# Patient Record
Sex: MALE | Race: WHITE | NOT HISPANIC OR LATINO | Employment: OTHER | URBAN - NONMETROPOLITAN AREA
[De-identification: names, ages, dates, MRNs, and addresses within clinical notes are randomized per-mention and may not be internally consistent; named-entity substitution may affect disease eponyms.]

---

## 2017-01-09 ENCOUNTER — OFFICE VISIT (OUTPATIENT)
Dept: GASTROENTEROLOGY | Facility: CLINIC | Age: 62
End: 2017-01-09

## 2017-01-09 VITALS
HEART RATE: 80 BPM | BODY MASS INDEX: 31.07 KG/M2 | RESPIRATION RATE: 18 BRPM | SYSTOLIC BLOOD PRESSURE: 158 MMHG | HEIGHT: 72 IN | DIASTOLIC BLOOD PRESSURE: 120 MMHG | WEIGHT: 229.4 LBS

## 2017-01-09 DIAGNOSIS — Z80.0 FAMILY HX OF COLON CANCER: ICD-10-CM

## 2017-01-09 DIAGNOSIS — I10 HTN (HYPERTENSION), BENIGN: ICD-10-CM

## 2017-01-09 DIAGNOSIS — Z86.010 HX OF COLONIC POLYPS: Primary | ICD-10-CM

## 2017-01-09 DIAGNOSIS — E11.9 CONTROLLED TYPE 2 DIABETES MELLITUS WITHOUT COMPLICATION, WITHOUT LONG-TERM CURRENT USE OF INSULIN (HCC): ICD-10-CM

## 2017-01-09 PROCEDURE — S0260 H&P FOR SURGERY: HCPCS | Performed by: CLINICAL NURSE SPECIALIST

## 2017-01-09 RX ORDER — GLIPIZIDE 5 MG/1
TABLET, FILM COATED, EXTENDED RELEASE ORAL
COMMUNITY
End: 2017-02-07

## 2017-01-09 NOTE — PROGRESS NOTES
Chief Complaint   Patient presents with   • Colonoscopy     screening      Subjective   HPI  Joao Fonseca is a 61 y.o. male who presents as a referral for preventative maintenance. He has no complaints of nausea or vomiting. No change in bowels. No wt loss. No BRBPR. No melena. There is a positive family hx for colon cancer in his mother. No abdominal pain.    Past Medical History   Diagnosis Date   • Arrhythmia    • Cancer      malignant chest tumor   • Cardiomyopathy 12/27/2016   • CHF (congestive heart failure)    • Coronary artery disease    • Diabetes mellitus    • Disorder of liver 8/12/2013   • History of adenomatous polyp of colon 06/28/2010     TUBULAR ADENOMA AT 40CM   • Hyperlipidemia    • Hypertension    • Pacemaker    • Shingles      Past Surgical History   Procedure Laterality Date   • Coronary artery bypass graft     • Cardiac catheterization     • Tonsillectomy     • Hernia repair     • Cardiac electrophysiology procedure Left 10/6/2016     Procedure: ICD DC new;  Surgeon: Zander Andino MD;  Location:  [facility];  Service:    • Ankle surgery Right    • Hemorrhoidectomy  1980   • Colonoscopy w/ polypectomy  06/28/2010     POLYP AT 40CM TUBULAR ADENOMA, SUBOPTIMAL PREP   • Pacemaker implantation     • Colonoscopy  06/28/2010     biopsy at 41, 2 mm polyp supboptimal prep right clon      Outpatient Prescriptions Marked as Taking for the 1/9/17 encounter (Office Visit) with DIOR Caceres   Medication Sig Dispense Refill   • acyclovir (ZOVIRAX) 400 MG tablet Take 1 tablet by mouth Daily. Take no more than 5 doses a day. 90 tablet 2   • aspirin 81 MG EC tablet Take 1 tablet by mouth Daily. 90 tablet 2   • furosemide (LASIX) 40 MG tablet Take 1 tablet by mouth Daily As Needed (edema, SOA). 90 tablet 2   • lisinopril (PRINIVIL,ZESTRIL) 20 MG tablet Take 1 tablet by mouth Daily. 90 tablet 2   • metFORMIN (GLUCOPHAGE) 1000 MG tablet 1 po daily in the am and 1/2 po daily  in the pm 180 tablet 2   • metoprolol tartrate (LOPRESSOR) 25 MG tablet Take 25 mg by mouth 2 (Two) Times a Day.     • nitroglycerin (NITROSTAT) 0.4 MG SL tablet 1 under the tongue as needed for angina, may repeat q5mins for up three doses 100 tablet 11     Allergies   Allergen Reactions   • Cephalexin Anaphylaxis     Social History     Social History   • Marital status:      Spouse name: N/A   • Number of children: N/A   • Years of education: N/A     Occupational History   • Not on file.     Social History Main Topics   • Smoking status: Former Smoker     Types: Cigarettes   • Smokeless tobacco: Never Used      Comment: QUIT 20 YEARS AGO   • Alcohol use No   • Drug use: No   • Sexual activity: Defer     Other Topics Concern   • Not on file     Social History Narrative     Family History   Problem Relation Age of Onset   • Diabetes Mother    • Colon cancer Mother    • Heart disease Father    • Hypertension Father    • Diabetes Father    • Cancer Father    • Bipolar disorder Sister    • Hypertension Brother    • Colon polyps Brother    • No Known Problems Son    • No Known Problems Maternal Grandmother    • Diabetes Maternal Grandfather    • No Known Problems Paternal Grandmother    • Diabetes Paternal Grandfather      Health Maintenance   Topic Date Due   • HEPATITIS C SCREENING  11/07/2016   • DIABETIC EYE EXAM  11/07/2016   • COLONOSCOPY  11/07/2016   • DIABETIC FOOT EXAM  11/07/2017   • LIPID PANEL  11/07/2017   • HEMOGLOBIN A1C  11/07/2017   • URINE MICROALBUMIN  11/07/2017   • TDAP/TD VACCINES (2 - Td) 11/07/2026   • PNEUMOCOCCAL VACCINE (19-64 MEDIUM RISK)  Addressed   • INFLUENZA VACCINE  Addressed   • ZOSTER VACCINE  Completed       REVIEW OF SYSTEMS  General: well appearing, no fever chills or sweats, no unexplained wt loss  HEENT: no acute visual or hearing disturbances  Cardiovascular: No chest pain or palpitations  Pulmonary: No shortness of breath, coughing, wheezing or hemoptysis  : No  "burning, urgency, hematuria, or dysuria  Musculoskeletal: No joint pain or stiffness  Peripheral: no edema  Skin: No lesions or rashes  Neuro: No dizziness, headaches, stroke, syncope  Endocrine: No hot or cold intolerances  Hematological: No blood dyscrasias    Objective   Vitals:    01/09/17 0824   BP: (!) 158/120   BP Location: Left arm   Patient Position: Sitting   Cuff Size: Adult   Pulse: 80   Resp: 18   Weight: 229 lb 6.4 oz (104 kg)   Height: 72\" (182.9 cm)     Body mass index is 31.11 kg/(m^2).    PHYSICAL EXAM  General: age appropriate well nourished well appearing, no acute distress  Head: normocephalic and atraumatic  Global assessment-supple  Neck-No JVD noted, no lymphadenopathy  Pulmonary-clear to auscultation bilaterally, normal respiratory effort  Cardiovascular-normal rate and rhythm, normal heart sounds, S1 and S2 noted  Abdomen-soft, non tender, non distended, normal bowel sounds all 4 quadrants, no hepatosplenomegaly noted  Extremities-No clubbing cyanosis or edema  Neuro-Non focal, converses appropriately, awake, alert, oriented    Assessment/Plan     Joao was seen today for colonoscopy.    Diagnoses and all orders for this visit:    Hx of colonic polyps  -     polyethylene glycol (GoLYTELY) 236 G solution; Starting at noon on day prior to procedure, drink 8 ounces every 30 minutes until all gone or stools are clear. May add flavor packet.  -     Case Request; Standing  -     Implement Anesthesia Orders Day of Procedure; Standing  -     Obtain Informed Consent; Standing  -     Verify Informed Consent; Standing  -     Verify bowel prep was successful; Standing  -     Case Request    Family hx of colon cancer  Comments:  in his mother    Controlled type 2 diabetes mellitus without complication, without long-term current use of insulin    HTN (hypertension), benign  Comments:  continue BP medication the day of procedure        COLONOSCOPY WITH ANESTHESIA (N/A)  Body mass index is 31.11 " kg/(m^2).    Patient instructions on prep prior to procedure provided to the patient.    All risks, benefits, alternatives, and indications of colonoscopy procedure have been discussed with the patient. Risks to include perforation of the colon requiring possible surgery or colostomy, risk of bleeding from biopsies or removal of colon tissue, possibility of missing a colon polyp or cancer, or adverse drug reaction.  Benefits to include the diagnosis and management of disease of the colon and rectum. Alternatives to include barium enema, radiographic evaluation, lab testing or no intervention. Pt verbalizes understanding and agrees.

## 2017-01-09 NOTE — MR AVS SNAPSHOT
Joao Cuencaer   1/9/2017 2:00 PM   Office Visit    Dept Phone:  [phone]   Encounter #:  [id number]    Provider:  DIOR Caceres   Department:  [facility] GASTROENTEROLOGY                Your Full Care Plan              Today's Medication Changes          These changes are accurate as of: 1/9/17  9:03 AM.  If you have any questions, ask your nurse or doctor.               New Medication(s)Ordered:     polyethylene glycol 236 G solution   Commonly known as:  GoLYTELY   Starting at noon on day prior to procedure, drink 8 ounces every 30 minutes until all gone or stools are clear. May add flavor packet.   Started by:  DIOR Caceres            Where to Get Your Medications      These medications were sent to [facility]/pharmacy #[id number] - [place], [place] - [address] [place] [address] - [phone]  - [phone] [address] [address]     Phone:  [phone]     polyethylene glycol 236 G solution                  Your Updated Medication List          This list is accurate as of: 1/9/17  9:03 AM.  Always use your most recent med list.                acyclovir 400 MG tablet   Commonly known as:  ZOVIRAX   Take 1 tablet by mouth Daily. Take no more than 5 doses a day.       aspirin 81 MG EC tablet   Take 1 tablet by mouth Daily.       furosemide 40 MG tablet   Commonly known as:  LASIX   Take 1 tablet by mouth Daily As Needed (edema, SOA).       glipiZIDE 5 MG ER tablet   Commonly known as:  GLUCOTROL       lisinopril 20 MG tablet   Commonly known as:  PRINIVIL,ZESTRIL   Take 1 tablet by mouth Daily.       metFORMIN 1000 MG tablet   Commonly known as:  GLUCOPHAGE   1 po daily in the am and 1/2 po daily in the pm       metoprolol tartrate 25 MG tablet   Commonly known as:  LOPRESSOR       nitroglycerin 0.4 MG SL tablet   Commonly known as:  NITROSTAT   1 under the tongue as needed for angina, may repeat q5mins for up three doses       polyethylene  glycol 236 G solution   Commonly known as:  GoLYTELY   Starting at noon on day prior to procedure, drink 8 ounces every 30 minutes until all gone or stools are clear. May add flavor packet.               We Performed the Following     Case Request       You Were Diagnosed With        Codes Comments    Hx of colonic polyps    -  Primary ICD-10-CM: Z86.010  ICD-9-CM: V12.72     Family hx of colon cancer     ICD-10-CM: Z80.0  ICD-9-CM: V16.0 in his mother    Controlled type 2 diabetes mellitus without complication, without long-term current use of insulin     ICD-10-CM: E11.9  ICD-9-CM: 250.00     HTN (hypertension), benign     ICD-10-CM: I10  ICD-9-CM: 401.1 continue BP medication the day of procedure      Instructions     None    Patient Instructions History      Upcoming Appointments     Visit Type Date Time Department    SCOPE SCREENING 1/9/2017  2:00 PM [facility]    FOLLOW UP 2/7/2017  7:00 AM [facility]    FOLLOW UP 3/29/2017  8:00 AM [facility]      MyChart Signup     Our records indicate that you have declined [employer] signup. If you would like to sign up for [employer], please email [email or web address] or call [phone] to obtain an activation code.             Other Info from Your Visit           Your Appointments     Jan 09, 2017  2:00 PM CST   Scope Screen with DIOR Caceres   [facility] GASTROENTEROLOGY (--)    [address]  [facility] [address] [phone]            Feb 07, 2017  7:00 AM CST   Follow Up with Hira Alvarez MD   [facility] FAM[facility] MEDICINE (--)    [address] [place] [id number] [phone]           Arrive 15 minutes prior to appointment.            Mar 29, 2017  8:00 AM CDT   Follow Up with [facility] NP   [facility] (--)    [address]  [facility] [address] [phone]           Arrive 15 minutes prior to  "appointment.              Allergies     Cephalexin  Anaphylaxis      Reason for Visit     Colonoscopy screening       Vital Signs     Blood Pressure Pulse Respirations Height Weight Body Mass Index    158/120 (BP Location: Left arm, Patient Position: Sitting, Cuff Size: Adult) 80 18 72\" (182.9 cm) 229 lb 6.4 oz (104 kg) 31.11 kg/m2    Smoking Status                   Former Smoker           Problems and Diagnoses Noted     Controlled type 2 diabetes mellitus without complication, without long-term current use of insulin    Family hx of colon cancer    HTN (hypertension), benign    History of colonic polyps        "

## 2017-01-12 ENCOUNTER — HOSPITAL ENCOUNTER (OUTPATIENT)
Facility: HOSPITAL | Age: 62
Setting detail: HOSPITAL OUTPATIENT SURGERY
End: 2017-01-12
Attending: INTERNAL MEDICINE | Admitting: INTERNAL MEDICINE

## 2017-02-02 DIAGNOSIS — I10 ESSENTIAL HYPERTENSION: ICD-10-CM

## 2017-02-02 RX ORDER — LISINOPRIL 20 MG/1
20 TABLET ORAL DAILY
Qty: 90 TABLET | Refills: 3 | Status: SHIPPED | OUTPATIENT
Start: 2017-02-02 | End: 2018-01-05 | Stop reason: SDUPTHER

## 2017-02-07 ENCOUNTER — OFFICE VISIT (OUTPATIENT)
Dept: FAMILY MEDICINE CLINIC | Facility: CLINIC | Age: 62
End: 2017-02-07

## 2017-02-07 VITALS
OXYGEN SATURATION: 99 % | SYSTOLIC BLOOD PRESSURE: 128 MMHG | HEART RATE: 64 BPM | TEMPERATURE: 97.8 F | WEIGHT: 225.4 LBS | BODY MASS INDEX: 30.53 KG/M2 | HEIGHT: 72 IN | RESPIRATION RATE: 18 BRPM | DIASTOLIC BLOOD PRESSURE: 72 MMHG

## 2017-02-07 DIAGNOSIS — L30.9 ECZEMA, UNSPECIFIED TYPE: Primary | ICD-10-CM

## 2017-02-07 DIAGNOSIS — E11.9 TYPE 2 DIABETES MELLITUS WITH HEMOGLOBIN A1C GOAL OF LESS THAN 7.5% (HCC): ICD-10-CM

## 2017-02-07 DIAGNOSIS — I10 HTN (HYPERTENSION), BENIGN: ICD-10-CM

## 2017-02-07 DIAGNOSIS — B35.1 ONYCHOMYCOSIS: ICD-10-CM

## 2017-02-07 DIAGNOSIS — I50.22 SYSTOLIC HEART FAILURE, CHRONIC (HCC): ICD-10-CM

## 2017-02-07 DIAGNOSIS — Z95.810 AICD (AUTOMATIC CARDIOVERTER/DEFIBRILLATOR) PRESENT: ICD-10-CM

## 2017-02-07 LAB — HBA1C MFR BLD: 7.6 %

## 2017-02-07 PROCEDURE — 83036 HEMOGLOBIN GLYCOSYLATED A1C: CPT | Performed by: FAMILY MEDICINE

## 2017-02-07 PROCEDURE — 99214 OFFICE O/P EST MOD 30 MIN: CPT | Performed by: FAMILY MEDICINE

## 2017-02-07 RX ORDER — GLIPIZIDE 5 MG/1
5 TABLET, FILM COATED, EXTENDED RELEASE ORAL DAILY
Qty: 30 TABLET | Refills: 2 | Status: SHIPPED | OUTPATIENT
Start: 2017-02-07 | End: 2017-02-07

## 2017-02-07 RX ORDER — CLOBETASOL PROPIONATE 0.5 MG/G
CREAM TOPICAL 2 TIMES DAILY
Qty: 15 G | Refills: 0 | Status: SHIPPED | OUTPATIENT
Start: 2017-02-07 | End: 2017-08-24

## 2017-02-07 NOTE — PROGRESS NOTES
Chief Complaint   Patient presents with   • Follow-up     Patient said there are no problems today.        History:  Joao Fonseca is a 61 y.o. male presents who today for evaluation of the above problems.  PCP currently listed as Hira Alvarez MD.     Heart is in rhythm, showing good rate of 64. No chest pain, No new SOA actually improved from last OV.  Weight is down 4 lb from last OV and 4 pounds from prior OV.  Total of 8 lb drop from 12/29/16.  BP is stable at 128/72.  No refills needed of any medications.  Still using Baby ASA,  Lasix intermittently when needed.  Lopressor regularly.  Not needing nitro.  They gave him 100 pills and he was uncertain why he needed this many pills.  SS started and they stopped his disability.  He talked with an  and working on seeing why this process happened.  Last 1C 7.5 11/7/16.  Still taking metformin.  We are okay to keep his A1C <7.5.  He is on metformin and tolerating this well.  On Ace-I with lisinopril.  He is feeling better now than he has in a long time.  New problem Eczematous Rash:  Right anterior shin #2 lesions and along entirety of tibia.  Present on thighs. Worse with stress, worse in winter.  Improves with triamcinolone but takes a long time.  We discussed R/B/A to clobetasol and he wants to try the stronger agent. Aware to not occlude the chemical on the shins.  Use as needed avoid getting into the eye.  He is going to [place] soon.  Mild tenderness along anterior chest wall at location where he had his median sternotomy and along the left anterior chest.  He has had chronic shoulder pain/stiffness since the procedure.  He has some frozen shoulder bilaterally.  Due for colonoscopy 04/2017    Joao Fonseca  has a past medical history of Arrhythmia; Cancer; Cardiomyopathy (12/27/2016); CHF (congestive heart failure); Coronary artery disease; Diabetes mellitus; Disorder of liver (8/12/2013); History of adenomatous polyp of colon (06/28/2010);  Hyperlipidemia; Hypertension; Pacemaker; and Shingles.    Allergies   Allergen Reactions   • Cephalexin Anaphylaxis     Past Medical History   Diagnosis Date   • Arrhythmia    • Cancer      malignant chest tumor   • Cardiomyopathy 12/27/2016   • CHF (congestive heart failure)    • Coronary artery disease    • Diabetes mellitus    • Disorder of liver 8/12/2013   • History of adenomatous polyp of colon 06/28/2010     TUBULAR ADENOMA AT 40CM   • Hyperlipidemia    • Hypertension    • Pacemaker    • Shingles      Past Surgical History   Procedure Laterality Date   • Coronary artery bypass graft     • Cardiac catheterization     • Tonsillectomy     • Hernia repair     • Cardiac electrophysiology procedure Left 10/6/2016     Procedure: ICD DC new;  Surgeon: Zander Andino MD;  Location:  [facility];  Service:    • Ankle surgery Right    • Hemorrhoidectomy  1980   • Colonoscopy w/ polypectomy  06/28/2010     POLYP AT 40CM TUBULAR ADENOMA, SUBOPTIMAL PREP   • Pacemaker implantation     • Colonoscopy  06/28/2010     biopsy at 41, 2 mm polyp supboptimal prep right clon      Family History   Problem Relation Age of Onset   • Diabetes Mother    • Colon cancer Mother    • Heart disease Father    • Hypertension Father    • Diabetes Father    • Cancer Father    • Bipolar disorder Sister    • Hypertension Brother    • Colon polyps Brother    • No Known Problems Son    • No Known Problems Maternal Grandmother    • Diabetes Maternal Grandfather    • No Known Problems Paternal Grandmother    • Diabetes Paternal Grandfather        Current Outpatient Prescriptions on File Prior to Visit   Medication Sig Dispense Refill   • acyclovir (ZOVIRAX) 400 MG tablet Take 1 tablet by mouth Daily. Take no more than 5 doses a day. 90 tablet 2   • aspirin 81 MG EC tablet Take 1 tablet by mouth Daily. 90 tablet 2   • furosemide (LASIX) 40 MG tablet Take 1 tablet by mouth Daily As Needed (edema, SOA). 90 tablet 2   • lisinopril  "(PRINIVIL,ZESTRIL) 20 MG tablet Take 1 tablet by mouth Daily. 90 tablet 3   • metFORMIN (GLUCOPHAGE) 1000 MG tablet 1 po daily in the am and 1/2 po daily in the pm 180 tablet 2   • metoprolol tartrate (LOPRESSOR) 25 MG tablet Take 1 tablet by mouth 2 (Two) Times a Day. 180 tablet 3   • nitroglycerin (NITROSTAT) 0.4 MG SL tablet 1 under the tongue as needed for angina, may repeat q5mins for up three doses 100 tablet 11   • [DISCONTINUED] glipiZIDE (GLUCOTROL) 5 MG ER tablet Take 5 mg by mouth daily.     • [DISCONTINUED] polyethylene glycol (GoLYTELY) 236 G solution Starting at noon on day prior to procedure, drink 8 ounces every 30 minutes until all gone or stools are clear. May add flavor packet. 4000 mL 0     No current facility-administered medications on file prior to visit.         ROS:  Review of Systems   Constitutional: Negative for activity change, appetite change and chills.   HENT: Negative for congestion, dental problem and drooling.    Eyes: Negative for pain, discharge and itching.   Respiratory: Negative for apnea, choking and chest tightness.    Cardiovascular: Negative for chest pain, palpitations and leg swelling.   Gastrointestinal: Negative for abdominal distention, abdominal pain and anal bleeding.   Endocrine: Negative for cold intolerance, heat intolerance and polydipsia.   Genitourinary: Negative for difficulty urinating, dysuria and enuresis.   Musculoskeletal: Negative for arthralgias, back pain and gait problem.   Skin: Negative for color change, pallor and rash.   Neurological: Negative for dizziness and facial asymmetry.   Hematological: Negative for adenopathy. Does not bruise/bleed easily.   Psychiatric/Behavioral: Negative for agitation, behavioral problems and confusion.       OBJECTIVE:  Visit Vitals   • /72 (BP Location: Left arm, Patient Position: Sitting, Cuff Size: Large Adult)   • Pulse 64   • Temp 97.8 °F (36.6 °C) (Oral)   • Resp 18   • Ht 72\" (182.9 cm)   • Wt 225 lb " 6.4 oz (102 kg)   • SpO2 99%   • BMI 30.57 kg/m2      Physical Exam   Constitutional: He is oriented to person, place, and time. He appears well-developed and well-nourished.   HENT:   Head: Normocephalic and atraumatic.   Right Ear: External ear normal.   Left Ear: External ear normal.   Nose: Nose normal.   Mouth/Throat: Oropharynx is clear and moist.   Head- normocephalic and atraumatic.  Neck- without visible/palpable lumps or pulsations.  Palpation- No bony tenderness about head/neck along frontal, occipital, temporal, parietal, mastoid, jawline, zygoma, orbit or any other location.  NO temporal artery tenderness. No TMJ tenderness. Neck Supple.  Thyroid-No thyromegaly, no nodules     Lesion on the chin is resolved, he has no visible defect now. Nothing to I+D or biopsy.    Eyes: Conjunctivae and EOM are normal. Pupils are equal, round, and reactive to light.   Neck: Normal range of motion. Neck supple. No thyromegaly present.   Cardiovascular: Normal rate, regular rhythm and intact distal pulses.  PMI is displaced (mildly inferiorly and laterally).  Exam reveals distant heart sounds.    No murmur heard.   No systolic murmur is present   Distant heart sounds.    Pulmonary/Chest: Effort normal and breath sounds normal. No respiratory distress. He has no wheezes. He exhibits no tenderness.   Abdominal: Soft. Bowel sounds are normal. There is no tenderness. There is no rebound and no guarding.   Musculoskeletal: Normal range of motion. He exhibits no tenderness.   Lymphadenopathy:     He has no cervical adenopathy.   Neurological: He is alert and oriented to person, place, and time. No cranial nerve deficit. Coordination normal.   Skin: Skin is warm and dry. No rash noted.   Right anterior shin with #2 nummular type lesions 3.5 x 2.5 cm distally, proximally 3 x 2 cm mid shin.  All along tibia inflammatory, erythematous process.  Bilateral anterior thigh with xeroderma, excoriation and appearance of dry skin with  underlying eczematous process.  Spared around wasteline.  Similar lesions along bilateral lateral epicondyle L>R.    Psychiatric: He has a normal mood and affect. His behavior is normal. Judgment and thought content normal.   Nursing note and vitals reviewed.      Assessment/Plan:  Eczema, unspecified type  -     clobetasol (TEMOVATE) 0.05 % cream; Apply  topically 2 (Two) Times a Day.    Onychomycosis: R/B/A to meds d/w patient, SE reviewed, handout offered regarding medications listed.    New problem.  We will address this today. D/w patient jublia, ciclopirox and we will try jublia.  He is not interested in lamisil.   -     Efinaconazole (JUBLIA) 10 % solution; Apply 1 drop topically Daily. Every 7th day wipe off with alcohol.  For great toenail use 2 drops.    AICD (automatic cardioverter/defibrillator) present: tenderness anterior chest wall and along sternal scar and along the left pocket.  I discussed with patient to consider topical shingles cream sparingly over scar.      HTN (hypertension), benign: Chronic and stable problem. Using medications as prescribed.  Discussed need to take regularly as prescribed, to avoid missing doses as able.  Medications reviewed with patient today. We discussed cessation/continuation of medications for current problem.  Needed Rx refills will be provided.  No side effects from medications.  Risks/benefits to current therapy discussed      Systolic heart failure, chronic: Chronic and stable problem. Using medications as prescribed.  Discussed need to take regularly as prescribed, to avoid missing doses as able.  Medications reviewed with patient today. We discussed cessation/continuation of medications for current problem.  Needed Rx refills will be provided.  No side effects from medications.  Risks/benefits to current therapy discussed.       Type 2 diabetes mellitus with hemoglobin A1c goal of less than 7.5%: Diabetes Type 2:  poorly controlled. Insulin non dependent.  Nephropathy, Retinopathy, Neuropahty status discussed.  Reviewed goals of Diabetes today.  The Goal is to have an Hgb A1C <7.0 for most patients.  Good BP control is encouraged with Goal BP based on JNC 8 guidelines 2014 of SBP <140 and DBP <90.  At goal: yes.  Discussed role of Ace-I and ARB with DM.  DM imparts risk equivalence for CAD based on ATP III.  Current guidelines support moderate intensity statin with goal of 30-50% reduction in LDL unless 10 yr risk ASCVD >7.5 then high intensity should be used. Close monitoring of Lipid levels encouraged. Recommend once yearly eye evaluation by optometry or ophthalmology.  Good foot health discussed and foot exam completed today and listed in examination.  Recommended to monitor toe health, wear good shoes, cut nails straight across and tend calluses as listed.  Take medications as encouraged.  Monitor blood sugars as encouraged and bring log to future meetings. Weight needs to be monitored. Monitor portions and caloric intake.    · Pneumovax frequency discussed.    · Patients <65 years old should have PPSV 23 q 5 years x 2 up to age 65 then once after age 65.    · Prevnar at age 65 and then 1 year later PPSV 23.    · Recent data suggest there may be a link between metformin and B12.  D/W patient to consider evaluation for b12 deficiency.  · Recommend yearly microalbumin  · Yearly eye evaluation  · Discussed importance of Ace-I and ARB  · Monitor portions  · Work on regular exercise and diet to keep BMI in appropriate range.   · Lab review: labs are reviewed, up to date and normal except a1c today at 7.6.  We will have him add new agent.  Discussed glipizde initially.  With heart heart health, he may benefit more from jardiance. R/B/A to meds d/w patient, SE reviewed, handout offered regarding medications listed.      · See orders for this visit as documented in the electronic medical record. Diabetic issues reviewed with male: all medications, side effects and  compliance discussed carefully, foot care discussed and Podiatry visits discussed, annual eye examinations at Ophthalmology discussed, glycohemoglobin and other lab monitoring discussed and long term diabetic complications discussed.   -     POC Glycosylated Hemoglobin (Hb A1C)  -     Empagliflozin (JARDIANCE) 10 MG tablet; Take 10 mg by mouth Daily.            Risks/benefits of current and new medications discussed with the patient and or family today.  The patient/family are aware and accept that if there any side effects they should call or return to clinic as soon as possible.  Appropriate F/U discussed for topics addressed today. All questions were answered to the satisfactory state of patient/family.  Should symptoms fail to improve or worsen they agree to call or return to clinic or to go to the ER. Education handouts were offered on any new Rx if requested.  Discussed the importance of following up with any needed screening tests/labs/specialist appointments and any requested follow-up recommended by me today.  Importance of maintaining follow-up discussed and patient accepts that missed appointments can delay diagnosis and potentially lead to worsening of conditions.    An After Visit Summary was printed and given to the patient at discharge.  Return in about 1 month (around 3/7/2017).         Hira Alvarez M.D. 2/7/2017

## 2017-02-13 ENCOUNTER — TELEPHONE (OUTPATIENT)
Dept: FAMILY MEDICINE CLINIC | Facility: CLINIC | Age: 62
End: 2017-02-13

## 2017-02-13 NOTE — TELEPHONE ENCOUNTER
Patient said since he has started jardiance and he has been having dizzy spells. He blacked out last night and said he is not going to continue the medication. He said he will change his diet to control his sugars.

## 2017-02-15 ENCOUNTER — TELEPHONE (OUTPATIENT)
Dept: CARDIOLOGY | Facility: CLINIC | Age: 62
End: 2017-02-15

## 2017-02-15 NOTE — TELEPHONE ENCOUNTER
Patients pcp started him on Jardiance? and he said he became dehydrated and blood pressure dropped making his defibrillator gave him a shock. He wanted you to know this and wants to know if it will be safe to travel to [place] for 2 weeks. Please advise.

## 2017-02-16 NOTE — TELEPHONE ENCOUNTER
So long as he is feeling back to normal at this time, no obvious reason why he could not travel to [place].      Thanks

## 2017-04-13 ENCOUNTER — TELEPHONE (OUTPATIENT)
Dept: GASTROENTEROLOGY | Facility: CLINIC | Age: 62
End: 2017-04-13

## 2017-04-13 NOTE — TELEPHONE ENCOUNTER
I called patient because he was showing up on [facility] for a colon tomorrow but not on our paper schedule I called wife and she said she called and cancelled that due to patients insurance will not cover procedure at this time.

## 2017-04-28 ENCOUNTER — TELEPHONE (OUTPATIENT)
Dept: GASTROENTEROLOGY | Facility: CLINIC | Age: 62
End: 2017-04-28

## 2017-06-13 ENCOUNTER — TELEPHONE (OUTPATIENT)
Dept: CARDIOLOGY | Facility: CLINIC | Age: 62
End: 2017-06-13

## 2017-06-13 ENCOUNTER — OFFICE VISIT (OUTPATIENT)
Dept: FAMILY MEDICINE CLINIC | Facility: CLINIC | Age: 62
End: 2017-06-13

## 2017-06-13 VITALS
HEIGHT: 72 IN | HEART RATE: 90 BPM | OXYGEN SATURATION: 98 % | DIASTOLIC BLOOD PRESSURE: 80 MMHG | RESPIRATION RATE: 18 BRPM | WEIGHT: 232 LBS | BODY MASS INDEX: 31.42 KG/M2 | TEMPERATURE: 97.9 F | SYSTOLIC BLOOD PRESSURE: 128 MMHG

## 2017-06-13 DIAGNOSIS — I83.90 VARICOSITIES OF LEG: ICD-10-CM

## 2017-06-13 DIAGNOSIS — M25.521 RIGHT ELBOW PAIN: Primary | ICD-10-CM
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
## 2017-06-13 PROCEDURE — 99214 OFFICE O/P EST MOD 30 MIN: CPT | Performed by: FAMILY MEDICINE

## 2017-06-13 NOTE — TELEPHONE ENCOUNTER
Patient has appointment to see a chiropractor for a possible pinched nerve in his hip and also seeing pcp for gout. He would like to know if he can take inflammatories and ibuprofen? Please advise.

## 2017-06-13 NOTE — TELEPHONE ENCOUNTER
In general, I would avoid use of NSAIDs, including ibuprofen with ha history of CAD and CHF, if at all possible.

## 2017-06-13 NOTE — PROGRESS NOTES
Chief Complaint   Patient presents with   • Elbow Pain     started last night right elbow    • Tick Removal     patient reports that he removed a tick from his back a few days ago        History:  Joao Fonseca is a 62 y.o. male presents who today for evaluation of the above problems.  PCP currently listed as Hira Alvarez MD.   Right elbow pain 24 hours. Minimally edematous, mildly hot to touch.  NO obvious/remembered trauma, no pain, no injury.  His wife thinks he did hit this on the edge of the pool when he fell, but he thinks he would remember.  Pain with resting elbow on anything. No pain with flexion/extension of his elbow.  Swelling is better today but was worse in past 3-4 days.  Pain is minimal unless resting elbow on surface/touching area.  He has history of gout previously. 4 days ago sitting at pool.  He has not built deck yet.  He stood on bottom of step that was cracked and the step broke.  When it broke he bruised bottoms of feet and hit elbow on the pool.  He had tick on his back overnight <24 hours that he removed.  He has a 6cm x 4cm erythematous region that is not an EM rash along the left upper back.  Prominent varicose veins.  Watns to see specialist.  Leg heaviness fullness and edema.     Joao Fonseca  has a past medical history of Arrhythmia; Cancer; Cardiomyopathy (12/27/2016); CHF (congestive heart failure); Coronary artery disease; Diabetes mellitus; Disorder of liver (8/12/2013); History of adenomatous polyp of colon (06/28/2010); Hyperlipidemia; Hypertension; Pacemaker; and Shingles.    Allergies   Allergen Reactions   • Cephalexin Anaphylaxis     Past Medical History:   Diagnosis Date   • Arrhythmia    • Cancer     malignant chest tumor   • Cardiomyopathy 12/27/2016   • CHF (congestive heart failure)    • Coronary artery disease    • Diabetes mellitus    • Disorder of liver 8/12/2013   • History of adenomatous polyp of colon 06/28/2010    TUBULAR ADENOMA AT 40CM   •  Hyperlipidemia    • Hypertension    • Pacemaker    • Shingles      Past Surgical History:   Procedure Laterality Date   • ANKLE SURGERY Right    • CARDIAC CATHETERIZATION     • CARDIAC ELECTROPHYSIOLOGY PROCEDURE Left 10/6/2016    Procedure: ICD DC new;  Surgeon: Zander Andino MD;  Location:  [facility];  Service:    • COLONOSCOPY  06/28/2010    biopsy at 41, 2 mm polyp supboptimal prep right clon    • COLONOSCOPY W/ POLYPECTOMY  06/28/2010    POLYP AT 40CM TUBULAR ADENOMA, SUBOPTIMAL PREP   • CORONARY ARTERY BYPASS GRAFT     • HEMORRHOIDECTOMY  1980   • HERNIA REPAIR     • PACEMAKER IMPLANTATION     • TONSILLECTOMY       Family History   Problem Relation Age of Onset   • Diabetes Mother    • Colon cancer Mother    • Heart disease Father    • Hypertension Father    • Diabetes Father    • Cancer Father    • Bipolar disorder Sister    • Hypertension Brother    • Colon polyps Brother    • No Known Problems Son    • No Known Problems Maternal Grandmother    • Diabetes Maternal Grandfather    • No Known Problems Paternal Grandmother    • Diabetes Paternal Grandfather        Current Outpatient Prescriptions on File Prior to Visit   Medication Sig Dispense Refill   • acyclovir (ZOVIRAX) 400 MG tablet Take 1 tablet by mouth Daily. Take no more than 5 doses a day. 90 tablet 2   • aspirin 81 MG EC tablet Take 1 tablet by mouth Daily. 90 tablet 2   • clobetasol (TEMOVATE) 0.05 % cream Apply  topically 2 (Two) Times a Day. 15 g 0   • Efinaconazole (JUBLIA) 10 % solution Apply 1 drop topically Daily. Every 7th day wipe off with alcohol.  For great toenail use 2 drops. 8 mL 2   • furosemide (LASIX) 40 MG tablet Take 1 tablet by mouth Daily As Needed (edema, SOA). 90 tablet 2   • lisinopril (PRINIVIL,ZESTRIL) 20 MG tablet Take 1 tablet by mouth Daily. 90 tablet 3   • metFORMIN (GLUCOPHAGE) 1000 MG tablet 1 po daily in the am and 1/2 po daily in the pm 180 tablet 2   • metoprolol tartrate (LOPRESSOR) 25 MG  "tablet Take 1 tablet by mouth 2 (Two) Times a Day. 180 tablet 3   • nitroglycerin (NITROSTAT) 0.4 MG SL tablet 1 under the tongue as needed for angina, may repeat q5mins for up three doses 100 tablet 11     No current facility-administered medications on file prior to visit.        Family history, surgical history, past medical history, Allergies and meds reviewed with patient today and updated in [facility] EMR.     ROS:  Review of Systems   Constitutional: Negative.    HENT: Negative.    Eyes: Negative.    Respiratory: Negative.    Cardiovascular: Negative.    Gastrointestinal: Negative.    Endocrine: Negative.    Genitourinary: Negative.    Musculoskeletal: Positive for joint swelling.   Skin: Negative.    Allergic/Immunologic: Negative.    Neurological: Negative.    Hematological: Negative.    Psychiatric/Behavioral: Negative.        OBJECTIVE:  Vitals:    06/13/17 1441   BP: 128/80   BP Location: Left arm   Patient Position: Sitting   Cuff Size: Adult   Pulse: 90   Resp: 18   Temp: 97.9 °F (36.6 °C)   TempSrc: Oral   SpO2: 98%   Weight: 232 lb (105 kg)   Height: 72\" (182.9 cm)     Physical Exam   Constitutional: He is oriented to person, place, and time. He appears well-developed and well-nourished.   HENT:   Head: Normocephalic and atraumatic.   Right Ear: External ear normal.   Left Ear: External ear normal.   Nose: Nose normal.   Mouth/Throat: Oropharynx is clear and moist.   Head- normocephalic and atraumatic.  Neck- without visible/palpable lumps or pulsations.  Palpation- No bony tenderness about head/neck along frontal, occipital, temporal, parietal, mastoid, jawline, zygoma, orbit or any other location.  NO temporal artery tenderness. No TMJ tenderness. Neck Supple.  Thyroid-No thyromegaly, no nodules      Eyes: Conjunctivae and EOM are normal. Pupils are equal, round, and reactive to light.   Neck: Normal range of motion. Neck supple. No thyromegaly present.   Cardiovascular: Normal rate, regular rhythm, " normal heart sounds and intact distal pulses.  PMI is displaced (mildly inferiorly and laterally).    No murmur heard.   No systolic murmur is present   Distant heart sounds.    Pulmonary/Chest: Effort normal and breath sounds normal. No respiratory distress. He has no wheezes. He exhibits no tenderness.   Abdominal: Soft. Bowel sounds are normal. There is no tenderness. There is no rebound and no guarding.   Musculoskeletal: Normal range of motion.        Right shoulder: He exhibits normal range of motion, no tenderness, no bony tenderness, no pain and no spasm.        Left shoulder: He exhibits normal range of motion, no tenderness, no bony tenderness, no pain and no spasm.        Right elbow: He exhibits normal range of motion, no swelling and no effusion. Tenderness found. Olecranon process tenderness noted. No radial head, no medial epicondyle and no lateral epicondyle tenderness noted.        Left elbow: He exhibits normal range of motion, no swelling, no effusion, no deformity and no laceration. No tenderness found. No medial epicondyle, no lateral epicondyle and no olecranon process tenderness noted.        Right wrist: He exhibits normal range of motion, no tenderness and no bony tenderness.   Lymphadenopathy:     He has no cervical adenopathy.   Neurological: He is alert and oriented to person, place, and time. No cranial nerve deficit. Coordination normal.   Skin: Skin is warm and dry. No rash noted.   Prominent varicosities bilaterally.    6cm x 4cm erythematous region without embedded tick.  Not EM rash.  Localized bite reaction likely. Present along left upper back just at base of scapula.    Psychiatric: He has a normal mood and affect. His behavior is normal. Judgment and thought content normal.   Nursing note and vitals reviewed.      Assessment/Plan:  Right elbow pain: Etiology unknown.  No obvious bursitis, no epicondylitis either.  He thinks he may have hit it on the pool deck.  I will treat as  if this is a contusion for now.  NO imaging today.  F/U in 1 week if not improving or sooner if worsening so we can consider imaging the joint.  No rubor/calor/dolor.    -     NON FORMULARY; ibuprofren 5%, baclofen 2%, gabapentin 6%, lidocaine 2.5%, sarapin 5%.    Varicosities of leg: Prominent varicosities, history of stripping veins. Not wearing hosing. L>R symptoms. Discussed referral to vein center.  Initially he was okay with this.  Called back after OV today and noted he wanted to go to Vascular surgery instead. Chronic problem worsening.   -     Cancel: Ambulatory Referral to Vascular Surgery  -     Ambulatory Referral to Vascular Surgery    Tick bite, initial encounter: he had a tick removed 2 days ago present on body <48 hours.  Localized pruritis else no rash, no HA, no other joint pain.   -     NON FORMULARY; ibuprofren 5%, baclofen 2%, gabapentin 6%, lidocaine 2.5%, sarapin 5%.      Risks/benefits of current and new medications discussed with the patient and or family today.  The patient/family are aware and accept that if there any side effects they should call or return to clinic as soon as possible.  Appropriate F/U discussed for topics addressed today. All questions were answered to the satisfactory state of patient/family.  Should symptoms fail to improve or worsen they agree to call or return to clinic or to go to the ER. Education handouts were offered on any new Rx if requested.  Discussed the importance of following up with any needed screening tests/labs/specialist appointments and any requested follow-up recommended by me today.  Importance of maintaining follow-up discussed and patient accepts that missed appointments can delay diagnosis and potentially lead to worsening of conditions.    An After Visit Summary was printed and given to the patient at discharge.  Follow-up: Return if symptoms worsen or fail to improve.         Hira Alvarez M.D. 6/13/2017

## 2017-06-14 NOTE — TELEPHONE ENCOUNTER
Told patient what Dr Henry said. He said his pharmacist is mixing a cream for him that will be put on topically for pain.

## 2017-08-24 ENCOUNTER — OFFICE VISIT (OUTPATIENT)
Dept: CARDIOLOGY | Facility: CLINIC | Age: 62
End: 2017-08-24

## 2017-08-24 VITALS
BODY MASS INDEX: 31.29 KG/M2 | DIASTOLIC BLOOD PRESSURE: 82 MMHG | HEIGHT: 72 IN | WEIGHT: 231 LBS | SYSTOLIC BLOOD PRESSURE: 123 MMHG | HEART RATE: 65 BPM

## 2017-08-24 DIAGNOSIS — I50.22 SYSTOLIC HEART FAILURE, CHRONIC (HCC): Primary | ICD-10-CM

## 2017-08-24 DIAGNOSIS — I10 HTN (HYPERTENSION), BENIGN: ICD-10-CM

## 2017-08-24 DIAGNOSIS — I25.10 ATHEROSCLEROSIS OF NATIVE CORONARY ARTERY OF NATIVE HEART WITHOUT ANGINA PECTORIS: ICD-10-CM

## 2017-08-24 PROCEDURE — 93000 ELECTROCARDIOGRAM COMPLETE: CPT | Performed by: INTERNAL MEDICINE

## 2017-08-24 PROCEDURE — 99214 OFFICE O/P EST MOD 30 MIN: CPT | Performed by: INTERNAL MEDICINE

## 2017-08-24 NOTE — PROGRESS NOTES
Subjective:     Encounter Date: 08/24/2017      Patient ID: Joao Fonseca is a 62 y.o. male with coronary artery disease, status post coronary artery bypass grafting consisting of left internal mammary to left anterior descending artery, saphenous vein graft to the first obtuse marginal, saphenous vein graft to the diagonal, chronic systolic heart failure (ICD implant place per Dr. Andino), hypertension who presents today for routine follow-up.    Chief Complaint: Routine follow-up    Coronary Artery Disease   Presents for follow-up visit. Symptoms include chest pain (Rare, sharp, left-sided, occurring at random times, lasting seconds) and shortness of breath (Only with exertion, stable). Pertinent negatives include no chest pressure, dizziness, leg swelling, muscle weakness or palpitations. His past medical history is significant for CHF. The symptoms have been stable. Compliance with diet is good. Compliance with exercise is good. Compliance with medications is good.   Congestive Heart Failure   Presents for follow-up visit. Associated symptoms include chest pain (Rare, sharp, left-sided, occurring at random times, lasting seconds), fatigue (occasional) and shortness of breath (Only with exertion, stable). Pertinent negatives include no abdominal pain, chest pressure, claudication, edema, muscle weakness, near-syncope, nocturia, orthopnea, palpitations, paroxysmal nocturnal dyspnea or unexpected weight change. The symptoms have been stable. The pain is mild. The pain is present in the lateral region. The quality of the pain is described as sharp. The pain does not radiate. Chest pain occurs at rest. His past medical history is significant for CAD. Compliance with total regimen is %. Compliance with diet is %. Compliance with exercise is %. Compliance with medications is %.     The patient presents today for routine follow-up.  We follow him closely for coronary artery disease and  chronic systolic congestive heart failure.  In terms of the patient's coronary artery disease, the patient says that he has been clinically stable with no significant chest discomfort.  He does describe intermittent sharp left-sided chest comfort occurs at random times, last seconds, nonradiating, no associated signs or symptoms, going away on its own without any need for medication.  This is been present for quite some time and he notes only 3 episodes of any significance.  There is been no increase in frequency, severity, timing.  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  He seems to be able to do a considerable amount of exertion and simply knows when to rest and when he rests he feels better quickly.  He denies orthopnea, PND, edema, lightheadedness, dizziness, syncope.  He says that he was dehydrated earlier this year and did receive one shock from his ICD.  Dr. Andino follows his ICD closely.  His only other complaint today is bilateral shoulder pain.  He says that initially, his right shoulder was hurting and when he lifts his arm out laterally, he is unable to lift any further than his shoulder line.  This was also having with his left shoulder but he recently moved in a certain way or he felt that shoulder pop and now his left shoulder does not hurt anymore.  He says that he has never been known to have any rotator cuff issues.  He notes compliance with all medications with the exception of metformin as prescribed by his primary care doctor.  Apparently, the patient's hemoglobin A1c was elevated and he was started on metformin at which point he lost a considerable amount weight pretty quickly and says that he became dehydrated and that is when his ICD fired.  He then stop this medication and has not restarted.   The rest of his cardiac medications he has been compliant with.  He does say that he stopped all medications for one week because he was taking that he had some side effects but he has been back on them and has tolerated them well.      Current Outpatient Prescriptions:   •  acyclovir (ZOVIRAX) 400 MG tablet, Take 1 tablet by mouth Daily. Take no more than 5 doses a day., Disp: 90 tablet, Rfl: 2  •  aspirin 81 MG EC tablet, Take 1 tablet by mouth Daily., Disp: 90 tablet, Rfl: 2  •  furosemide (LASIX) 40 MG tablet, Take 1 tablet by mouth Daily As Needed (edema, SOA)., Disp: 90 tablet, Rfl: 2  •  lisinopril (PRINIVIL,ZESTRIL) 20 MG tablet, Take 1 tablet by mouth Daily., Disp: 90 tablet, Rfl: 3  •  metFORMIN (GLUCOPHAGE) 1000 MG tablet, 1 po daily in the am and 1/2 po daily in the pm, Disp: 180 tablet, Rfl: 2  •  metoprolol tartrate (LOPRESSOR) 25 MG tablet, Take 1 tablet by mouth 2 (Two) Times a Day., Disp: 180 tablet, Rfl: 3  •  nitroglycerin (NITROSTAT) 0.4 MG SL tablet, 1 under the tongue as needed for angina, may repeat q5mins for up three doses, Disp: 100 tablet, Rfl: 11    Allergies   Allergen Reactions   • Cephalexin Anaphylaxis     Social History   Substance Use Topics   • Smoking status: Former Smoker     Types: Cigarettes   • Smokeless tobacco: Never Used      Comment: QUIT 20 YEARS AGO   • Alcohol use No     Review of Systems   Constitution: Positive for fatigue (occasional). Negative for chills, fever, weakness, night sweats, unexpected weight change and weight loss.   HENT: Negative for congestion, headaches and sore throat.    Cardiovascular: Positive for chest pain (Rare, sharp, left-sided, occurring at random times, lasting seconds) and dyspnea on exertion (Stable, but does limit activity somewhat). Negative for claudication, irregular heartbeat, leg swelling, near-syncope, orthopnea, palpitations, paroxysmal nocturnal dyspnea and syncope.   Respiratory: Positive for shortness of breath (Only with  exertion, stable). Negative for cough, hemoptysis and wheezing.    Endocrine: Negative for cold intolerance, heat intolerance, polydipsia and polyuria.   Hematologic/Lymphatic: Negative for bleeding problem. Does not bruise/bleed easily.   Musculoskeletal: Positive for joint pain (Patient notes pain in bilateral shoulders.  Recently with left shoulder popped and feels better and right shoulder with limited motion above the shoulder). Negative for muscle weakness.   Gastrointestinal: Negative for abdominal pain, hematemesis, hematochezia, melena, nausea and vomiting.   Genitourinary: Negative for bladder incontinence, dysuria, hematuria and nocturia.   Neurological: Negative for dizziness, loss of balance, numbness, paresthesias and seizures.       ECG 12 Lead  Date/Time: 8/24/2017 8:59 AM  Performed by: JAISON GONZALEZ  Authorized by: JAISON GONZALEZ   Comparison: compared with previous ECG from 12/29/2016  Rhythm: sinus rhythm  Ectopy: atrial premature contractions  Rate: normal  BPM: 65  Conduction: non-specific intraventricular conduction delay  QRS axis: left  Other findings: LAE  Clinical impression: abnormal ECG             Objective:     Physical Exam   Constitutional: He is oriented to person, place, and time. He appears well-developed and well-nourished. No distress.   HENT:   Head: Normocephalic and atraumatic.   Mouth/Throat: Oropharynx is clear and moist.   Eyes: EOM are normal. Pupils are equal, round, and reactive to light.   Neck: Normal range of motion. Neck supple. No JVD present. No thyromegaly present.   Cardiovascular: Normal rate, regular rhythm, S1 normal, S2 normal, normal heart sounds and intact distal pulses.  Exam reveals no gallop and no friction rub.    No murmur heard.  Pulmonary/Chest: Effort normal and breath sounds normal.   Abdominal: Soft. Bowel sounds are normal. He exhibits no distension. There is no tenderness.   Musculoskeletal: Normal range of motion. He exhibits  "no edema.   Neurological: He is alert and oriented to person, place, and time. No cranial nerve deficit.   Skin: Skin is warm and dry. No rash noted. No cyanosis or erythema. Nails show no clubbing.   Psychiatric: He has a normal mood and affect.   Vitals reviewed.    /82 (BP Location: Right arm, Patient Position: Sitting)  Pulse 65  Ht 72\" (182.9 cm)  Wt 231 lb (105 kg)  BMI 31.33 kg/m2        Assessment:          Diagnosis Plan   1. Systolic heart failure, chronic  ECG 12 Lead   2. Atherosclerosis of native coronary artery of native heart without angina pectoris     3. HTN (hypertension), benign            Plan:       1.  Chronic systolic heart failure, NYHA class 2 symptoms: The patient appears to be clinically stable and euvolemic on examination today.  He notes some dietary indiscretions but otherwise has been compliant with his medications.  No changes are recommended at this time.     2.  Coronary artery disease in native coronary artery without angina pectoris: The patient remains clinically stable in terms of his coronary artery disease.  He remains on aspirin, beta blocker therapies.  The patient has some very atypical chest pain that, with only 3 episodes, would not necessarily need any further cardiac workup at this time.  I have told him, however, if symptoms worsen, last longer, more severe, new or other associated symptoms, we would be interested in hearing back from him sooner so that we can look into this further.     3.  Essential hypertension: Blood pressure remains well controlled at this time.  Continue current medications.     - I have encouraged patient to talk with his primary care physician regarding his shoulder pain as it sounds like this could be some sort of rotator cuff injury.  He will do so.     Follow-up: 6 months unless needed sooner.     EMR Dragon/Transcription disclaimer: Much of this encounter note is an electronic transcription/translation of spoken language to " printed text. The electronic translation of spoken language may permit erroneous, or at times, nonsensical words or phrases to be inadvertently transcribed; although I have reviewed the note for such errors, some may still exist.

## 2017-11-08 ENCOUNTER — OFFICE VISIT (OUTPATIENT)
Dept: FAMILY MEDICINE CLINIC | Facility: CLINIC | Age: 62
End: 2017-11-08

## 2017-11-08 VITALS
SYSTOLIC BLOOD PRESSURE: 126 MMHG | HEIGHT: 72 IN | WEIGHT: 233.4 LBS | BODY MASS INDEX: 31.61 KG/M2 | OXYGEN SATURATION: 98 % | DIASTOLIC BLOOD PRESSURE: 72 MMHG | TEMPERATURE: 98 F | HEART RATE: 68 BPM | RESPIRATION RATE: 18 BRPM

## 2017-11-08 DIAGNOSIS — E11.9 DIABETES MELLITUS TYPE 2, NONINSULIN DEPENDENT (HCC): ICD-10-CM

## 2017-11-08 DIAGNOSIS — I10 HTN (HYPERTENSION), BENIGN: ICD-10-CM

## 2017-11-08 DIAGNOSIS — J00 ACUTE RHINITIS, UNSPECIFIED TYPE: ICD-10-CM

## 2017-11-08 DIAGNOSIS — K76.9 DISORDER OF LIVER: ICD-10-CM

## 2017-11-08 DIAGNOSIS — R30.0 DYSURIA: ICD-10-CM

## 2017-11-08 DIAGNOSIS — R30.9 URINATION PAIN: Primary | ICD-10-CM

## 2017-11-08 DIAGNOSIS — I50.22 SYSTOLIC HEART FAILURE, CHRONIC (HCC): ICD-10-CM

## 2017-11-08 LAB
BILIRUB BLD-MCNC: NEGATIVE MG/DL
CLARITY, POC: CLEAR
COLOR UR: ABNORMAL
GLUCOSE UR STRIP-MCNC: ABNORMAL MG/DL
HBA1C MFR BLD: 7.8 %
KETONES UR QL: NEGATIVE
LEUKOCYTE EST, POC: NEGATIVE
NITRITE UR-MCNC: NEGATIVE MG/ML
PH UR: 5 [PH] (ref 5–8)
PROT UR STRIP-MCNC: ABNORMAL MG/DL
RBC # UR STRIP: ABNORMAL /UL
SP GR UR: 1.02 (ref 1–1.03)
UROBILINOGEN UR QL: NORMAL

## 2017-11-08 PROCEDURE — 99214 OFFICE O/P EST MOD 30 MIN: CPT | Performed by: FAMILY MEDICINE

## 2017-11-08 PROCEDURE — 81003 URINALYSIS AUTO W/O SCOPE: CPT | Performed by: FAMILY MEDICINE

## 2017-11-08 PROCEDURE — 83036 HEMOGLOBIN GLYCOSYLATED A1C: CPT | Performed by: FAMILY MEDICINE

## 2017-11-08 RX ORDER — FLUTICASONE PROPIONATE 50 MCG
1 SPRAY, SUSPENSION (ML) NASAL 2 TIMES DAILY
Qty: 16 G | Refills: 2 | Status: SHIPPED | OUTPATIENT
Start: 2017-11-08 | End: 2017-12-08

## 2017-11-08 RX ORDER — IPRATROPIUM BROMIDE 42 UG/1
2 SPRAY, METERED NASAL 4 TIMES DAILY
Qty: 15 ML | Refills: 2 | Status: SHIPPED | OUTPATIENT
Start: 2017-11-08 | End: 2018-01-05

## 2017-11-08 NOTE — PROGRESS NOTES
Chief Complaint   Patient presents with   • Sinusitis     Patient is here today for a cough, head congestion, and burning when he urinates.Symptoms started 4 days ago. I have completed his A1C and tested urine.          History:  Joao Fonseca is a 62 y.o. male presents who today for evaluation of the above problems.  PCP currently listed as Hira Alvarez MD.   Congestion:  This has been present 5 days worse in am and draining down back of throat and coughing up a lot of mucus.  He has not been using anything OTC.  With heart hx he has no clue what he can take.  He did use alkaseltzer once, discussed concern for decongestants and his heart related health.  No fever, mild cough in am but better through day.  The patient has not been using any nasal sprays.  He has some ocean spray but not using this.  We discussed medications to avoid in his current health are things like sudafed.  He has noted increased drainage, increased wet symptoms and prominent rhinorrhea.  No tobacco exposures.  No new pets at home. No indoor pets. No recent travel. No other sick contacts.  No other ameliorating/aggravating factors.     Dysuria:  Yesterday am burning with urination and along the left pantline/buttock.  He had mild discomfort at awakening yesterday but this has since resolved.  Urine today shows trace lysed blood, small protein and negative Celi, negative nit.  We will send for culture.  He feels fine now.  No burning with urination now, no discomfort, no back pain, no flank pain etc.  Discussed kidney stone, discussed symptoms.  He is asymptomatic for now. We will continue to monitor x 1 week.  If sx worsen please call clinic. No fever, no fatigue, no pain now, this has resolved, normal uout and normal stooling.  He has no c/o today this has resolved.     DM: Not checking sugars regularly.  A1C 7.8 today.  Was 7.6 02/7/17.  He is taking metformin regularly.  He is on fluid intake restriction for heart. He wants to get  "off of the metformin \"not absorbed not working.\"  He has not been on januvia.  We will add januvia to the metformin. R/B/A to meds d/w patient, SE reviewed, handout offered regarding medications listed.    We talked about SE to Januvia and metformin and will provide handouts.     Joao Fonseca  has a past medical history of Arrhythmia; Cancer; Cardiomyopathy (12/27/2016); CHF (congestive heart failure); Coronary artery disease; Diabetes mellitus; Disorder of liver (8/12/2013); History of adenomatous polyp of colon (06/28/2010); Hyperlipidemia; Hypertension; Pacemaker; and Shingles.    Allergies   Allergen Reactions   • Cephalexin Anaphylaxis     Past Medical History:   Diagnosis Date   • Arrhythmia    • Cancer     malignant chest tumor   • Cardiomyopathy 12/27/2016   • CHF (congestive heart failure)    • Coronary artery disease    • Diabetes mellitus    • Disorder of liver 8/12/2013   • History of adenomatous polyp of colon 06/28/2010    TUBULAR ADENOMA AT 40CM   • Hyperlipidemia    • Hypertension    • Pacemaker    • Shingles      Past Surgical History:   Procedure Laterality Date   • ANKLE SURGERY Right    • CARDIAC CATHETERIZATION     • CARDIAC ELECTROPHYSIOLOGY PROCEDURE Left 10/6/2016    Procedure: ICD DC new;  Surgeon: Zander Andino MD;  Location: [facility] INVASIVE LOCATION;  Service:    • COLONOSCOPY  06/28/2010    biopsy at 41, 2 mm polyp supboptimal prep right clon    • COLONOSCOPY W/ POLYPECTOMY  06/28/2010    POLYP AT 40CM TUBULAR ADENOMA, SUBOPTIMAL PREP   • CORONARY ARTERY BYPASS GRAFT     • HEMORRHOIDECTOMY  1980   • HERNIA REPAIR     • PACEMAKER IMPLANTATION     • TONSILLECTOMY       Family History   Problem Relation Age of Onset   • Diabetes Mother    • Colon cancer Mother    • Heart disease Father    • Hypertension Father    • Diabetes Father    • Cancer Father    • Bipolar disorder Sister    • Hypertension Brother    • Colon polyps Brother    • No Known Problems Son    • No Known Problems " Maternal Grandmother    • Diabetes Maternal Grandfather    • No Known Problems Paternal Grandmother    • Diabetes Paternal Grandfather        Current Outpatient Prescriptions on File Prior to Visit   Medication Sig Dispense Refill   • acyclovir (ZOVIRAX) 400 MG tablet Take 1 tablet by mouth Daily. Take no more than 5 doses a day. 90 tablet 2   • aspirin 81 MG EC tablet Take 1 tablet by mouth Daily. 90 tablet 2   • furosemide (LASIX) 40 MG tablet Take 1 tablet by mouth Daily As Needed (edema, SOA). 90 tablet 2   • lisinopril (PRINIVIL,ZESTRIL) 20 MG tablet Take 1 tablet by mouth Daily. 90 tablet 3   • metFORMIN (GLUCOPHAGE) 1000 MG tablet 1 po daily in the am and 1/2 po daily in the pm 180 tablet 2   • metoprolol tartrate (LOPRESSOR) 25 MG tablet Take 1 tablet by mouth 2 (Two) Times a Day. 180 tablet 3   • nitroglycerin (NITROSTAT) 0.4 MG SL tablet 1 under the tongue as needed for angina, may repeat q5mins for up three doses 100 tablet 11     No current facility-administered medications on file prior to visit.        Family history, surgical history, past medical history, Allergies and meds reviewed with patient today and updated in [facility] EMR.     ROS:  Review of Systems   Constitutional: Positive for fatigue. Negative for activity change, appetite change, diaphoresis and fever.   HENT: Positive for congestion, postnasal drip, rhinorrhea, sinus pressure and sore throat. Negative for ear discharge, ear pain, facial swelling, mouth sores, nosebleeds, sinus pain, sneezing and trouble swallowing.    Eyes: Negative for photophobia, discharge, redness, itching and visual disturbance.   Respiratory: Positive for cough and wheezing (very rare). Negative for choking, chest tightness, shortness of breath and stridor.    Cardiovascular: Negative for chest pain, palpitations and leg swelling.   Gastrointestinal: Positive for nausea. Negative for abdominal pain, constipation, diarrhea and vomiting.   Genitourinary: Negative for  "decreased urine volume, difficulty urinating, flank pain and hematuria.   Musculoskeletal: Positive for back pain and myalgias. Negative for neck pain.   Skin: Negative for color change and rash.   Neurological: Positive for headaches. Negative for dizziness, speech difficulty, weakness and numbness.   Psychiatric/Behavioral: Negative for agitation, behavioral problems, confusion, decreased concentration and hallucinations. The patient is not nervous/anxious.        OBJECTIVE:  Vitals:    11/08/17 0712   BP: 126/72   Pulse: 68   Resp: 18   Temp: 98 °F (36.7 °C)   SpO2: 98%   Weight: 233 lb 6.4 oz (106 kg)   Height: 72\" (182.9 cm)     Physical Exam   Constitutional: He is oriented to person, place, and time. He appears well-developed and well-nourished.   HENT:   Head: Normocephalic and atraumatic.   Right Ear: External ear normal.   Left Ear: External ear normal.   Nose: Nose normal.   Mouth/Throat: Oropharynx is clear and moist.   Head- normocephalic and atraumatic.  Neck- without visible/palpable lumps or pulsations.  Palpation- No bony tenderness about head/neck along frontal, occipital, temporal, parietal, mastoid, jawline, zygoma, orbit or any other location.  NO temporal artery tenderness. No TMJ tenderness. Neck Supple.  Thyroid-No thyromegaly, no nodules      Eyes: Conjunctivae and EOM are normal. Pupils are equal, round, and reactive to light.   Neck: Normal range of motion. Neck supple. No thyromegaly present.   Cardiovascular: Normal rate, regular rhythm, normal heart sounds and intact distal pulses.  PMI is displaced (mildly inferiorly and laterally).    No murmur heard.   No systolic murmur is present   Distant heart sounds.    Pulmonary/Chest: Effort normal. No respiratory distress. He has wheezes (mild infreq very scattered). He has rhonchi. He exhibits no tenderness.   CHEST/LUNG: Inspection- symmetric chest wall no pectus deformity. Decreased effort, no distress, no use of accessory muscles. " Palpation- nontender sternum, ribline.  No abnormal pulsations. Auscultation- Breath sounds coarse throughout all lung fields, decreased effort.  Normal tracheal sounds, Normal bronchial sounds overlying sternum, Bronchovessicular sounds decreased and coarse between scapulae posteriorly and with vessicular breath sounds heard throughout periphery. Adventitious sounds- Scattered wheezes but improve with cough, NO rales,  Scattered rhonchi.    Abdominal: Soft. Bowel sounds are normal. There is no tenderness. There is no rebound and no guarding.   Musculoskeletal: Normal range of motion.        Right shoulder: He exhibits normal range of motion, no tenderness, no bony tenderness, no pain and no spasm.        Left shoulder: He exhibits normal range of motion, no tenderness, no bony tenderness, no pain and no spasm.        Right elbow: He exhibits normal range of motion, no swelling and no effusion. Tenderness found. Olecranon process tenderness noted. No radial head, no medial epicondyle and no lateral epicondyle tenderness noted.        Left elbow: He exhibits normal range of motion, no swelling, no effusion, no deformity and no laceration. No tenderness found. No medial epicondyle, no lateral epicondyle and no olecranon process tenderness noted.        Right wrist: He exhibits normal range of motion, no tenderness and no bony tenderness.    Joao had a diabetic foot exam performed (onychomycosis bilaterally) today.   During the foot exam he had a monofilament test performed (Normal 10/10 bilaterally.  ).    Neurological Sensory Findings - Unaltered hot/cold right ankle/foot discrimination and unaltered hot/cold left ankle/foot discrimination. Unaltered sharp/dull right ankle/foot discrimination and unaltered sharp/dull left ankle/foot discrimination. No right ankle/foot altered proprioception and no left ankle/foot altered proprioception    Vascular Status -  His exam exhibits right foot vasculature normal. His exam  exhibits no right foot edema. His exam exhibits left foot vasculature normal. His exam exhibits no left foot edema.   Skin Integrity  -  His right foot skin is intact.  He has right foot onychomycosis.  He hasnon-callous right foot.    Joao 's left foot skin is intact. He has left foot onychomycosis. He has non-callous left foot..   Foot Structure and Biomechanics -  His right foot has no hallux valgus, no pes cavus and no claw toes present. His left foot has no hallux valgus, no pes cavus and no claw toes.      Lymphadenopathy:     He has no cervical adenopathy.     He has no axillary adenopathy.   Neurological: He is alert and oriented to person, place, and time. No cranial nerve deficit. Coordination normal.   Skin: Skin is warm and dry. No rash noted.   Prominent varicosities bilaterally.    6cm x 4cm erythematous region without embedded tick.  Not EM rash.  Localized bite reaction likely. Present along left upper back just at base of scapula.    Psychiatric: He has a normal mood and affect. His behavior is normal. Judgment and thought content normal.   Nursing note and vitals reviewed.    UA with trace blood, +Glucose and  2+ protein    Assessment/Plan:  Urination pain:  This was present and now resolved.  I would like to check a Urine Culture.  -     POC Urinalysis Dipstick, Automated  -     CBC Auto Differential  - Urine Culture.   Comments:  resolved now  Orders:  -     Urine Culture - Urine, Urine, Clean Catch    Diabetes mellitus type 2, noninsulin dependent: Diabetes Type 2:  borderline controlled and needs to follow diet more regularly. Insulin dependent: no. Nephropathy, Retinopathy, Neuropathy status discussed.  Reviewed goals of Diabetes today.  The Goal is to have an Hgb A1C <7.0 for most patients.  Good BP control is encouraged with Goal BP based on JNC 8 guidelines 2014 of SBP <140 and DBP <90.  At goal: yes.  Discussed role of Ace-I and ARB with DM.  DM imparts risk equivalence for CAD based on  ATP III.  Current guidelines from ACC/AHA support moderate intensity statin with goal of 30-50% reduction in LDL unless 10 yr risk ASCVD >7.5 then high intensity should be used. Close monitoring of Lipid levels encouraged. Recommend once yearly eye evaluation by optometry or ophthalmology.  Good foot health discussed and foot exam recommended with each visit. Recommended to monitor toe health, wear good shoes, cut nails straight across and tend calluses as listed.  Take medications as encouraged.  Monitor blood sugars as encouraged and bring log to future meetings. Weight needs to be monitored. Monitor portions and caloric intake.    · Pneumovax frequency discussed.    · Recent data suggest there may be a link between metformin and B12.  D/W patient to consider evaluation for b12 deficiency.  · Recommend yearly microalbumin  · Yearly eye evaluation, please sign release to get eye evaluation data.   · Discussed importance of Ace-I and ARB  · Monitor portions, goal BMI discussed.   · Work on regular exercise and diet to keep BMI in appropriate range.   · Lab review: Any needed orders written for new lab studies as appropriate; see orders.   ·  Diabetic issues reviewed with male: referral to Diabetic Education department discussed, low cholesterol diet, weight control and daily exercise discussed, home glucose monitoring emphasized, all medications, side effects and compliance discussed carefully, foot care discussed and offered referral to Podiatry,  annual eye examinations discussed, glycohemoglobin and other lab monitoring discussed and long term diabetic complications discussed.  · Role of Bariatric surgery d/w patient today.    · MEds as listed Reviewed r/B/A and SE of listed agents.   ORDERS:    -     POC Glycosylated Hemoglobin (Hb A1C)  -     SITagliptin (JANUVIA) 100 MG tablet; Take 1 tablet by mouth Daily.  -     SITagliptin (JANUVIA) 100 MG tablet; Take 1 tablet by mouth Daily.  -     CBC Auto  Differential  -     Lipid Panel  -     MicroAlbumin, Urine, Random - Urine, Clean Catch    Acute rhinitis, unspecified type: Acute URI suspect viral process.  DDX includes viruses to include corona, adeno, parainfluenza, rhinovirus, noninfectious rhinitis (vasomotor and allergic). Reviewed sick contacts, reviewed recent travel.  Reviewed tobacco history.  Avoidance of all tobacco encouraged today.  Discussed abx not recommended for this treatment at present.  The patient voiced understanding. If no improvement in 3-5 days or worsening, they can contact clinic to start abx to include augmentin 875 PO BID x 10 days (unless PCN allergic and then other arrangements will be d/w patient). Reviewed risks/benefits of Abx discussed today.  Discussed allergies to medications.  Steroid injection discussed with patient today. R/B/A to this reviewed specifically with use in viral URI.  Steroids by mouth d/w patient to include R/B/A.  Nasal GC R/B/A d/w patient. A few OTC options d/w patient.  Can consider Zyrtec every am.  Dayquil per package insert discussed, concern with any BP elevation or history of HTN, DM, CAD.  If no better in 3-5 days or if worsening call and we will consider to start abx. Risks/benefits of current and new medications discussed with the patient today.  The patient is aware if there any side effects they should stop meds and call or return to clinic.  Appropriate F/U discussed. All questions answered to satisfactory state of patient.  Patient left ambulatory.  Hand washing d/w patient. Cover sneezes/cough.  Avoid work/school for 24 hours if Temp >100.4.  The patient has allergy to cephalosporin and we will not use PCN.  Watch symptoms x 1 weke and f/u by phone on Monday.   **If no better by Monday we will call in doxycycline 100 BID x 7 days #14 .  He is allergic to keflex and had anaphylaxis.   -     fluticasone (FLONASE) 50 MCG/ACT nasal spray; 1 spray into each nostril 2 (Two) Times a Day for 30  days.  -     ipratropium (ATROVENT) 0.06 % nasal spray; 2 sprays into each nostril 4 (Four) Times a Day.    BMI 31.0-31.9,adult: Discussed the federal guidelines suggest a healthy goal BMI of 18.5-24.9 for people 18-65 and 23-30 for people age 65 and older.  Overweight is considered BMI 25-30, Obesity 30-40 and Morbid obesity is defined as >100 lb overweight or BMI >40.  With a BMI above goal, it is recommended to utilize a diet/exercise program to get back into the appropriate range.  Consider referral to dietician.  For BMI >40 consider referral to bariatrics.    · If not already monitoring intake.  I would recommend at least to keep a food diary.  Document everything that is consumed into a food diary.  Studies have shown that patients can lose up to 2x the weight by keeping track of foods.    · Offered handout on weight loss techniques.   · Apps that may be of benefit include Myfitness Pal, Lose it.    · Regular exercise encouraged.  Start with walking 5-10 minutes at a pace that is difficult to carry a conversation. If chest pain/SOA stop and f/u in office.    ORDERS:  -     CBC Auto Differential  -     Lipid Panel  -     Comprehensive Metabolic Panel  -     MicroAlbumin, Urine, Random - Urine, Clean Catch    HTN (hyertension), benign: HTN: Suspect essential HTN. Good BP control is encouraged with Goal BP based on JNC 8 guidelines:  For the general population <60 yr old goal BP <140/90 and for those >60 <150/90.  For patients of all ages with Diabetes, CKD, Known CAD the goal is <140/90. Reviewed typical first line agents to include thiazide diuretic or ace-I or ARB or CCB alone or in combo. At goal yes.  If not already owned, I recommended to the patient to obtain electronic home BP machine with upper arm blood pressure cuff and to check regularly as instructed.  Keep BP log and bring to subsequent visits.    · Offered handout on HTN educational topics: HTN, Low sodium diet.   These were provided if patient  requested these today.  · ADA diet encouraged.  · Monitor cholesterol regularly  · Monitor weight with goal of BMI <30.  · Consider taking Rx at night as recent study supports this may decreased chances of leading to DM.   · Consider bringing home BP cuff to office to compare readings with ours.  · Checking BP at home can help to lower BP.  Certain medications and substances can increase BP to include: NSAIDS, caffeine, decongestants, nicotine.    · Well controlled, BP goal for age is <140/90 per JNC 8 guidelines and continue current medications  -     CBC Auto Differential  -     Lipid Panel    Systolic heart failure, chronic: Stable/chronic.   -     CBC Auto Differential  -     Lipid Panel    Disorder of liver  -     CBC Auto Differential  -     Lipid Panel  -     Comprehensive Metabolic Panel        Risks/benefits of current and new medications discussed with the patient and or family today.  The patient/family are aware and accept that if there any side effects they should call or return to clinic as soon as possible.  Appropriate F/U discussed for topics addressed today. All questions were answered to the satisfactory state of patient/family.  Should symptoms fail to improve or worsen they agree to call or return to clinic or to go to the ER. Education handouts were offered on any new Rx if requested.  Discussed the importance of following up with any needed screening tests/labs/specialist appointments and any requested follow-up recommended by me today.  Importance of maintaining follow-up discussed and patient accepts that missed appointments can delay diagnosis and potentially lead to worsening of conditions.    An After Visit Summary was printed and given to the patient at discharge.  Follow-up: Return in about 1 week (around 11/15/2017), or if symptoms worsen or fail to improve, for CALL MONDAY WITH UPDATE.         iHra Alvarez M.D. 11/8/2017

## 2017-11-09 LAB
ALBUMIN SERPL-MCNC: 4.1 G/DL (ref 3.5–5)
ALBUMIN/GLOB SERPL: 1.5 G/DL (ref 1.1–2.5)
ALP SERPL-CCNC: 66 U/L (ref 24–120)
ALT SERPL-CCNC: 36 U/L (ref 0–54)
AST SERPL-CCNC: 20 U/L (ref 7–45)
BASOPHILS # BLD AUTO: 0.06 10*3/MM3 (ref 0–0.2)
BASOPHILS NFR BLD AUTO: 1 % (ref 0–2)
BILIRUB SERPL-MCNC: 0.8 MG/DL (ref 0.1–1)
BUN SERPL-MCNC: 25 MG/DL (ref 5–21)
BUN/CREAT SERPL: 20.5 (ref 7–25)
CALCIUM SERPL-MCNC: 9.3 MG/DL (ref 8.4–10.4)
CHLORIDE SERPL-SCNC: 98 MMOL/L (ref 98–110)
CHOLEST SERPL-MCNC: 164 MG/DL (ref 130–200)
CO2 SERPL-SCNC: 28 MMOL/L (ref 24–31)
CREAT SERPL-MCNC: 1.22 MG/DL (ref 0.5–1.4)
EOSINOPHIL # BLD AUTO: 0.2 10*3/MM3 (ref 0–0.7)
EOSINOPHIL NFR BLD AUTO: 3.5 % (ref 0–4)
ERYTHROCYTE [DISTWIDTH] IN BLOOD BY AUTOMATED COUNT: 12.7 % (ref 12–15)
GFR SERPLBLD CREATININE-BSD FMLA CKD-EPI: 60 ML/MIN/1.73
GFR SERPLBLD CREATININE-BSD FMLA CKD-EPI: 73 ML/MIN/1.73
GLOBULIN SER CALC-MCNC: 2.7 GM/DL
GLUCOSE SERPL-MCNC: 181 MG/DL (ref 70–100)
HCT VFR BLD AUTO: 50.9 % (ref 40–52)
HDLC SERPL-MCNC: 46 MG/DL
HGB BLD-MCNC: 16.6 G/DL (ref 14–18)
IMM GRANULOCYTES # BLD: 0.01 10*3/MM3 (ref 0–0.03)
IMM GRANULOCYTES NFR BLD: 0.2 % (ref 0–5)
LDLC SERPL CALC-MCNC: 105 MG/DL (ref 0–99)
LYMPHOCYTES # BLD AUTO: 1.18 10*3/MM3 (ref 0.72–4.86)
LYMPHOCYTES NFR BLD AUTO: 20.5 % (ref 15–45)
MCH RBC QN AUTO: 31.4 PG (ref 28–32)
MCHC RBC AUTO-ENTMCNC: 32.6 G/DL (ref 33–36)
MCV RBC AUTO: 96.4 FL (ref 82–95)
MICROALBUMIN UR-MCNC: 115.7 UG/ML
MONOCYTES # BLD AUTO: 0.41 10*3/MM3 (ref 0.19–1.3)
MONOCYTES NFR BLD AUTO: 7.1 % (ref 4–12)
NEUTROPHILS # BLD AUTO: 3.91 10*3/MM3 (ref 1.87–8.4)
NEUTROPHILS NFR BLD AUTO: 67.7 % (ref 39–78)
PLATELET # BLD AUTO: 131 10*3/MM3 (ref 130–400)
POTASSIUM SERPL-SCNC: 4.4 MMOL/L (ref 3.5–5.3)
PROT SERPL-MCNC: 6.8 G/DL (ref 6.3–8.7)
RBC # BLD AUTO: 5.28 10*6/MM3 (ref 4.8–5.9)
SODIUM SERPL-SCNC: 138 MMOL/L (ref 135–145)
TRIGL SERPL-MCNC: 65 MG/DL (ref 0–149)
VLDLC SERPL CALC-MCNC: 13 MG/DL
WBC # BLD AUTO: 5.77 10*3/MM3 (ref 4.8–10.8)

## 2017-11-10 LAB
BACTERIA UR CULT: NORMAL
BACTERIA UR CULT: NORMAL

## 2017-11-13 ENCOUNTER — TELEPHONE (OUTPATIENT)
Dept: FAMILY MEDICINE CLINIC | Facility: CLINIC | Age: 62
End: 2017-11-13

## 2017-11-13 RX ORDER — DOXYCYCLINE HYCLATE 100 MG/1
100 TABLET, DELAYED RELEASE ORAL 2 TIMES DAILY
Qty: 14 TABLET | Refills: 0 | Status: SHIPPED | OUTPATIENT
Start: 2017-11-13 | End: 2017-11-21 | Stop reason: HOSPADM

## 2017-11-13 NOTE — TELEPHONE ENCOUNTER
PATIENT HAS CALLED STATES THAT SINCE HE STARTED THE JANUVIA HE IS HAVING EXTREME CRAMPING IN BILATERAL LOWER EXTREMITIES HE WANTED TO MAKE SURE THAT IT DID NOT HAVE ANY PRODUCT THAT WAS LIKE A DIURETIC PATIENT IS NOT ABLE TO TOLERATE ANY DIURETICS. PLEASE REVIEW AND ADVISE

## 2017-11-13 NOTE — TELEPHONE ENCOUNTER
PATIENT HAS BEEN CONTACTED HE WILL CONTACT THE OFFICE TO MAKE APPT WHEN HE GETS HOME TO LOOK AT HIS SCHEDULE

## 2017-11-13 NOTE — TELEPHONE ENCOUNTER
This is not a diuretic, I agree, stop the Rx and f/u in office. Myalgias can happen with januvia.  Hira Alvarez MD 11/13/2017 5:17 PM

## 2017-11-18 ENCOUNTER — HOSPITAL ENCOUNTER (INPATIENT)
Facility: HOSPITAL | Age: 62
LOS: 3 days | Discharge: HOME | End: 2017-11-21
Attending: INTERNAL MEDICINE | Admitting: FAMILY MEDICINE

## 2017-11-18 ENCOUNTER — APPOINTMENT (OUTPATIENT)
Dept: CT IMAGING | Facility: HOSPITAL | Age: 62
End: 2017-11-18

## 2017-11-18 ENCOUNTER — APPOINTMENT (OUTPATIENT)
Dept: GENERAL RADIOLOGY | Facility: HOSPITAL | Age: 62
End: 2017-11-18

## 2017-11-18 DIAGNOSIS — J04.30 ADULT SUPRAGLOTTITIS: Primary | ICD-10-CM

## 2017-11-18 LAB
ALBUMIN SERPL-MCNC: 4.5 G/DL (ref 3.5–5)
ALBUMIN/GLOB SERPL: 1.5 G/DL (ref 1.1–2.5)
ALP SERPL-CCNC: 62 U/L (ref 24–120)
ALT SERPL W P-5'-P-CCNC: 33 U/L (ref 0–54)
ANION GAP SERPL CALCULATED.3IONS-SCNC: 16 MMOL/L (ref 4–13)
AST SERPL-CCNC: 23 U/L (ref 7–45)
BASOPHILS # BLD AUTO: 0.02 10*3/MM3 (ref 0–0.2)
BASOPHILS NFR BLD AUTO: 0.1 % (ref 0–2)
BILIRUB SERPL-MCNC: 2.1 MG/DL (ref 0.1–1)
BUN BLD-MCNC: 19 MG/DL (ref 5–21)
BUN/CREAT SERPL: 16.1 (ref 7–25)
CALCIUM SPEC-SCNC: 9.3 MG/DL (ref 8.4–10.4)
CHLORIDE SERPL-SCNC: 98 MMOL/L (ref 98–110)
CO2 SERPL-SCNC: 25 MMOL/L (ref 24–31)
CREAT BLD-MCNC: 1.18 MG/DL (ref 0.5–1.4)
D-LACTATE SERPL-SCNC: 2.6 MMOL/L (ref 0.5–2)
D-LACTATE SERPL-SCNC: 2.7 MMOL/L (ref 0.5–2)
DEPRECATED RDW RBC AUTO: 42.3 FL (ref 40–54)
EOSINOPHIL # BLD AUTO: 0 10*3/MM3 (ref 0–0.7)
EOSINOPHIL NFR BLD AUTO: 0 % (ref 0–4)
ERYTHROCYTE [DISTWIDTH] IN BLOOD BY AUTOMATED COUNT: 12.6 % (ref 12–15)
GFR SERPL CREATININE-BSD FRML MDRD: 63 ML/MIN/1.73
GLOBULIN UR ELPH-MCNC: 3.1 GM/DL
GLUCOSE BLD-MCNC: 216 MG/DL (ref 70–100)
GLUCOSE BLDC GLUCOMTR-MCNC: 284 MG/DL (ref 70–130)
HCT VFR BLD AUTO: 52.8 % (ref 40–52)
HGB BLD-MCNC: 18.3 G/DL (ref 14–18)
HOLD SPECIMEN: NORMAL
IMM GRANULOCYTES # BLD: 0.07 10*3/MM3 (ref 0–0.03)
IMM GRANULOCYTES NFR BLD: 0.3 % (ref 0–5)
LYMPHOCYTES # BLD AUTO: 0.68 10*3/MM3 (ref 0.72–4.86)
LYMPHOCYTES NFR BLD AUTO: 3.1 % (ref 15–45)
MCH RBC QN AUTO: 32.1 PG (ref 28–32)
MCHC RBC AUTO-ENTMCNC: 34.7 G/DL (ref 33–36)
MCV RBC AUTO: 92.6 FL (ref 82–95)
MONOCYTES # BLD AUTO: 1.41 10*3/MM3 (ref 0.19–1.3)
MONOCYTES NFR BLD AUTO: 6.5 % (ref 4–12)
NEUTROPHILS # BLD AUTO: 19.62 10*3/MM3 (ref 1.87–8.4)
NEUTROPHILS NFR BLD AUTO: 90 % (ref 39–78)
NRBC BLD MANUAL-RTO: 0 /100 WBC (ref 0–0)
PLATELET # BLD AUTO: 152 10*3/MM3 (ref 130–400)
PMV BLD AUTO: 11.5 FL (ref 6–12)
POTASSIUM BLD-SCNC: 4.2 MMOL/L (ref 3.5–5.3)
PROT SERPL-MCNC: 7.6 G/DL (ref 6.3–8.7)
RBC # BLD AUTO: 5.7 10*6/MM3 (ref 4.8–5.9)
S PYO AG THROAT QL: NEGATIVE
SODIUM BLD-SCNC: 139 MMOL/L (ref 135–145)
TROPONIN I SERPL-MCNC: 0.04 NG/ML (ref 0–0.03)
WBC NRBC COR # BLD: 21.8 10*3/MM3 (ref 4.8–10.8)

## 2017-11-18 PROCEDURE — 93010 ELECTROCARDIOGRAM REPORT: CPT | Performed by: INTERNAL MEDICINE

## 2017-11-18 PROCEDURE — 71020 HC CHEST PA AND LATERAL: CPT

## 2017-11-18 PROCEDURE — 0 IOPAMIDOL 61 % SOLUTION: Performed by: NURSE PRACTITIONER

## 2017-11-18 PROCEDURE — 25010000003 AMPICILLIN-SULBACTAM PER 1.5 G: Performed by: NURSE PRACTITIONER

## 2017-11-18 PROCEDURE — 87186 SC STD MICRODIL/AGAR DIL: CPT | Performed by: NURSE PRACTITIONER

## 2017-11-18 PROCEDURE — 87077 CULTURE AEROBIC IDENTIFY: CPT | Performed by: NURSE PRACTITIONER

## 2017-11-18 PROCEDURE — 87150 DNA/RNA AMPLIFIED PROBE: CPT | Performed by: NURSE PRACTITIONER

## 2017-11-18 PROCEDURE — 99253 IP/OBS CNSLTJ NEW/EST LOW 45: CPT | Performed by: OTOLARYNGOLOGY

## 2017-11-18 PROCEDURE — 94640 AIRWAY INHALATION TREATMENT: CPT

## 2017-11-18 PROCEDURE — 84484 ASSAY OF TROPONIN QUANT: CPT | Performed by: NURSE PRACTITIONER

## 2017-11-18 PROCEDURE — 25010000002 KETOROLAC TROMETHAMINE PER 15 MG: Performed by: NURSE PRACTITIONER

## 2017-11-18 PROCEDURE — 83605 ASSAY OF LACTIC ACID: CPT | Performed by: NURSE PRACTITIONER

## 2017-11-18 PROCEDURE — 25010000002 DEXAMETHASONE PER 1 MG: Performed by: NURSE PRACTITIONER

## 2017-11-18 PROCEDURE — 25010000002 METHYLPREDNISOLONE PER 125 MG: Performed by: NURSE PRACTITIONER

## 2017-11-18 PROCEDURE — 87070 CULTURE OTHR SPECIMN AEROBIC: CPT | Performed by: NURSE PRACTITIONER

## 2017-11-18 PROCEDURE — 63710000001 INSULIN LISPRO (HUMAN) PER 5 UNITS: Performed by: INTERNAL MEDICINE

## 2017-11-18 PROCEDURE — 31575 DIAGNOSTIC LARYNGOSCOPY: CPT | Performed by: OTOLARYNGOLOGY

## 2017-11-18 PROCEDURE — 70491 CT SOFT TISSUE NECK W/DYE: CPT

## 2017-11-18 PROCEDURE — 99284 EMERGENCY DEPT VISIT MOD MDM: CPT

## 2017-11-18 PROCEDURE — 87040 BLOOD CULTURE FOR BACTERIA: CPT | Performed by: NURSE PRACTITIONER

## 2017-11-18 PROCEDURE — 87880 STREP A ASSAY W/OPTIC: CPT | Performed by: NURSE PRACTITIONER

## 2017-11-18 PROCEDURE — 80053 COMPREHEN METABOLIC PANEL: CPT | Performed by: NURSE PRACTITIONER

## 2017-11-18 PROCEDURE — 85025 COMPLETE CBC W/AUTO DIFF WBC: CPT | Performed by: NURSE PRACTITIONER

## 2017-11-18 PROCEDURE — 93005 ELECTROCARDIOGRAM TRACING: CPT | Performed by: NURSE PRACTITIONER

## 2017-11-18 PROCEDURE — 82962 GLUCOSE BLOOD TEST: CPT

## 2017-11-18 RX ORDER — IPRATROPIUM BROMIDE AND ALBUTEROL SULFATE 2.5; .5 MG/3ML; MG/3ML
3 SOLUTION RESPIRATORY (INHALATION)
Status: DISCONTINUED | OUTPATIENT
Start: 2017-11-19 | End: 2017-11-21 | Stop reason: HOSPADM

## 2017-11-18 RX ORDER — NICOTINE POLACRILEX 4 MG
15 LOZENGE BUCCAL
Status: DISCONTINUED | OUTPATIENT
Start: 2017-11-18 | End: 2017-11-19

## 2017-11-18 RX ORDER — NITROGLYCERIN 0.4 MG/1
0.4 TABLET SUBLINGUAL
Status: DISCONTINUED | OUTPATIENT
Start: 2017-11-18 | End: 2017-11-21 | Stop reason: HOSPADM

## 2017-11-18 RX ORDER — DEXTROSE MONOHYDRATE 25 G/50ML
25 INJECTION, SOLUTION INTRAVENOUS
Status: DISCONTINUED | OUTPATIENT
Start: 2017-11-18 | End: 2017-11-19

## 2017-11-18 RX ORDER — ACETAMINOPHEN AND CODEINE PHOSPHATE 120; 12 MG/5ML; MG/5ML
5 SOLUTION ORAL EVERY 4 HOURS PRN
Status: DISCONTINUED | OUTPATIENT
Start: 2017-11-18 | End: 2017-11-21 | Stop reason: HOSPADM

## 2017-11-18 RX ORDER — BISACODYL 5 MG/1
5 TABLET, DELAYED RELEASE ORAL DAILY PRN
Status: DISCONTINUED | OUTPATIENT
Start: 2017-11-18 | End: 2017-11-21 | Stop reason: HOSPADM

## 2017-11-18 RX ORDER — IPRATROPIUM BROMIDE 21 UG/1
1 SPRAY, METERED NASAL 3 TIMES DAILY
Status: DISCONTINUED | OUTPATIENT
Start: 2017-11-18 | End: 2017-11-19

## 2017-11-18 RX ORDER — SODIUM CHLORIDE 0.9 % (FLUSH) 0.9 %
1-10 SYRINGE (ML) INJECTION AS NEEDED
Status: DISCONTINUED | OUTPATIENT
Start: 2017-11-18 | End: 2017-11-21 | Stop reason: HOSPADM

## 2017-11-18 RX ORDER — ACETAMINOPHEN 325 MG/1
650 TABLET ORAL EVERY 4 HOURS PRN
Status: DISCONTINUED | OUTPATIENT
Start: 2017-11-18 | End: 2017-11-21 | Stop reason: HOSPADM

## 2017-11-18 RX ORDER — SODIUM CHLORIDE 9 MG/ML
75 INJECTION, SOLUTION INTRAVENOUS CONTINUOUS
Status: DISCONTINUED | OUTPATIENT
Start: 2017-11-18 | End: 2017-11-20

## 2017-11-18 RX ORDER — FLUTICASONE PROPIONATE 50 MCG
1 SPRAY, SUSPENSION (ML) NASAL 2 TIMES DAILY
Status: DISCONTINUED | OUTPATIENT
Start: 2017-11-19 | End: 2017-11-21 | Stop reason: HOSPADM

## 2017-11-18 RX ORDER — DEXAMETHASONE SODIUM PHOSPHATE 4 MG/ML
4 INJECTION, SOLUTION INTRA-ARTICULAR; INTRALESIONAL; INTRAMUSCULAR; INTRAVENOUS; SOFT TISSUE ONCE
Status: COMPLETED | OUTPATIENT
Start: 2017-11-18 | End: 2017-11-18

## 2017-11-18 RX ORDER — METHYLPREDNISOLONE SODIUM SUCCINATE 125 MG/2ML
125 INJECTION, POWDER, LYOPHILIZED, FOR SOLUTION INTRAMUSCULAR; INTRAVENOUS ONCE
Status: COMPLETED | OUTPATIENT
Start: 2017-11-18 | End: 2017-11-18

## 2017-11-18 RX ORDER — KETOROLAC TROMETHAMINE 15 MG/ML
15 INJECTION, SOLUTION INTRAMUSCULAR; INTRAVENOUS ONCE
Status: COMPLETED | OUTPATIENT
Start: 2017-11-18 | End: 2017-11-18

## 2017-11-18 RX ORDER — ASPIRIN 81 MG/1
81 TABLET ORAL DAILY
Status: DISCONTINUED | OUTPATIENT
Start: 2017-11-19 | End: 2017-11-19

## 2017-11-18 RX ORDER — ACETAMINOPHEN 650 MG/1
650 SUPPOSITORY RECTAL EVERY 4 HOURS PRN
Status: DISCONTINUED | OUTPATIENT
Start: 2017-11-18 | End: 2017-11-21 | Stop reason: HOSPADM

## 2017-11-18 RX ORDER — HYDRALAZINE HYDROCHLORIDE 20 MG/ML
20 INJECTION INTRAMUSCULAR; INTRAVENOUS EVERY 4 HOURS PRN
Status: DISCONTINUED | OUTPATIENT
Start: 2017-11-18 | End: 2017-11-21 | Stop reason: HOSPADM

## 2017-11-18 RX ORDER — DEXAMETHASONE SODIUM PHOSPHATE 4 MG/ML
8 INJECTION, SOLUTION INTRA-ARTICULAR; INTRALESIONAL; INTRAMUSCULAR; INTRAVENOUS; SOFT TISSUE EVERY 6 HOURS
Status: DISCONTINUED | OUTPATIENT
Start: 2017-11-19 | End: 2017-11-20

## 2017-11-18 RX ORDER — FUROSEMIDE 40 MG/1
40 TABLET ORAL DAILY PRN
Status: DISCONTINUED | OUTPATIENT
Start: 2017-11-18 | End: 2017-11-21 | Stop reason: HOSPADM

## 2017-11-18 RX ORDER — ONDANSETRON 2 MG/ML
4 INJECTION INTRAMUSCULAR; INTRAVENOUS EVERY 6 HOURS PRN
Status: DISCONTINUED | OUTPATIENT
Start: 2017-11-18 | End: 2017-11-21 | Stop reason: HOSPADM

## 2017-11-18 RX ORDER — LISINOPRIL 20 MG/1
20 TABLET ORAL DAILY
Status: DISCONTINUED | OUTPATIENT
Start: 2017-11-19 | End: 2017-11-19

## 2017-11-18 RX ADMIN — AMPICILLIN SODIUM AND SULBACTAM SODIUM 3 G: 2; 1 INJECTION, POWDER, FOR SOLUTION INTRAMUSCULAR; INTRAVENOUS at 20:31

## 2017-11-18 RX ADMIN — SODIUM CHLORIDE 500 ML: 9 INJECTION, SOLUTION INTRAVENOUS at 18:38

## 2017-11-18 RX ADMIN — IOPAMIDOL 100 ML: 612 INJECTION, SOLUTION INTRAVENOUS at 19:25

## 2017-11-18 RX ADMIN — METHYLPREDNISOLONE SODIUM SUCCINATE 125 MG: 125 INJECTION, POWDER, FOR SOLUTION INTRAMUSCULAR; INTRAVENOUS at 18:25

## 2017-11-18 RX ADMIN — IPRATROPIUM BROMIDE 1 SPRAY: 21 SPRAY, METERED NASAL at 22:43

## 2017-11-18 RX ADMIN — INSULIN LISPRO 6 UNITS: 100 INJECTION, SOLUTION INTRAVENOUS; SUBCUTANEOUS at 23:48

## 2017-11-18 RX ADMIN — RACEPINEPHRINE HYDROCHLORIDE 0.5 ML: 11.25 SOLUTION RESPIRATORY (INHALATION) at 20:16

## 2017-11-18 RX ADMIN — DEXAMETHASONE SODIUM PHOSPHATE 4 MG: 4 INJECTION, SOLUTION INTRAMUSCULAR; INTRAVENOUS at 20:44

## 2017-11-18 RX ADMIN — SODIUM CHLORIDE 100 ML/HR: 9 INJECTION, SOLUTION INTRAVENOUS at 22:43

## 2017-11-18 RX ADMIN — KETOROLAC TROMETHAMINE 15 MG: 15 INJECTION, SOLUTION INTRAMUSCULAR; INTRAVENOUS at 19:52

## 2017-11-19 LAB
BACTERIA BLD CULT: ABNORMAL
CK MB SERPL-CCNC: 3.56 NG/ML (ref 0–5)
CK SERPL-CCNC: 98 U/L (ref 0–203)
D-LACTATE SERPL-SCNC: 1.9 MMOL/L (ref 0.5–2)
D-LACTATE SERPL-SCNC: 2.2 MMOL/L (ref 0.5–2)
GLUCOSE BLDC GLUCOMTR-MCNC: 249 MG/DL (ref 70–130)
GLUCOSE BLDC GLUCOMTR-MCNC: 260 MG/DL (ref 70–130)
GLUCOSE BLDC GLUCOMTR-MCNC: 261 MG/DL (ref 70–130)
GLUCOSE BLDC GLUCOMTR-MCNC: 289 MG/DL (ref 70–130)
TROPONIN I SERPL-MCNC: 0.07 NG/ML (ref 0–0.03)
TROPONIN I SERPL-MCNC: 0.07 NG/ML (ref 0–0.03)
TROPONIN I SERPL-MCNC: 0.1 NG/ML (ref 0–0.03)

## 2017-11-19 PROCEDURE — 82553 CREATINE MB FRACTION: CPT | Performed by: FAMILY MEDICINE

## 2017-11-19 PROCEDURE — 83605 ASSAY OF LACTIC ACID: CPT | Performed by: INTERNAL MEDICINE

## 2017-11-19 PROCEDURE — 25010000002 DEXAMETHASONE PER 1 MG: Performed by: INTERNAL MEDICINE

## 2017-11-19 PROCEDURE — 82550 ASSAY OF CK (CPK): CPT | Performed by: FAMILY MEDICINE

## 2017-11-19 PROCEDURE — 63710000001 INSULIN LISPRO (HUMAN) PER 5 UNITS: Performed by: INTERNAL MEDICINE

## 2017-11-19 PROCEDURE — 82962 GLUCOSE BLOOD TEST: CPT

## 2017-11-19 PROCEDURE — 99231 SBSQ HOSP IP/OBS SF/LOW 25: CPT | Performed by: OTOLARYNGOLOGY

## 2017-11-19 PROCEDURE — 94799 UNLISTED PULMONARY SVC/PX: CPT

## 2017-11-19 PROCEDURE — 25010000002 ENOXAPARIN PER 10 MG: Performed by: INTERNAL MEDICINE

## 2017-11-19 PROCEDURE — 25010000003 AMPICILLIN-SULBACTAM PER 1.5 G: Performed by: INTERNAL MEDICINE

## 2017-11-19 PROCEDURE — 84484 ASSAY OF TROPONIN QUANT: CPT | Performed by: FAMILY MEDICINE

## 2017-11-19 PROCEDURE — 63710000001 INSULIN LISPRO (HUMAN) PER 5 UNITS: Performed by: FAMILY MEDICINE

## 2017-11-19 RX ORDER — LABETALOL HYDROCHLORIDE 5 MG/ML
10 INJECTION, SOLUTION INTRAVENOUS EVERY 4 HOURS PRN
Status: DISCONTINUED | OUTPATIENT
Start: 2017-11-19 | End: 2017-11-20

## 2017-11-19 RX ORDER — NICOTINE POLACRILEX 4 MG
15 LOZENGE BUCCAL
Status: DISCONTINUED | OUTPATIENT
Start: 2017-11-19 | End: 2017-11-21 | Stop reason: HOSPADM

## 2017-11-19 RX ORDER — ENALAPRILAT 2.5 MG/2ML
0.62 INJECTION INTRAVENOUS EVERY 6 HOURS PRN
Status: DISCONTINUED | OUTPATIENT
Start: 2017-11-19 | End: 2017-11-20

## 2017-11-19 RX ORDER — DEXTROSE MONOHYDRATE 25 G/50ML
25 INJECTION, SOLUTION INTRAVENOUS
Status: DISCONTINUED | OUTPATIENT
Start: 2017-11-19 | End: 2017-11-21 | Stop reason: HOSPADM

## 2017-11-19 RX ADMIN — AMPICILLIN SODIUM AND SULBACTAM SODIUM 3 G: 2; 1 INJECTION, POWDER, FOR SOLUTION INTRAMUSCULAR; INTRAVENOUS at 09:11

## 2017-11-19 RX ADMIN — INSULIN LISPRO 12 UNITS: 100 INJECTION, SOLUTION INTRAVENOUS; SUBCUTANEOUS at 18:17

## 2017-11-19 RX ADMIN — SODIUM CHLORIDE 100 ML/HR: 9 INJECTION, SOLUTION INTRAVENOUS at 09:13

## 2017-11-19 RX ADMIN — ENOXAPARIN SODIUM 40 MG: 40 INJECTION SUBCUTANEOUS at 09:14

## 2017-11-19 RX ADMIN — DEXAMETHASONE SODIUM PHOSPHATE 8 MG: 4 INJECTION, SOLUTION INTRA-ARTICULAR; INTRALESIONAL; INTRAMUSCULAR; INTRAVENOUS; SOFT TISSUE at 09:14

## 2017-11-19 RX ADMIN — FLUTICASONE PROPIONATE 1 SPRAY: 50 SPRAY, METERED NASAL at 18:12

## 2017-11-19 RX ADMIN — IPRATROPIUM BROMIDE AND ALBUTEROL SULFATE 3 ML: 2.5; .5 SOLUTION RESPIRATORY (INHALATION) at 07:44

## 2017-11-19 RX ADMIN — INSULIN LISPRO 6 UNITS: 100 INJECTION, SOLUTION INTRAVENOUS; SUBCUTANEOUS at 09:12

## 2017-11-19 RX ADMIN — DEXAMETHASONE SODIUM PHOSPHATE 8 MG: 4 INJECTION, SOLUTION INTRA-ARTICULAR; INTRALESIONAL; INTRAMUSCULAR; INTRAVENOUS; SOFT TISSUE at 15:17

## 2017-11-19 RX ADMIN — INSULIN LISPRO 12 UNITS: 100 INJECTION, SOLUTION INTRAVENOUS; SUBCUTANEOUS at 21:55

## 2017-11-19 RX ADMIN — INSULIN LISPRO 8 UNITS: 100 INJECTION, SOLUTION INTRAVENOUS; SUBCUTANEOUS at 12:38

## 2017-11-19 RX ADMIN — IPRATROPIUM BROMIDE AND ALBUTEROL SULFATE 3 ML: 2.5; .5 SOLUTION RESPIRATORY (INHALATION) at 00:09

## 2017-11-19 RX ADMIN — SODIUM CHLORIDE 75 ML/HR: 9 INJECTION, SOLUTION INTRAVENOUS at 23:48

## 2017-11-19 RX ADMIN — IPRATROPIUM BROMIDE AND ALBUTEROL SULFATE 3 ML: 2.5; .5 SOLUTION RESPIRATORY (INHALATION) at 11:37

## 2017-11-19 RX ADMIN — DEXAMETHASONE SODIUM PHOSPHATE 8 MG: 4 INJECTION, SOLUTION INTRA-ARTICULAR; INTRALESIONAL; INTRAMUSCULAR; INTRAVENOUS; SOFT TISSUE at 04:03

## 2017-11-19 RX ADMIN — FLUTICASONE PROPIONATE 1 SPRAY: 50 SPRAY, METERED NASAL at 11:51

## 2017-11-19 RX ADMIN — AMPICILLIN SODIUM AND SULBACTAM SODIUM 3 G: 2; 1 INJECTION, POWDER, FOR SOLUTION INTRAMUSCULAR; INTRAVENOUS at 20:17

## 2017-11-19 RX ADMIN — DEXAMETHASONE SODIUM PHOSPHATE 8 MG: 4 INJECTION, SOLUTION INTRA-ARTICULAR; INTRALESIONAL; INTRAMUSCULAR; INTRAVENOUS; SOFT TISSUE at 21:56

## 2017-11-19 RX ADMIN — IPRATROPIUM BROMIDE AND ALBUTEROL SULFATE 3 ML: 2.5; .5 SOLUTION RESPIRATORY (INHALATION) at 19:24

## 2017-11-19 RX ADMIN — AMPICILLIN SODIUM AND SULBACTAM SODIUM 3 G: 2; 1 INJECTION, POWDER, FOR SOLUTION INTRAMUSCULAR; INTRAVENOUS at 15:17

## 2017-11-19 RX ADMIN — AMPICILLIN SODIUM AND SULBACTAM SODIUM 3 G: 2; 1 INJECTION, POWDER, FOR SOLUTION INTRAMUSCULAR; INTRAVENOUS at 02:30

## 2017-11-19 NOTE — PROGRESS NOTES
OPERATIVE NOTE:  Joao FANNY Fonseca    DATE OF PROCEDURE: [unfilled]    PROCEDURE:   Flexible Fiberoptic Laryngoscopy    ANESTHESIA:  None    REASON FOR PROCEDURE:  Procedure was recommend for globus sensation and sore throat associated with dysphagia and progressive odynophagia with fever  Risks, benefits and alternatives were discussed.      DETAILS of OPERATION:  The patient was seated in the exam chair.  A flexible fiberoptic laryngoscopy was performed through the oral cavity.  The scope was introduced into the oral cavity and directed to the level of the glottis, examining the structures of the oropharynx, base of tongue, vallecula, supraglottic larynx, glottic larynx, and hypopharynx.      FINDINGS:  Mucosal surfaces:   The mucosal surfaces demonstrated normal mucosa surfaces with mild inflammation    Base of tongue:  The base of tongue was found to have lymphoid hyperplasia, moderate with moderate erythema and moderately severe edema without lashon purulence.    Epiglottis:  The epiglottis was found to have  no mass or lesion but curling into an omega-shaped is noted with mild erythema and mild edema as well as been mildly retro-displaced.    Aryepligottic fold:  The AE folds were found to have mild to moderate edema and erythema.    False Vocal Fold:  The false cords were found to have mild edema without erythema.    True Vocal Cord:  The true vocal cords were found to have no mass or lesion.    Arytenoid:   The arytenoids were found to have moderate edema without erythema.    Hypopharynx:  The hypopharynx was found to have no mass or lesion.    The patient tolerated procedure well.

## 2017-11-19 NOTE — CONSULTS
ENT CONSULT NOTE  2017    Patient Identification:  Name: Joao Fonseca  Age: 62 y.o.  Sex: male  :  1955  MRN: [id number]                     Date of Admission: 2017      CC:    Sore throat    Subjective     HPI:   Location:  Throat  Duration:  2 days ago  Timing:  acute   Quality:   severe  Context:  Patient had a mild upper respiratory tract infection approximately 10 days ago and was given Flonase but had gradually progressive dysphasia, odynophagia associated with fever and began having to sit up to feel as though he was having a choking sensation.  Modifying Factors:  nothing  Associated Signs/Symptoms:  Patient denies abdominal pain. chest pain, cough complaining as noted above of sore throat, dysphagia, odynophagia and fever.  He does complain of feeling some increased shortness of breath upon reclining.    ROS:  Review of Systems - Negative except as noted above  History obtained from chart review and the patient  General ROS: positive for  - fever and sleep disturbance  negative for - hot flashes, malaise, weight gain or weight loss  ENT ROS: positive for - sore throat  negative for - epistaxis, headaches, hearing change, nasal congestion, nasal discharge, nasal polyps, oral lesions, sinus pain, sneezing, tinnitus, vertigo, visual changes or vocal changes  Respiratory ROS: positive for - shortness of breath which is mild unless he reclines.  negative for - cough, hemoptysis, orthopnea, pleuritic pain, sputum changes, stridor, tachypnea or wheezing    HISTORY      Past Medical History:   Diagnosis Date   • Arrhythmia    • Cancer     malignant chest tumor   • Cardiomyopathy 2016   • CHF (congestive heart failure)    • Coronary artery disease    • Diabetes mellitus    • Disorder of liver 2013   • History of adenomatous polyp of colon 2010    TUBULAR ADENOMA AT 40CM   • Hyperlipidemia    • Hypertension    • Pacemaker    • Shingles         Past Surgical History:   Procedure  Laterality Date   • ANKLE SURGERY Right    • CARDIAC CATHETERIZATION     • CARDIAC ELECTROPHYSIOLOGY PROCEDURE Left 10/6/2016    Procedure: ICD DC new;  Surgeon: Zander Andino MD;  Location:  [facility];  Service:    • COLONOSCOPY  06/28/2010    biopsy at 41, 2 mm polyp supboptimal prep right clon    • COLONOSCOPY W/ POLYPECTOMY  06/28/2010    POLYP AT 40CM TUBULAR ADENOMA, SUBOPTIMAL PREP   • CORONARY ARTERY BYPASS GRAFT     • HEMORRHOIDECTOMY  1980   • HERNIA REPAIR     • PACEMAKER IMPLANTATION     • TONSILLECTOMY          Social History     Social History   • Marital status:      Spouse name: N/A   • Number of children: N/A   • Years of education: N/A     Occupational History   • Not on file.     Social History Main Topics   • Smoking status: Former Smoker     Types: Cigarettes   • Smokeless tobacco: Never Used      Comment: QUIT 20 YEARS AGO   • Alcohol use No   • Drug use: No   • Sexual activity: Defer     Other Topics Concern   • Not on file     Social History Narrative        (Not in a hospital admission)     Allergies   Allergen Reactions   • Cephalexin Anaphylaxis        Immunization History   Administered Date(s) Administered   • Tdap 11/07/2016        Family History   Problem Relation Age of Onset   • Diabetes Mother    • Colon cancer Mother    • Heart disease Father    • Hypertension Father    • Diabetes Father    • Cancer Father    • Bipolar disorder Sister    • Hypertension Brother    • Colon polyps Brother    • No Known Problems Son    • No Known Problems Maternal Grandmother    • Diabetes Maternal Grandfather    • No Known Problems Paternal Grandmother    • Diabetes Paternal Grandfather           Objective     PE:    Temp:  [100.1 °F (37.8 °C)-100.6 °F (38.1 °C)] 100.6 °F (38.1 °C)  Heart Rate:  [110-123] 113  Resp:  [19-25] 19  BP: (139-172)/() 145/78   Body mass index is 30.52 kg/(m^2).     General appearance: alert, well appearing, and in no distress, oriented  to person, place, and time, overweight, acyanotic, in no respiratory distress and without inspiratory stridor.   Ability to Communicate: normal means of communication, clear voice, normal  hearing   Ears - bilateral TM's and external ear canals normal.   Nasal exam - normal and patent, no erythema, discharge or polyps.   Oropharyngeal exam - mucous membranes moist, pharynx normal without lesions and Tonsils surgically absent.   Neck exam - supple, no significant adenopathy, no erythema but mild anterior tenderness at the superior aspect of the thyroid lamina.     CVS exam: normal rate and regular rhythm.   Chest: clear to auscultation, no wheezes, rales or rhonchi, symmetric air entry.   No lymphadenopathy in the anterior or posterior neck, supraclavicular, axillary or inguinal areas. No hepato-splenomegaly noted.   Neurological exam reveals alert, oriented, normal speech, no focal findings or movement disorder noted.    Flexible fiberoptic nasal laryngoscopy is dictated under a separate report was demonstrated findings consistent with supraglottitis    DATA      MEDICATIONS     No current facility-administered medications for this encounter.      Current Outpatient Prescriptions   Medication Sig Dispense Refill   • acyclovir (ZOVIRAX) 400 MG tablet Take 1 tablet by mouth Daily. Take no more than 5 doses a day. 90 tablet 2   • aspirin 81 MG EC tablet Take 1 tablet by mouth Daily. 90 tablet 2   • doxycycline (DORYX) 100 MG enteric coated tablet Take 1 tablet by mouth 2 (Two) Times a Day. 14 tablet 0   • fluticasone (FLONASE) 50 MCG/ACT nasal spray 1 spray into each nostril 2 (Two) Times a Day for 30 days. 16 g 2   • furosemide (LASIX) 40 MG tablet Take 1 tablet by mouth Daily As Needed (edema, SOA). 90 tablet 2   • ipratropium (ATROVENT) 0.06 % nasal spray 2 sprays into each nostril 4 (Four) Times a Day. 15 mL 2   • lisinopril (PRINIVIL,ZESTRIL) 20 MG tablet Take 1 tablet by mouth Daily. 90 tablet 3   • metFORMIN  (GLUCOPHAGE) 1000 MG tablet 1 po daily in the am and 1/2 po daily in the pm 180 tablet 2   • metoprolol tartrate (LOPRESSOR) 25 MG tablet Take 1 tablet by mouth 2 (Two) Times a Day. 180 tablet 3   • nitroglycerin (NITROSTAT) 0.4 MG SL tablet 1 under the tongue as needed for angina, may repeat q5mins for up three doses 100 tablet 11   • SITagliptin (JANUVIA) 100 MG tablet Take 1 tablet by mouth Daily. 21 tablet 0   • [START ON 11/29/2017] SITagliptin (JANUVIA) 100 MG tablet Take 1 tablet by mouth Daily. 90 tablet 2          Intake/Output Summary (Last 24 hours) at 11/18/17 2145  Last data filed at 11/18/17 2031   Gross per 24 hour   Intake              100 ml   Output                0 ml   Net              100 ml          Lab Results   Component Value Date    WBC 21.80 (H) 11/18/2017    HGB 18.3 (H) 11/18/2017    HCT 52.8 (H) 11/18/2017     11/18/2017     Lab Results   Component Value Date     11/18/2017    K 4.2 11/18/2017    CL 98 11/18/2017    CO2 25.0 11/18/2017    BUN 19 11/18/2017    CREATININE 1.18 11/18/2017    GLUCOSE 216 (H) 11/18/2017     Lab Results   Component Value Date    CALCIUM 9.3 11/18/2017     Lab Results   Component Value Date    AST 23 11/18/2017    ALT 33 11/18/2017    ALKPHOS 62 11/18/2017     No results found for: APTT, INR  No results found for: COLORU, CLARITYU, SPECGRAV, PHUR, PROTEINUR, GLUCOSEU, KETONESU, BLOODU, NITRITE, LEUKOCYTESUR, BILIRUBINUR, UROBILINOGEN, RBCUA, WBCUA, BACTERIA  Lab Results   Component Value Date    TROPONINI 0.036 (H) 11/18/2017     No components found for: HGBA1C;2  No components found for: TSH;2        Imaging Results (all)     Procedure Component Value Units Date/Time    XR Chest 2 View [[id number]] Collected:  11/18/17 1959     Updated:  11/18/17 2003    Narrative:       XR CHEST 2 VW- 11/18/2017 7:52 PM CST     HISTORY: fever; shortness of breath      COMPARISON: 10/06/2016     FINDINGS:   Upright frontal and lateral radiographs of the chest were  obtained.     Diffuse airspace opacities are present. The heart is enlarged. Pulmonary  vascularity is increased. The pacemaker lead is stable in position. The  osseous structures and surrounding soft tissues demonstrate no acute  abnormality.       Impression:       1. Pulmonary edema and cardiomegaly.        This report was finalized on 11/18/2017 20:00 by Dr. Jesus Miller MD.    CT Soft Tissue Neck With Contrast [[id number]] Collected:  11/18/17 1928     Updated:  11/18/17 2128    Addenda:        Addendum:     The previously described soft tissue attenuation is not in the  infraglottic region but in the region of the arytenoids and  aryepiglottic fold.  This report was finalized on 11/18/2017 21:25 by Dr. Jesus Miller MD.  Signed:  11/18/17 2125 by Jesus Miller MD    Narrative:       CT SOFT TISSUE NECK W CONTRAST- 11/18/2017 7:17 PM CST     HISTORY: cervical adenopathy; neck pain; fever; shortness of breath      COMPARISON: None     DOSE LENGTH PRODUCT: 348 mGy cm. Automated exposure control was also  utilized to decrease patient radiation dose.     TECHNIQUE: Helical tomographic images of the neck were obtained after  the intravenous infusion of contrast.     FINDINGS:  The visualized intracranial structures are unremarkable. The visualized  orbits are normal. The sinuses are clear.     There is soft tissue prominence at the level of the false vocal cords in  the posterior aspect of the infraglottic space. The airway is moderately  narrowed. No abscess is identified. The retropharyngeal fat is obscured  The vocal cords are normal in appearance. The epiglottis is normal in  appearance. No significant lymphadenopathy is seen in the neck.     The major vasculature of the neck is patent.     Degenerative changes are seen in the spine. No worrisome bony  abnormalities are noted.     Visualized lung apices are clear.       Impression:       1. Soft tissue density in the infraglottic space could be due to  a  suppurative, infectious process, but no abscess is identified. A  neoplastic process should also be considered. Direct visualization is  recommended.  2. No cervical lymphadenopathy appreciated.  This report was finalized on 11/18/2017 19:31 by Dr. Jesus Miller MD.        Discussed the above report with Dr. Miller-he apparently was incorrectly transcribed feeling the patient had an infraepiglottic edema NOT tissue density in the infraglottic airway which corresponds both with my clinical evaluation and my review of the radiological exam.     Assessment     ASSESSMENT      Active Problems:    Adult supraglottitis       Status post coronary bypass grafting 12/2015-to Steve   Diabetes mellitus   Congestive heart failure         Plan     PLAN      -Admission with IV antibiotic therapy and steroids   -Discussed with Dr. Benjamin   -We will admit to intensive care unit and monitored closely with tracheostomy for any   increasing airway difficulties/distress          Pipe Meeks MD  11/18/2017  9:45 PM

## 2017-11-19 NOTE — PROGRESS NOTES
Discharge Planning Assessment   [place]     Patient Name: Joao Fonseca  MRN: [id number]  Today's Date: 11/19/2017    Admit Date: 11/18/2017          Discharge Needs Assessment       11/19/17 0847    Living Environment    Lives With spouse    Home Accessibility no concerns    Stair Railings at Home none    Type of Financial/Environmental Concern none    Transportation Available car;family or friend will provide    Living Environment    Provides Primary Care For no one    Quality Of Family Relationships involved;helpful;supportive    Able to Return to Prior Living Arrangements yes    Discharge Needs Assessment    Concerns To Be Addressed no discharge needs identified    Readmission Within The Last 30 Days no previous admission in last 30 days    Anticipated Changes Related to Illness none    Equipment Currently Used at Home none    Equipment Needed After Discharge none    Discharge Planning Comments --   Pt has RX coverage and PCP is Dr. Alvarez.            Discharge Plan     None        Discharge Placement     No information found                Demographic Summary     None            Functional Status     None            Psychosocial     None            Abuse/Neglect     None            Legal     None            Substance Abuse     None            Patient Forms     None          DAMI Osborne

## 2017-11-19 NOTE — PROGRESS NOTES
[facility] Medicine Services  INPATIENT PROGRESS NOTE    Length of Stay: 1  Date of Admission: 11/18/2017  Primary Care Physician: Hira Alvarez MD    Subjective   Chief Complaint: SOB    HPI   Pt states he is breathing better. Pt states that he fell his neck is still swollen. Pt denies of any chest pain.     Review of Systems   Constitutional: Positive for activity change, appetite change and fatigue. Negative for chills and fever.   HENT: Negative for hearing loss, nosebleeds, tinnitus and trouble swallowing.    Eyes: Negative for visual disturbance.   Respiratory: Positive for choking and shortness of breath. Negative for cough, chest tightness and wheezing.    Cardiovascular: Negative for chest pain, palpitations and leg swelling.   Gastrointestinal: Negative for abdominal distention, abdominal pain, blood in stool, constipation, diarrhea, nausea and vomiting.   Endocrine: Negative for cold intolerance, heat intolerance, polydipsia, polyphagia and polyuria.   Genitourinary: Negative for decreased urine volume, difficulty urinating, dysuria, flank pain, frequency and hematuria.   Musculoskeletal: Negative for arthralgias, joint swelling and myalgias.   Skin: Negative for rash.   Allergic/Immunologic: Negative for immunocompromised state.   Neurological: Positive for weakness. Negative for dizziness, syncope, light-headedness and headaches.   Hematological: Negative for adenopathy. Does not bruise/bleed easily.   Psychiatric/Behavioral: Negative for confusion and sleep disturbance. The patient is not nervous/anxious.           All pertinent negatives and positives are as above. All other systems have been reviewed and are negative unless otherwise stated.     Objective    Temp:  [97.3 °F (36.3 °C)-100.6 °F (38.1 °C)] 97.3 °F (36.3 °C)  Heart Rate:  [] 99  Resp:  [14-25] 23  BP: (118-172)/() 122/79    Intake/Output Summary (Last 24 hours) at 11/19/17  0928  Last data filed at 11/19/17 0911   Gross per 24 hour   Intake          1309.63 ml   Output              750 ml   Net           559.63 ml     Physical Exam   Constitutional: He is oriented to person, place, and time. He appears well-developed and well-nourished.   HENT:   Head: Normocephalic and atraumatic.   Eyes: Conjunctivae and EOM are normal. Pupils are equal, round, and reactive to light.   Neck: Neck supple. No JVD present. No thyromegaly present.   Cardiovascular: Normal rate, regular rhythm and intact distal pulses.  Exam reveals no gallop and no friction rub.    No murmur heard.  Pulmonary/Chest: Effort normal and breath sounds normal. No respiratory distress. He has no wheezes. He has no rales. He exhibits no tenderness.   Abdominal: Soft. Bowel sounds are normal. He exhibits no distension. There is no tenderness. There is no rebound and no guarding.   Musculoskeletal: Normal range of motion. He exhibits no edema, tenderness or deformity.   Lymphadenopathy:     He has no cervical adenopathy.   Neurological: He is alert and oriented to person, place, and time. He displays normal reflexes. No cranial nerve deficit. He exhibits normal muscle tone.   Skin: Skin is warm and dry. No rash noted.   Psychiatric: He has a normal mood and affect. His behavior is normal. Judgment and thought content normal.   Nursing note and vitals reviewed.      Results Review:  Lab Results (last 24 hours)     Procedure Component Value Units Date/Time    Rapid Strep A Screen - Swab, Throat [[id number]]  (Normal) Collected:  11/18/17 1829    Specimen:  Swab from Throat Updated:  11/18/17 1847     Strep A Ag Negative    CBC Auto Differential [[id number]]  (Abnormal) Collected:  11/18/17 1826    Specimen:  Blood Updated:  11/18/17 1851     WBC 21.80 (H) 10*3/mm3      RBC 5.70 10*6/mm3      Hemoglobin 18.3 (H) g/dL      Hematocrit 52.8 (H) %      MCV 92.6 fL      MCH 32.1 (H) pg      MCHC 34.7 g/dL      RDW 12.6 %      RDW-SD 42.3  fl      MPV 11.5 fL      Platelets 152 10*3/mm3      Neutrophil % 90.0 (H) %      Lymphocyte % 3.1 (L) %      Monocyte % 6.5 %      Eosinophil % 0.0 %      Basophil % 0.1 %      Immature Grans % 0.3 %      Neutrophils, Absolute 19.62 (H) 10*3/mm3      Lymphocytes, Absolute 0.68 (L) 10*3/mm3      Monocytes, Absolute 1.41 (H) 10*3/mm3      Eosinophils, Absolute 0.00 10*3/mm3      Basophils, Absolute 0.02 10*3/mm3      Immature Grans, Absolute 0.07 (H) 10*3/mm3      nRBC 0.0 /100 WBC     CBC & Differential [[id number]] Collected:  11/18/17 1826    Specimen:  Blood Updated:  11/18/17 1851    Narrative:       The following orders were created for panel order CBC & Differential.  Procedure                               Abnormality         Status                     ---------                               -----------         ------                     Scan Slide[[id number]]                                                                  CBC Auto Differential[[id number]]        Abnormal            Final result                 Please view results for these tests on the individual orders.    Comprehensive Metabolic Panel [[id number]]  (Abnormal) Collected:  11/18/17 1826    Specimen:  Blood Updated:  11/18/17 1855     Glucose 216 (H) mg/dL      BUN 19 mg/dL      Creatinine 1.18 mg/dL      Sodium 139 mmol/L      Potassium 4.2 mmol/L      Chloride 98 mmol/L      CO2 25.0 mmol/L      Calcium 9.3 mg/dL      Total Protein 7.6 g/dL      Albumin 4.50 g/dL      ALT (SGPT) 33 U/L      AST (SGOT) 23 U/L      Alkaline Phosphatase 62 U/L      Total Bilirubin 2.1 (H) mg/dL      eGFR Non African Amer 63 mL/min/1.73      Globulin 3.1 gm/dL      A/G Ratio 1.5 g/dL      BUN/Creatinine Ratio 16.1     Anion Gap 16.0 (H) mmol/L     Lactic Acid, Plasma [[id number]]  (Abnormal) Collected:  11/18/17 1826    Specimen:  Blood Updated:  11/18/17 1855     Lactate 2.6 (C) mmol/L     Troponin [[id number]]  (Abnormal) Collected:  11/18/17 1826    Specimen:   Blood Updated:  11/18/17 1906     Troponin I 0.036 (H) ng/mL     Lactic Acid, Reflex Timer [[id number]] Collected:  11/18/17 1826    Specimen:  Blood Updated:  11/18/17 2201     Extra Tube Hold for add-ons.      Auto resulted.       Lactic Acid, Reflex [[id number]]  (Abnormal) Collected:  11/18/17 2210    Specimen:  Blood Updated:  11/18/17 2304     Lactate 2.7 (C) mmol/L     POC Glucose Fingerstick [[id number]]  (Abnormal) Collected:  11/18/17 2308    Specimen:  Blood Updated:  11/18/17 2319     Glucose 284 (H) mg/dL       : [id number] Gianfranco AmandaMeter ID: [id number]       Lactic Acid, Plasma [[id number]]  (Abnormal) Collected:  11/19/17 0249    Specimen:  Blood Updated:  11/19/17 0334     Lactate 2.2 (C) mmol/L     Blood Culture - Blood, Blood, Venous Line [[id number]]  (Abnormal) Collected:  11/18/17 1844    Specimen:  Blood from Arm, Left Updated:  11/19/17 0731     Blood Culture Abnormal Stain (A)      Gram Positive Cocci (A)     Isolated from Aerobic and Anaerobic Bottles     Gram Stain Result Gram positive cocci in pairs and chains    Blood Culture - Blood, Blood, Venous Line [[id number]]  (Abnormal) Collected:  11/18/17 1826    Specimen:  Blood from Arm, Right Updated:  11/19/17 0731     Blood Culture Abnormal Stain (A)      Gram Positive Cocci (A)     Isolated from Aerobic and Anaerobic Bottles     Gram Stain Result Gram positive cocci in pairs and chains    Lactic Acid, Plasma [[id number]]  (Normal) Collected:  11/19/17 0721    Specimen:  Blood Updated:  11/19/17 0815     Lactate 1.9 mmol/L     POC Glucose Fingerstick [[id number]]  (Abnormal) Collected:  11/19/17 0834    Specimen:  Blood Updated:  11/19/17 0846     Glucose 261 (H) mg/dL       : [id number] Head Kady DMeter ID: [id number]       Blood Culture ID, PCR - Blood, [[id number]]  (Abnormal) Collected:  11/18/17 1844    Specimen:  Blood from Arm, Left Updated:  11/19/17 0849     BCID, PCR Streptococcus pneumoniae. Identification by BCID PCR. (C)     Throat / Upper Respiratory Culture - Swab, Throat [[id number]] Collected:  11/18/17 1829    Specimen:  Swab from Throat Updated:  11/19/17 0852     Throat Culture --      Moderate growth (3+) Normal Respiratory Jaquelin           Cultures:  Blood Culture   Date Value Ref Range Status   11/18/2017 Abnormal Stain (A)  Preliminary   11/18/2017 Gram Positive Cocci (A)  Preliminary   11/18/2017 Abnormal Stain (A)  Preliminary   11/18/2017 Gram Positive Cocci (A)  Preliminary       Radiology Data:    Imaging Results (last 24 hours)     Procedure Component Value Units Date/Time    XR Chest 2 View [[id number]] Collected:  11/18/17 1959     Updated:  11/18/17 2003    Narrative:       XR CHEST 2 VW- 11/18/2017 7:52 PM CST     HISTORY: fever; shortness of breath      COMPARISON: 10/06/2016     FINDINGS:   Upright frontal and lateral radiographs of the chest were obtained.     Diffuse airspace opacities are present. The heart is enlarged. Pulmonary  vascularity is increased. The pacemaker lead is stable in position. The  osseous structures and surrounding soft tissues demonstrate no acute  abnormality.       Impression:       1. Pulmonary edema and cardiomegaly.        This report was finalized on 11/18/2017 20:00 by Dr. Jesus Miller MD.    CT Soft Tissue Neck With Contrast [[id number]] Collected:  11/18/17 1928     Updated:  11/18/17 2128    Addenda:        Addendum:     The previously described soft tissue attenuation is not in the  infraglottic region but in the region of the arytenoids and  aryepiglottic fold.  This report was finalized on 11/18/2017 21:25 by Dr. Jesus Miller MD.  Signed:  11/18/17 2125 by Jesus Miller MD    Narrative:       CT SOFT TISSUE NECK W CONTRAST- 11/18/2017 7:17 PM CST     HISTORY: cervical adenopathy; neck pain; fever; shortness of breath      COMPARISON: None     DOSE LENGTH PRODUCT: 348 mGy cm. Automated exposure control was also  utilized to decrease patient radiation dose.     TECHNIQUE:  Helical tomographic images of the neck were obtained after  the intravenous infusion of contrast.     FINDINGS:  The visualized intracranial structures are unremarkable. The visualized  orbits are normal. The sinuses are clear.     There is soft tissue prominence at the level of the false vocal cords in  the posterior aspect of the infraglottic space. The airway is moderately  narrowed. No abscess is identified. The retropharyngeal fat is obscured  The vocal cords are normal in appearance. The epiglottis is normal in  appearance. No significant lymphadenopathy is seen in the neck.     The major vasculature of the neck is patent.     Degenerative changes are seen in the spine. No worrisome bony  abnormalities are noted.     Visualized lung apices are clear.       Impression:       1. Soft tissue density in the infraglottic space could be due to a  suppurative, infectious process, but no abscess is identified. A  neoplastic process should also be considered. Direct visualization is  recommended.  2. No cervical lymphadenopathy appreciated.  This report was finalized on 11/18/2017 19:31 by Dr. Jesus Miller MD.          Allergies   Allergen Reactions   • Cephalexin Anaphylaxis       Scheduled meds:     ampicillin-sulbactam 3 g Intravenous Q6H   aspirin 81 mg Oral Daily   dexamethasone 8 mg Intravenous Q6H   enoxaparin 40 mg Subcutaneous Daily   fluticasone 1 spray Nasal BID   insulin lispro 0-9 Units Subcutaneous 4x Daily With Meals & Nightly   ipratropium 1 spray Each Nare TID   ipratropium-albuterol 3 mL Nebulization Q6H - RT   linagliptin 5 mg Oral Daily   lisinopril 20 mg Oral Daily   metoprolol tartrate 25 mg Oral BID       PRN meds:  •  acetaminophen **OR** acetaminophen  •  acetaminophen-codeine  •  bisacodyl  •  dextrose  •  dextrose  •  furosemide  •  glucagon (human recombinant)  •  hydrALAZINE  •  nitroglycerin  •  ondansetron  •  sodium chloride    Assessment/Plan     Active Problems:    Adult  supraglottitis      Plan:  Supraglottis/luekocytosis- iv steriod, unasyn. Consult HEENT.    DM- high sliding scale due to steroid. Ha1c.    Cardiomyopathy- pos troponin, Xray- pulm edema and cardiomegaly. Echo 10/6/16 EF 25%. Hx of CABG 3 vessel, AICD,  Hx thymona surgery 2 years ago.     Culture- streptococcus, gram pos cocci in pair and chain.     Discharge Planning:  Unknown.    Lenard Higginbotham MD   11/19/17   9:28 AM

## 2017-11-19 NOTE — H&P
[facility] Medicine Services  HISTORY AND PHYSICAL    Date of Admission: 11/18/2017  Primary Care Physician: Hira Alvarez MD    Subjective     Chief Complaint: Trouble breathing    History of Present Illness  Patient is a 62-year-old white male past medical history of coronary artery disease cardiomyopathy CHF type II diabetes who presents to the emergency room with a three-day history of increasing problems with swelling and congestion in his ears and his epiglottic area.  Apparently he finally came to the emergency room when he started having increasing problems breathing consistent with stridor.  He was evaluated in the emergency room and CT scan showed inflammation in the arytenoids and area epiglottic fold area.  He also has some cervical adenopathy there is concern that he was initially had infra-glottic inflammation and was at possible risk for losing airway capacity.  Dr. Meeks came in emergently evaluated him.  Patient was given racemic epinephrine and Decadron and nebulizer treatments in the emergency room with some improvement in his overall capacity.  He was evaluated by ENT and thought not to be having what was initially thought on the CT scan but that it was more anterior.  Patient is being transferred to the intensive care unit for further observation and treatment of infection with antibiotics and steroids.  He is also diabetic.        Review of Systems   14 point review of systems are negative except for as per HPI  Otherwise complete ROS reviewed and negative except as mentioned in the HPI.    Past Medical History:   Past Medical History:   Diagnosis Date   • Arrhythmia    • Cancer     malignant chest tumor   • Cardiomyopathy 12/27/2016   • CHF (congestive heart failure)    • Coronary artery disease    • Diabetes mellitus    • Disorder of liver 8/12/2013   • History of adenomatous polyp of colon 06/28/2010    TUBULAR ADENOMA AT 40CM   • Hyperlipidemia     • Hypertension    • Pacemaker    • Shingles      Past Surgical History:  Past Surgical History:   Procedure Laterality Date   • ANKLE SURGERY Right    • CARDIAC CATHETERIZATION     • CARDIAC ELECTROPHYSIOLOGY PROCEDURE Left 10/6/2016    Procedure: ICD DC new;  Surgeon: Zander Andino MD;  Location:  [facility];  Service:    • COLONOSCOPY  06/28/2010    biopsy at 41, 2 mm polyp supboptimal prep right clon    • COLONOSCOPY W/ POLYPECTOMY  06/28/2010    POLYP AT 40CM TUBULAR ADENOMA, SUBOPTIMAL PREP   • CORONARY ARTERY BYPASS GRAFT     • HEMORRHOIDECTOMY  1980   • HERNIA REPAIR     • PACEMAKER IMPLANTATION     • TONSILLECTOMY       Social History:  reports that he has quit smoking. His smoking use included Cigarettes. He has never used smokeless tobacco. He reports that he does not drink alcohol or use illicit drugs.    Family History: family history includes Bipolar disorder in his sister; Cancer in his father; Colon cancer in his mother; Colon polyps in his brother; Diabetes in his father, maternal grandfather, mother, and paternal grandfather; Heart disease in his father; Hypertension in his brother and father; No Known Problems in his maternal grandmother, paternal grandmother, and son.       Allergies:  Allergies   Allergen Reactions   • Cephalexin Anaphylaxis     Medications:  Prior to Admission medications    Medication Sig Start Date End Date Taking? Authorizing Provider   acyclovir (ZOVIRAX) 400 MG tablet Take 1 tablet by mouth Daily. Take no more than 5 doses a day. 11/7/16   Hira Alvarez MD   aspirin 81 MG EC tablet Take 1 tablet by mouth Daily. 11/7/16   Hira Alvarez MD   doxycycline (DORYX) 100 MG enteric coated tablet Take 1 tablet by mouth 2 (Two) Times a Day. 11/13/17   Mackenzie Hurtado MD   fluticasone (FLONASE) 50 MCG/ACT nasal spray 1 spray into each nostril 2 (Two) Times a Day for 30 days. 11/8/17 12/8/17  Hira Alvarez MD   furosemide (LASIX) 40 MG  "tablet Take 1 tablet by mouth Daily As Needed (edema, SOA). 11/7/16   Hira Alvarez MD   ipratropium (ATROVENT) 0.06 % nasal spray 2 sprays into each nostril 4 (Four) Times a Day. 11/8/17   Hira Alvarez MD   lisinopril (PRINIVIL,ZESTRIL) 20 MG tablet Take 1 tablet by mouth Daily. 2/2/17   Phil Henry MD   metFORMIN (GLUCOPHAGE) 1000 MG tablet 1 po daily in the am and 1/2 po daily in the pm 11/7/16   Hira Alvarez MD   metoprolol tartrate (LOPRESSOR) 25 MG tablet Take 1 tablet by mouth 2 (Two) Times a Day. 2/2/17   Phil Henry MD   nitroglycerin (NITROSTAT) 0.4 MG SL tablet 1 under the tongue as needed for angina, may repeat q5mins for up three doses 12/29/16   Phil Henry MD   SITagliptin (JANUVIA) 100 MG tablet Take 1 tablet by mouth Daily. 11/8/17   Hira Alvarez MD   SITagliptin (JANUVIA) 100 MG tablet Take 1 tablet by mouth Daily. 11/29/17   Hira Alvarez MD     Objective     Vital Signs: /78  Pulse 113  Temp (!) 100.6 °F (38.1 °C)  Resp 19  Ht 72\" (182.9 cm)  Wt 225 lb (102 kg)  SpO2 98%  BMI 30.52 kg/m2  Physical Exam  Constitutional: He is oriented to person, place, and time. He appears well-developed and well-nourished.   HENT:   Head: Normocephalic and atraumatic.   Right Ear: External ear normal.  Normal tympanic membrane   Left Ear: External ear normal tympanic membrane shows fluid behind it.   Nose: Nose normal.   Mouth/Throat: Oropharynx is clear and moist.   Moderately erythematous oropharynx; patient sitting up straight on the side of the bed with gurgling noted with breathing; no stridor noted  tonsils are absent uvula is slightly inflamed  Eyes: Conjunctivae and EOM are normal. Pupils are equal, round, and reactive to light.   Neck: Normal range of motion. Neck supple.   Cardiovascular: Normal heart sounds and intact distal pulses.    Tachycardia on exam   Pulmonary/Chest: Effort normal and breath sounds normal. "   Abdominal: Soft. Bowel sounds are normal.   Musculoskeletal: Normal range of motion.   Lymphadenopathy:     He has cervical adenopathy.  Which is tender to palpation   Neurological: He is alert and oriented to person, place, and time.   Skin: Skin is warm and dry.   Psychiatric: He has a normal mood and affect. His behavior is normal. Judgment and thought content normal.   Nursing note and vitals reviewed.         Results Reviewed:  Lab Results (last 24 hours)     Procedure Component Value Units Date/Time    Throat / Upper Respiratory Culture - Swab, Throat [[id number]] Collected:  11/18/17 1829    Specimen:  Swab from Throat Updated:  11/18/17 1834    Blood Culture - Blood, Blood, Venous Line [[id number]] Collected:  11/18/17 1826    Specimen:  Blood from Arm, Right Updated:  11/18/17 1843    Rapid Strep A Screen - Swab, Throat [[id number]]  (Normal) Collected:  11/18/17 1829    Specimen:  Swab from Throat Updated:  11/18/17 1847     Strep A Ag Negative    Blood Culture - Blood, Blood, Venous Line [[id number]] Collected:  11/18/17 1844    Specimen:  Blood from Arm, Left Updated:  11/18/17 1848    CBC Auto Differential [[id number]]  (Abnormal) Collected:  11/18/17 1826    Specimen:  Blood Updated:  11/18/17 1851     WBC 21.80 (H) 10*3/mm3      RBC 5.70 10*6/mm3      Hemoglobin 18.3 (H) g/dL      Hematocrit 52.8 (H) %      MCV 92.6 fL      MCH 32.1 (H) pg      MCHC 34.7 g/dL      RDW 12.6 %      RDW-SD 42.3 fl      MPV 11.5 fL      Platelets 152 10*3/mm3      Neutrophil % 90.0 (H) %      Lymphocyte % 3.1 (L) %      Monocyte % 6.5 %      Eosinophil % 0.0 %      Basophil % 0.1 %      Immature Grans % 0.3 %      Neutrophils, Absolute 19.62 (H) 10*3/mm3      Lymphocytes, Absolute 0.68 (L) 10*3/mm3      Monocytes, Absolute 1.41 (H) 10*3/mm3      Eosinophils, Absolute 0.00 10*3/mm3      Basophils, Absolute 0.02 10*3/mm3      Immature Grans, Absolute 0.07 (H) 10*3/mm3      nRBC 0.0 /100 WBC     CBC & Differential [[id number]]  Collected:  11/18/17 1826    Specimen:  Blood Updated:  11/18/17 1851    Narrative:       The following orders were created for panel order CBC & Differential.  Procedure                               Abnormality         Status                     ---------                               -----------         ------                     Scan Slide[[id number]]                                                                  CBC Auto Differential[[id number]]        Abnormal            Final result                 Please view results for these tests on the individual orders.    Comprehensive Metabolic Panel [[id number]]  (Abnormal) Collected:  11/18/17 1826    Specimen:  Blood Updated:  11/18/17 1855     Glucose 216 (H) mg/dL      BUN 19 mg/dL      Creatinine 1.18 mg/dL      Sodium 139 mmol/L      Potassium 4.2 mmol/L      Chloride 98 mmol/L      CO2 25.0 mmol/L      Calcium 9.3 mg/dL      Total Protein 7.6 g/dL      Albumin 4.50 g/dL      ALT (SGPT) 33 U/L      AST (SGOT) 23 U/L      Alkaline Phosphatase 62 U/L      Total Bilirubin 2.1 (H) mg/dL      eGFR Non African Amer 63 mL/min/1.73      Globulin 3.1 gm/dL      A/G Ratio 1.5 g/dL      BUN/Creatinine Ratio 16.1     Anion Gap 16.0 (H) mmol/L     Lactic Acid, Plasma [[id number]]  (Abnormal) Collected:  11/18/17 1826    Specimen:  Blood Updated:  11/18/17 1855     Lactate 2.6 (C) mmol/L     Lactic Acid, Reflex Timer [[id number]] Collected:  11/18/17 1826    Specimen:  Blood Updated:  11/18/17 1855    Troponin [[id number]]  (Abnormal) Collected:  11/18/17 1826    Specimen:  Blood Updated:  11/18/17 1906     Troponin I 0.036 (H) ng/mL         Imaging Results (last 24 hours)     Procedure Component Value Units Date/Time    XR Chest 2 View [[id number]] Collected:  11/18/17 1959     Updated:  11/18/17 2003    Narrative:       XR CHEST 2 VW- 11/18/2017 7:52 PM CST     HISTORY: fever; shortness of breath      COMPARISON: 10/06/2016     FINDINGS:   Upright frontal and lateral  radiographs of the chest were obtained.     Diffuse airspace opacities are present. The heart is enlarged. Pulmonary  vascularity is increased. The pacemaker lead is stable in position. The  osseous structures and surrounding soft tissues demonstrate no acute  abnormality.       Impression:       1. Pulmonary edema and cardiomegaly.        This report was finalized on 11/18/2017 20:00 by Dr. Jesus Miller MD.    CT Soft Tissue Neck With Contrast [[id number]] Collected:  11/18/17 1928     Updated:  11/18/17 2128    Addenda:        Addendum:     The previously described soft tissue attenuation is not in the  infraglottic region but in the region of the arytenoids and  aryepiglottic fold.  This report was finalized on 11/18/2017 21:25 by Dr. Jesus Miller MD.  Signed:  11/18/17 2125 by Jesus Miller MD    Narrative:       CT SOFT TISSUE NECK W CONTRAST- 11/18/2017 7:17 PM CST     HISTORY: cervical adenopathy; neck pain; fever; shortness of breath      COMPARISON: None     DOSE LENGTH PRODUCT: 348 mGy cm. Automated exposure control was also  utilized to decrease patient radiation dose.     TECHNIQUE: Helical tomographic images of the neck were obtained after  the intravenous infusion of contrast.     FINDINGS:  The visualized intracranial structures are unremarkable. The visualized  orbits are normal. The sinuses are clear.     There is soft tissue prominence at the level of the false vocal cords in  the posterior aspect of the infraglottic space. The airway is moderately  narrowed. No abscess is identified. The retropharyngeal fat is obscured  The vocal cords are normal in appearance. The epiglottis is normal in  appearance. No significant lymphadenopathy is seen in the neck.     The major vasculature of the neck is patent.     Degenerative changes are seen in the spine. No worrisome bony  abnormalities are noted.     Visualized lung apices are clear.       Impression:       1. Soft tissue density in the infraglottic  space could be due to a  suppurative, infectious process, but no abscess is identified. A  neoplastic process should also be considered. Direct visualization is  recommended.  2. No cervical lymphadenopathy appreciated.  This report was finalized on 11/18/2017 19:31 by Dr. Jesus Miller MD.        I have personally reviewed and interpreted the radiology studies and ECG obtained at time of admission.     Assessment / Plan     Assessment:   Hospital Problem List     Adult supraglottitis      1.  Adult supraglottitis plan is to put patient in the intensive care unit start him on broad-spectrum antibiotics with Unasyn 3 g IV every 6 hours we will also give him Decadron 8 mg IV every 6 hours also given duo nebs to help with airway.  On the hold off on racemic epinephrine as he does have a cardiomyopathy and my concern is that would exacerbate that.  He does have a positive troponin slightly I think this is just due to stress we will monitor the trend.  ENT has been consulted and is following.  He was watched closely in intensive care unit overnight to probably be transferred out in the morning if stable.  Further plans as per ENT.  2.  Type II diabetes continue current medications aggressive insulin treatment with sliding scale insulin considering is getting steroids monitor closely.  3.  Cardiomyopathy with positive troponin will check serial troponins to see if he trends down hopefully that will be the case I am not going involve cardiology yet will get serial EKGs as well monitor closely.            Code Status: Full     I discussed the patients findings and my recommendations with the patient and his wife who is his healthcare power surrogate    Estimated length of stay 2-3 days    Tony Benjamin MD   11/18/17   9:49 PM

## 2017-11-19 NOTE — ED PROVIDER NOTES
Subjective   Patient is a 62 y.o. male presenting with general illness.   Illness   Severity:  Moderate  Onset quality:  Gradual  Timing:  Constant  Progression:  Worsening  Chronicity:  New  Associated symptoms: fever, shortness of breath and sore throat    Associated symptoms: no abdominal pain, no chest pain, no congestion, no cough, no headaches, no rash and no vomiting  Fatigue: patient was prescribed flonase inhaler on 11/8 for congestion; last night patient was up all thru the night with complaints of sore throat and difficulty swallowing; positive for fever;         Review of Systems   Constitutional: Positive for fever. Negative for appetite change and chills. Fatigue: patient was prescribed flonase inhaler on 11/8 for congestion; last night patient was up all thru the night with complaints of sore throat and difficulty swallowing; positive for fever;    HENT: Positive for sore throat. Negative for congestion.    Respiratory: Positive for shortness of breath. Negative for cough and chest tightness.    Cardiovascular: Negative for chest pain and palpitations.   Gastrointestinal: Negative for abdominal distention, abdominal pain and vomiting.   Genitourinary: Negative for difficulty urinating and dysuria.   Musculoskeletal: Negative for back pain, joint swelling, neck pain and neck stiffness.   Skin: Negative for rash.   Neurological: Negative for dizziness and headaches.   All other systems reviewed and are negative.      Past Medical History:   Diagnosis Date   • Arrhythmia    • Cancer     malignant chest tumor   • Cardiomyopathy 12/27/2016   • CHF (congestive heart failure)    • Coronary artery disease    • Diabetes mellitus    • Disorder of liver 8/12/2013   • History of adenomatous polyp of colon 06/28/2010    TUBULAR ADENOMA AT 40CM   • Hyperlipidemia    • Hypertension    • Pacemaker    • Shingles        Allergies   Allergen Reactions   • Cephalexin Anaphylaxis       Past Surgical History:   Procedure  Laterality Date   • ANKLE SURGERY Right    • CARDIAC CATHETERIZATION     • CARDIAC ELECTROPHYSIOLOGY PROCEDURE Left 10/6/2016    Procedure: ICD DC new;  Surgeon: Zander Andino MD;  Location:  [facility];  Service:    • COLONOSCOPY  06/28/2010    biopsy at 41, 2 mm polyp supboptimal prep right clon    • COLONOSCOPY W/ POLYPECTOMY  06/28/2010    POLYP AT 40CM TUBULAR ADENOMA, SUBOPTIMAL PREP   • CORONARY ARTERY BYPASS GRAFT     • HEMORRHOIDECTOMY  1980   • HERNIA REPAIR     • PACEMAKER IMPLANTATION     • TONSILLECTOMY         Family History   Problem Relation Age of Onset   • Diabetes Mother    • Colon cancer Mother    • Heart disease Father    • Hypertension Father    • Diabetes Father    • Cancer Father    • Bipolar disorder Sister    • Hypertension Brother    • Colon polyps Brother    • No Known Problems Son    • No Known Problems Maternal Grandmother    • Diabetes Maternal Grandfather    • No Known Problems Paternal Grandmother    • Diabetes Paternal Grandfather        Social History     Social History   • Marital status:      Spouse name: N/A   • Number of children: N/A   • Years of education: N/A     Social History Main Topics   • Smoking status: Former Smoker     Types: Cigarettes   • Smokeless tobacco: Never Used      Comment: QUIT 20 YEARS AGO   • Alcohol use No   • Drug use: No   • Sexual activity: Defer     Other Topics Concern   • None     Social History Narrative       Prior to Admission medications    Medication Sig Start Date End Date Taking? Authorizing Provider   acyclovir (ZOVIRAX) 400 MG tablet Take 1 tablet by mouth Daily. Take no more than 5 doses a day. 11/7/16   Hira Alvarez MD   aspirin 81 MG EC tablet Take 1 tablet by mouth Daily. 11/7/16   Hira Alvarez MD   doxycycline (DORYX) 100 MG enteric coated tablet Take 1 tablet by mouth 2 (Two) Times a Day. 11/13/17   Mackenzie Hurtado MD   fluticasone (FLONASE) 50 MCG/ACT nasal spray 1 spray into each  "nostril 2 (Two) Times a Day for 30 days. 11/8/17 12/8/17  Hira Alvarez MD   furosemide (LASIX) 40 MG tablet Take 1 tablet by mouth Daily As Needed (edema, SOA). 11/7/16   Hira Alvarez MD   ipratropium (ATROVENT) 0.06 % nasal spray 2 sprays into each nostril 4 (Four) Times a Day. 11/8/17   Hira Alvarez MD   lisinopril (PRINIVIL,ZESTRIL) 20 MG tablet Take 1 tablet by mouth Daily. 2/2/17   Phil Henry MD   metFORMIN (GLUCOPHAGE) 1000 MG tablet 1 po daily in the am and 1/2 po daily in the pm 11/7/16   Hira Alvarez MD   metoprolol tartrate (LOPRESSOR) 25 MG tablet Take 1 tablet by mouth 2 (Two) Times a Day. 2/2/17   Phil Henry MD   nitroglycerin (NITROSTAT) 0.4 MG SL tablet 1 under the tongue as needed for angina, may repeat q5mins for up three doses 12/29/16   Phil Henry MD   SITagliptin (JANUVIA) 100 MG tablet Take 1 tablet by mouth Daily. 11/8/17   Hira Alvarez MD   SITagliptin (JANUVIA) 100 MG tablet Take 1 tablet by mouth Daily. 11/29/17   Hira Alvarez MD       Medications   methylPREDNISolone sodium succinate (SOLU-Medrol) injection 125 mg (125 mg Intravenous Given 11/18/17 1825)   sodium chloride 0.9 % bolus 500 mL (0 mL Intravenous Stopped 11/18/17 1910)   ketorolac (TORADOL) injection 15 mg (15 mg Intravenous Given 11/18/17 1952)   iopamidol (ISOVUE-300) 61 % injection 100 mL (100 mL Intravenous Given 11/18/17 1925)   ampicillin-sulbactam 3 g/100 mL 0.9% NS (MBP) (3 g Intravenous New Bag 11/18/17 2031)   racemic epinephrine (RACEPINEPHRINE) nebulizer solution 0.5 mL (0.5 mL Nebulization Given 11/18/17 2016)   dexamethasone (DECADRON) injection 4 mg (4 mg Intravenous Given 11/18/17 2044)       /83  Pulse 116  Temp (!) 100.6 °F (38.1 °C)  Resp 19  Ht 72\" (182.9 cm)  Wt 225 lb (102 kg)  SpO2 99%  BMI 30.52 kg/m2      Objective   Physical Exam   Constitutional: He is oriented to person, place, and time. He appears " well-developed and well-nourished.   HENT:   Head: Normocephalic and atraumatic.   Right Ear: External ear normal.   Left Ear: External ear normal.   Nose: Nose normal.   Mouth/Throat: Oropharynx is clear and moist.   Moderately erythematous oropharynx; patient sitting up straight on the side of the bed with gurgling noted with breathing; no stridor noted   Eyes: Conjunctivae and EOM are normal. Pupils are equal, round, and reactive to light.   Neck: Normal range of motion. Neck supple.   Cardiovascular: Normal heart sounds and intact distal pulses.    Tachycardia on exam   Pulmonary/Chest: Effort normal and breath sounds normal.   Abdominal: Soft. Bowel sounds are normal.   Musculoskeletal: Normal range of motion.   Lymphadenopathy:     He has cervical adenopathy.   Neurological: He is alert and oriented to person, place, and time.   Skin: Skin is warm and dry.   Psychiatric: He has a normal mood and affect. His behavior is normal. Judgment and thought content normal.   Nursing note and vitals reviewed.      Procedures         Lab Results (last 24 hours)     Procedure Component Value Units Date/Time    CBC & Differential [[id number]] Collected:  11/18/17 1826    Specimen:  Blood Updated:  11/18/17 1851    Narrative:       The following orders were created for panel order CBC & Differential.  Procedure                               Abnormality         Status                     ---------                               -----------         ------                     Scan Slide[[id number]]                                                                  CBC Auto Differential[[id number]]        Abnormal            Final result                 Please view results for these tests on the individual orders.    Comprehensive Metabolic Panel [[id number]]  (Abnormal) Collected:  11/18/17 1826    Specimen:  Blood Updated:  11/18/17 1855     Glucose 216 (H) mg/dL      BUN 19 mg/dL      Creatinine 1.18 mg/dL      Sodium 139 mmol/L       Potassium 4.2 mmol/L      Chloride 98 mmol/L      CO2 25.0 mmol/L      Calcium 9.3 mg/dL      Total Protein 7.6 g/dL      Albumin 4.50 g/dL      ALT (SGPT) 33 U/L      AST (SGOT) 23 U/L      Alkaline Phosphatase 62 U/L      Total Bilirubin 2.1 (H) mg/dL      eGFR Non African Amer 63 mL/min/1.73      Globulin 3.1 gm/dL      A/G Ratio 1.5 g/dL      BUN/Creatinine Ratio 16.1     Anion Gap 16.0 (H) mmol/L     Blood Culture - Blood, Blood, Venous Line [[id number]] Collected:  11/18/17 1826    Specimen:  Blood from Arm, Right Updated:  11/18/17 1843    Lactic Acid, Plasma [[id number]]  (Abnormal) Collected:  11/18/17 1826    Specimen:  Blood Updated:  11/18/17 1855     Lactate 2.6 (C) mmol/L     Troponin [[id number]]  (Abnormal) Collected:  11/18/17 1826    Specimen:  Blood Updated:  11/18/17 1906     Troponin I 0.036 (H) ng/mL     CBC Auto Differential [[id number]]  (Abnormal) Collected:  11/18/17 1826    Specimen:  Blood Updated:  11/18/17 1851     WBC 21.80 (H) 10*3/mm3      RBC 5.70 10*6/mm3      Hemoglobin 18.3 (H) g/dL      Hematocrit 52.8 (H) %      MCV 92.6 fL      MCH 32.1 (H) pg      MCHC 34.7 g/dL      RDW 12.6 %      RDW-SD 42.3 fl      MPV 11.5 fL      Platelets 152 10*3/mm3      Neutrophil % 90.0 (H) %      Lymphocyte % 3.1 (L) %      Monocyte % 6.5 %      Eosinophil % 0.0 %      Basophil % 0.1 %      Immature Grans % 0.3 %      Neutrophils, Absolute 19.62 (H) 10*3/mm3      Lymphocytes, Absolute 0.68 (L) 10*3/mm3      Monocytes, Absolute 1.41 (H) 10*3/mm3      Eosinophils, Absolute 0.00 10*3/mm3      Basophils, Absolute 0.02 10*3/mm3      Immature Grans, Absolute 0.07 (H) 10*3/mm3      nRBC 0.0 /100 WBC     Lactic Acid, Reflex Timer [[id number]] Collected:  11/18/17 1826    Specimen:  Blood Updated:  11/18/17 1855    Rapid Strep A Screen - Swab, Throat [[id number]]  (Normal) Collected:  11/18/17 1829    Specimen:  Swab from Throat Updated:  11/18/17 1847     Strep A Ag Negative    Throat / Upper Respiratory  Culture - Swab, Throat [[id number]] Collected:  11/18/17 1829    Specimen:  Swab from Throat Updated:  11/18/17 1834    Blood Culture - Blood, Blood, Venous Line [[id number]] Collected:  11/18/17 1844    Specimen:  Blood from Arm, Left Updated:  11/18/17 1848          XR Chest 2 View   Final Result   1. Pulmonary edema and cardiomegaly.           This report was finalized on 11/18/2017 20:00 by Dr. Jesus Miller MD.      CT Soft Tissue Neck With Contrast   Final Result   1. Soft tissue density in the infraglottic space could be due to a   suppurative, infectious process, but no abscess is identified. A   neoplastic process should also be considered. Direct visualization is   recommended.   2. No cervical lymphadenopathy appreciated.   This report was finalized on 11/18/2017 19:31 by Dr. Jesus Miller MD.            ED Course  ED Course   Comment By Time   Spoke with hospitalist and ENT. Dr. Meeks will be in the ER shortly to assess patient. Hospitalist plans to admit. Maya Zuniga, DIOR 11/18 2055          MDM  Number of Diagnoses or Management Options  Adult supraglottitis: new and requires workup     Amount and/or Complexity of Data Reviewed  Clinical lab tests: ordered and reviewed  Tests in the radiology section of CPT®: ordered and reviewed  Discuss the patient with other providers: yes    Risk of Complications, Morbidity, and/or Mortality  Presenting problems: moderate  Diagnostic procedures: moderate  Management options: moderate    Patient Progress  Patient progress: improved      Final diagnoses:   Adult supraglottitis          Maya Zuniga, DIOR  11/18/17 2057

## 2017-11-19 NOTE — PROGRESS NOTES
[facility]  ENT PROGRESS NOTES  2017      Patient Identification:  Name: Joao Fonseca  Age: 62 y.o.  Sex: male  :  1955  MRN: [id number]                     Date of Admission: 2017      CC:    Feels better this a.m.  Subjective   The patient is without complaints stating that his voice is better and his throat is sore.  His breathing is much more comfortable this a.m.    HISTORY   HPI, ROS, PMFSHx reviewed:   No changes       Objective     PE:    Temp:  [97.3 °F (36.3 °C)-100.6 °F (38.1 °C)] 97.3 °F (36.3 °C)  Heart Rate:  [] 99  Resp:  [14-25] 23  BP: (118-172)/() 122/79   Body mass index is 30.81 kg/(m^2).     General appearance: alert, well appearing, and in no distress and oriented to person, place, and time.   Ability to Communicate: normal means of communication, clear voice-without raspiness or any inspiratory or expiratory audible sounds.  Significant improvement noted in voice overnight, normal Hearing   Face: No edema, erythema   Ears - bilateral TM's and external ear canals normal.   Nasal exam - normal and patent, no erythema, discharge or polyps.   Oropharyngeal exam - mucous membranes moist, pharynx normal without lesions.   Neck exam - supple, no significant adenopathy.     CVS exam: normal rate and regular rhythm.   Chest: clear to auscultation, no wheezes, rales or rhonchi, symmetric air entry.   No lymphadenopathy in the anterior or posterior neck, supraclavicular, axillary or inguinal areas. No hepato-splenomegaly noted.   Neurological exam - not examined.    DATA      MEDICATIONS   I have reviewed the current MAR.     LABS AND IMAGING      I have reviewed the labs and imaging data since the patient was last seen.        POC Glucose Fingerstick   Order: [id number]   Status:  Final result   Visible to patient:  No (Not Released)         Ref Range & Units 8:34 AM   1d ago      Glucose 70 - 130 mg/dL 261 (H) 284 (H)CM   Comments: : [id number] Head  Kady DMeter ID: [id number]   Resulting Agency   [facility]      Specimen Collected: 11/19/17  8:34 AM Last Resulted: 11/19/17  8:46 AM                       Blood Culture ID, PCR - Blood,   Order: [id number] - Reflex for Order [id number]   Status:  Final result   Visible to patient:  No (Not Released)   Specimen Information: Arm, Left; Blood           Ref Range & Units 1d ago     BCID, PCR No organism detected by BCID PCR., Negative by BCID PCR. Culture to Follow. Streptococcus pneumoniae. Identification by BCID PCR. (C)   Resulting [facility]      Specimen Collected: 11/18/17  6:44 PM   Last Resulted: 11/19/17  8:49 AM                             Assessment   Supraglottitis  Diabetes mellitus  History of coronary artery bypass grafting    ASSESSMENT     Active Problems:    Adult supraglottitis            Plan     PLAN     We will await sensitivities on cultures  Antibiotic should be adequate and patient is clinically improving  Okay to move to floor from an ENT standpoint  Okay to advance diet from an ENT standpoint  Check CBC in a.m.          Pipe Meeks MD  11/19/2017  8:50 AM

## 2017-11-20 PROBLEM — R78.81 STREPTOCOCCAL BACTEREMIA: Status: ACTIVE | Noted: 2017-11-20

## 2017-11-20 PROBLEM — B95.5 STREPTOCOCCAL BACTEREMIA: Status: ACTIVE | Noted: 2017-11-20

## 2017-11-20 PROBLEM — E78.5 HYPERLIPIDEMIA: Status: ACTIVE | Noted: 2017-11-20

## 2017-11-20 PROBLEM — I50.23 ACUTE ON CHRONIC SYSTOLIC HEART FAILURE (HCC): Status: ACTIVE | Noted: 2017-11-20

## 2017-11-20 PROBLEM — A41.9 SEPSIS (HCC): Status: ACTIVE | Noted: 2017-11-20

## 2017-11-20 LAB
ALBUMIN SERPL-MCNC: 3.5 G/DL (ref 3.5–5)
ALBUMIN/GLOB SERPL: 1.2 G/DL (ref 1.1–2.5)
ALP SERPL-CCNC: 54 U/L (ref 24–120)
ALT SERPL W P-5'-P-CCNC: 32 U/L (ref 0–54)
ANION GAP SERPL CALCULATED.3IONS-SCNC: 10 MMOL/L (ref 4–13)
AST SERPL-CCNC: 18 U/L (ref 7–45)
BACTERIA SPEC AEROBE CULT: NORMAL
BACTERIA SPEC AEROBE CULT: NORMAL
BASOPHILS # BLD AUTO: 0.01 10*3/MM3 (ref 0–0.2)
BASOPHILS NFR BLD AUTO: 0.1 % (ref 0–2)
BILIRUB SERPL-MCNC: 0.9 MG/DL (ref 0.1–1)
BUN BLD-MCNC: 30 MG/DL (ref 5–21)
BUN/CREAT SERPL: 30 (ref 7–25)
CALCIUM SPEC-SCNC: 8.7 MG/DL (ref 8.4–10.4)
CHLORIDE SERPL-SCNC: 105 MMOL/L (ref 98–110)
CO2 SERPL-SCNC: 26 MMOL/L (ref 24–31)
CREAT BLD-MCNC: 1 MG/DL (ref 0.5–1.4)
DEPRECATED RDW RBC AUTO: 43.1 FL (ref 40–54)
EOSINOPHIL # BLD AUTO: 0 10*3/MM3 (ref 0–0.7)
EOSINOPHIL NFR BLD AUTO: 0 % (ref 0–4)
ERYTHROCYTE [DISTWIDTH] IN BLOOD BY AUTOMATED COUNT: 12.8 % (ref 12–15)
GFR SERPL CREATININE-BSD FRML MDRD: 76 ML/MIN/1.73
GLOBULIN UR ELPH-MCNC: 2.9 GM/DL
GLUCOSE BLD-MCNC: 233 MG/DL (ref 70–100)
GLUCOSE BLDC GLUCOMTR-MCNC: 206 MG/DL (ref 70–130)
GLUCOSE BLDC GLUCOMTR-MCNC: 321 MG/DL (ref 70–130)
GLUCOSE BLDC GLUCOMTR-MCNC: 322 MG/DL (ref 70–130)
HBA1C MFR BLD: 7.8 %
HCT VFR BLD AUTO: 48.6 % (ref 40–52)
HGB BLD-MCNC: 16.5 G/DL (ref 14–18)
IMM GRANULOCYTES # BLD: 0.09 10*3/MM3 (ref 0–0.03)
IMM GRANULOCYTES NFR BLD: 0.5 % (ref 0–5)
LYMPHOCYTES # BLD AUTO: 0.34 10*3/MM3 (ref 0.72–4.86)
LYMPHOCYTES NFR BLD AUTO: 2 % (ref 15–45)
MCH RBC QN AUTO: 31.5 PG (ref 28–32)
MCHC RBC AUTO-ENTMCNC: 34 G/DL (ref 33–36)
MCV RBC AUTO: 92.7 FL (ref 82–95)
MONOCYTES # BLD AUTO: 0.53 10*3/MM3 (ref 0.19–1.3)
MONOCYTES NFR BLD AUTO: 3.1 % (ref 4–12)
NEUTROPHILS # BLD AUTO: 16.24 10*3/MM3 (ref 1.87–8.4)
NEUTROPHILS NFR BLD AUTO: 94.3 % (ref 39–78)
NT-PROBNP SERPL-MCNC: 3170 PG/ML (ref 0–900)
PLATELET # BLD AUTO: 130 10*3/MM3 (ref 130–400)
PMV BLD AUTO: 11.5 FL (ref 6–12)
POTASSIUM BLD-SCNC: 4 MMOL/L (ref 3.5–5.3)
PROT SERPL-MCNC: 6.4 G/DL (ref 6.3–8.7)
RBC # BLD AUTO: 5.24 10*6/MM3 (ref 4.8–5.9)
SODIUM BLD-SCNC: 141 MMOL/L (ref 135–145)
TSH SERPL DL<=0.05 MIU/L-ACNC: 0.16 MIU/ML (ref 0.47–4.68)
WBC NRBC COR # BLD: 17.21 10*3/MM3 (ref 4.8–10.8)

## 2017-11-20 PROCEDURE — 94799 UNLISTED PULMONARY SVC/PX: CPT

## 2017-11-20 PROCEDURE — 25010000003 AMPICILLIN-SULBACTAM PER 1.5 G: Performed by: INTERNAL MEDICINE

## 2017-11-20 PROCEDURE — 83036 HEMOGLOBIN GLYCOSYLATED A1C: CPT | Performed by: FAMILY MEDICINE

## 2017-11-20 PROCEDURE — 85025 COMPLETE CBC W/AUTO DIFF WBC: CPT | Performed by: FAMILY MEDICINE

## 2017-11-20 PROCEDURE — 83880 ASSAY OF NATRIURETIC PEPTIDE: CPT | Performed by: FAMILY MEDICINE

## 2017-11-20 PROCEDURE — 82962 GLUCOSE BLOOD TEST: CPT

## 2017-11-20 PROCEDURE — 63710000001 PREDNISONE PER 1 MG: Performed by: FAMILY MEDICINE

## 2017-11-20 PROCEDURE — 99231 SBSQ HOSP IP/OBS SF/LOW 25: CPT | Performed by: NURSE PRACTITIONER

## 2017-11-20 PROCEDURE — 84443 ASSAY THYROID STIM HORMONE: CPT | Performed by: FAMILY MEDICINE

## 2017-11-20 PROCEDURE — 25010000002 ENOXAPARIN PER 10 MG: Performed by: INTERNAL MEDICINE

## 2017-11-20 PROCEDURE — 94760 N-INVAS EAR/PLS OXIMETRY 1: CPT

## 2017-11-20 PROCEDURE — 63710000001 INSULIN LISPRO (HUMAN) PER 5 UNITS: Performed by: FAMILY MEDICINE

## 2017-11-20 PROCEDURE — 80053 COMPREHEN METABOLIC PANEL: CPT | Performed by: FAMILY MEDICINE

## 2017-11-20 PROCEDURE — 25010000002 DEXAMETHASONE PER 1 MG: Performed by: INTERNAL MEDICINE

## 2017-11-20 RX ORDER — PREDNISONE 20 MG/1
20 TABLET ORAL 2 TIMES DAILY WITH MEALS
Status: DISCONTINUED | OUTPATIENT
Start: 2017-11-20 | End: 2017-11-21 | Stop reason: HOSPADM

## 2017-11-20 RX ORDER — ATORVASTATIN CALCIUM 40 MG/1
40 TABLET, FILM COATED ORAL NIGHTLY
Status: DISCONTINUED | OUTPATIENT
Start: 2017-11-20 | End: 2017-11-21 | Stop reason: HOSPADM

## 2017-11-20 RX ORDER — ASPIRIN 81 MG/1
81 TABLET, CHEWABLE ORAL DAILY
Status: DISCONTINUED | OUTPATIENT
Start: 2017-11-20 | End: 2017-11-21 | Stop reason: HOSPADM

## 2017-11-20 RX ORDER — LISINOPRIL 20 MG/1
20 TABLET ORAL
Status: DISCONTINUED | OUTPATIENT
Start: 2017-11-20 | End: 2017-11-21 | Stop reason: HOSPADM

## 2017-11-20 RX ADMIN — INSULIN LISPRO 16 UNITS: 100 INJECTION, SOLUTION INTRAVENOUS; SUBCUTANEOUS at 17:53

## 2017-11-20 RX ADMIN — AMPICILLIN SODIUM AND SULBACTAM SODIUM 3 G: 2; 1 INJECTION, POWDER, FOR SOLUTION INTRAMUSCULAR; INTRAVENOUS at 08:31

## 2017-11-20 RX ADMIN — AMPICILLIN SODIUM AND SULBACTAM SODIUM 3 G: 2; 1 INJECTION, POWDER, FOR SOLUTION INTRAMUSCULAR; INTRAVENOUS at 01:55

## 2017-11-20 RX ADMIN — INSULIN LISPRO 16 UNITS: 100 INJECTION, SOLUTION INTRAVENOUS; SUBCUTANEOUS at 12:34

## 2017-11-20 RX ADMIN — IPRATROPIUM BROMIDE AND ALBUTEROL SULFATE 3 ML: 2.5; .5 SOLUTION RESPIRATORY (INHALATION) at 20:08

## 2017-11-20 RX ADMIN — DEXAMETHASONE SODIUM PHOSPHATE 8 MG: 4 INJECTION, SOLUTION INTRA-ARTICULAR; INTRALESIONAL; INTRAMUSCULAR; INTRAVENOUS; SOFT TISSUE at 02:46

## 2017-11-20 RX ADMIN — IPRATROPIUM BROMIDE AND ALBUTEROL SULFATE 3 ML: 2.5; .5 SOLUTION RESPIRATORY (INHALATION) at 06:51

## 2017-11-20 RX ADMIN — LISINOPRIL 20 MG: 20 TABLET ORAL at 11:42

## 2017-11-20 RX ADMIN — INSULIN LISPRO 8 UNITS: 100 INJECTION, SOLUTION INTRAVENOUS; SUBCUTANEOUS at 08:31

## 2017-11-20 RX ADMIN — DEXAMETHASONE SODIUM PHOSPHATE 8 MG: 4 INJECTION, SOLUTION INTRA-ARTICULAR; INTRALESIONAL; INTRAMUSCULAR; INTRAVENOUS; SOFT TISSUE at 08:31

## 2017-11-20 RX ADMIN — PREDNISONE 20 MG: 20 TABLET ORAL at 17:53

## 2017-11-20 RX ADMIN — AMPICILLIN SODIUM AND SULBACTAM SODIUM 3 G: 2; 1 INJECTION, POWDER, FOR SOLUTION INTRAMUSCULAR; INTRAVENOUS at 13:59

## 2017-11-20 RX ADMIN — METOPROLOL TARTRATE 25 MG: 25 TABLET, FILM COATED ORAL at 20:27

## 2017-11-20 RX ADMIN — AMPICILLIN SODIUM AND SULBACTAM SODIUM 3 G: 2; 1 INJECTION, POWDER, FOR SOLUTION INTRAMUSCULAR; INTRAVENOUS at 20:27

## 2017-11-20 RX ADMIN — ENOXAPARIN SODIUM 40 MG: 40 INJECTION SUBCUTANEOUS at 08:32

## 2017-11-20 RX ADMIN — DESMOPRESSIN ACETATE 40 MG: 0.2 TABLET ORAL at 20:27

## 2017-11-20 RX ADMIN — INSULIN LISPRO 8 UNITS: 100 INJECTION, SOLUTION INTRAVENOUS; SUBCUTANEOUS at 20:39

## 2017-11-20 RX ADMIN — IPRATROPIUM BROMIDE AND ALBUTEROL SULFATE 3 ML: 2.5; .5 SOLUTION RESPIRATORY (INHALATION) at 11:25

## 2017-11-20 RX ADMIN — METOPROLOL TARTRATE 25 MG: 25 TABLET, FILM COATED ORAL at 11:42

## 2017-11-20 RX ADMIN — ASPIRIN 81 MG 81 MG: 81 TABLET ORAL at 11:42

## 2017-11-20 NOTE — PLAN OF CARE
Problem: Fall Risk (Adult)  Goal: Identify Related Risk Factors and Signs and Symptoms  Outcome: Ongoing (interventions implemented as appropriate)  Goal: Absence of Falls  Outcome: Ongoing (interventions implemented as appropriate)    Problem: Respiratory Insufficiency (Adult)  Goal: Identify Related Risk Factors and Signs and Symptoms  Outcome: Ongoing (interventions implemented as appropriate)  Goal: Acid/Base Balance  Outcome: Ongoing (interventions implemented as appropriate)  Goal: Effective Ventilation  Outcome: Ongoing (interventions implemented as appropriate)    Problem: Infection, Risk/Actual (Adult)  Goal: Identify Related Risk Factors and Signs and Symptoms  Outcome: Ongoing (interventions implemented as appropriate)  Goal: Infection Prevention/Resolution  Outcome: Ongoing (interventions implemented as appropriate)    Problem: Patient Care Overview (Adult)  Goal: Plan of Care Review  Outcome: Ongoing (interventions implemented as appropriate)    11/20/17 1543   Coping/Psychosocial Response Interventions   Plan Of Care Reviewed With patient   Patient Care Overview   Progress improving   Outcome Evaluation   Outcome Summary/Follow up Plan No c/o pain. Pt tolerating regular consistency diet. VSS. NSR on tele. Pt up walking hallway.

## 2017-11-20 NOTE — PROGRESS NOTES
[facility] Medicine Services  INPATIENT PROGRESS NOTE    Length of Stay: 2  Date of Admission: 11/18/2017  Primary Care Physician: Hira Alvarez MD    Subjective   Chief Complaint: SOA  HPI   Doing well.  No SOA.  No dysphagia  BP Stable.    WBC's trending down, on steroids--WBC count won't be totally reliable.    F-ADA/Heart Health/Low Sodium  A-PRN pain meds  S-NA  T-Lovenox  H-Up 30 degrees  U-NA--will transfer to floor.  No signs of bleeding at this time  G-233    Drips:  None        Review of Systems   Constitutional: Negative for fatigue and fever.   HENT: Negative for congestion and ear pain.    Eyes: Negative for redness and visual disturbance.   Respiratory: Positive for cough and shortness of breath. Negative for wheezing.    Cardiovascular: Negative for chest pain and palpitations.   Gastrointestinal: Negative for abdominal pain, diarrhea, nausea and vomiting.   Endocrine: Negative for cold intolerance and heat intolerance.   Genitourinary: Negative for dysuria and frequency.   Musculoskeletal: Negative for arthralgias and back pain.   Skin: Negative for rash and wound.   Neurological: Negative for dizziness and headaches.   Psychiatric/Behavioral: Negative for confusion. The patient is not nervous/anxious.         All pertinent negatives and positives are as above. All other systems have been reviewed and are negative unless otherwise stated.     Objective    Temp:  [97.4 °F (36.3 °C)-98.2 °F (36.8 °C)] 98.2 °F (36.8 °C)  Heart Rate:  [] 100  Resp:  [15-26] 16  BP: (101-133)/(69-97) 128/80  Physical Exam   Constitutional: He is oriented to person, place, and time. He appears well-developed and well-nourished.   HENT:   Head: Normocephalic and atraumatic.   Right Ear: External ear normal.   Left Ear: External ear normal.   Nose: Nose normal.   Mouth/Throat: Oropharynx is clear and moist.   Eyes: Conjunctivae and EOM are normal. Pupils are equal, round, and  reactive to light. Right eye exhibits no discharge. Left eye exhibits no discharge. No scleral icterus.   Neck: Normal range of motion. Neck supple. No tracheal deviation present. No thyromegaly present.   Cardiovascular: Normal rate, regular rhythm, normal heart sounds and intact distal pulses.  Exam reveals no gallop and no friction rub.    No murmur heard.  Pulmonary/Chest: Effort normal and breath sounds normal. No stridor. No respiratory distress. He has no wheezes. He has no rales. He exhibits no tenderness.   Abdominal: Soft. Bowel sounds are normal. He exhibits no distension and no mass. There is no tenderness. There is no rebound and no guarding. No hernia.   Musculoskeletal: Normal range of motion. He exhibits no edema or deformity.   Lymphadenopathy:     He has no cervical adenopathy.   Neurological: He is alert and oriented to person, place, and time. He has normal reflexes. He displays normal reflexes. No cranial nerve deficit. He exhibits normal muscle tone. Coordination normal.   Skin: Skin is warm and dry. No rash noted. No erythema. No pallor.   Psychiatric: He has a normal mood and affect. His behavior is normal. Judgment and thought content normal.   Vitals reviewed.          Results Review:  I have reviewed the labs, radiology results, and diagnostic studies.    Laboratory Data:     Results from last 7 days  Lab Units 11/20/17  0224 11/18/17  1826   WBC 10*3/mm3 17.21* 21.80*   HEMOGLOBIN g/dL 16.5 18.3*   HEMATOCRIT % 48.6 52.8*   PLATELETS 10*3/mm3 130 152          Results from last 7 days  Lab Units 11/20/17  0224 11/18/17  1826   SODIUM mmol/L 141 139   POTASSIUM mmol/L 4.0 4.2   CHLORIDE mmol/L 105 98   CO2 mmol/L 26.0 25.0   BUN mg/dL 30* 19   CREATININE mg/dL 1.00 1.18   CALCIUM mg/dL 8.7 9.3   BILIRUBIN mg/dL 0.9 2.1*   ALK PHOS U/L 54 62   ALT (SGPT) U/L 32 33   AST (SGOT) U/L 18 23   GLUCOSE mg/dL 233* 216*       Culture Data:   Blood Culture   Date Value Ref Range Status   11/18/2017  Abnormal Stain (A)  Preliminary   11/18/2017 Gram Positive Cocci (A)  Preliminary   11/18/2017 Abnormal Stain (A)  Preliminary   11/18/2017 Gram Positive Cocci (A)  Preliminary       Radiology Data:   Imaging Results (last 24 hours)     ** No results found for the last 24 hours. **          I have reviewed the patient current medications.     Assessment/Plan     Hospital Problem List     * (Principal)Adult supraglottitis    Overview Signed 11/20/2017  9:02 AM by Hira Solorzano MD     IV Abx  Decadron  ENT following         Coronary atherosclerosis of native coronary artery    Overview Addendum 11/20/2017  9:17 AM by Hira Solorzano MD     ASA  Metoprolol  Lisinopril  Statin          Disorder of liver    Overview Signed 11/20/2017  9:15 AM by Hira Solorzano MD     Monitor LFT's         Controlled type 2 diabetes mellitus without complication, without long-term current use of insulin    Overview Signed 11/20/2017  9:13 AM by Hira Solorzano MD     SSI           HTN (hypertension), benign    Overview Addendum 11/20/2017  9:10 AM by Hira Solorzano MD     Lopressor  Lisinopril         Eczema    Overview Signed 11/20/2017  9:10 AM by Hira Solorzano MD     Monitor         Streptococcal bacteremia    Overview Signed 11/20/2017  9:15 AM by Hira Solorzano MD     IV Unasyn          Hyperlipidemia    Overview Signed 11/20/2017  9:16 AM by Hira Solorzano MD     statin         Sepsis    Overview Signed 11/20/2017  9:17 AM by Hira Solorzano MD     IV Abx  Monitor Vitals         Acute on chronic systolic heart failure    Overview Signed 11/20/2017  9:22 AM by Hira Solorzano MD     Lasix  Metoprolol  Lisinopril  Monitor                              Discharge Planning: transfer to floor, PT/OT  Hira Solorzano MD   11/20/17   9:23 AM

## 2017-11-20 NOTE — PLAN OF CARE
Problem: Fall Risk (Adult)  Goal: Identify Related Risk Factors and Signs and Symptoms  Outcome: Ongoing (interventions implemented as appropriate)  Goal: Absence of Falls  Outcome: Ongoing (interventions implemented as appropriate)    Problem: Respiratory Insufficiency (Adult)  Goal: Identify Related Risk Factors and Signs and Symptoms  Outcome: Ongoing (interventions implemented as appropriate)  Goal: Acid/Base Balance  Outcome: Ongoing (interventions implemented as appropriate)  Goal: Effective Ventilation  Outcome: Ongoing (interventions implemented as appropriate)    Problem: Infection, Risk/Actual (Adult)  Goal: Identify Related Risk Factors and Signs and Symptoms  Outcome: Ongoing (interventions implemented as appropriate)  Goal: Infection Prevention/Resolution  Outcome: Ongoing (interventions implemented as appropriate)    Problem: Patient Care Overview (Adult)  Goal: Plan of Care Review  Outcome: Ongoing (interventions implemented as appropriate)    11/19/17 1936 11/20/17 0400 11/20/17 0530   Coping/Psychosocial Response Interventions   Plan Of Care Reviewed With --  patient --    Patient Care Overview   Progress improving --  --    Outcome Evaluation   Outcome Summary/Follow up Plan --  --  pt remained stable throughout the night, pt reported liquids are easier to swallow tonight.       Goal: Adult Individualization and Mutuality  Outcome: Ongoing (interventions implemented as appropriate)  Goal: Discharge Needs Assessment  Outcome: Ongoing (interventions implemented as appropriate)  Goal: Plan of Care Review  Outcome: Ongoing (interventions implemented as appropriate)  Goal: Adult Individualization and Mutuality  Outcome: Ongoing (interventions implemented as appropriate)  Goal: Discharge Needs Assessment  Outcome: Ongoing (interventions implemented as appropriate)

## 2017-11-20 NOTE — PROGRESS NOTES
ENT  [address], [facility] Progress Note:       LOS: 2 days   Patient Care Team:  Hira Alvarez MD as PCP - General  Hira Alvarez MD as PCP - Family Medicine  Corina Haddad, RN as Registered Nurse  Phil Henry MD as Cardiologist (Cardiology)      Subjective     Interval History:     Status of active consulting problem: Significant improvement in throat swelling    History taken from: patient    Review of Systems:    Review of Systems   Constitutional: Negative.    HENT:        SEE HPI   Eyes: Negative.    Respiratory: Negative.    Cardiovascular: Negative.    Gastrointestinal: Negative.    Endocrine: Negative.    Genitourinary: Negative.    Musculoskeletal: Negative.    Skin: Negative.    Allergic/Immunologic: Negative.    Neurological: Negative.    Hematological: Negative.    Psychiatric/Behavioral: Negative.        Objective     Vital Signs  Temp:  [97.4 °F (36.3 °C)-98.2 °F (36.8 °C)] 98.2 °F (36.8 °C)  Heart Rate:  [] 100  Resp:  [15-26] 16  BP: (101-133)/(69-97) 128/80    Physical Exam:   Physical Exam     CONSTITUTIONAL: well nourished, alert, oriented, in no acute distress     COMMUNICATION AND VOICE: able to communicate normally, normal voice quality    HEAD: normocephalic, no lesions, atraumatic, no tenderness, no masses     FACE: appearance normal, no lesions, no tenderness, no deformities, facial motion symmetric    SALIVARY GLANDS: parotid glands with no tenderness, no swelling, no masses, submandibular glands with normal size, nontender    EYES: ocular motility normal, eyelids normal, orbits normal, no proptosis, conjunctiva normal , pupils equal, round     EARS:  Hearing: response to conversational voice normal bilaterally   External Ears: auricles without lesions    NOSE:  External Nose: structure normal, no tenderness on palpation, no nasal discharge, no lesions, no evidence of trauma, nostrils patent     ORAL:  Lips: upper and lower lips  without lesion   Teeth: dentition within normal limits for age   Gums: gingivae healthy   Oral Mucosa: oral mucosa normal, no mucosal lesions   Floor of Mouth: Warthin’s duct patent, mucosa normal  Tongue: lingual mucosa normal without lesions, normal tongue mobility   Palate: soft and hard palates with normal mucosa and structure  Oropharynx: oropharyngeal mucosa normal    NECK: neck appearance normal, no mass,  noted without erythema or tenderness    THYROID: no overt thyromegaly, no tenderness, nodules or mass present on palpation, position midline     LYMPH NODES: no lymphadenopathy    CHEST/RESPIRATORY: respiratory effort normal    CARDIOVASCULAR: extremities without cyanosis or edema      NEUROLOGIC/PSYCHIATRIC: oriented to time, place and person, mood normal, affect appropriate, CN II-XII intact grossly    Results Review:       Lab Results (last 24 hours)     Procedure Component Value Units Date/Time    CK [[id number]]  (Normal) Collected:  11/19/17 0721    Specimen:  Blood Updated:  11/19/17 1006     Creatine Kinase 98 U/L     CK-MB [[id number]]  (Normal) Collected:  11/19/17 0721    Specimen:  Blood Updated:  11/19/17 1006     CKMB 3.56 ng/mL     Narrative:       CKMB Index not indicated    Troponin [[id number]]  (Abnormal) Collected:  11/19/17 0721    Specimen:  Blood Updated:  11/19/17 1013     Troponin I 0.103 (H) ng/mL     POC Glucose Fingerstick [[id number]]  (Abnormal) Collected:  11/19/17 1232    Specimen:  Blood Updated:  11/19/17 1243     Glucose 249 (H) mg/dL       : [id number] Arielle [facility] ID: [id number]       Troponin [[id number]]  (Abnormal) Collected:  11/19/17 1428    Specimen:  Blood Updated:  11/19/17 1514     Troponin I 0.072 (H) ng/mL     Troponin [[id number]]  (Abnormal) Collected:  11/19/17 1745    Specimen:  Blood Updated:  11/19/17 1823     Troponin I 0.075 (H) ng/mL     POC Glucose Fingerstick [[id number]]  (Abnormal) Collected:  11/19/17 1814    Specimen:  Blood Updated:  11/19/17  1851     Glucose 289 (H) mg/dL       : [id number] Arielle [facility] ID: [id number]       POC Glucose Fingerstick [[id number]]  (Abnormal) Collected:  11/19/17 2046    Specimen:  Blood Updated:  11/19/17 2107     Glucose 260 (H) mg/dL       : [id number] Reena [facility] ID: [id number]       CBC & Differential [[id number]] Collected:  11/20/17 0224    Specimen:  Blood Updated:  11/20/17 0243    Narrative:       The following orders were created for panel order CBC & Differential.  Procedure                               Abnormality         Status                     ---------                               -----------         ------                     CBC Auto Differential[[id number]]        Abnormal            Final result                 Please view results for these tests on the individual orders.    CBC Auto Differential [[id number]]  (Abnormal) Collected:  11/20/17 0224    Specimen:  Blood Updated:  11/20/17 0243     WBC 17.21 (H) 10*3/mm3      RBC 5.24 10*6/mm3      Hemoglobin 16.5 g/dL      Hematocrit 48.6 %      MCV 92.7 fL      MCH 31.5 pg      MCHC 34.0 g/dL      RDW 12.8 %      RDW-SD 43.1 fl      MPV 11.5 fL      Platelets 130 10*3/mm3      Neutrophil % 94.3 (H) %      Lymphocyte % 2.0 (L) %      Monocyte % 3.1 (L) %      Eosinophil % 0.0 %      Basophil % 0.1 %      Immature Grans % 0.5 %      Neutrophils, Absolute 16.24 (H) 10*3/mm3      Lymphocytes, Absolute 0.34 (L) 10*3/mm3      Monocytes, Absolute 0.53 10*3/mm3      Eosinophils, Absolute 0.00 10*3/mm3      Basophils, Absolute 0.01 10*3/mm3      Immature Grans, Absolute 0.09 (H) 10*3/mm3     Comprehensive Metabolic Panel [[id number]]  (Abnormal) Collected:  11/20/17 0224    Specimen:  Blood Updated:  11/20/17 0306     Glucose 233 (H) mg/dL      BUN 30 (H) mg/dL      Creatinine 1.00 mg/dL      Sodium 141 mmol/L      Potassium 4.0 mmol/L      Chloride 105 mmol/L      CO2 26.0 mmol/L      Calcium 8.7 mg/dL      Total Protein 6.4 g/dL      Albumin  3.50 g/dL      ALT (SGPT) 32 U/L      AST (SGOT) 18 U/L      Alkaline Phosphatase 54 U/L      Total Bilirubin 0.9 mg/dL      eGFR Non African Amer 76 mL/min/1.73      Globulin 2.9 gm/dL      A/G Ratio 1.2 g/dL      BUN/Creatinine Ratio 30.0 (H)     Anion Gap 10.0 mmol/L     BNP [[id number]]  (Abnormal) Collected:  11/20/17 0224    Specimen:  Blood Updated:  11/20/17 0309     proBNP 3170.0 (H) pg/mL     TSH [[id number]]  (Abnormal) Collected:  11/20/17 0224    Specimen:  Blood Updated:  11/20/17 0331     TSH 0.160 (L) mIU/mL     Hemoglobin A1c [[id number]] Collected:  11/20/17 0224    Specimen:  Blood Updated:  11/20/17 0348     Hemoglobin A1C 7.8 %     Narrative:       Less than 6.0           Non-Diabetic Range  6.0-7.0                 ADA Therapeutic Target  Greater than 7.0        Action Suggested    Throat / Upper Respiratory Culture - Swab, Throat [[id number]] Collected:  11/18/17 1829    Specimen:  Swab from Throat Updated:  11/20/17 0828     Throat Culture --      Moderate growth (3+) Normal Respiratory Jaquelin        Imaging Results (last 24 hours)     ** No results found for the last 24 hours. **          Medication Review:     Current Facility-Administered Medications   Medication Dose Route Frequency Provider Last Rate Last Dose   • acetaminophen (TYLENOL) tablet 650 mg  650 mg Oral Q4H PRN Tony Benjamin MD        Or   • acetaminophen (TYLENOL) suppository 650 mg  650 mg Rectal Q4H PRN Tony Benjamin MD       • acetaminophen-codeine (TYLENOL with CODEINE) 120-12 MG/5ML solution 5 mL  5 mL Oral Q4H PRN Tony Benjamin MD       • ampicillin-sulbactam 3 g/100 mL 0.9% NS (MBP)  3 g Intravenous Q6H Tony Benjamin MD   3 g at 11/20/17 0831   • bisacodyl (DULCOLAX) EC tablet 5 mg  5 mg Oral Daily PRN Tony Benjamin MD       • dexamethasone (DECADRON) injection 8 mg  8 mg Intravenous Q6H Tony Benjamin MD   8 mg at 11/20/17 0809   • dextrose (D50W) solution 25 g  25 g Intravenous Q15 Min PRN Lenard SANDERS  MD Neftaly       • dextrose (GLUTOSE) oral gel 15 g  15 g Oral Q15 Min PRN Lenard Higginbotham MD       • enalaprilat (VASOTEC) injection 0.625 mg  0.625 mg Intravenous Q6H PRN Lenard Higginbotham MD       • enoxaparin (LOVENOX) syringe 40 mg  40 mg Subcutaneous Daily Tony Benjamin MD   40 mg at 11/20/17 0832   • fluticasone (FLONASE) 50 MCG/ACT nasal spray 1 spray  1 spray Nasal BID Tony Benjamin MD   1 spray at 11/19/17 1812   • furosemide (LASIX) tablet 40 mg  40 mg Oral Daily PRN Tony Benjamin MD       • glucagon (human recombinant) (GLUCAGEN DIAGNOSTIC) injection 1 mg  1 mg Subcutaneous Q15 Min PRN Lenard Higginbotham MD       • hydrALAZINE (APRESOLINE) injection 20 mg  20 mg Intravenous Q4H PRN Tony Benjamin MD       • insulin lispro (humaLOG) injection 4-24 Units  4-24 Units Subcutaneous 4x Daily With Meals & Nightly Lenard Higginbotham MD   8 Units at 11/20/17 0831   • ipratropium-albuterol (DUO-NEB) nebulizer solution 3 mL  3 mL Nebulization Q6H - RT Tony Benjamin MD   3 mL at 11/20/17 0651   • labetalol (NORMODYNE,TRANDATE) injection 10 mg  10 mg Intravenous Q4H PRN Lenard Higginbotham MD       • nitroglycerin (NITROSTAT) SL tablet 0.4 mg  0.4 mg Sublingual Q5 Min PRN Tony Benjamin MD       • ondansetron (ZOFRAN) injection 4 mg  4 mg Intravenous Q6H PRN Tony Benjamin MD       • sodium chloride 0.9 % flush 1-10 mL  1-10 mL Intravenous PRN Tony Benjamin MD       • sodium chloride 0.9 % infusion  75 mL/hr Intravenous Continuous Lenard Higginbotham MD 75 mL/hr at 11/19/17 2348 75 mL/hr at 11/19/17 2348       Assessment/Plan     Principal Problem:    Adult supraglottitis  Active Problems:    Coronary atherosclerosis of native coronary artery    Controlled type 2 diabetes mellitus without complication, without long-term current use of insulin    HTN (hypertension), benign    Eczema      Plan:  Continue IV antibiotics. Agree to go to floor from ENT standpoint.  Call for problems or worsening symptoms  Plan in  home in am.        Emmie Marsh, APRN  11/20/17  9:05 AM

## 2017-11-20 NOTE — PLAN OF CARE
Problem: Fall Risk (Adult)  Goal: Identify Related Risk Factors and Signs and Symptoms  Outcome: Ongoing (interventions implemented as appropriate)  Goal: Absence of Falls  Outcome: Ongoing (interventions implemented as appropriate)    Problem: Respiratory Insufficiency (Adult)  Goal: Identify Related Risk Factors and Signs and Symptoms  Outcome: Ongoing (interventions implemented as appropriate)  Goal: Acid/Base Balance  Outcome: Ongoing (interventions implemented as appropriate)  Goal: Effective Ventilation  Outcome: Ongoing (interventions implemented as appropriate)    Problem: Infection, Risk/Actual (Adult)  Goal: Identify Related Risk Factors and Signs and Symptoms  Outcome: Ongoing (interventions implemented as appropriate)  Goal: Infection Prevention/Resolution  Outcome: Ongoing (interventions implemented as appropriate)    Problem: Patient Care Overview (Adult)  Goal: Plan of Care Review  Outcome: Ongoing (interventions implemented as appropriate)  Goal: Adult Individualization and Mutuality  Outcome: Ongoing (interventions implemented as appropriate)  Goal: Discharge Needs Assessment  Outcome: Ongoing (interventions implemented as appropriate)  Goal: Plan of Care Review  Outcome: Ongoing (interventions implemented as appropriate)  Goal: Adult Individualization and Mutuality  Outcome: Ongoing (interventions implemented as appropriate)  Goal: Discharge Needs Assessment  Outcome: Ongoing (interventions implemented as appropriate)

## 2017-11-20 NOTE — PLAN OF CARE
Problem: Patient Care Overview (Adult)  Goal: Plan of Care Review  Outcome: Ongoing (interventions implemented as appropriate)    11/20/17 5456   Coping/Psychosocial Response Interventions   Plan Of Care Reviewed With patient;spouse   Patient Care Overview   Progress improving   Outcome Evaluation   Outcome Summary/Follow up Plan Pt reports he is tolerating regular diabetic diet at this time. Cont to follow

## 2017-11-20 NOTE — PAYOR COMM NOTE
"Joao Zhong (62 y.o. Male)     Date of Birth Social Security Number Address Home Phone MRN    1955  [address]   [facility] [address] [phone] [id number]    Congregational Marital Status          [employer]ist of [facility]        Admission Date Admission Type Admitting Provider Attending Provider Department, Room/Bed    11/18/17 Emergency Hira Solorzano MD Moore, Hira Montoya MD [facility] 4C, 487/1    Discharge Date Discharge Disposition Discharge Destination                      Attending Provider: Hira Solorzano MD     Allergies:  Cephalexin    Isolation:  None   Infection:  None   Code Status:  FULL    Ht:  72\" (182.9 cm)   Wt:  229 lb (104 kg)    Admission Cmt:  None   Principal Problem:  Adult supraglottitis [J04.30] More...                 Active Insurance as of 11/18/2017     Primary Coverage     Payor Plan Insurance Group Employer/Plan Group    [employer]HE[employer] BLUE CROSS ANTHEM BLUE CROSS BLUE SHIELD PPO [id number]     Payor Plan Address Payor Plan Phone Number Effective From Effective To    [address] [phone] 4/23/2017     [place] [address]       Subscriber Name Subscriber Birth Date Member ID       JOAO ZHONG 1955 [id number]                 Emergency Contacts      (Rel.) Home Phone Work Phone Mobile Phone    Jayna Zhong (Spouse) [phone] -- [phone]    Stella Stevens (Daughter) [phone] -- --    Manish Garcia (Son) [phone] -- --                 11/20/17 [facility]              PATIENT ADMITTED TO INPATIENT TO ICU WITH ADULT SUPRAGLOTTITIS ON 11/18/17. INPATIENT ADMISSION.    FAXING CHAI'S AND CLINICAL ON PATIENT. THANK YOU. RANDY [phone].             H&P  Date of Service: 11/18/2017 9:49 PM  Tony Benjamin MD   Medicine   Expand All Collapse All    []Hide copied text       [facility] Medicine Services  HISTORY AND PHYSICAL     Date of Admission: " 11/18/2017  Primary Care Physician: Hira Alvarez MD     Subjective      Chief Complaint: Trouble breathing     History of Present Illness  Patient is a 62-year-old white male past medical history of coronary artery disease cardiomyopathy CHF type II diabetes who presents to the emergency room with a three-day history of increasing problems with swelling and congestion in his ears and his epiglottic area.  Apparently he finally came to the emergency room when he started having increasing problems breathing consistent with stridor.  He was evaluated in the emergency room and CT scan showed inflammation in the arytenoids and area epiglottic fold area.  He also has some cervical adenopathy there is concern that he was initially had infra-glottic inflammation and was at possible risk for losing airway capacity.  Dr. Meeks came in emergently evaluated him.  Patient was given racemic epinephrine and Decadron and nebulizer treatments in the emergency room with some improvement in his overall capacity.  He was evaluated by ENT and thought not to be having what was initially thought on the CT scan but that it was more anterior.  Patient is being transferred to the intensive care unit for further observation and treatment of infection with antibiotics and steroids.  He is also diabetic.           Review of Systems   14 point review of systems are negative except for as per HPI  Otherwise complete ROS reviewed and negative except as mentioned in the HPI.     Past Medical History:    Medical History         Past Medical History:   Diagnosis Date   • Arrhythmia     • Cancer       malignant chest tumor   • Cardiomyopathy 12/27/2016   • CHF (congestive heart failure)     • Coronary artery disease     • Diabetes mellitus     • Disorder of liver 8/12/2013   • History of adenomatous polyp of colon 06/28/2010     TUBULAR ADENOMA AT 40CM   • Hyperlipidemia     • Hypertension     • Pacemaker     • Shingles           Past Surgical  "History:   Surgical History          Past Surgical History:   Procedure Laterality Date   • ANKLE SURGERY Right     • CARDIAC CATHETERIZATION       • CARDIAC ELECTROPHYSIOLOGY PROCEDURE Left 10/6/2016     Procedure: ICD DC new;  Surgeon: Zander Andino MD;  Location:  [facility] CATH INVASIVE LOCATION;  Service:    • COLONOSCOPY   06/28/2010     biopsy at 41, 2 mm polyp supboptimal prep right clon    • COLONOSCOPY W/ POLYPECTOMY   06/28/2010     POLYP AT 40CM TUBULAR ADENOMA, SUBOPTIMAL PREP   • CORONARY ARTERY BYPASS GRAFT       • HEMORRHOIDECTOMY   1980   • HERNIA REPAIR       • PACEMAKER IMPLANTATION       • TONSILLECTOMY             Social History:  reports that he has quit smoking. His smoking use included Cigarettes. He has never used smokeless tobacco. He reports that he does not drink alcohol or use illicit drugs.     Family History: family history includes Bipolar disorder in his sister; Cancer in his father; Colon cancer in his mother; Colon polyps in his brother; Diabetes in his father, maternal grandfather, mother, and paternal grandfather; Heart disease in his father; Hypertension in his brother and father; No Known Problems in his maternal grandmother, paternal grandmother, and son.          Objective      Vital Signs: /78  Pulse 113  Temp (!) 100.6 °F (38.1 °C)  Resp 19  Ht 72\" (182.9 cm)  Wt 225 lb (102 kg)  SpO2 98%  BMI 30.52 kg/m2  Physical Exam  Constitutional: He is oriented to person, place, and time. He appears well-developed and well-nourished.   HENT:   Head: Normocephalic and atraumatic.   Right Ear: External ear normal.  Normal tympanic membrane   Left Ear: External ear normal tympanic membrane shows fluid behind it.   Nose: Nose normal.   Mouth/Throat: Oropharynx is clear and moist.   Moderately erythematous oropharynx; patient sitting up straight on the side of the bed with gurgling noted with breathing; no stridor noted  tonsils are absent uvula is slightly inflamed  Eyes: " Conjunctivae and EOM are normal. Pupils are equal, round, and reactive to light.   Neck: Normal range of motion. Neck supple.   Cardiovascular: Normal heart sounds and intact distal pulses.    Tachycardia on exam   Pulmonary/Chest: Effort normal and breath sounds normal.   Abdominal: Soft. Bowel sounds are normal.   Musculoskeletal: Normal range of motion.   Lymphadenopathy:     He has cervical adenopathy.  Which is tender to palpation   Neurological: He is alert and oriented to person, place, and time.   Skin: Skin is warm and dry.   Psychiatric: He has a normal mood and affect. His behavior is normal. Judgment and thought content normal.   Nursing note and vitals reviewed.           Results Reviewed:  Lab Results (last 24 hours)                                                                             CBC Auto Differential [[id number]]  (Abnormal) Collected:  11/18/17 1826     Specimen:  Blood Updated:  11/18/17 1851       WBC 21.80 (H) 10*3/mm3         RBC 5.70 10*6/mm3         Hemoglobin 18.3 (H) g/dL         Hematocrit 52.8 (H) %         MCV 92.6 fL         MCH 32.1 (H) pg         MCHC 34.7 g/dL         RDW 12.6 %         RDW-SD 42.3 fl         MPV 11.5 fL         Platelets 152 10*3/mm3         Neutrophil % 90.0 (H) %         Lymphocyte % 3.1 (L) %         Monocyte % 6.5 %         Eosinophil % 0.0 %         Basophil % 0.1 %         Immature Grans % 0.3 %         Neutrophils, Absolute 19.62 (H) 10*3/mm3         Lymphocytes, Absolute 0.68 (L) 10*3/mm3         Monocytes, Absolute 1.41 (H) 10*3/mm3         Eosinophils, Absolute 0.00 10*3/mm3         Basophils, Absolute 0.02 10*3/mm3         Immature Grans, Absolute 0.07 (H) 10*3/mm3         nRBC 0.0 /100 WBC       CBC & Differential [[id number]] Collected:  11/18/17 1826     Specimen:  Blood Updated:  11/18/17 1851     Narrative:        The following orders were created for panel order CBC & Differential.  Procedure                               Abnormality          Status                     ---------                               -----------         ------                     Scan Slide[[id number]]                                                                  CBC Auto Differential[[id number]]        Abnormal            Final result                  Please view results for these tests on the individual orders.     Comprehensive Metabolic Panel [[id number]]  (Abnormal) Collected:  11/18/17 1826     Specimen:  Blood Updated:  11/18/17 1855       Glucose 216 (H) mg/dL         BUN 19 mg/dL         Creatinine 1.18 mg/dL         Sodium 139 mmol/L         Potassium 4.2 mmol/L         Chloride 98 mmol/L         CO2 25.0 mmol/L         Calcium 9.3 mg/dL         Total Protein 7.6 g/dL         Albumin 4.50 g/dL         ALT (SGPT) 33 U/L         AST (SGOT) 23 U/L         Alkaline Phosphatase 62 U/L         Total Bilirubin 2.1 (H) mg/dL         eGFR Non African Amer 63 mL/min/1.73         Globulin 3.1 gm/dL         A/G Ratio 1.5 g/dL         BUN/Creatinine Ratio 16.1       Anion Gap 16.0 (H) mmol/L       Lactic Acid, Plasma [[id number]]  (Abnormal) Collected:  11/18/17 1826     Specimen:  Blood Updated:  11/18/17 1855       Lactate 2.6 (C) mmol/L       Lactic Acid, Reflex Timer [[id number]] Collected:  11/18/17 1826     Specimen:  Blood Updated:  11/18/17 1855     Troponin [[id number]]  (Abnormal) Collected:  11/18/17 1826     Specimen:  Blood Updated:  11/18/17 1906       Troponin I 0.036 (H) ng/mL           Imaging Results (last 24 hours)     Procedure Component Value Units Date/Time             XR Chest 2 View [[id number]] Collected:  11/18/17 1959       Updated:  11/18/17 2003     Narrative:        XR CHEST 2 VW- 11/18/2017 7:52 PM CST      HISTORY: fever; shortness of breath       COMPARISON: 10/06/2016      FINDINGS:   Upright frontal and lateral radiographs of the chest were obtained.      Diffuse airspace opacities are present. The heart is enlarged. Pulmonary  vascularity is  increased. The pacemaker lead is stable in position. The  osseous structures and surrounding soft tissues demonstrate no acute  abnormality.         Impression:        1. Pulmonary edema and cardiomegaly.          This report was finalized on 11/18/2017 20:00 by Dr. Jesus Miller MD.     Assessment / Plan      Assessment:       Hospital Problem List      Adult supraglottitis       1.  Adult supraglottitis plan is to put patient in the intensive care unit start him on broad-spectrum antibiotics with Unasyn 3 g IV every 6 hours we will also give him Decadron 8 mg IV every 6 hours also given duo nebs to help with airway.  On the hold off on racemic epinephrine as he does have a cardiomyopathy and my concern is that would exacerbate that.  He does have a positive troponin slightly I think this is just due to stress we will monitor the trend.  ENT has been consulted and is following.  He was watched closely in intensive care unit overnight to probably be transferred out in the morning if stable.  Further plans as per ENT.  2.  Type II diabetes continue current medications aggressive insulin treatment with sliding scale insulin considering is getting steroids monitor closely.  3.  Cardiomyopathy with positive troponin will check serial troponins to see if he trends down hopefully that will be the case I am not going involve cardiology yet will get serial EKGs as well monitor closely.                 Code Status: Full      I discussed the patients findings and my recommendations with the patient and his wife who is his healthcare power surrogate     Estimated length of stay 2-3 days     Tony Benjamin MD   11/18/17   9:49 PM                              Consults  Date of Service: 11/18/2017 9:44 PM  Pipe Meeks MD   Otolaryngology/ENT   Consult Orders:   1. ENT (on-call MD unless specified) [[id number]] ordered by Tony Benjamin MD at 11/18/17 2053   Expand All Collapse All    []Hide copied text  []Hover for  attribution information  ENT CONSULT NOTE  2017     Patient Identification:  Name: Joao Fonseca  Age: 62 y.o.  Sex: male  :  1955  MRN: [id number]                     Date of Admission: 2017        CC:                  Sore throat        Subjective         HPI:                 Location:  Throat  Duration:  2 days ago  Timing:  acute   Quality:   severe  Context:  Patient had a mild upper respiratory tract infection approximately 10 days ago and was given Flonase but had gradually progressive dysphasia, odynophagia associated with fever and began having to sit up to feel as though he was having a choking sensation.  Modifying Factors:  nothing  Associated Signs/Symptoms:  Patient denies abdominal pain. chest pain, cough complaining as noted above of sore throat, dysphagia, odynophagia and fever.  He does complain of feeling some increased shortness of breath upon reclining.     ROS:  Review of Systems - Negative except as noted above  History obtained from chart review and the patient  General ROS: positive for  - fever and sleep disturbance  negative for - hot flashes, malaise, weight gain or weight loss  ENT ROS: positive for - sore throat  negative for - epistaxis, headaches, hearing change, nasal congestion, nasal discharge, nasal polyps, oral lesions, sinus pain, sneezing, tinnitus, vertigo, visual changes or vocal changes  Respiratory ROS: positive for - shortness of breath which is mild unless he reclines.  negative for - cough, hemoptysis, orthopnea, pleuritic pain, sputum changes, stridor, tachypnea or wheezing     HISTORY        Medical History         Past Medical History:   Diagnosis Date   • Arrhythmia     • Cancer       malignant chest tumor   • Cardiomyopathy 2016   • CHF (congestive heart failure)     • Coronary artery disease     • Diabetes mellitus     • Disorder of liver 2013   • History of adenomatous polyp of colon 2010     TUBULAR ADENOMA AT 40CM   •  Hyperlipidemia     • Hypertension     • Pacemaker     • Shingles                Surgical History          Past Surgical History:   Procedure Laterality Date   • ANKLE SURGERY Right     • CARDIAC CATHETERIZATION       • CARDIAC ELECTROPHYSIOLOGY PROCEDURE Left 10/6/2016     Procedure: ICD DC new;  Surgeon: Zander Andino MD;  Location:  [facility] CATH INVASIVE LOCATION;  Service:    • COLONOSCOPY   06/28/2010     biopsy at 41, 2 mm polyp supboptimal prep right clon    • COLONOSCOPY W/ POLYPECTOMY   06/28/2010     POLYP AT 40CM TUBULAR ADENOMA, SUBOPTIMAL PREP   • CORONARY ARTERY BYPASS GRAFT       • HEMORRHOIDECTOMY   1980   • HERNIA REPAIR       • PACEMAKER IMPLANTATION       • TONSILLECTOMY                  Social History          Allergies   Allergen Reactions   • Cephalexin Anaphylaxis               Immunization History   Administered Date(s) Administered   • Tdap 11/07/2016                Family History   Problem Relation Age of Onset   • Diabetes Mother     • Colon cancer Mother     • Heart disease Father     • Hypertension Father     • Diabetes Father     • Cancer Father     • Bipolar disorder Sister     • Hypertension Brother     • Colon polyps Brother     • No Known Problems Son     • No Known Problems Maternal Grandmother     • Diabetes Maternal Grandfather     • No Known Problems Paternal Grandmother     • Diabetes Paternal Grandfather                 Objective         PE:                         Temp:  [100.1 °F (37.8 °C)-100.6 °F (38.1 °C)] 100.6 °F (38.1 °C)  Heart Rate:  [110-123] 113  Resp:  [19-25] 19  BP: (139-172)/() 145/78                        Body mass index is 30.52 kg/(m^2).                           General appearance: alert, well appearing, and in no distress, oriented to person, place, and time, overweight, acyanotic, in no respiratory distress and without inspiratory stridor.                        Ability to Communicate: normal means of communication, clear voice, normal                  hearing                        Ears - bilateral TM's and external ear canals normal.                        Nasal exam - normal and patent, no erythema, discharge or polyps.                        Oropharyngeal exam - mucous membranes moist, pharynx normal without lesions and Tonsils surgically absent.                        Neck exam - supple, no significant adenopathy, no erythema but mild anterior tenderness at the superior aspect of the thyroid lamina.                           CVS exam: normal rate and regular rhythm.                        Chest: clear to auscultation, no wheezes, rales or rhonchi, symmetric air entry.                        No lymphadenopathy in the anterior or posterior neck, supraclavicular, axillary or inguinal areas. No hepato-splenomegaly noted.                        Neurological exam reveals alert, oriented, normal speech, no focal findings or movement disorder noted.     Flexible fiberoptic nasal laryngoscopy is dictated under a separate report was demonstrated findings consistent with supraglottitis                                     Lab Results   Component Value Date     WBC 21.80 (H) 11/18/2017     HGB 18.3 (H) 11/18/2017     HCT 52.8 (H) 11/18/2017      11/18/2017            Lab Results   Component Value Date      11/18/2017     K 4.2 11/18/2017     CL 98 11/18/2017     CO2 25.0 11/18/2017     BUN 19 11/18/2017     CREATININE 1.18 11/18/2017     GLUCOSE 216 (H) 11/18/2017            Lab Results   Component Value Date     CALCIUM 9.3 11/18/2017            Lab Results   Component Value Date     AST 23 11/18/2017     ALT 33 11/18/2017     ALKPHOS 62 11/18/2017              Lab Results   Component Value Date     TROPONINI 0.036 (H) 11/18/2017      No components found for: HGBA1C;2  No components found for: TSH;2          Imaging Results (all)     Procedure Component Value Units Date/Time             XR Chest 2 View [[id number]] Collected:  11/18/17 1959        Updated:  11/18/17 2003     Narrative:        XR CHEST 2 VW- 11/18/2017 7:52 PM CST      HISTORY: fever; shortness of breath       COMPARISON: 10/06/2016      FINDINGS:   Upright frontal and lateral radiographs of the chest were obtained.      Diffuse airspace opacities are present. The heart is enlarged. Pulmonary  vascularity is increased. The pacemaker lead is stable in position. The  osseous structures and surrounding soft tissues demonstrate no acute  abnormality.         Impression:        1. Pulmonary edema and cardiomegaly.          This report was finalized on 11/18/2017 20:00 by Dr. Jesus Miller MD.     CT Soft Tissue Neck With Contrast [[id number]] Collected:  11/18/17 1928       Updated:  11/18/17 2128     Addenda:             Addendum:     The previously described soft tissue attenuation is not in the  infraglottic region but in the region of the arytenoids and  aryepiglottic fold.  This report was finalized on 11/18/2017 21:25 by Dr. Jesus Miller MD.  Signed:  11/18/17 2125 by Jesus Miller MD     Narrative:        CT SOFT TISSUE NECK W CONTRAST- 11/18/2017 7:17 PM CST      HISTORY: cervical adenopathy; neck pain; fever; shortness of breath       COMPARISON: None      DOSE LENGTH PRODUCT: 348 mGy cm. Automated exposure control was also  utilized to decrease patient radiation dose.      TECHNIQUE: Helical tomographic images of the neck were obtained after  the intravenous infusion of contrast.      FINDINGS:  The visualized intracranial structures are unremarkable. The visualized  orbits are normal. The sinuses are clear.      There is soft tissue prominence at the level of the false vocal cords in  the posterior aspect of the infraglottic space. The airway is moderately  narrowed. No abscess is identified. The retropharyngeal fat is obscured  The vocal cords are normal in appearance. The epiglottis is normal in  appearance. No significant lymphadenopathy is seen in the neck.      The major  vasculature of the neck is patent.      Degenerative changes are seen in the spine. No worrisome bony  abnormalities are noted.      Visualized lung apices are clear.         Impression:        1. Soft tissue density in the infraglottic space could be due to a  suppurative, infectious process, but no abscess is identified. A  neoplastic process should also be considered. Direct visualization is  recommended.  2. No cervical lymphadenopathy appreciated.  This report was finalized on 11/18/2017 19:31 by Dr. Jesus Miller MD.         Discussed the above report with Dr. Miller-he apparently was incorrectly transcribed feeling the patient had an infraepiglottic edema NOT tissue density in the infraglottic airway which corresponds both with my clinical evaluation and my review of the radiological exam.        Assessment         ASSESSMENT                            Active Problems:    Adult supraglottitis                              Status post coronary bypass grafting 12/2015-to Steve                        Diabetes mellitus                        Congestive heart failure                                    Plan         PLAN                            -Admission with IV antibiotic therapy and steroids                        -Discussed with Dr. Benjamin                        -We will admit to intensive care unit and monitored closely with tracheostomy for any                                          increasing airway difficulties/distress                                   Pipe Meeks MD  11/18/2017  9:45 PM                        Progress Notes  Date of Service: 11/20/2017 8:30 AM  DIOR Berry   Otolaryngology/ENT   Expand All Collapse All    []Hide copied text  []Hover for attribution information  ENT  DIOR Smart Dr, APRN  Hospital Progress Note:         LOS: 2 days   Patient Care Team:  Hira Alvarez MD as PCP - General  Hira Alvarez MD as PCP - Family Medicine  Corina BROWN  Boo RN as Registered Nurse  Phil Henry MD as Cardiologist (Cardiology)           Subjective         Interval History:      Status of active consulting problem: Significant improvement in throat swelling     History taken from: patient     Review of Systems:                         Review of Systems   Constitutional: Negative.    HENT:        SEE HPI   Eyes: Negative.    Respiratory: Negative.    Cardiovascular: Negative.    Gastrointestinal: Negative.    Endocrine: Negative.    Genitourinary: Negative.    Musculoskeletal: Negative.    Skin: Negative.    Allergic/Immunologic: Negative.    Neurological: Negative.    Hematological: Negative.    Psychiatric/Behavioral: Negative.             Objective         Vital Signs  Temp:  [97.4 °F (36.3 °C)-98.2 °F (36.8 °C)] 98.2 °F (36.8 °C)  Heart Rate:  [] 100  Resp:  [15-26] 16  BP: (101-133)/(69-97) 128/80     Physical Exam:                        Physical Exam                          CONSTITUTIONAL: well nourished, alert, oriented, in no acute distress      COMMUNICATION AND VOICE: able to communicate normally, normal voice quality     HEAD: normocephalic, no lesions, atraumatic, no tenderness, no masses      FACE: appearance normal, no lesions, no tenderness, no deformities, facial motion symmetric     SALIVARY GLANDS: parotid glands with no tenderness, no swelling, no masses, submandibular glands with normal size, nontender     EYES: ocular motility normal, eyelids normal, orbits normal, no proptosis, conjunctiva normal , pupils equal, round      EARS:  Hearing: response to conversational voice normal bilaterally   External Ears: auricles without lesions     NOSE:  External Nose: structure normal, no tenderness on palpation, no nasal discharge, no lesions, no evidence of trauma, nostrils patent      ORAL:  Lips: upper and lower lips without lesion   Teeth: dentition within normal limits for age   Gums: gingivae healthy   Oral Mucosa: oral mucosa  normal, no mucosal lesions   Floor of Mouth: Warthin’s duct patent, mucosa normal  Tongue: lingual mucosa normal without lesions, normal tongue mobility   Palate: soft and hard palates with normal mucosa and structure  Oropharynx: oropharyngeal mucosa normal     NECK: neck appearance normal, no mass,  noted without erythema or tenderness     THYROID: no overt thyromegaly, no tenderness, nodules or mass present on palpation, position midline      LYMPH NODES: no lymphadenopathy     CHEST/RESPIRATORY: respiratory effort normal     CARDIOVASCULAR: extremities without cyanosis or edema       NEUROLOGIC/PSYCHIATRIC: oriented to time, place and person, mood normal, affect appropriate, CN II-XII intact grossly     Results Review:        Lab Results (last 24 hours)     Procedure Component Value Units Date/Time             CK [[id number]]  (Normal) Collected:  11/19/17 0721     Specimen:  Blood Updated:  11/19/17 1006       Creatine Kinase 98 U/L       CK-MB [[id number]]  (Normal) Collected:  11/19/17 0721     Specimen:  Blood Updated:  11/19/17 1006       CKMB 3.56 ng/mL       Narrative:        CKMB Index not indicated     Troponin [[id number]]  (Abnormal) Collected:  11/19/17 0721     Specimen:  Blood Updated:  11/19/17 1013       Troponin I 0.103 (H) ng/mL       POC Glucose Fingerstick [[id number]]  (Abnormal) Collected:  11/19/17 1232     Specimen:  Blood Updated:  11/19/17 1243       Glucose 249 (H) mg/dL           : [id number] Arielle SarahMeter ID: [id number]         Troponin [[id number]]  (Abnormal) Collected:  11/19/17 1428     Specimen:  Blood Updated:  11/19/17 1514       Troponin I 0.072 (H) ng/mL       Troponin [[id number]]  (Abnormal) Collected:  11/19/17 1745     Specimen:  Blood Updated:  11/19/17 1823       Troponin I 0.075 (H) ng/mL       POC Glucose Fingerstick [[id number]]  (Abnormal) Collected:  11/19/17 1814     Specimen:  Blood Updated:  11/19/17 1851       Glucose 289 (H) mg/dL           : [id number]  Arielle KapoorMeter ID: [id number]         POC Glucose Fingerstick [[id number]]  (Abnormal) Collected:  11/19/17 2046     Specimen:  Blood Updated:  11/19/17 2107       Glucose 260 (H) mg/dL           : [id number] Reena HillmanMeter ID: [id number]         CBC & Differential [[id number]] Collected:  11/20/17 0224     Specimen:  Blood Updated:  11/20/17 0243     Narrative:        The following orders were created for panel order CBC & Differential.  Procedure                               Abnormality         Status                     ---------                               -----------         ------                     CBC Auto Differential[[id number]]        Abnormal            Final result                  Please view results for these tests on the individual orders.     CBC Auto Differential [[id number]]  (Abnormal) Collected:  11/20/17 0224     Specimen:  Blood Updated:  11/20/17 0243       WBC 17.21 (H) 10*3/mm3         RBC 5.24 10*6/mm3         Hemoglobin 16.5 g/dL         Hematocrit 48.6 %         MCV 92.7 fL         MCH 31.5 pg         MCHC 34.0 g/dL         RDW 12.8 %         RDW-SD 43.1 fl         MPV 11.5 fL         Platelets 130 10*3/mm3         Neutrophil % 94.3 (H) %         Lymphocyte % 2.0 (L) %         Monocyte % 3.1 (L) %         Eosinophil % 0.0 %         Basophil % 0.1 %         Immature Grans % 0.5 %         Neutrophils, Absolute 16.24 (H) 10*3/mm3         Lymphocytes, Absolute 0.34 (L) 10*3/mm3         Monocytes, Absolute 0.53 10*3/mm3         Eosinophils, Absolute 0.00 10*3/mm3         Basophils, Absolute 0.01 10*3/mm3         Immature Grans, Absolute 0.09 (H) 10*3/mm3       Comprehensive Metabolic Panel [[id number]]  (Abnormal) Collected:  11/20/17 0224     Specimen:  Blood Updated:  11/20/17 0306       Glucose 233 (H) mg/dL         BUN 30 (H) mg/dL         Creatinine 1.00 mg/dL         Sodium 141 mmol/L         Potassium 4.0 mmol/L         Chloride 105 mmol/L         CO2 26.0 mmol/L          Calcium 8.7 mg/dL         Total Protein 6.4 g/dL         Albumin 3.50 g/dL         ALT (SGPT) 32 U/L         AST (SGOT) 18 U/L         Alkaline Phosphatase 54 U/L         Total Bilirubin 0.9 mg/dL         eGFR Non African Amer 76 mL/min/1.73         Globulin 2.9 gm/dL         A/G Ratio 1.2 g/dL         BUN/Creatinine Ratio 30.0 (H)       Anion Gap 10.0 mmol/L       BNP [[id number]]  (Abnormal) Collected:  11/20/17 0224     Specimen:  Blood Updated:  11/20/17 0309       proBNP 3170.0 (H) pg/mL       TSH [[id number]]  (Abnormal) Collected:  11/20/17 0224     Specimen:  Blood Updated:  11/20/17 0331       TSH 0.160 (L) mIU/mL       Hemoglobin A1c [[id number]] Collected:  11/20/17 0224     Specimen:  Blood Updated:  11/20/17 0348       Hemoglobin A1C 7.8 %       Narrative:        Less than 6.0           Non-Diabetic Range  6.0-7.0                 ADA Therapeutic Target  Greater than 7.0        Action Suggested     Throat / Upper Respiratory Culture - Swab, Throat [[id number]] Collected:  11/18/17 1829     Specimen:  Swab from Throat Updated:  11/20/17 0828       Throat Culture --         Moderate growth (3+) Normal Respiratory Jaquelin             Imaging Results (last 24 hours)      ** No results found for the last 24 hours. **             Medication Review:       Current Medications              Current Facility-Administered Medications   Medication Dose Route Frequency Provider Last Rate Last Dose   • acetaminophen (TYLENOL) tablet 650 mg  650 mg Oral Q4H PRN Tony Benjamin MD           Or   • acetaminophen (TYLENOL) suppository 650 mg  650 mg Rectal Q4H PRN Tony Benjamin MD         • acetaminophen-codeine (TYLENOL with CODEINE) 120-12 MG/5ML solution 5 mL  5 mL Oral Q4H PRN Tony Benjamin MD         • ampicillin-sulbactam 3 g/100 mL 0.9% NS (MBP)  3 g Intravenous Q6H Tony Benjamin MD    3 g at 11/20/17 0831   • bisacodyl (DULCOLAX) EC tablet 5 mg  5 mg Oral Daily PRN Tony Benjamin MD         •  dexamethasone (DECADRON) injection 8 mg  8 mg Intravenous Q6H Tony Benjamin MD    8 mg at 11/20/17 0831   • dextrose (D50W) solution 25 g  25 g Intravenous Q15 Min PRN Lenard Higginbotham MD         • dextrose (GLUTOSE) oral gel 15 g  15 g Oral Q15 Min PRN Lenard Higginbotham MD         • enalaprilat (VASOTEC) injection 0.625 mg  0.625 mg Intravenous Q6H PRN Lenard Higginbotham MD         • enoxaparin (LOVENOX) syringe 40 mg  40 mg Subcutaneous Daily Tony Benjamin MD    40 mg at 11/20/17 0832   • fluticasone (FLONASE) 50 MCG/ACT nasal spray 1 spray  1 spray Nasal BID Tony Benjamin MD    1 spray at 11/19/17 1812   • furosemide (LASIX) tablet 40 mg  40 mg Oral Daily PRN Tony Benjamin MD         • glucagon (human recombinant) (GLUCAGEN DIAGNOSTIC) injection 1 mg  1 mg Subcutaneous Q15 Min PRN Lenard Higginbotham MD         • hydrALAZINE (APRESOLINE) injection 20 mg  20 mg Intravenous Q4H PRN Tony Benjamin MD         • insulin lispro (humaLOG) injection 4-24 Units  4-24 Units Subcutaneous 4x Daily With Meals & Nightly Lenard Higginbotham MD    8 Units at 11/20/17 0831   • ipratropium-albuterol (DUO-NEB) nebulizer solution 3 mL  3 mL Nebulization Q6H - RT Tony Benjamin MD    3 mL at 11/20/17 0651   • labetalol (NORMODYNE,TRANDATE) injection 10 mg  10 mg Intravenous Q4H PRN Lenard Higginbotham MD         • nitroglycerin (NITROSTAT) SL tablet 0.4 mg  0.4 mg Sublingual Q5 Min PRN Tony Benjamin MD         • ondansetron (ZOFRAN) injection 4 mg  4 mg Intravenous Q6H PRN Tony Benjamin MD         • sodium chloride 0.9 % flush 1-10 mL  1-10 mL Intravenous PRN Tony Benjamin MD         • sodium chloride 0.9 % infusion  75 mL/hr Intravenous Continuous Lenard Higginbotham MD 75 mL/hr at 11/19/17 2348 75 mL/hr at 11/19/17 2348               Assessment/Plan          Principal Problem:    Adult supraglottitis  Active Problems:    Coronary atherosclerosis of native coronary artery    Controlled type 2 diabetes mellitus without  "complication, without long-term current use of insulin    HTN (hypertension), benign    Eczema        Plan:  Continue IV antibiotics. Agree to go to floor from ENT standpoint.  Call for problems or worsening symptoms  Plan in home in am.           Emmie GRACE Maximo, APRN  11/20/17  9:05 AM                         BNP [[id number]] (Order [id number])   Lab   Date: 11/20/2017 Department: [address] Released By/Authorizing: Lenard Higginbotham MD (auto-released)          BNP   Order: [id number]   Status:  Final result   Visible to patient:  No (Not Released)      Ref Range & Units 2:24 AM     proBNP 0.0 - 900.0 pg/mL 3170.0 (H)   Resulting Agency  [facility]      Specimen Collected: 11/20/17  2:24 AM Last Resulted: 11/20/17  3:09 AM                       Related Result Highlights          Hospital Medications (active)       Dose Frequency Start End    acetaminophen (TYLENOL) suppository 650 mg 650 mg Every 4 Hours PRN 11/18/2017     Sig - Route: Insert 1 suppository into the rectum Every 4 (Four) Hours As Needed for Fever (temperature greater than 101.5 F or headache). - Rectal    Linked Group 1:  \"Or\" Linked Group Details        acetaminophen (TYLENOL) tablet 650 mg 650 mg Every 4 Hours PRN 11/18/2017     Sig - Route: Take 2 tablets by mouth Every 4 (Four) Hours As Needed for Headache or Fever (fever greater than 101.5 F). - Oral    Linked Group 1:  \"Or\" Linked Group Details        acetaminophen-codeine (TYLENOL with CODEINE) 120-12 MG/5ML solution 5 mL 5 mL Every 4 Hours PRN 11/18/2017     Sig - Route: Take 5 mL by mouth Every 4 (Four) Hours As Needed for Cough. - Oral    ampicillin-sulbactam 3 g/100 mL 0.9% NS (MBP) 3 g Every 6 Hours 11/19/2017 11/26/2017    Sig - Route: Infuse 100 mL into a venous catheter Every 6 (Six) Hours. - Intravenous    aspirin chewable tablet 81 mg 81 mg Daily 11/20/2017     Sig - Route: Chew 1 tablet Daily. - Oral    atorvastatin (LIPITOR) tablet 40 mg 40 mg Nightly 11/20/2017     Sig - " Route: Take 1 tablet by mouth Every Night. - Oral    bisacodyl (DULCOLAX) EC tablet 5 mg 5 mg Daily PRN 11/18/2017     Sig - Route: Take 1 tablet by mouth Daily As Needed for Constipation. - Oral    dextrose (D50W) solution 25 g 25 g Every 15 Minutes PRN 11/19/2017     Sig - Route: Infuse 50 mL into a venous catheter Every 15 (Fifteen) Minutes As Needed for Low Blood Sugar (Blood Sugar Less Than 70, Patient Has IV Access - Unresponsive, NPO or Unable To Safely Swallow). - Intravenous    dextrose (GLUTOSE) oral gel 15 g 15 g Every 15 Minutes PRN 11/19/2017     Sig - Route: Take 15 g by mouth Every 15 (Fifteen) Minutes As Needed for Low Blood Sugar (Blood Sugar Less Than 70, Patient Alert, Is Not NPO & Can Safely Swallow). - Oral    enoxaparin (LOVENOX) syringe 40 mg 40 mg Daily 11/19/2017     Sig - Route: Inject 0.4 mL under the skin Daily. - Subcutaneous    fluticasone (FLONASE) 50 MCG/ACT nasal spray 1 spray 1 spray 2 Times Daily 11/19/2017 12/9/2017    Sig - Route: 1 spray into each nostril 2 (Two) Times a Day. - Nasal    furosemide (LASIX) tablet 40 mg 40 mg Daily PRN 11/18/2017     Sig - Route: Take 1 tablet by mouth Daily As Needed (edema, SOA). - Oral    glucagon (human recombinant) (GLUCAGEN DIAGNOSTIC) injection 1 mg 1 mg Every 15 Minutes PRN 11/19/2017     Sig - Route: Inject 1 mg under the skin Every 15 (Fifteen) Minutes As Needed (Blood Glucose Less Than 70 - Patient Without IV Access - Unresponsive, NPO or Unable To Safely Swallow). - Subcutaneous    hydrALAZINE (APRESOLINE) injection 20 mg 20 mg Every 4 Hours PRN 11/18/2017     Sig - Route: Infuse 1 mL into a venous catheter Every 4 (Four) Hours As Needed for High Blood Pressure. - Intravenous    insulin lispro (humaLOG) injection 4-24 Units 4-24 Units 4 Times Daily With Meals & Nightly 11/19/2017     Sig - Route: Inject 4-24 Units under the skin 4 (Four) Times a Day With Meals & at Bedtime. - Subcutaneous    ipratropium-albuterol (DUO-NEB) nebulizer  solution 3 mL 3 mL Every 6 Hours - RT 11/19/2017     Sig - Route: Take 3 mL by nebulization Every 6 (Six) Hours. - Nebulization    lisinopril (PRINIVIL,ZESTRIL) tablet 20 mg 20 mg Every 24 Hours Scheduled 11/20/2017     Sig - Route: Take 1 tablet by mouth Daily. - Oral    metoprolol tartrate (LOPRESSOR) tablet 25 mg 25 mg Every 12 Hours Scheduled 11/20/2017     Sig - Route: Take 1 tablet by mouth Every 12 (Twelve) Hours. - Oral    nitroglycerin (NITROSTAT) SL tablet 0.4 mg 0.4 mg Every 5 Minutes PRN 11/18/2017     Sig - Route: Place 1 tablet under the tongue Every 5 (Five) Minutes As Needed for Chest Pain. - Sublingual    ondansetron (ZOFRAN) injection 4 mg 4 mg Every 6 Hours PRN 11/18/2017     Sig - Route: Infuse 2 mL into a venous catheter Every 6 (Six) Hours As Needed for Nausea or Vomiting. - Intravenous    predniSONE (DELTASONE) tablet 20 mg 20 mg 2 Times Daily With Meals 11/20/2017     Sig - Route: Take 1 tablet by mouth 2 (Two) Times a Day With Meals. - Oral    sodium chloride 0.9 % flush 1-10 mL 1-10 mL As Needed 11/18/2017     Sig - Route: Infuse 1-10 mL into a venous catheter As Needed for Line Care. - Intravenous    dexamethasone (DECADRON) injection 8 mg (Discontinued) 8 mg Every 6 Hours 11/19/2017 11/20/2017    Sig - Route: Infuse 2 mL into a venous catheter Every 6 (Six) Hours. - Intravenous    enalaprilat (VASOTEC) injection 0.625 mg (Discontinued) 0.625 mg Every 6 Hours PRN 11/19/2017 11/20/2017    Sig - Route: Infuse 0.5 mL into a venous catheter Every 6 (Six) Hours As Needed for High Blood Pressure. - Intravenous    labetalol (NORMODYNE,TRANDATE) injection 10 mg (Discontinued) 10 mg Every 4 Hours PRN 11/19/2017 11/20/2017    Sig - Route: Infuse 2 mL into a venous catheter Every 4 (Four) Hours As Needed for High Blood Pressure. - Intravenous    sodium chloride 0.9 % infusion (Discontinued) 75 mL/hr Continuous 11/18/2017 11/20/2017    Sig - Route: Infuse 75 mL/hr into a venous catheter Continuous. -  Intravenous

## 2017-11-21 VITALS
WEIGHT: 229 LBS | BODY MASS INDEX: 31.02 KG/M2 | TEMPERATURE: 97.5 F | HEART RATE: 92 BPM | SYSTOLIC BLOOD PRESSURE: 136 MMHG | OXYGEN SATURATION: 97 % | HEIGHT: 72 IN | RESPIRATION RATE: 20 BRPM | DIASTOLIC BLOOD PRESSURE: 71 MMHG

## 2017-11-21 LAB
ALBUMIN SERPL-MCNC: 3.5 G/DL (ref 3.5–5)
ALBUMIN/GLOB SERPL: 1.3 G/DL (ref 1.1–2.5)
ALP SERPL-CCNC: 54 U/L (ref 24–120)
ALT SERPL W P-5'-P-CCNC: 34 U/L (ref 0–54)
ANION GAP SERPL CALCULATED.3IONS-SCNC: 12 MMOL/L (ref 4–13)
AST SERPL-CCNC: 16 U/L (ref 7–45)
BACTERIA SPEC AEROBE CULT: ABNORMAL
BASOPHILS # BLD AUTO: 0 10*3/MM3 (ref 0–0.2)
BASOPHILS NFR BLD AUTO: 0 % (ref 0–2)
BILIRUB SERPL-MCNC: 0.9 MG/DL (ref 0.1–1)
BUN BLD-MCNC: 42 MG/DL (ref 5–21)
BUN/CREAT SERPL: 37.8 (ref 7–25)
CALCIUM SPEC-SCNC: 8.7 MG/DL (ref 8.4–10.4)
CHLORIDE SERPL-SCNC: 102 MMOL/L (ref 98–110)
CO2 SERPL-SCNC: 23 MMOL/L (ref 24–31)
CREAT BLD-MCNC: 1.11 MG/DL (ref 0.5–1.4)
DEPRECATED RDW RBC AUTO: 43.9 FL (ref 40–54)
EOSINOPHIL # BLD AUTO: 0 10*3/MM3 (ref 0–0.7)
EOSINOPHIL NFR BLD AUTO: 0 % (ref 0–4)
ERYTHROCYTE [DISTWIDTH] IN BLOOD BY AUTOMATED COUNT: 13 % (ref 12–15)
GFR SERPL CREATININE-BSD FRML MDRD: 67 ML/MIN/1.73
GLOBULIN UR ELPH-MCNC: 2.6 GM/DL
GLUCOSE BLD-MCNC: 310 MG/DL (ref 70–100)
GLUCOSE BLDC GLUCOMTR-MCNC: 254 MG/DL (ref 70–130)
GRAM STN SPEC: ABNORMAL
GRAM STN SPEC: ABNORMAL
HCT VFR BLD AUTO: 46.1 % (ref 40–52)
HGB BLD-MCNC: 15.6 G/DL (ref 14–18)
IMM GRANULOCYTES # BLD: 0.09 10*3/MM3 (ref 0–0.03)
IMM GRANULOCYTES NFR BLD: 0.7 % (ref 0–5)
ISOLATED FROM: ABNORMAL
ISOLATED FROM: ABNORMAL
LYMPHOCYTES # BLD AUTO: 0.42 10*3/MM3 (ref 0.72–4.86)
LYMPHOCYTES NFR BLD AUTO: 3.3 % (ref 15–45)
MCH RBC QN AUTO: 31.5 PG (ref 28–32)
MCHC RBC AUTO-ENTMCNC: 33.8 G/DL (ref 33–36)
MCV RBC AUTO: 93.1 FL (ref 82–95)
MONOCYTES # BLD AUTO: 0.34 10*3/MM3 (ref 0.19–1.3)
MONOCYTES NFR BLD AUTO: 2.7 % (ref 4–12)
NEUTROPHILS # BLD AUTO: 11.75 10*3/MM3 (ref 1.87–8.4)
NEUTROPHILS NFR BLD AUTO: 93.3 % (ref 39–78)
PLATELET # BLD AUTO: 146 10*3/MM3 (ref 130–400)
PMV BLD AUTO: 12.1 FL (ref 6–12)
POTASSIUM BLD-SCNC: 4.5 MMOL/L (ref 3.5–5.3)
PROT SERPL-MCNC: 6.1 G/DL (ref 6.3–8.7)
RBC # BLD AUTO: 4.95 10*6/MM3 (ref 4.8–5.9)
SODIUM BLD-SCNC: 137 MMOL/L (ref 135–145)
WBC NRBC COR # BLD: 12.6 10*3/MM3 (ref 4.8–10.8)

## 2017-11-21 PROCEDURE — 82962 GLUCOSE BLOOD TEST: CPT

## 2017-11-21 PROCEDURE — 99231 SBSQ HOSP IP/OBS SF/LOW 25: CPT | Performed by: NURSE PRACTITIONER

## 2017-11-21 PROCEDURE — 80053 COMPREHEN METABOLIC PANEL: CPT | Performed by: FAMILY MEDICINE

## 2017-11-21 PROCEDURE — 94799 UNLISTED PULMONARY SVC/PX: CPT

## 2017-11-21 PROCEDURE — 63710000001 PREDNISONE PER 1 MG: Performed by: FAMILY MEDICINE

## 2017-11-21 PROCEDURE — 85025 COMPLETE CBC W/AUTO DIFF WBC: CPT | Performed by: FAMILY MEDICINE

## 2017-11-21 PROCEDURE — 25010000003 AMPICILLIN-SULBACTAM PER 1.5 G: Performed by: INTERNAL MEDICINE

## 2017-11-21 PROCEDURE — 63710000001 INSULIN LISPRO (HUMAN) PER 5 UNITS: Performed by: FAMILY MEDICINE

## 2017-11-21 PROCEDURE — 25010000002 ENOXAPARIN PER 10 MG: Performed by: INTERNAL MEDICINE

## 2017-11-21 RX ORDER — PREDNISONE 20 MG/1
20 TABLET ORAL 2 TIMES DAILY WITH MEALS
Qty: 4 TABLET | Refills: 0 | Status: SHIPPED | OUTPATIENT
Start: 2017-11-21 | End: 2018-01-05

## 2017-11-21 RX ORDER — LEVOFLOXACIN 750 MG/1
750 TABLET ORAL DAILY
Qty: 10 TABLET | Refills: 0 | Status: SHIPPED | OUTPATIENT
Start: 2017-11-21 | End: 2018-01-04

## 2017-11-21 RX ORDER — ATORVASTATIN CALCIUM 40 MG/1
40 TABLET, FILM COATED ORAL NIGHTLY
Qty: 30 TABLET | Refills: 0 | Status: SHIPPED | OUTPATIENT
Start: 2017-11-21 | End: 2018-01-05

## 2017-11-21 RX ORDER — AMOXICILLIN AND CLAVULANATE POTASSIUM 875; 125 MG/1; MG/1
1 TABLET, FILM COATED ORAL 2 TIMES DAILY
Qty: 20 TABLET | Refills: 0 | Status: SHIPPED | OUTPATIENT
Start: 2017-11-21 | End: 2017-11-21 | Stop reason: HOSPADM

## 2017-11-21 RX ADMIN — METOPROLOL TARTRATE 25 MG: 25 TABLET, FILM COATED ORAL at 08:41

## 2017-11-21 RX ADMIN — INSULIN LISPRO 12 UNITS: 100 INJECTION, SOLUTION INTRAVENOUS; SUBCUTANEOUS at 08:41

## 2017-11-21 RX ADMIN — ENOXAPARIN SODIUM 40 MG: 40 INJECTION SUBCUTANEOUS at 08:41

## 2017-11-21 RX ADMIN — PREDNISONE 20 MG: 20 TABLET ORAL at 08:41

## 2017-11-21 RX ADMIN — AMPICILLIN SODIUM AND SULBACTAM SODIUM 3 G: 2; 1 INJECTION, POWDER, FOR SOLUTION INTRAMUSCULAR; INTRAVENOUS at 08:40

## 2017-11-21 RX ADMIN — IPRATROPIUM BROMIDE AND ALBUTEROL SULFATE 3 ML: 2.5; .5 SOLUTION RESPIRATORY (INHALATION) at 06:34

## 2017-11-21 RX ADMIN — LISINOPRIL 20 MG: 20 TABLET ORAL at 08:41

## 2017-11-21 RX ADMIN — AMPICILLIN SODIUM AND SULBACTAM SODIUM 3 G: 2; 1 INJECTION, POWDER, FOR SOLUTION INTRAMUSCULAR; INTRAVENOUS at 02:05

## 2017-11-21 RX ADMIN — ASPIRIN 81 MG 81 MG: 81 TABLET ORAL at 08:41

## 2017-11-21 NOTE — DISCHARGE SUMMARY
[facility] Medicine Services  DISCHARGE SUMMARY       Date of Admission: 11/18/2017  Date of Discharge:  11/21/2017  Primary Care Physician: Hira Alvarez MD    Presenting Problem/History of Present Illness:  Adult supraglottitis [J04.30]  Adult supraglottitis [J04.30]     Final Discharge Diagnoses:  Hospital Problem List     * (Principal)Adult supraglottitis    Overview Signed 11/20/2017  9:02 AM by Hira Solorzano MD     IV Abx  Decadron  ENT following         Coronary atherosclerosis of native coronary artery    Overview Addendum 11/20/2017  9:17 AM by Hira Solorzano MD     ASA  Metoprolol  Lisinopril  Statin          Disorder of liver    Overview Signed 11/20/2017  9:15 AM by Hira Solorzano MD     Monitor LFT's         Controlled type 2 diabetes mellitus without complication, without long-term current use of insulin    Overview Signed 11/20/2017  9:13 AM by Hira Solorzano MD     SSI           HTN (hypertension), benign    Overview Addendum 11/20/2017  9:10 AM by Hira Solorzano MD     Lopressor  Lisinopril         Eczema    Overview Signed 11/20/2017  9:10 AM by Hira Solorzano MD     Monitor         Streptococcal bacteremia    Overview Signed 11/20/2017  9:15 AM by Hira Solorzano MD     IV Unasyn          Hyperlipidemia    Overview Signed 11/20/2017  9:16 AM by Hira Solorzano MD     statin         Sepsis    Overview Signed 11/20/2017  9:17 AM by Hira Solorzano MD     IV Abx  Monitor Vitals         Acute on chronic systolic heart failure    Overview Signed 11/20/2017  9:22 AM by Hira Solorzano MD     Lasix  Metoprolol  Lisinopril  Monitor                Consults: ENT    Procedures Performed: NA    Pertinent Test Results: CT Soft tissue neck showed density in the infraglottic space concerning for Supraglottitis/Epiglottitis.      Chief Complaint on Day of Discharge: Dysphagia    History of Present  "Illness on Day of Discharge:   Doing well.  No SOA or Dysphagia.  Eager to go home.       Hospital Course:  The patient is a 62 y.o. male who presented to [facility] with increasing swelling and congestion in his neck and epiglottic area.  CT of his neck showed an area concerning for supraglottitis/epiglottitis.  He was admitted to the ICU for close monitoring.  ENT was consulted.  The patient was started on IV Unasyn and IV Steroids.  He improved clinically over the coming days and was transitioned to the regular floor.  He was changed to PO steroids and continued to improve.  ENT has cleared him for discharge.  The patient is eager to go as he has an appointment later today in [place].  At the time of discharge, he is comfortable with being discharged and with the discharge plan.  He was given the chance to ask questions and all of his questions were answered to his complete satisfaction.      Condition on Discharge:  Stable    Physical Exam on Discharge:  /71  Pulse 92  Temp 97.5 °F (36.4 °C)  Resp 20  Ht 72\" (182.9 cm)  Wt 229 lb (104 kg)  SpO2 97%  BMI 31.06 kg/m2  Physical Exam   Constitutional: He is oriented to person, place, and time. He appears well-developed and well-nourished.   HENT:   Head: Normocephalic and atraumatic.   Right Ear: External ear normal.   Left Ear: External ear normal.   Nose: Nose normal.   Mouth/Throat: Oropharynx is clear and moist.   Eyes: Conjunctivae and EOM are normal. Pupils are equal, round, and reactive to light. Right eye exhibits no discharge. Left eye exhibits no discharge. No scleral icterus.   Neck: Normal range of motion. Neck supple. No tracheal deviation present. No thyromegaly present.   Cardiovascular: Normal rate, regular rhythm, normal heart sounds and intact distal pulses.  Exam reveals no gallop and no friction rub.    No murmur heard.  Trace BLE edema   Pulmonary/Chest: Effort normal and breath sounds normal. No stridor. No " respiratory distress. He has no wheezes. He has no rales. He exhibits no tenderness.   Abdominal: Soft. Bowel sounds are normal. He exhibits no distension and no mass. There is no tenderness. There is no rebound and no guarding. No hernia.   Musculoskeletal: Normal range of motion. He exhibits no edema or deformity.   Lymphadenopathy:     He has no cervical adenopathy.   Neurological: He is alert and oriented to person, place, and time. He has normal reflexes. He displays normal reflexes. No cranial nerve deficit. He exhibits normal muscle tone. Coordination normal.   Skin: Skin is warm and dry. No rash noted. No erythema. No pallor.   Psychiatric: He has a normal mood and affect. His behavior is normal. Judgment and thought content normal.   Vitals reviewed.      Discharge Disposition:  Home or Self Care    Discharge Medications:   Joao Fonseca   Home Medication Instructions JORGE:[id number]    Printed on:11/21/17 4001   Medication Information                      acyclovir (ZOVIRAX) 400 MG tablet  Take 1 tablet by mouth Daily. Take no more than 5 doses a day.             amoxicillin-clavulanate (AUGMENTIN) 875-125 MG per tablet  Take 1 tablet by mouth 2 (Two) Times a Day for 10 days.             aspirin 81 MG EC tablet  Take 1 tablet by mouth Daily.             atorvastatin (LIPITOR) 40 MG tablet  Take 1 tablet by mouth Every Night.             fluticasone (FLONASE) 50 MCG/ACT nasal spray  1 spray into each nostril 2 (Two) Times a Day for 30 days.             furosemide (LASIX) 40 MG tablet  Take 1 tablet by mouth Daily As Needed (edema, SOA).             ipratropium (ATROVENT) 0.06 % nasal spray  2 sprays into each nostril 4 (Four) Times a Day.             lisinopril (PRINIVIL,ZESTRIL) 20 MG tablet  Take 1 tablet by mouth Daily.             metFORMIN (GLUCOPHAGE) 1000 MG tablet  1 po daily in the am and 1/2 po daily in the pm             metoprolol tartrate (LOPRESSOR) 25 MG tablet  Take 1 tablet by mouth 2  (Two) Times a Day.             nitroglycerin (NITROSTAT) 0.4 MG SL tablet  1 under the tongue as needed for angina, may repeat q5mins for up three doses             predniSONE (DELTASONE) 20 MG tablet  Take 1 tablet by mouth 2 (Two) Times a Day With Meals.             SITagliptin (JANUVIA) 100 MG tablet  Take 1 tablet by mouth Daily.             SITagliptin (JANUVIA) 100 MG tablet  Take 1 tablet by mouth Daily.                 Discharge Diet: ADA/Heart Healthy    Activity at Discharge: as tolerated    Discharge Care Plan/Instructions: Follow up with PCP, ENT.  Continue Antibiotics, steroids.    Follow-up Appointments:   Future Appointments  Date Time Provider Department Center   3/1/2018 8:15 AM Phil Henry MD [facility] None       Test Results Pending at Discharge: LUIS DANIEL Solorzano MD  11/21/17  9:46 AM    Time: 35 minutes

## 2017-11-21 NOTE — PROGRESS NOTES
ENT  [address], [facility] Progress Note:       LOS: 3 days   Patient Care Team:  Hira Alvarez MD as PCP - General  Hira Alvarez MD as PCP - Family Medicine  Corina Haddad, RN as Registered Nurse  Phil Henry MD as Cardiologist (Cardiology)      Subjective     Interval History:     Status of active consulting problem: No complaints    History taken from: patient    Review of Systems:    Review of Systems   Constitutional: Negative.    HENT:        SEE HPI   Eyes: Negative.    Respiratory: Negative.    Cardiovascular: Negative.    Gastrointestinal: Negative.    Endocrine: Negative.    Genitourinary: Negative.    Musculoskeletal: Negative.    Skin: Negative.    Allergic/Immunologic: Negative.    Neurological: Negative.    Hematological: Negative.    Psychiatric/Behavioral: Negative.        Objective     Vital Signs  Temp:  [97.5 °F (36.4 °C)-99.3 °F (37.4 °C)] 97.5 °F (36.4 °C)  Heart Rate:  [] 92  Resp:  [18-20] 20  BP: (113-136)/(67-88) 136/71    Physical Exam:   Physical Exam     CONSTITUTIONAL: well nourished, alert, oriented, in no acute distress     COMMUNICATION AND VOICE: able to communicate normally, normal voice quality    HEAD: normocephalic, no lesions, atraumatic, no tenderness, no masses     FACE: appearance normal, no lesions, no tenderness, no deformities, facial motion symmetric    SALIVARY GLANDS: parotid glands with no tenderness, no swelling, no masses, submandibular glands with normal size, nontender    EYES: ocular motility normal, eyelids normal, orbits normal, no proptosis, conjunctiva normal , pupils equal, round     EARS:  Hearing: response to conversational voice normal bilaterally   External Ears: auricles without lesions    NOSE:  External Nose: structure normal, no tenderness on palpation, no nasal discharge, no lesions, no evidence of trauma, nostrils patent     ORAL:  Lips: upper and lower lips without lesion   Teeth: dentition  within normal limits for age   Gums: gingivae healthy   Oral Mucosa: oral mucosa normal, no mucosal lesions   Floor of Mouth: Warthin’s duct patent, mucosa normal  Tongue: lingual mucosa normal without lesions, normal tongue mobility   Palate: soft and hard palates with normal mucosa and structure  Oropharynx: oropharyngeal mucosa normal    NECK: neck appearance normal, no mass,  noted without erythema or tenderness    THYROID: no overt thyromegaly, no tenderness, nodules or mass present on palpation, position midline     LYMPH NODES: no lymphadenopathy    CHEST/RESPIRATORY: respiratory effort normal    CARDIOVASCULAR: extremities without cyanosis or edema      NEUROLOGIC/PSYCHIATRIC: oriented to time, place and person, mood normal, affect appropriate, CN II-XII intact grossly    Results Review:       Lab Results (last 24 hours)     Procedure Component Value Units Date/Time    POC Glucose Fingerstick [[id number]]  (Abnormal) Collected:  11/20/17 1214    Specimen:  Blood Updated:  11/20/17 1251     Glucose 321 (H) mg/dL       : [id number] Agustin KimMeter ID: [id number]       POC Glucose Fingerstick [[id number]]  (Abnormal) Collected:  11/20/17 1644    Specimen:  Blood Updated:  11/20/17 1713     Glucose 322 (H) mg/dL       : [id number] Agustin KimMeter ID: [id number]       POC Glucose Fingerstick [[id number]]  (Abnormal) Collected:  11/20/17 2030    Specimen:  Blood Updated:  11/20/17 2100     Glucose 206 (H) mg/dL       : [id number] Timothy Hummel ID: [id number]       CBC & Differential [[id number]] Collected:  11/21/17 0514    Specimen:  Blood Updated:  11/21/17 0553    Narrative:       The following orders were created for panel order CBC & Differential.  Procedure                               Abnormality         Status                     ---------                               -----------         ------                     CBC Auto Differential[[id number]]        Abnormal            Final  result                 Please view results for these tests on the individual orders.    CBC Auto Differential [[id number]]  (Abnormal) Collected:  11/21/17 0514    Specimen:  Blood Updated:  11/21/17 0553     WBC 12.60 (H) 10*3/mm3      RBC 4.95 10*6/mm3      Hemoglobin 15.6 g/dL      Hematocrit 46.1 %      MCV 93.1 fL      MCH 31.5 pg      MCHC 33.8 g/dL      RDW 13.0 %      RDW-SD 43.9 fl      MPV 12.1 (H) fL      Platelets 146 10*3/mm3      Neutrophil % 93.3 (H) %      Lymphocyte % 3.3 (L) %      Monocyte % 2.7 (L) %      Eosinophil % 0.0 %      Basophil % 0.0 %      Immature Grans % 0.7 %      Neutrophils, Absolute 11.75 (H) 10*3/mm3      Lymphocytes, Absolute 0.42 (L) 10*3/mm3      Monocytes, Absolute 0.34 10*3/mm3      Eosinophils, Absolute 0.00 10*3/mm3      Basophils, Absolute 0.00 10*3/mm3      Immature Grans, Absolute 0.09 (H) 10*3/mm3     Comprehensive Metabolic Panel [[id number]]  (Abnormal) Collected:  11/21/17 0514    Specimen:  Blood Updated:  11/21/17 0609     Glucose 310 (H) mg/dL      BUN 42 (H) mg/dL      Creatinine 1.11 mg/dL      Sodium 137 mmol/L      Potassium 4.5 mmol/L      Chloride 102 mmol/L      CO2 23.0 (L) mmol/L      Calcium 8.7 mg/dL      Total Protein 6.1 (L) g/dL      Albumin 3.50 g/dL      ALT (SGPT) 34 U/L      AST (SGOT) 16 U/L      Alkaline Phosphatase 54 U/L      Total Bilirubin 0.9 mg/dL      eGFR Non African Amer 67 mL/min/1.73      Globulin 2.6 gm/dL      A/G Ratio 1.3 g/dL      BUN/Creatinine Ratio 37.8 (H)     Anion Gap 12.0 mmol/L     Blood Culture - Blood, Blood, Venous Line [[id number]]  (Abnormal)  (Susceptibility) Collected:  11/18/17 4574    Specimen:  Blood from Arm, Left Updated:  11/21/17 0709     Blood Culture Abnormal Stain (A)      Streptococcus pneumoniae (A)     Isolated from Aerobic and Anaerobic Bottles     Gram Stain Result Gram positive cocci in pairs and chains    Susceptibility      Streptococcus pneumoniae     AAKASH     Cefotaxime (Meningitis) <=0.12 ug/ml  Susceptible     Cefotaxime (Non-Meningitis) <=0.12 ug/ml Susceptible     Erythromycin <=0.12 ug/ml Susceptible     Levofloxacin 0.5 ug/ml Susceptible     Trimethoprim + Sulfamethoxazole <=10 ug/ml Susceptible     Vancomycin 0.5 ug/ml Susceptible                    Blood Culture - Blood, Blood, Venous Line [[id number]]  (Abnormal) Collected:  11/18/17 1826    Specimen:  Blood from Arm, Right Updated:  11/21/17 0750     Blood Culture Abnormal Stain (A)      Streptococcus pneumoniae (A)     Isolated from Aerobic and Anaerobic Bottles     Gram Stain Result Gram positive cocci in pairs and chains    Narrative:       See culture [id number], collected 11/18/2017 1844 for complete AAKASH.    POC Glucose Fingerstick [[id number]]  (Abnormal) Collected:  11/21/17 0800    Specimen:  Blood Updated:  11/21/17 0841     Glucose 254 (H) mg/dL       : [id number] Agustin Refresh.ioMeter ID: [id number]           Imaging Results (last 24 hours)     ** No results found for the last 24 hours. **          Medication Review:     Current Facility-Administered Medications   Medication Dose Route Frequency Provider Last Rate Last Dose   • acetaminophen (TYLENOL) tablet 650 mg  650 mg Oral Q4H PRN Tony Benjamin MD        Or   • acetaminophen (TYLENOL) suppository 650 mg  650 mg Rectal Q4H PRN Tony Benjamin MD       • acetaminophen-codeine (TYLENOL with CODEINE) 120-12 MG/5ML solution 5 mL  5 mL Oral Q4H PRN Tony Benjamin MD       • ampicillin-sulbactam 3 g/100 mL 0.9% NS (MBP)  3 g Intravenous Q6H Tony Benjamin MD 0 mL/hr at 11/20/17 1513 3 g at 11/21/17 0840   • aspirin chewable tablet 81 mg  81 mg Oral Daily Hira Solorzano MD   81 mg at 11/21/17 0841   • atorvastatin (LIPITOR) tablet 40 mg  40 mg Oral Nightly Hira Solorzano MD   40 mg at 11/20/17 2027   • bisacodyl (DULCOLAX) EC tablet 5 mg  5 mg Oral Daily PRN Tony Benjamin MD       • dextrose (D50W) solution 25 g  25 g Intravenous Q15 Min PRN Lenard Higginbotham MD        • dextrose (GLUTOSE) oral gel 15 g  15 g Oral Q15 Min PRN Lenard Higginbotham MD       • enoxaparin (LOVENOX) syringe 40 mg  40 mg Subcutaneous Daily Tony Benjamin MD   40 mg at 11/21/17 0841   • fluticasone (FLONASE) 50 MCG/ACT nasal spray 1 spray  1 spray Nasal BID Tony Benjamin MD   1 spray at 11/19/17 1812   • furosemide (LASIX) tablet 40 mg  40 mg Oral Daily PRN Tony Benjamin MD       • glucagon (human recombinant) (GLUCAGEN DIAGNOSTIC) injection 1 mg  1 mg Subcutaneous Q15 Min PRN Lenard Higginbotham MD       • hydrALAZINE (APRESOLINE) injection 20 mg  20 mg Intravenous Q4H PRN Tony Benjamin MD       • insulin lispro (humaLOG) injection 4-24 Units  4-24 Units Subcutaneous 4x Daily With Meals & Nightly Lenard Higginbotham MD   12 Units at 11/21/17 0841   • ipratropium-albuterol (DUO-NEB) nebulizer solution 3 mL  3 mL Nebulization Q6H - RT Tony Benjamin MD   3 mL at 11/21/17 0634   • lisinopril (PRINIVIL,ZESTRIL) tablet 20 mg  20 mg Oral Q24H Hira Solorzano MD   20 mg at 11/21/17 0841   • metoprolol tartrate (LOPRESSOR) tablet 25 mg  25 mg Oral Q12H Hira Solorzano MD   25 mg at 11/21/17 0841   • nitroglycerin (NITROSTAT) SL tablet 0.4 mg  0.4 mg Sublingual Q5 Min PRN Tony Benjamin MD       • ondansetron (ZOFRAN) injection 4 mg  4 mg Intravenous Q6H PRN Tony Benjamin MD       • predniSONE (DELTASONE) tablet 20 mg  20 mg Oral BID With Meals Hira Solorzano MD   20 mg at 11/21/17 0841   • sodium chloride 0.9 % flush 1-10 mL  1-10 mL Intravenous PRN Tony Benjamin MD           Assessment/Plan     Principal Problem:    Adult supraglottitis  Active Problems:    Coronary atherosclerosis of native coronary artery    Disorder of liver    Controlled type 2 diabetes mellitus without complication, without long-term current use of insulin    HTN (hypertension), benign    Eczema    Streptococcal bacteremia    Hyperlipidemia    Sepsis    Acute on chronic systolic heart  failure  Suprglottitis    Plan:  Home on oral antibiotics.  Call for problems or worsening symptoms.  Followup in 6 weeks        Emmie Marsh, DIOR  11/21/17  9:06 AM

## 2017-11-21 NOTE — PAYOR COMM NOTE
"Joao Fonseca (62 y.o. Male)     Date of Birth Social Security Number Address Home Phone MRN    1955  [address]   [facility] [phone] [id number]    [employer] Marital Status          [employer]        Admission Date Admission Type Admitting Provider Attending Provider Department, Room/Bed    11/18/17 Emergency Hira Solorzano MD  [facility] 4C, 487/1    Discharge Date Discharge Disposition Discharge Destination        11/21/2017 Home or Self Care             Attending Provider: (none)    Allergies:  Cephalexin    Isolation:  None   Infection:  None   Code Status:  Prior    Ht:  72\" (182.9 cm)   Wt:  229 lb (104 kg)    Admission Cmt:  None   Principal Problem:  Adult supraglottitis [J04.30] More...                 Active Insurance as of 11/18/2017     Primary Coverage     Payor Plan Insurance Group Employer/Plan Group    [employer] BLUE [employer]OSS [employer] CROSS BLUE SHIELD PPO [id number]     Payor Plan Address Payor Plan Phone Number Effective From Effective To    [address] [phone] 4/23/2017     [place] [address]       Subscriber Name Subscriber Birth Date Member ID       JOAO FONSECA 1955 [id number]                 Emergency Contacts      (Rel.) Home Phone Work Phone Mobile Phone    Jayna Fonseca (Spouse) [phone] -- [phone]    Stella Stevens (Daughter) [phone] -- --    JoseManish (Son) [phone] -- --          11/21/17 D/C DATE.....  Discharge Order     Start     Ordered    11/21/17 0944  Discharge patient  Once     Expected Discharge Date:  11/21/17    Discharge Disposition:  Home or Self Care        11/21/17 0943          "

## 2017-11-21 NOTE — PLAN OF CARE
Problem: Fall Risk (Adult)  Goal: Identify Related Risk Factors and Signs and Symptoms  Outcome: Outcome(s) achieved Date Met:  11/21/17  Goal: Absence of Falls  Outcome: Ongoing (interventions implemented as appropriate)    11/21/17 0308   Fall Risk (Adult)   Absence of Falls making progress toward outcome         Problem: Respiratory Insufficiency (Adult)  Goal: Identify Related Risk Factors and Signs and Symptoms  Outcome: Outcome(s) achieved Date Met:  11/21/17  Goal: Acid/Base Balance  Outcome: Ongoing (interventions implemented as appropriate)    11/21/17 0308   Respiratory Insufficiency (Adult)   Acid/Base Balance making progress toward outcome       Goal: Effective Ventilation  Outcome: Ongoing (interventions implemented as appropriate)    11/21/17 0308   Respiratory Insufficiency (Adult)   Effective Ventilation making progress toward outcome         Problem: Infection, Risk/Actual (Adult)  Goal: Identify Related Risk Factors and Signs and Symptoms  Outcome: Outcome(s) achieved Date Met:  11/21/17  Goal: Infection Prevention/Resolution  Outcome: Ongoing (interventions implemented as appropriate)    11/21/17 0308   Infection, Risk/Actual (Adult)   Infection Prevention/Resolution making progress toward outcome         Problem: Patient Care Overview (Adult)  Goal: Plan of Care Review  Outcome: Ongoing (interventions implemented as appropriate)    11/21/17 0308   Coping/Psychosocial Response Interventions   Plan Of Care Reviewed With patient   Patient Care Overview   Progress improving   Outcome Evaluation   Outcome Summary/Follow up Plan pt had no c/o pain; pt looking forward to going home today; VSS; safety maintained       Goal: Adult Individualization and Mutuality  Outcome: Ongoing (interventions implemented as appropriate)  Goal: Discharge Needs Assessment  Outcome: Ongoing (interventions implemented as appropriate)  Goal: Plan of Care Review  Outcome: Outcome(s) achieved Date Met:  11/21/17  Goal: Adult  Individualization and Mutuality  Outcome: Outcome(s) achieved Date Met:  11/21/17  Goal: Discharge Needs Assessment  Outcome: Outcome(s) achieved Date Met:  11/21/17

## 2017-11-22 ENCOUNTER — TRANSITIONAL CARE MANAGEMENT TELEPHONE ENCOUNTER (OUTPATIENT)
Dept: FAMILY MEDICINE CLINIC | Facility: CLINIC | Age: 62
End: 2017-11-22

## 2017-12-18 RX ORDER — CLINDAMYCIN HYDROCHLORIDE 300 MG/1
300 CAPSULE ORAL 3 TIMES DAILY
Qty: 30 CAPSULE | Refills: 0 | Status: SHIPPED | OUTPATIENT
Start: 2017-12-18 | End: 2017-12-28

## 2018-01-04 ENCOUNTER — OFFICE VISIT (OUTPATIENT)
Dept: OTOLARYNGOLOGY | Facility: CLINIC | Age: 63
End: 2018-01-04

## 2018-01-04 VITALS
BODY MASS INDEX: 30.48 KG/M2 | HEART RATE: 80 BPM | SYSTOLIC BLOOD PRESSURE: 122 MMHG | DIASTOLIC BLOOD PRESSURE: 80 MMHG | WEIGHT: 225 LBS | TEMPERATURE: 97 F | HEIGHT: 72 IN

## 2018-01-04 DIAGNOSIS — J01.40 ACUTE PANSINUSITIS, RECURRENCE NOT SPECIFIED: Primary | ICD-10-CM

## 2018-01-04 DIAGNOSIS — J37.0 CHRONIC LARYNGITIS: ICD-10-CM

## 2018-01-04 PROCEDURE — 99214 OFFICE O/P EST MOD 30 MIN: CPT | Performed by: PHYSICIAN ASSISTANT

## 2018-01-04 PROCEDURE — 31575 DIAGNOSTIC LARYNGOSCOPY: CPT | Performed by: PHYSICIAN ASSISTANT

## 2018-01-04 RX ORDER — CLARITHROMYCIN 500 MG/1
500 TABLET, COATED ORAL EVERY 12 HOURS SCHEDULED
Qty: 20 TABLET | Refills: 0 | Status: SHIPPED | OUTPATIENT
Start: 2018-01-04 | End: 2018-01-14

## 2018-01-04 RX ORDER — PREDNISONE 10 MG/1
TABLET ORAL
Qty: 30 TABLET | Refills: 0 | Status: SHIPPED | OUTPATIENT
Start: 2018-01-04 | End: 2018-03-01

## 2018-01-04 NOTE — PROGRESS NOTES
YOB: 1955  Location: [facility]  Location Address: [address] [place] [address], [facility] [address], [address] [place] [address]  Location Phone: [phone]    Chief Complaint   Patient presents with   • Follow-up     hospital       History of Present Illness  Joao Fonseca is a 62 y.o. male.  Joao Fonseca is here for follow up of ENT complaints. The patient has had problems with nasal drainage, nasal congestion, sinusitis, sinus pressure and postnasal drip, globus sensation  The symptoms are not localized to a particular location. The patient has had mild to moderate symptoms. The symptoms have been present for the last several weeks The symptoms are aggravated by  no identifiable factors. The symptoms are improved by antibiotics.       Past Medical History:   Diagnosis Date   • Arrhythmia    • Cancer     malignant chest tumor   • Cardiomyopathy 2016   • CHF (congestive heart failure)    • Coronary artery disease    • Diabetes mellitus    • Disorder of liver 2013   • History of adenomatous polyp of colon 2010    TUBULAR ADENOMA AT 40CM   • Hyperlipidemia    • Hypertension    • Pacemaker    • Shingles        Past Surgical History:   Procedure Laterality Date   • ANKLE SURGERY Right    • CARDIAC CATHETERIZATION     • CARDIAC ELECTROPHYSIOLOGY PROCEDURE Left 10/6/2016    Procedure: ICD DC new;  Surgeon: Zander Andino MD;  Location: [facility] CATH INVASIVE LOCATION;  Service:    • COLONOSCOPY  2010    biopsy at 41, 2 mm polyp supboptimal prep right clon    • COLONOSCOPY W/ POLYPECTOMY  2010    POLYP AT 40CM TUBULAR ADENOMA, SUBOPTIMAL PREP   • CORONARY ARTERY BYPASS GRAFT     • HEMORRHOIDECTOMY     • HERNIA REPAIR     • PACEMAKER IMPLANTATION     • TONSILLECTOMY           Current Outpatient Prescriptions:   •  acyclovir (ZOVIRAX) 400 MG tablet, Take 1 tablet by mouth Daily. Take no more than 5 doses a day., Disp: 90 tablet, Rfl: 2  •  aspirin 81 MG EC tablet, Take 1 tablet by mouth  Daily., Disp: 90 tablet, Rfl: 2  •  atorvastatin (LIPITOR) 40 MG tablet, Take 1 tablet by mouth Every Night., Disp: 30 tablet, Rfl: 0  •  furosemide (LASIX) 40 MG tablet, Take 1 tablet by mouth Daily As Needed (edema, SOA)., Disp: 90 tablet, Rfl: 2  •  ipratropium (ATROVENT) 0.06 % nasal spray, 2 sprays into each nostril 4 (Four) Times a Day., Disp: 15 mL, Rfl: 2  •  lisinopril (PRINIVIL,ZESTRIL) 20 MG tablet, Take 1 tablet by mouth Daily., Disp: 90 tablet, Rfl: 3  •  metFORMIN (GLUCOPHAGE) 1000 MG tablet, 1 po daily in the am and 1/2 po daily in the pm, Disp: 180 tablet, Rfl: 2  •  metoprolol tartrate (LOPRESSOR) 25 MG tablet, Take 1 tablet by mouth 2 (Two) Times a Day., Disp: 180 tablet, Rfl: 3  •  nitroglycerin (NITROSTAT) 0.4 MG SL tablet, 1 under the tongue as needed for angina, may repeat q5mins for up three doses, Disp: 100 tablet, Rfl: 11  •  predniSONE (DELTASONE) 20 MG tablet, Take 1 tablet by mouth 2 (Two) Times a Day With Meals., Disp: 4 tablet, Rfl: 0  •  SITagliptin (JANUVIA) 100 MG tablet, Take 1 tablet by mouth Daily., Disp: 21 tablet, Rfl: 0  •  SITagliptin (JANUVIA) 100 MG tablet, Take 1 tablet by mouth Daily., Disp: 90 tablet, Rfl: 2  •  clarithromycin (BIAXIN) 500 MG tablet, Take 1 tablet by mouth Every 12 (Twelve) Hours for 10 days., Disp: 20 tablet, Rfl: 0  •  predniSONE (DELTASONE) 10 MG tablet, Daily dose: 5 tabs for 2 days, then 4 tabs for 2 days, then 3 tabs for 2 days, then 2 tabs for 2 days, then 1 tab for 2 days, Disp: 30 tablet, Rfl: 0    Cephalexin    Family History   Problem Relation Age of Onset   • Diabetes Mother    • Colon cancer Mother    • Heart disease Father    • Hypertension Father    • Diabetes Father    • Cancer Father    • Bipolar disorder Sister    • Hypertension Brother    • Colon polyps Brother    • No Known Problems Son    • No Known Problems Maternal Grandmother    • Diabetes Maternal Grandfather    • No Known Problems Paternal Grandmother    • Diabetes Paternal  Grandfather        Social History     Social History   • Marital status:      Spouse name: N/A   • Number of children: N/A   • Years of education: N/A     Occupational History   • Not on file.     Social History Main Topics   • Smoking status: Former Smoker     Types: Cigarettes   • Smokeless tobacco: Never Used      Comment: QUIT 20 YEARS AGO   • Alcohol use No   • Drug use: No   • Sexual activity: Defer     Other Topics Concern   • Not on file     Social History Narrative       Review of Systems   Constitutional: Negative for activity change, appetite change, chills, diaphoresis, fatigue, fever and unexpected weight change.   HENT: Positive for congestion, postnasal drip, sinus pain and sinus pressure. Negative for ear discharge, ear pain, facial swelling, hearing loss, mouth sores, nosebleeds, rhinorrhea, sneezing, sore throat, tinnitus, trouble swallowing and voice change.    Eyes: Negative for pain, discharge, redness, itching and visual disturbance.   Respiratory: Negative for apnea, cough, choking, chest tightness, shortness of breath, wheezing and stridor.    Gastrointestinal: Negative for nausea and vomiting.   Endocrine: Negative for cold intolerance and heat intolerance.   Musculoskeletal: Negative for arthralgias, back pain, gait problem, neck pain and neck stiffness.   Skin: Negative for rash.   Allergic/Immunologic: Negative for environmental allergies and food allergies.   Neurological: Negative for dizziness, tremors, seizures, syncope, facial asymmetry, speech difficulty, weakness, light-headedness, numbness and headaches.   Hematological: Negative for adenopathy. Does not bruise/bleed easily.   Psychiatric/Behavioral: Negative for behavioral problems, sleep disturbance and suicidal ideas. The patient is not nervous/anxious and is not hyperactive.        Vitals:    01/04/18 1623   BP: 122/80   Pulse: 80   Temp: 97 °F (36.1 °C)       Objective     Physical Exam  CONSTITUTIONAL: well nourished,  alert, oriented, in no acute distress     COMMUNICATION AND VOICE: able to communicate normally, normal voice quality    HEAD: normocephalic, no lesions, atraumatic, no tenderness, no masses     FACE: appearance normal, no lesions, no tenderness, no deformities, facial motion symmetric    SALIVARY GLANDS: parotid glands with no tenderness, no swelling, no masses, submandibular glands with normal size, nontender    EYES: ocular motility normal, eyelids normal, orbits normal, no proptosis, conjunctiva normal , pupils equal, round     EARS:  Hearing: response to conversational voice normal bilaterally   External Ears: auricles without lesions  Otoscopic: tympanic membrane appearance normal, no lesions, no perforation, normal mobility, no fluid    NOSE:  External Nose: structure normal, no tenderness on palpation, no nasal discharge, no lesions, no evidence of trauma, nostrils patent   Intranasal Exam: nasal mucosa edema and inflammation, vestibule within normal limits, inferior turbinate normal, nasal septum midline   Nasopharynx:     ORAL:  Lips: upper and lower lips without lesion   Teeth: dentition within normal limits for age   Gums: gingivae healthy   Oral Mucosa: oral mucosa normal, no mucosal lesions   Floor of Mouth: Warthin’s duct patent, mucosa normal  Tongue: lingual mucosa normal without lesions, normal tongue mobility   Palate: soft and hard palates with normal mucosa and structure  Oropharynx: oropharyngeal mucosa erythema with postnasal drainage      NECK: neck appearance normal, no mass,  noted without erythema or tenderness    THYROID: no overt thyromegaly, no tenderness, nodules or mass present on palpation, position midline     LYMPH NODES: no lymphadenopathy    CHEST/RESPIRATORY: respiratory effort normal, normal breath sounds     CARDIOVASCULAR: rate and rhythm normal, extremities without cyanosis or edema      NEUROLOGIC/PSYCHIATRIC: oriented to time, place and person, mood normal, affect  appropriate, CN II-XII intact grossly    Procedure Note    Anesthesia: none    Endoscopy Type: Flexible Laryngoscopy    Indications for Procedure: Patient unable to cooperate - preventing mirror examination    Procedure Details:    The patient was placed in the sitting position.  After topical anesthesia and decongestion, the 4 mm laryngoscope was passed.  The nasal cavities, nasopharynx, oropharynx, hypopharynx, and larynx were all examined.  Vocal cords were examined during respiration and phonation.  The following findings were noted:    Findings: normal arytenoid appearance present, normal true vocal cord appearance present, normal true vocal cord mobility present, no lesions present, no airway obstruction present, omega shaped epiglottis    Condition:  Stable.  Patient tolerated procedure well.    Complications:  None      Assessment/Plan   Problems Addressed this Visit        Respiratory    Acute pansinusitis - Primary    Chronic laryngitis        * Surgery not found *  No orders of the defined types were placed in this encounter.    Return in about 6 weeks (around 2/15/2018) for Recheck throat/sinus.       Patient Instructions   Sinus infection and swelling of the epiglottis, will treat with antibiotic and steroid and recheck in 6 weeks.

## 2018-01-04 NOTE — PATIENT INSTRUCTIONS
Sinus infection and swelling of the epiglottis, will treat with antibiotic and steroid and recheck in 6 weeks.

## 2018-01-05 ENCOUNTER — OFFICE VISIT (OUTPATIENT)
Dept: INTERNAL MEDICINE | Facility: CLINIC | Age: 63
End: 2018-01-05

## 2018-01-05 VITALS
SYSTOLIC BLOOD PRESSURE: 124 MMHG | HEIGHT: 72 IN | WEIGHT: 227.7 LBS | BODY MASS INDEX: 30.84 KG/M2 | DIASTOLIC BLOOD PRESSURE: 76 MMHG | OXYGEN SATURATION: 97 % | RESPIRATION RATE: 16 BRPM | HEART RATE: 86 BPM | TEMPERATURE: 98.5 F

## 2018-01-05 DIAGNOSIS — R06.2 WHEEZING: ICD-10-CM

## 2018-01-05 DIAGNOSIS — I42.9 CARDIOMYOPATHY, UNSPECIFIED TYPE (HCC): ICD-10-CM

## 2018-01-05 DIAGNOSIS — I10 ESSENTIAL HYPERTENSION: ICD-10-CM

## 2018-01-05 DIAGNOSIS — I45.9 INTRAVENTRICULAR CONDUCTION DELAY: ICD-10-CM

## 2018-01-05 DIAGNOSIS — I25.10 ATHEROSCLEROSIS OF NATIVE CORONARY ARTERY OF NATIVE HEART WITHOUT ANGINA PECTORIS: ICD-10-CM

## 2018-01-05 DIAGNOSIS — E66.9 OBESITY (BMI 30-39.9): ICD-10-CM

## 2018-01-05 DIAGNOSIS — B02.9 HERPES ZOSTER WITHOUT COMPLICATION: ICD-10-CM

## 2018-01-05 DIAGNOSIS — Z95.810 AICD (AUTOMATIC CARDIOVERTER/DEFIBRILLATOR) PRESENT: ICD-10-CM

## 2018-01-05 DIAGNOSIS — I50.22 SYSTOLIC HEART FAILURE, CHRONIC (HCC): Primary | ICD-10-CM

## 2018-01-05 DIAGNOSIS — E11.9 TYPE 2 DIABETES MELLITUS WITH HEMOGLOBIN A1C GOAL OF LESS THAN 7.5% (HCC): ICD-10-CM

## 2018-01-05 DIAGNOSIS — E78.2 MIXED HYPERLIPIDEMIA: ICD-10-CM

## 2018-01-05 PROCEDURE — 99204 OFFICE O/P NEW MOD 45 MIN: CPT | Performed by: NURSE PRACTITIONER

## 2018-01-05 RX ORDER — ASPIRIN 81 MG/1
81 TABLET ORAL DAILY
Qty: 90 TABLET | Refills: 2 | Status: SHIPPED | OUTPATIENT
Start: 2018-01-05 | End: 2018-04-13 | Stop reason: SDUPTHER

## 2018-01-05 RX ORDER — ALBUTEROL SULFATE 90 UG/1
2 AEROSOL, METERED RESPIRATORY (INHALATION) EVERY 4 HOURS PRN
Start: 2018-01-05 | End: 2021-07-28 | Stop reason: HOSPADM

## 2018-01-05 RX ORDER — ATORVASTATIN CALCIUM 40 MG/1
40 TABLET, FILM COATED ORAL NIGHTLY
Qty: 30 TABLET | Refills: 5 | Status: SHIPPED | OUTPATIENT
Start: 2018-01-05 | End: 2018-01-29 | Stop reason: SDUPTHER

## 2018-01-05 RX ORDER — LISINOPRIL 20 MG/1
20 TABLET ORAL DAILY
Qty: 90 TABLET | Refills: 3 | Status: SHIPPED | OUTPATIENT
Start: 2018-01-05 | End: 2018-03-01 | Stop reason: SDUPTHER

## 2018-01-05 RX ORDER — ACYCLOVIR 400 MG/1
400 TABLET ORAL DAILY
Qty: 90 TABLET | Refills: 2 | Status: SHIPPED | OUTPATIENT
Start: 2018-01-05 | End: 2018-04-13 | Stop reason: SDUPTHER

## 2018-01-05 NOTE — PROGRESS NOTES
CC: new patient; coronary artery disease, systolic heart failure, T2DM    History:  Joao Fonseca is a 62 y.o. male who presents today for evaluation of the above problems.    Joao presents as new patient today with a health history significant for triple bypass and chest tumor removal in 2015, automatic cardioverter implant placed in 2016, essential hypertension, T2DM.  Most recently, he was admitted to ICU for supraglottitis in mid-November.  He is currently being treated for a sinus infection and swelling of the epiglottis by TESSA Jain. Mr. Fonseca is aware that his most recent Hemoglobin A1C was 7.8 and says that Dr. Alvarez tried multiple oral medication in addition to his metformin and he became very ill on these medications, even losing consciousness, and he is not willing to take any other pill for his blood sugar.  He states that he is attempting to lower his A1C through diet contol, however, he has been on steroids for approximately a month and is aware that this will keep his blood sugar elevated.  Otherwise, he denies chest pain, but does occasionally becomes short of breath, and does sometimes experience wheezing, but he has an albuterol inhaler that he uses if needed.  He has experienced multiple zoster outbreaks, and while having received the zoster vax, he states that if he stops his daily acyclovir, for example in the hospital, that he does begin to have an outbreak and requests to stay on this medication.  He is aware that he needs a diabetic eye exam and plans to schedule this appointment. He declines a flu or pneumonia vaccine today.  He is aware that he needs to lose some weight to be at a goal BMI. He checks his weight approximately every other day and has Lasix on hand to take as needed for edema in relation to his chronic systolic heart failure.    ROS:  Review of Systems   Constitutional: Negative for chills, fatigue, fever and unexpected weight change.   HENT: Positive for postnasal  drip and rhinorrhea. Negative for congestion, sinus pain, sinus pressure, sneezing and sore throat.    Eyes: Negative for visual disturbance.   Respiratory: Positive for cough and shortness of breath. Negative for chest tightness and wheezing.    Cardiovascular: Negative for chest pain and leg swelling.   Gastrointestinal: Negative for abdominal distention, abdominal pain, blood in stool, constipation, diarrhea, nausea and vomiting.   Endocrine: Negative for polyuria.   Genitourinary: Negative for decreased urine volume, dysuria, hematuria and urgency.   Musculoskeletal: Negative for arthralgias and myalgias.   Skin: Negative for rash.   Allergic/Immunologic: Negative for environmental allergies.   Neurological: Negative for dizziness, seizures, light-headedness and headaches.   Psychiatric/Behavioral: Negative for dysphoric mood and sleep disturbance.       Allergies   Allergen Reactions   • Cephalexin Anaphylaxis     Causes swelling of tongue, eyes, throat     Past Medical History:   Diagnosis Date   • Arrhythmia    • Cancer     malignant chest tumor   • Cardiomyopathy 12/27/2016   • CHF (congestive heart failure)    • Coronary artery disease    • Diabetes mellitus    • Disorder of liver 8/12/2013   • GERD (gastroesophageal reflux disease)    • History of adenomatous polyp of colon 06/28/2010    TUBULAR ADENOMA AT 40CM   • Hyperlipidemia    • Hypertension    • Obesity (BMI 30-39.9) 1/5/2018   • Pacemaker    • Shingles      Past Surgical History:   Procedure Laterality Date   • ANKLE SURGERY Right    • CARDIAC CATHETERIZATION     • CARDIAC ELECTROPHYSIOLOGY PROCEDURE Left 10/6/2016    Procedure: ICD DC new;  Surgeon: Zander Andino MD;  Location: [facility] INVASIVE LOCATION;  Service:    • COLONOSCOPY  06/28/2010    biopsy at 41, 2 mm polyp supboptimal prep right clon    • COLONOSCOPY W/ POLYPECTOMY  06/28/2010    POLYP AT 40CM TUBULAR ADENOMA, SUBOPTIMAL PREP   • CORONARY ARTERY BYPASS GRAFT     •  HEMORRHOIDECTOMY  1980   • HERNIA REPAIR     • PACEMAKER IMPLANTATION     • TONSILLECTOMY       Family History   Problem Relation Age of Onset   • Diabetes Mother    • Colon cancer Mother    • Heart disease Mother    • Hypertension Mother    • Stroke Mother    • Osteoporosis Mother    • Cancer Mother    • Heart disease Father    • Hypertension Father    • Cancer Father    • Bipolar disorder Sister    • Hypertension Brother    • Colon polyps Brother    • No Known Problems Son    • Heart disease Maternal Grandmother    • Diabetes Maternal Grandfather    • Heart disease Maternal Grandfather    • Heart disease Paternal Grandmother    • Diabetes Paternal Grandfather    • Heart disease Paternal Grandfather       reports that he has quit smoking. His smoking use included Cigarettes. He quit after 25.00 years of use. He has never used smokeless tobacco. He reports that he does not drink alcohol or use illicit drugs.      Current Outpatient Prescriptions:   •  acyclovir (ZOVIRAX) 400 MG tablet, Take 1 tablet by mouth Daily. Take no more than 5 doses a day., Disp: 90 tablet, Rfl: 2  •  aspirin 81 MG EC tablet, Take 1 tablet by mouth Daily., Disp: 90 tablet, Rfl: 2  •  clarithromycin (BIAXIN) 500 MG tablet, Take 1 tablet by mouth Every 12 (Twelve) Hours for 10 days., Disp: 20 tablet, Rfl: 0  •  furosemide (LASIX) 40 MG tablet, Take 1 tablet by mouth Daily As Needed (edema, SOA)., Disp: 90 tablet, Rfl: 2  •  lisinopril (PRINIVIL,ZESTRIL) 20 MG tablet, Take 1 tablet by mouth Daily., Disp: 90 tablet, Rfl: 3  •  metFORMIN (GLUCOPHAGE) 1000 MG tablet, 1 po daily in the am and 1/2 po daily in the pm, Disp: 180 tablet, Rfl: 2  •  metoprolol tartrate (LOPRESSOR) 25 MG tablet, Take 1 tablet by mouth 2 (Two) Times a Day., Disp: 180 tablet, Rfl: 3  •  predniSONE (DELTASONE) 10 MG tablet, Daily dose: 5 tabs for 2 days, then 4 tabs for 2 days, then 3 tabs for 2 days, then 2 tabs for 2 days, then 1 tab for 2 days, Disp: 30 tablet, Rfl:  "0    OBJECTIVE:  /76 (BP Location: Right arm, Patient Position: Sitting, Cuff Size: Adult)  Pulse 86  Temp 98.5 °F (36.9 °C) (Oral)   Resp 16  Ht 182.9 cm (72\")  Wt 103 kg (227 lb 11.2 oz)  SpO2 97%  BMI 30.88 kg/m2   Physical Exam   Constitutional: He is oriented to person, place, and time. Vital signs are normal. He appears well-developed and well-nourished. He is cooperative. He does not have a sickly appearance.   HENT:   Head: Normocephalic.   Right Ear: Tympanic membrane, external ear and ear canal normal.   Left Ear: Tympanic membrane, external ear and ear canal normal.   Nose: Right sinus exhibits no maxillary sinus tenderness and no frontal sinus tenderness. Left sinus exhibits no maxillary sinus tenderness and no frontal sinus tenderness.   Mouth/Throat: Uvula is midline, oropharynx is clear and moist and mucous membranes are normal. No posterior oropharyngeal edema.   Eyes: Conjunctivae and lids are normal. Right eye exhibits no discharge. Left eye exhibits no discharge.   Cardiovascular: Normal rate, regular rhythm and normal heart sounds.  Exam reveals no gallop and no friction rub.    No murmur heard.  Pulmonary/Chest: Effort normal. No tachypnea. No respiratory distress. He has wheezes (Right middle lobe). He has no rales. He exhibits no tenderness.   Abdominal: Soft. Bowel sounds are normal. He exhibits no distension. There is no tenderness.   Neurological: He is alert and oriented to person, place, and time.   Skin: Skin is warm, dry and intact. No rash noted. He is not diaphoretic. No erythema.   Psychiatric: He has a normal mood and affect. His speech is normal and behavior is normal. Thought content normal.   Vitals reviewed.      Assessment/Plan    Diagnoses and all orders for this visit:    Systolic heart failure, chronic  -     metoprolol tartrate (LOPRESSOR) 25 MG tablet; Take 1 tablet by mouth Every 12 (Twelve) Hours.  Continue therapy with lisinopril, metoprolol, Lasix.  " Continue to monitor weight.      Atherosclerosis of native coronary artery of native heart without angina pectoris  Cardiomyopathy, unspecified type  Intraventricular conduction delay  AICD (automatic cardioverter/defibrillator) present  -     atorvastatin (LIPITOR) 40 MG tablet; Take 1 tablet by mouth Every Night.  -     metoprolol tartrate (LOPRESSOR) 25 MG tablet; Take 1 tablet by mouth Every 12 (Twelve) Hours.  Continue aspirin, lisinopril, metoprolol, atorvastatin.  No changes to therapy at this time.    Essential hypertension  -     lisinopril (PRINIVIL,ZESTRIL) 20 MG tablet; Take 1 tablet by mouth Daily.  -     metoprolol tartrate (LOPRESSOR) 25 MG tablet; Take 1 tablet by mouth Every 12 (Twelve) Hours.  -     aspirin 81 MG EC tablet; Take 1 tablet by mouth Daily.    Mixed hyperlipidemia  -     atorvastatin (LIPITOR) 40 MG tablet; Take 1 tablet by mouth Every Night.    Herpes zoster without complication  -     acyclovir (ZOVIRAX) 400 MG tablet; Take 1 tablet by mouth Daily. Take no more than 5 doses a day.  At this time, I will continue this, however, since he has received the zoster vax, I would like to consider stopping this as a daily medication with him keeping it on hand to take if he feels an outbreak coming on.     Type 2 diabetes mellitus with hemoglobin A1c goal of less than 7.5%  A1C is not at goal with most recent value of 7.8.  He is not willing to try different medications at this point.  Will check A1C at next visit as he plans to attempt more careful diet control.  Will plan to increase metformin to 1000 mg BID if not at goal at this visit.    Wheezing  -     albuterol (PROVENTIL HFA;VENTOLIN HFA) 108 (90 Base) MCG/ACT inhaler; Inhale 2 puffs Every 4 (Four) Hours As Needed for Wheezing or Shortness of Air.    Obesity (BMI 30-39.9)  He is aware that he needs to lose some weight to promote better health and will attempt closer diet control.      An After Visit Summary was printed and given to the  patient at discharge.  Return in about 3 months (around 4/5/2018).         Danisha Tomlinson, APRN 1/5/2018

## 2018-01-29 DIAGNOSIS — E78.2 MIXED HYPERLIPIDEMIA: ICD-10-CM

## 2018-01-29 RX ORDER — ATORVASTATIN CALCIUM 40 MG/1
40 TABLET, FILM COATED ORAL NIGHTLY
Qty: 90 TABLET | Refills: 3 | Status: SHIPPED | OUTPATIENT
Start: 2018-01-29 | End: 2018-03-01 | Stop reason: SINTOL

## 2018-02-08 ENCOUNTER — PREP FOR SURGERY (OUTPATIENT)
Dept: GASTROENTEROLOGY | Facility: CLINIC | Age: 63
End: 2018-02-08

## 2018-02-08 DIAGNOSIS — Z86.010 HISTORY OF COLONIC POLYPS: Primary | ICD-10-CM

## 2018-02-08 DIAGNOSIS — Z86.010 HISTORY OF COLON POLYPS: Primary | ICD-10-CM

## 2018-02-09 PROBLEM — Z86.010 HISTORY OF COLONIC POLYPS: Status: ACTIVE | Noted: 2018-02-09

## 2018-02-15 ENCOUNTER — OFFICE VISIT (OUTPATIENT)
Dept: OTOLARYNGOLOGY | Facility: CLINIC | Age: 63
End: 2018-02-15

## 2018-02-15 VITALS
OXYGEN SATURATION: 98 % | DIASTOLIC BLOOD PRESSURE: 72 MMHG | TEMPERATURE: 97 F | HEART RATE: 81 BPM | WEIGHT: 226 LBS | SYSTOLIC BLOOD PRESSURE: 112 MMHG | BODY MASS INDEX: 30.61 KG/M2 | HEIGHT: 72 IN

## 2018-02-15 DIAGNOSIS — J31.0 OTHER CHRONIC RHINITIS: ICD-10-CM

## 2018-02-15 DIAGNOSIS — J37.0 CHRONIC LARYNGITIS: Primary | ICD-10-CM

## 2018-02-15 DIAGNOSIS — G47.30 SLEEP APNEA, UNSPECIFIED TYPE: ICD-10-CM

## 2018-02-15 DIAGNOSIS — J32.9 CHRONIC SINUSITIS, UNSPECIFIED LOCATION: ICD-10-CM

## 2018-02-15 PROCEDURE — 99213 OFFICE O/P EST LOW 20 MIN: CPT | Performed by: PHYSICIAN ASSISTANT

## 2018-02-15 RX ORDER — FLUTICASONE PROPIONATE 50 MCG
2 SPRAY, SUSPENSION (ML) NASAL AS NEEDED
Status: ON HOLD | COMMUNITY
End: 2018-05-06

## 2018-02-15 NOTE — PROGRESS NOTES
YOB: 1955  Location: [facility]  Location Address: [address] [place] [address], [facility], [address] [place] [address]  Location Phone: [phone]    Chief Complaint   Patient presents with   • Follow-up     sinus       History of Present Illness  Joao Fonseca is a 62 y.o. male.  Joao Fonseca is here for follow up of ENT complaints. The patient has continued problems with nasal drainage, nasal congestion and postnasal drip. The symptoms of globus sensation and sinus pressure have improved.  The symptoms are not localized to a particular location. The patient has had improving symptoms. The symptoms have been present for the last several months. The symptoms are aggravated by  no identifiable factors. The symptoms are improved by antibiotics.Patient would like ears evaluated for cerumen today.      Study Result   CT SOFT TISSUE NECK W CONTRAST- 2017 7:17 PM CST      HISTORY: cervical adenopathy; neck pain; fever; shortness of breath       COMPARISON: None      DOSE LENGTH PRODUCT: 348 mGy cm. Automated exposure control was also  utilized to decrease patient radiation dose.      TECHNIQUE: Helical tomographic images of the neck were obtained after   the intravenous infusion of contrast.      FINDINGS:  The visualized intracranial structures are unremarkable. The visualized  orbits are normal. The sinuses are clear.      There is soft tissue prominence at the level of the false vocal cords in  the posterior aspect of the infraglottic space. The airway is moderately  narrowed. No abscess is identified. The retropharyngeal fat is obscured  The vocal cords are normal in appearance. The epiglottis is normal in  appearance. No significant lymphadenopathy is seen in the neck.      The major vasculature of the neck is patent.      Degenerative changes are seen in the spine. No worrisome bony  abnormalities are noted.      Visualized lung apices are clear.      IMPRESSION:  1. Soft tissue density in the  infraglottic space could be due to a  suppurative, infectious process, but no abscess is identified. A  neoplastic process should also be considered. Direct visualization is  recommended.  2. No cervical lymphadenopathy appreciated.  This report was finalized on 11/18/2017 19:31 by Dr. Jesus Miller MD.              Past Medical History:   Diagnosis Date   • Acute pansinusitis 1/4/2018   • Arrhythmia    • Cancer     malignant chest tumor   • Cardiomyopathy 12/27/2016   • CHF (congestive heart failure)    • Chronic laryngitis 1/4/2018   • Coronary artery disease    • Diabetes mellitus    • Disorder of liver 8/12/2013   • GERD (gastroesophageal reflux disease)    • History of adenomatous polyp of colon 06/28/2010    TUBULAR ADENOMA AT 40CM   • Hyperlipidemia    • Hypertension    • Obesity (BMI 30-39.9) 1/5/2018   • Pacemaker    • Shingles        Past Surgical History:   Procedure Laterality Date   • ANKLE SURGERY Right    • CARDIAC CATHETERIZATION     • CARDIAC ELECTROPHYSIOLOGY PROCEDURE Left 10/6/2016    Procedure: ICD DC new;  Surgeon: Zander Andino MD;  Location:  [facility];  Service:    • COLONOSCOPY  06/28/2010    biopsy at 41, 2 mm polyp supboptimal prep right clon    • COLONOSCOPY W/ POLYPECTOMY  06/28/2010    POLYP AT 40CM TUBULAR ADENOMA, SUBOPTIMAL PREP   • CORONARY ARTERY BYPASS GRAFT     • HEMORRHOIDECTOMY  1980   • HERNIA REPAIR     • PACEMAKER IMPLANTATION     • TONSILLECTOMY         Outpatient Prescriptions Marked as Taking for the 2/15/18 encounter (Office Visit) with Pipe Meeks MD   Medication Sig Dispense Refill   • acyclovir (ZOVIRAX) 400 MG tablet Take 1 tablet by mouth Daily. Take no more than 5 doses a day. 90 tablet 2   • albuterol (PROVENTIL HFA;VENTOLIN HFA) 108 (90 Base) MCG/ACT inhaler Inhale 2 puffs Every 4 (Four) Hours As Needed for Wheezing or Shortness of Air.     • aspirin 81 MG EC tablet Take 1 tablet by mouth Daily. 90 tablet 2   • fluticasone  (FLONASE) 50 MCG/ACT nasal spray 2 sprays into each nostril Daily.     • furosemide (LASIX) 40 MG tablet Take 1 tablet by mouth Daily As Needed (edema, SOA). 90 tablet 2   • lisinopril (PRINIVIL,ZESTRIL) 20 MG tablet Take 1 tablet by mouth Daily. 90 tablet 3   • metFORMIN (GLUCOPHAGE) 1000 MG tablet 1 po daily in the am and 1/2 po daily in the pm 180 tablet 2   • [DISCONTINUED] metoprolol tartrate (LOPRESSOR) 25 MG tablet Take 1 tablet by mouth Every 12 (Twelve) Hours. 180 tablet 3       Cephalexin    Family History   Problem Relation Age of Onset   • Diabetes Mother    • Colon cancer Mother    • Heart disease Mother    • Hypertension Mother    • Stroke Mother    • Osteoporosis Mother    • Cancer Mother    • Heart disease Father    • Hypertension Father    • Cancer Father    • Bipolar disorder Sister    • Hypertension Brother    • Colon polyps Brother    • No Known Problems Son    • Heart disease Maternal Grandmother    • Diabetes Maternal Grandfather    • Heart disease Maternal Grandfather    • Heart disease Paternal Grandmother    • Diabetes Paternal Grandfather    • Heart disease Paternal Grandfather        Social History     Social History   • Marital status:      Spouse name: N/A   • Number of children: N/A   • Years of education: N/A     Occupational History   • Not on file.     Social History Main Topics   • Smoking status: Former Smoker     Years: 25.00     Types: Cigarettes   • Smokeless tobacco: Never Used      Comment: QUIT 20 YEARS AGO   • Alcohol use No   • Drug use: No   • Sexual activity: No     Other Topics Concern   • Not on file     Social History Narrative       Review of Systems   Constitutional: Negative.    HENT: Positive for congestion, postnasal drip and rhinorrhea.    Eyes: Negative.    Respiratory: Negative.    Cardiovascular: Negative.    Gastrointestinal: Negative.    Endocrine: Negative.    Genitourinary: Negative.    Musculoskeletal: Negative.    Skin: Negative.     Allergic/Immunologic: Negative.    Neurological: Negative.    Hematological: Negative.    Psychiatric/Behavioral: Negative.        Vitals:    02/15/18 0936   BP: 112/72   Pulse: 81   Temp: 97 °F (36.1 °C)   SpO2: 98%       Body mass index is 30.65 kg/(m^2).    Objective     Physical Exam  CONSTITUTIONAL: well nourished, alert, oriented, in no acute distress     COMMUNICATION AND VOICE: able to communicate normally, normal voice quality    HEAD: normocephalic, no lesions, atraumatic, no tenderness, no masses     FACE: appearance normal, no lesions, no tenderness, no deformities, facial motion symmetric    SALIVARY GLANDS: parotid glands with no tenderness, no swelling, no masses, submandibular glands with normal size, nontender    EYES: ocular motility normal, eyelids normal, orbits normal, no proptosis, conjunctiva normal , pupils equal, round     EARS:  Hearing: response to conversational voice normal bilaterally   External Ears: auricles without lesions  Otoscopic: tympanic membrane appearance normal, no lesions, no perforation, normal mobility, no fluid    NOSE:  External Nose: structure normal, no tenderness on palpation, no nasal discharge, no lesions, no evidence of trauma, nostrils patent   Intranasal Exam: nasal mucosa normal, vestibule within normal limits, inferior turbinate normal, nasal septum midline   Nasopharynx:     ORAL:  Lips: upper and lower lips without lesion   Teeth: dentition within normal limits for age   Gums: gingivae healthy   Oral Mucosa: oral mucosa normal, no mucosal lesions   Floor of Mouth: Warthin’s duct patent, mucosa normal  Tongue: lingual mucosa normal without lesions, normal tongue mobility   Palate: soft and hard palates with normal mucosa and structure  Oropharynx: oropharyngeal mucosa erythema    NECK: neck appearance normal, no mass,  noted without erythema or tenderness    THYROID: no overt thyromegaly, no tenderness, nodules or mass present on palpation, position midline      LYMPH NODES: no lymphadenopathy    CHEST/RESPIRATORY: respiratory effort normal, normal breath sounds     CARDIOVASCULAR: rate and rhythm normal, extremities without cyanosis or edema      NEUROLOGIC/PSYCHIATRIC: oriented to time, place and person, mood normal, affect appropriate, CN II-XII intact grossly    Assessment/Plan   Problems Addressed this Visit        Respiratory    Chronic laryngitis - Primary    Chronic sinusitis    Chronic rhinitis       Other    Sleep apnea        * Surgery not found *  No orders of the defined types were placed in this encounter.    Return in about 3 months (around 5/15/2018) for Recheck sinus/throat.     I wanted to see the patient and review his films and go over treatment options with him-but the patient left [place] he was not coming back prior to my being able to evaluate him.    Pipe Meeks MD, FACS    Patient Instructions   Advised to restart Flonase daily as directed. Patient had an appointment he had to get to, so he had to leave before Dr. Meeks could scope him.       IF YOU SMOKE OR USE TOBACCO PLEASE READ THE FOLLOWING:    Why is smoking bad for me?  Smoking increases the risk of heart disease, lung disease, vascular disease, stroke, and cancer.     If you smoke, STOP!    If you would like more information on quitting smoking, please visit the [employer] website: [email or web address]   This link will provide additional resources including the QUIT line and the Beat the Pack support groups.     For more information:    Quit Now [place]  1-800-QUIT-NOW  https://[place].quitlogix.org/en-[place]/    MyPlate from [employer]  The general, healthful diet is based on the 2010 Dietary Guidelines for Americans. The amount of food you need to eat from each food group depends on your age, sex, and level of physical activity and can be individualized by a dietitian. Go to ChooseMyPlate.gov for more information.  What do I need to know about  the MyPlate plan?  · Enjoy your food, but eat less.  · Avoid oversized portions.  ¨ ½ of your plate should include fruits and vegetables.  ¨ ¼ of your plate should be grains.  ¨ ¼ of your plate should be protein.  Grains   · Make at least half of your grains whole grains.  · For a 2,000 calorie daily food plan, eat 6 oz every day.  · 1 oz is about 1 slice bread, 1 cup cereal, or ½ cup cooked rice, cereal, or pasta.  Vegetables   · Make half your plate fruits and vegetables.  · For a 2,000 calorie daily food plan, eat 2½ cups every day.  · 1 cup is about 1 cup raw or cooked vegetables or vegetable juice or 2 cups raw leafy greens.  Fruits   · Make half your plate fruits and vegetables.  · For a 2,000 calorie daily food plan, eat 2 cups every day.  · 1 cup is about 1 cup fruit or 100% fruit juice or ½ cup dried fruit.  Protein   · For a 2,000 calorie daily food plan, eat 5½ oz every day.  · 1 oz is about 1 oz meat, poultry, or fish, ¼ cup cooked beans, 1 egg, 1 Tbsp peanut butter, or ½ oz nuts or seeds.  Dairy   · Switch to fat-free or low-fat (1%) milk.  · For a 2,000 calorie daily food plan, eat 3 cups every day.  · 1 cup is about 1 cup milk or yogurt or soy milk (soy beverage), 1½ oz natural cheese, or 2 oz processed cheese.  Fats, Oils, and Empty Calories   · Only small amounts of oils are recommended.  · Empty calories are calories from solid fats or added sugars.  · Compare sodium in foods like soup, bread, and frozen meals. Choose the foods with lower numbers.  · Drink water instead of sugary drinks.  What foods can I eat?  Grains   Whole grains such as whole wheat, quinoa, millet, and bulgur. Bread, rolls, and pasta made from whole grains. Brown or wild rice. Hot or cold cereals made from whole grains and without added sugar.  Vegetables   All fresh vegetables, especially fresh red, dark green, or orange vegetables. Peas and beans. Low-sodium frozen or canned vegetables prepared without added salt. Low-sodium  vegetable juices.  Fruits   All fresh, frozen, and dried fruits. Canned fruit packed in water or fruit juice without added sugar. Fruit juices without added sugar.  Meats and Other Protein Sources   Boiled, baked, or grilled lean meat trimmed of fat. Skinless poultry. Fresh seafood and shellfish. Canned seafood packed in water. Unsalted nuts and unsalted nut butters. Tofu. Dried beans and pea. Eggs.  Dairy   Low-fat or fat-free milk, yogurt, and cheeses.  Sweets and Desserts   Frozen desserts made from low-fat milk.  Fats and Oils   Olive, peanut, and canola oils and margarine. Salad dressing and mayonnaise made from these oils.  Other   Soups and casseroles made from allowed ingredients and without added fat or salt.  The items listed above may not be a complete list of recommended foods or beverages. Contact your dietitian for more options.   What foods are not recommended?  Grains   Sweetened, low-fiber cereals. Packaged baked goods. Snack crackers and chips. Cheese crackers, butter crackers, and biscuits. Frozen waffles, sweet breads, doughnuts, pastries, packaged baking mixes, pancakes, cakes, and cookies.  Vegetables   Regular canned or frozen vegetables or vegetables prepared with salt. Canned tomatoes. Canned tomato sauce. Fried vegetables. Vegetables in cream sauce or cheese sauce.  Fruits   Fruits packed in syrup or made with added sugar.  Meats and Other Protein Sources   Marbled or fatty meats such as ribs. Poultry with skin. Fried meats, poultry, eggs, or fish. Sausages, hot dogs, and deli meats such as pastrami, bologna, or salami.  Dairy   Whole milk, cream, cheeses made from whole milk, sour cream. Ice cream or yogurt made from whole milk or with added sugar.  Beverages   For adults, no more than one alcoholic drink per day. Regular soft drinks or other sugary beverages. Juice drinks.  Sweets and Desserts   Sugary or fatty desserts, candy, and other sweets.  Fats and Oils   Solid shortening or  partially hydrogenated oils. Solid margarine. Margarine that contains trans fats. Butter.  The items listed above may not be a complete list of foods and beverages to avoid. Contact your dietitian for more information.   This information is not intended to replace advice given to you by your health care provider. Make sure you discuss any questions you have with your health care provider.  Document Released: 01/06/2009 Document Revised: 05/25/2017 Document Reviewed: 11/26/2014  [employer] Interactive Patient Education © 2017 [employer]     Calorie Counting for Weight Loss  Calories are units of energy. Your body needs a certain amount of calories from food to keep you going throughout the day. When you eat more calories than your body needs, your body stores the extra calories as fat. When you eat fewer calories than your body needs, your body burns fat to get the energy it needs.  Calorie counting means keeping track of how many calories you eat and drink each day. Calorie counting can be helpful if you need to lose weight. If you make sure to eat fewer calories than your body needs, you should lose weight. Ask your health care provider what a healthy weight is for you.  For calorie counting to work, you will need to eat the right number of calories in a day in order to lose a healthy amount of weight per week. A dietitian can help you determine how many calories you need in a day and will give you suggestions on how to reach your calorie goal.  · A healthy amount of weight to lose per week is usually 1-2 lb (0.5-0.9 kg). This usually means that your daily calorie intake should be reduced by 500-750 calories.  · Eating 1,200 - 1,500 calories per day can help most women lose weight.  · Eating 1,500 - 1,800 calories per day can help most men lose weight.  What is my plan?  My goal is to have __________ calories per day.  If I have this many calories per day, I should lose around __________ pounds per week.  What do  I need to know about calorie counting?  In order to meet your daily calorie goal, you will need to:  · Find out how many calories are in each food you would like to eat. Try to do this before you eat.  · Decide how much of the food you plan to eat.  · Write down what you ate and how many calories it had. Doing this is called keeping a food log.  To successfully lose weight, it is important to balance calorie counting with a healthy lifestyle that includes regular activity. Aim for 150 minutes of moderate exercise (such as walking) or 75 minutes of vigorous exercise (such as running) each week.  Where do I find calorie information?     The number of calories in a food can be found on a Nutrition Facts label. If a food does not have a Nutrition Facts label, try to look up the calories online or ask your dietitian for help.  Remember that calories are listed per serving. If you choose to have more than one serving of a food, you will have to multiply the calories per serving by the amount of servings you plan to eat. For example, the label on a package of bread might say that a serving size is 1 slice and that there are 90 calories in a serving. If you eat 1 slice, you will have eaten 90 calories. If you eat 2 slices, you will have eaten 180 calories.  How do I keep a food log?  Immediately after each meal, record the following information in your food log:  · What you ate. Don't forget to include toppings, sauces, and other extras on the food.  · How much you ate. This can be measured in cups, ounces, or number of items.  · How many calories each food and drink had.  · The total number of calories in the meal.  Keep your food log near you, such as in a small notebook in your pocket, or use a mobile everardo or website. Some programs will calculate calories for you and show you how many calories you have left for the day to meet your goal.  What are some calorie counting tips?  · Use your calories on foods and drinks that  "will fill you up and not leave you hungry:  ¨ Some examples of foods that fill you up are nuts and nut butters, vegetables, lean proteins, and high-fiber foods like whole grains. High-fiber foods are foods with more than 5 g fiber per serving.  ¨ Drinks such as sodas, specialty coffee drinks, alcohol, and juices have a lot of calories, yet do not fill you up.  · Eat nutritious foods and avoid empty calories. Empty calories are calories you get from foods or beverages that do not have many vitamins or protein, such as candy, sweets, and soda. It is better to have a nutritious high-calorie food (such as an avocado) than a food with few nutrients (such as a bag of chips).  · Know how many calories are in the foods you eat most often. This will help you calculate calorie counts faster.  · Pay attention to calories in drinks. Low-calorie drinks include water and unsweetened drinks.  · Pay attention to nutrition labels for \"low fat\" or \"fat free\" foods. These foods sometimes have the same amount of calories or more calories than the full fat versions. They also often have added sugar, starch, or salt, to make up for flavor that was removed with the fat.  · Find a way of tracking calories that works for you. Get creative. Try different apps or programs if writing down calories does not work for you.  What are some portion control tips?  · Know how many calories are in a serving. This will help you know how many servings of a certain food you can have.  · Use a measuring cup to measure serving sizes. You could also try weighing out portions on a kitchen scale. With time, you will be able to estimate serving sizes for some foods.  · Take some time to put servings of different foods on your favorite plates, bowls, and cups so you know what a serving looks like.  · Try not to eat straight from a bag or box. Doing this can lead to overeating. Put the amount you would like to eat in a cup or on a plate to make sure you are " eating the right portion.  · Use smaller plates, glasses, and bowls to prevent overeating.  · Try not to multitask (for example, watch TV or use your computer) while eating. If it is time to eat, sit down at a table and enjoy your food. This will help you to know when you are full. It will also help you to be aware of what you are eating and how much you are eating.  What are tips for following this plan?  Reading food labels   · Check the calorie count compared to the serving size. The serving size may be smaller than what you are used to eating.  · Check the source of the calories. Make sure the food you are eating is high in vitamins and protein and low in saturated and trans fats.  Shopping   · Read nutrition labels while you shop. This will help you make healthy decisions before you decide to purchase your food.  · Make a grocery list and stick to it.  Cooking   · Try to cook your favorite foods in a healthier way. For example, try baking instead of frying.  · Use low-fat dairy products.  Meal planning   · Use more fruits and vegetables. Half of your plate should be fruits and vegetables.  · Include lean proteins like poultry and fish.  How do I count calories when eating out?  · Ask for smaller portion sizes.  · Consider sharing an entree and sides instead of getting your own entree.  · If you get your own entree, eat only half. Ask for a box at the beginning of your meal and put the rest of your entree in it so you are not tempted to eat it.  · If calories are listed on the menu, choose the lower calorie options.  · Choose dishes that include vegetables, fruits, whole grains, low-fat dairy products, and lean protein.  · Choose items that are boiled, broiled, grilled, or steamed. Stay away from items that are buttered, battered, fried, or served with cream sauce. Items labeled “crispy” are usually fried, unless stated otherwise.  · Choose water, low-fat milk, unsweetened iced tea, or other drinks without added  sugar. If you want an alcoholic beverage, choose a lower calorie option such as a glass of wine or light beer.  · Ask for dressings, sauces, and syrups on the side. These are usually high in calories, so you should limit the amount you eat.  · If you want a salad, choose a garden salad and ask for grilled meats. Avoid extra toppings like joshi, cheese, or fried items. Ask for the dressing on the side, or ask for olive oil and vinegar or lemon to use as dressing.  · Estimate how many servings of a food you are given. For example, a serving of cooked rice is ½ cup or about the size of half a baseball. Knowing serving sizes will help you be aware of how much food you are eating at restaurants. The list below tells you how big or small some common portion sizes are based on everyday objects:  ¨ 1 oz--4 stacked dice.  ¨ 3 oz--1 deck of cards.  ¨ 1 tsp--1 die.  ¨ 1 Tbsp--½ a ping-pong ball.  ¨ 2 Tbsp--1 ping-pong ball.  ¨ ½ cup--½ baseball.  ¨ 1 cup--1 baseball.  Summary  · Calorie counting means keeping track of how many calories you eat and drink each day. If you eat fewer calories than your body needs, you should lose weight.  · A healthy amount of weight to lose per week is usually 1-2 lb (0.5-0.9 kg). This usually means reducing your daily calorie intake by 500-750 calories.  · The number of calories in a food can be found on a Nutrition Facts label. If a food does not have a Nutrition Facts label, try to look up the calories online or ask your dietitian for help.  · Use your calories on foods and drinks that will fill you up, and not on foods and drinks that will leave you hungry.  · Use smaller plates, glasses, and bowls to prevent overeating.  This information is not intended to replace advice given to you by your health care provider. Make sure you discuss any questions you have with your health care provider.  Document Released: 12/18/2006 Document Revised: 11/17/2017 Document Reviewed: 11/17/2017  [employer]  Interactive Patient Education © 2017 [employer]     Exercising to Lose Weight  Exercising can help you to lose weight. In order to lose weight through exercise, you need to do vigorous-intensity exercise. You can tell that you are exercising with vigorous intensity if you are breathing very hard and fast and cannot hold a conversation while exercising.  Moderate-intensity exercise helps to maintain your current weight. You can tell that you are exercising at a moderate level if you have a higher heart rate and faster breathing, but you are still able to hold a conversation.  How often should I exercise?  Choose an activity that you enjoy and set realistic goals. Your health care provider can help you to make an activity plan that works for you. Exercise regularly as directed by your health care provider. This may include:  · Doing resistance training twice each week, such as:  ¨ Push-ups.  ¨ Sit-ups.  ¨ Lifting weights.  ¨ Using resistance bands.  · Doing a given intensity of exercise for a given amount of time. Choose from these options:  ¨ 150 minutes of moderate-intensity exercise every week.  ¨ 75 minutes of vigorous-intensity exercise every week.  ¨ A mix of moderate-intensity and vigorous-intensity exercise every week.  Children, pregnant women, people who are out of shape, people who are overweight, and older adults may need to consult a health care provider for individual recommendations. If you have any sort of medical condition, be sure to consult your health care provider before starting a new exercise program.  What are some activities that can help me to lose weight?  · Walking at a rate of at least 4.5 miles an hour.  · Jogging or running at a rate of 5 miles per hour.  · Biking at a rate of at least 10 miles per hour.  · Lap swimming.  · Roller-skating or in-line skating.  · Cross-country skiing.  · Vigorous competitive sports, such as football, basketball, and soccer.  · Jumping rope.  · Aerobic  dancing.  How can I be more active in my day-to-day activities?  · Use the stairs instead of the elevator.  · Take a walk during your lunch break.  · If you drive, park your car farther away from work or school.  · If you take public transportation, get off one stop early and walk the rest of the way.  · Make all of your phone calls while standing up and walking around.  · Get up, stretch, and walk around every 30 minutes throughout the day.  What guidelines should I follow while exercising?  · Do not exercise so much that you hurt yourself, feel dizzy, or get very short of breath.  · Consult your health care provider prior to starting a new exercise program.  · Wear comfortable clothes and shoes with good support.  · Drink plenty of water while you exercise to prevent dehydration or heat stroke. Body water is lost during exercise and must be replaced.  · Work out until you breathe faster and your heart beats faster.  This information is not intended to replace advice given to you by your health care provider. Make sure you discuss any questions you have with your health care provider.  Document Released: 01/20/2012 Document Revised: 05/25/2017 Document Reviewed: 05/21/2015  [employer] Interactive Patient Education © 2017 [employer]

## 2018-02-15 NOTE — PATIENT INSTRUCTIONS
Advised to restart Flonase daily as directed. Patient had an appointment he had to get to, so he had to leave before Dr. Meeks could scope him.       IF YOU SMOKE OR USE TOBACCO PLEASE READ THE FOLLOWING:    Why is smoking bad for me?  Smoking increases the risk of heart disease, lung disease, vascular disease, stroke, and cancer.     If you smoke, STOP!    If you would like more information on quitting smoking, please visit the [employer] website: www.[employer]/Jiaheate/healthier-together/smoke   This link will provide additional resources including the QUIT line and the Beat the Pack support groups.     For more information:    Quit Now [place]  1-800-QUIT-NOW  https://[facility].[employer]org/en-[place]/    MyPlate from [employer]  The general, healthful diet is based on the 2010 Dietary Guidelines for Americans. The amount of food you need to eat from each food group depends on your age, sex, and level of physical activity and can be individualized by a dietitian. Go to ChooseMyPlate.gov for more information.  What do I need to know about the MyPlate plan?  · Enjoy your food, but eat less.  · Avoid oversized portions.  ¨ ½ of your plate should include fruits and vegetables.  ¨ ¼ of your plate should be grains.  ¨ ¼ of your plate should be protein.  Grains   · Make at least half of your grains whole grains.  · For a 2,000 calorie daily food plan, eat 6 oz every day.  · 1 oz is about 1 slice bread, 1 cup cereal, or ½ cup cooked rice, cereal, or pasta.  Vegetables   · Make half your plate fruits and vegetables.  · For a 2,000 calorie daily food plan, eat 2½ cups every day.  · 1 cup is about 1 cup raw or cooked vegetables or vegetable juice or 2 cups raw leafy greens.  Fruits   · Make half your plate fruits and vegetables.  · For a 2,000 calorie daily food plan, eat 2 cups every day.  · 1 cup is about 1 cup fruit or 100% fruit juice or ½ cup dried fruit.  Protein   · For a 2,000 calorie daily food plan, eat  5½ oz every day.  · 1 oz is about 1 oz meat, poultry, or fish, ¼ cup cooked beans, 1 egg, 1 Tbsp peanut butter, or ½ oz nuts or seeds.  Dairy   · Switch to fat-free or low-fat (1%) milk.  · For a 2,000 calorie daily food plan, eat 3 cups every day.  · 1 cup is about 1 cup milk or yogurt or soy milk (soy beverage), 1½ oz natural cheese, or 2 oz processed cheese.  Fats, Oils, and Empty Calories   · Only small amounts of oils are recommended.  · Empty calories are calories from solid fats or added sugars.  · Compare sodium in foods like soup, bread, and frozen meals. Choose the foods with lower numbers.  · Drink water instead of sugary drinks.  What foods can I eat?  Grains   Whole grains such as whole wheat, quinoa, millet, and bulgur. Bread, rolls, and pasta made from whole grains. Brown or wild rice. Hot or cold cereals made from whole grains and without added sugar.  Vegetables   All fresh vegetables, especially fresh red, dark green, or orange vegetables. Peas and beans. Low-sodium frozen or canned vegetables prepared without added salt. Low-sodium vegetable juices.  Fruits   All fresh, frozen, and dried fruits. Canned fruit packed in water or fruit juice without added sugar. Fruit juices without added sugar.  Meats and Other Protein Sources   Boiled, baked, or grilled lean meat trimmed of fat. Skinless poultry. Fresh seafood and shellfish. Canned seafood packed in water. Unsalted nuts and unsalted nut butters. Tofu. Dried beans and pea. Eggs.  Dairy   Low-fat or fat-free milk, yogurt, and cheeses.  Sweets and Desserts   Frozen desserts made from low-fat milk.  Fats and Oils   Olive, peanut, and canola oils and margarine. Salad dressing and mayonnaise made from these oils.  Other   Soups and casseroles made from allowed ingredients and without added fat or salt.  The items listed above may not be a complete list of recommended foods or beverages. Contact your dietitian for more options.   What foods are not  recommended?  Grains   Sweetened, low-fiber cereals. Packaged baked goods. Snack crackers and chips. Cheese crackers, butter crackers, and biscuits. Frozen waffles, sweet breads, doughnuts, pastries, packaged baking mixes, pancakes, cakes, and cookies.  Vegetables   Regular canned or frozen vegetables or vegetables prepared with salt. Canned tomatoes. Canned tomato sauce. Fried vegetables. Vegetables in cream sauce or cheese sauce.  Fruits   Fruits packed in syrup or made with added sugar.  Meats and Other Protein Sources   Marbled or fatty meats such as ribs. Poultry with skin. Fried meats, poultry, eggs, or fish. Sausages, hot dogs, and deli meats such as pastrami, bologna, or salami.  Dairy   Whole milk, cream, cheeses made from whole milk, sour cream. Ice cream or yogurt made from whole milk or with added sugar.  Beverages   For adults, no more than one alcoholic drink per day. Regular soft drinks or other sugary beverages. Juice drinks.  Sweets and Desserts   Sugary or fatty desserts, candy, and other sweets.  Fats and Oils   Solid shortening or partially hydrogenated oils. Solid margarine. Margarine that contains trans fats. Butter.  The items listed above may not be a complete list of foods and beverages to avoid. Contact your dietitian for more information.   This information is not intended to replace advice given to you by your health care provider. Make sure you discuss any questions you have with your health care provider.  Document Released: 01/06/2009 Document Revised: 05/25/2017 Document Reviewed: 11/26/2014  [employer] Interactive Patient Education © 2017 [employer]     Calorie Counting for Weight Loss  Calories are units of energy. Your body needs a certain amount of calories from food to keep you going throughout the day. When you eat more calories than your body needs, your body stores the extra calories as fat. When you eat fewer calories than your body needs, your body burns fat to get the  energy it needs.  Calorie counting means keeping track of how many calories you eat and drink each day. Calorie counting can be helpful if you need to lose weight. If you make sure to eat fewer calories than your body needs, you should lose weight. Ask your health care provider what a healthy weight is for you.  For calorie counting to work, you will need to eat the right number of calories in a day in order to lose a healthy amount of weight per week. A dietitian can help you determine how many calories you need in a day and will give you suggestions on how to reach your calorie goal.  · A healthy amount of weight to lose per week is usually 1-2 lb (0.5-0.9 kg). This usually means that your daily calorie intake should be reduced by 500-750 calories.  · Eating 1,200 - 1,500 calories per day can help most women lose weight.  · Eating 1,500 - 1,800 calories per day can help most men lose weight.  What is my plan?  My goal is to have __________ calories per day.  If I have this many calories per day, I should lose around __________ pounds per week.  What do I need to know about calorie counting?  In order to meet your daily calorie goal, you will need to:  · Find out how many calories are in each food you would like to eat. Try to do this before you eat.  · Decide how much of the food you plan to eat.  · Write down what you ate and how many calories it had. Doing this is called keeping a food log.  To successfully lose weight, it is important to balance calorie counting with a healthy lifestyle that includes regular activity. Aim for 150 minutes of moderate exercise (such as walking) or 75 minutes of vigorous exercise (such as running) each week.  Where do I find calorie information?     The number of calories in a food can be found on a Nutrition Facts label. If a food does not have a Nutrition Facts label, try to look up the calories online or ask your dietitian for help.  Remember that calories are listed per  serving. If you choose to have more than one serving of a food, you will have to multiply the calories per serving by the amount of servings you plan to eat. For example, the label on a package of bread might say that a serving size is 1 slice and that there are 90 calories in a serving. If you eat 1 slice, you will have eaten 90 calories. If you eat 2 slices, you will have eaten 180 calories.  How do I keep a food log?  Immediately after each meal, record the following information in your food log:  · What you ate. Don't forget to include toppings, sauces, and other extras on the food.  · How much you ate. This can be measured in cups, ounces, or number of items.  · How many calories each food and drink had.  · The total number of calories in the meal.  Keep your food log near you, such as in a small notebook in your pocket, or use a mobile everardo or website. Some programs will calculate calories for you and show you how many calories you have left for the day to meet your goal.  What are some calorie counting tips?  · Use your calories on foods and drinks that will fill you up and not leave you hungry:  ¨ Some examples of foods that fill you up are nuts and nut butters, vegetables, lean proteins, and high-fiber foods like whole grains. High-fiber foods are foods with more than 5 g fiber per serving.  ¨ Drinks such as sodas, specialty coffee drinks, alcohol, and juices have a lot of calories, yet do not fill you up.  · Eat nutritious foods and avoid empty calories. Empty calories are calories you get from foods or beverages that do not have many vitamins or protein, such as candy, sweets, and soda. It is better to have a nutritious high-calorie food (such as an avocado) than a food with few nutrients (such as a bag of chips).  · Know how many calories are in the foods you eat most often. This will help you calculate calorie counts faster.  · Pay attention to calories in drinks. Low-calorie drinks include water and  "unsweetened drinks.  · Pay attention to nutrition labels for \"low fat\" or \"fat free\" foods. These foods sometimes have the same amount of calories or more calories than the full fat versions. They also often have added sugar, starch, or salt, to make up for flavor that was removed with the fat.  · Find a way of tracking calories that works for you. Get creative. Try different apps or programs if writing down calories does not work for you.  What are some portion control tips?  · Know how many calories are in a serving. This will help you know how many servings of a certain food you can have.  · Use a measuring cup to measure serving sizes. You could also try weighing out portions on a kitchen scale. With time, you will be able to estimate serving sizes for some foods.  · Take some time to put servings of different foods on your favorite plates, bowls, and cups so you know what a serving looks like.  · Try not to eat straight from a bag or box. Doing this can lead to overeating. Put the amount you would like to eat in a cup or on a plate to make sure you are eating the right portion.  · Use smaller plates, glasses, and bowls to prevent overeating.  · Try not to multitask (for example, watch TV or use your computer) while eating. If it is time to eat, sit down at a table and enjoy your food. This will help you to know when you are full. It will also help you to be aware of what you are eating and how much you are eating.  What are tips for following this plan?  Reading food labels   · Check the calorie count compared to the serving size. The serving size may be smaller than what you are used to eating.  · Check the source of the calories. Make sure the food you are eating is high in vitamins and protein and low in saturated and trans fats.  Shopping   · Read nutrition labels while you shop. This will help you make healthy decisions before you decide to purchase your food.  · Make a grocery list and stick to " it.  Cooking   · Try to cook your favorite foods in a healthier way. For example, try baking instead of frying.  · Use low-fat dairy products.  Meal planning   · Use more fruits and vegetables. Half of your plate should be fruits and vegetables.  · Include lean proteins like poultry and fish.  How do I count calories when eating out?  · Ask for smaller portion sizes.  · Consider sharing an entree and sides instead of getting your own entree.  · If you get your own entree, eat only half. Ask for a box at the beginning of your meal and put the rest of your entree in it so you are not tempted to eat it.  · If calories are listed on the menu, choose the lower calorie options.  · Choose dishes that include vegetables, fruits, whole grains, low-fat dairy products, and lean protein.  · Choose items that are boiled, broiled, grilled, or steamed. Stay away from items that are buttered, battered, fried, or served with cream sauce. Items labeled “crispy” are usually fried, unless stated otherwise.  · Choose water, low-fat milk, unsweetened iced tea, or other drinks without added sugar. If you want an alcoholic beverage, choose a lower calorie option such as a glass of wine or light beer.  · Ask for dressings, sauces, and syrups on the side. These are usually high in calories, so you should limit the amount you eat.  · If you want a salad, choose a garden salad and ask for grilled meats. Avoid extra toppings like joshi, cheese, or fried items. Ask for the dressing on the side, or ask for olive oil and vinegar or lemon to use as dressing.  · Estimate how many servings of a food you are given. For example, a serving of cooked rice is ½ cup or about the size of half a baseball. Knowing serving sizes will help you be aware of how much food you are eating at restaurants. The list below tells you how big or small some common portion sizes are based on everyday objects:  ¨ 1 oz--4 stacked dice.  ¨ 3 oz--1 deck of cards.  ¨ 1 tsp--1  die.  ¨ 1 Tbsp--½ a ping-pong ball.  ¨ 2 Tbsp--1 ping-pong ball.  ¨ ½ cup--½ baseball.  ¨ 1 cup--1 baseball.  Summary  · Calorie counting means keeping track of how many calories you eat and drink each day. If you eat fewer calories than your body needs, you should lose weight.  · A healthy amount of weight to lose per week is usually 1-2 lb (0.5-0.9 kg). This usually means reducing your daily calorie intake by 500-750 calories.  · The number of calories in a food can be found on a Nutrition Facts label. If a food does not have a Nutrition Facts label, try to look up the calories online or ask your dietitian for help.  · Use your calories on foods and drinks that will fill you up, and not on foods and drinks that will leave you hungry.  · Use smaller plates, glasses, and bowls to prevent overeating.  This information is not intended to replace advice given to you by your health care provider. Make sure you discuss any questions you have with your health care provider.  Document Released: 12/18/2006 Document Revised: 11/17/2017 Document Reviewed: 11/17/2017  [employer] Interactive Patient Education © 2017 [employer]     Exercising to Lose Weight  Exercising can help you to lose weight. In order to lose weight through exercise, you need to do vigorous-intensity exercise. You can tell that you are exercising with vigorous intensity if you are breathing very hard and fast and cannot hold a conversation while exercising.  Moderate-intensity exercise helps to maintain your current weight. You can tell that you are exercising at a moderate level if you have a higher heart rate and faster breathing, but you are still able to hold a conversation.  How often should I exercise?  Choose an activity that you enjoy and set realistic goals. Your health care provider can help you to make an activity plan that works for you. Exercise regularly as directed by your health care provider. This may include:  · Doing resistance training  twice each week, such as:  ¨ Push-ups.  ¨ Sit-ups.  ¨ Lifting weights.  ¨ Using resistance bands.  · Doing a given intensity of exercise for a given amount of time. Choose from these options:  ¨ 150 minutes of moderate-intensity exercise every week.  ¨ 75 minutes of vigorous-intensity exercise every week.  ¨ A mix of moderate-intensity and vigorous-intensity exercise every week.  Children, pregnant women, people who are out of shape, people who are overweight, and older adults may need to consult a health care provider for individual recommendations. If you have any sort of medical condition, be sure to consult your health care provider before starting a new exercise program.  What are some activities that can help me to lose weight?  · Walking at a rate of at least 4.5 miles an hour.  · Jogging or running at a rate of 5 miles per hour.  · Biking at a rate of at least 10 miles per hour.  · Lap swimming.  · Roller-skating or in-line skating.  · Cross-country skiing.  · Vigorous competitive sports, such as football, basketball, and soccer.  · Jumping rope.  · Aerobic dancing.  How can I be more active in my day-to-day activities?  · Use the stairs instead of the elevator.  · Take a walk during your lunch break.  · If you drive, park your car farther away from work or school.  · If you take public transportation, get off one stop early and walk the rest of the way.  · Make all of your phone calls while standing up and walking around.  · Get up, stretch, and walk around every 30 minutes throughout the day.  What guidelines should I follow while exercising?  · Do not exercise so much that you hurt yourself, feel dizzy, or get very short of breath.  · Consult your health care provider prior to starting a new exercise program.  · Wear comfortable clothes and shoes with good support.  · Drink plenty of water while you exercise to prevent dehydration or heat stroke. Body water is lost during exercise and must be  replaced.  · Work out until you breathe faster and your heart beats faster.  This information is not intended to replace advice given to you by your health care provider. Make sure you discuss any questions you have with your health care provider.  Document Released: 01/20/2012 Document Revised: 05/25/2017 Document Reviewed: 05/21/2015  [employer] Interactive Patient Education © 2017 [employer]

## 2018-02-21 PROBLEM — E11.3553: Status: ACTIVE | Noted: 2017-01-09

## 2018-03-01 ENCOUNTER — OFFICE VISIT (OUTPATIENT)
Dept: CARDIOLOGY | Facility: CLINIC | Age: 63
End: 2018-03-01

## 2018-03-01 ENCOUNTER — LAB (OUTPATIENT)
Dept: LAB | Facility: HOSPITAL | Age: 63
End: 2018-03-01
Attending: INTERNAL MEDICINE

## 2018-03-01 VITALS
SYSTOLIC BLOOD PRESSURE: 110 MMHG | HEART RATE: 76 BPM | OXYGEN SATURATION: 99 % | HEIGHT: 72 IN | DIASTOLIC BLOOD PRESSURE: 72 MMHG | WEIGHT: 234 LBS | BODY MASS INDEX: 31.69 KG/M2

## 2018-03-01 DIAGNOSIS — I10 ESSENTIAL HYPERTENSION: ICD-10-CM

## 2018-03-01 DIAGNOSIS — I50.22 SYSTOLIC HEART FAILURE, CHRONIC (HCC): ICD-10-CM

## 2018-03-01 DIAGNOSIS — I50.22 SYSTOLIC HEART FAILURE, CHRONIC (HCC): Primary | ICD-10-CM

## 2018-03-01 DIAGNOSIS — E78.2 MIXED HYPERLIPIDEMIA: ICD-10-CM

## 2018-03-01 DIAGNOSIS — I25.10 ATHEROSCLEROSIS OF NATIVE CORONARY ARTERY OF NATIVE HEART WITHOUT ANGINA PECTORIS: ICD-10-CM

## 2018-03-01 DIAGNOSIS — E11.9 TYPE 2 DIABETES MELLITUS WITH HEMOGLOBIN A1C GOAL OF LESS THAN 7.5% (HCC): ICD-10-CM

## 2018-03-01 PROBLEM — I50.23 ACUTE ON CHRONIC SYSTOLIC HEART FAILURE (HCC): Status: RESOLVED | Noted: 2017-11-20 | Resolved: 2018-03-01

## 2018-03-01 LAB
ANION GAP SERPL CALCULATED.3IONS-SCNC: 12 MMOL/L (ref 4–13)
BUN BLD-MCNC: 24 MG/DL (ref 5–21)
BUN/CREAT SERPL: 22.2 (ref 7–25)
CALCIUM SPEC-SCNC: 9.2 MG/DL (ref 8.4–10.4)
CHLORIDE SERPL-SCNC: 102 MMOL/L (ref 98–110)
CO2 SERPL-SCNC: 27 MMOL/L (ref 24–31)
CREAT BLD-MCNC: 1.08 MG/DL (ref 0.5–1.4)
GFR SERPL CREATININE-BSD FRML MDRD: 70 ML/MIN/1.73
GLUCOSE BLD-MCNC: 164 MG/DL (ref 70–100)
POTASSIUM BLD-SCNC: 4.4 MMOL/L (ref 3.5–5.3)
SODIUM BLD-SCNC: 141 MMOL/L (ref 135–145)

## 2018-03-01 PROCEDURE — 93000 ELECTROCARDIOGRAM COMPLETE: CPT | Performed by: INTERNAL MEDICINE

## 2018-03-01 PROCEDURE — 36415 COLL VENOUS BLD VENIPUNCTURE: CPT

## 2018-03-01 PROCEDURE — 99214 OFFICE O/P EST MOD 30 MIN: CPT | Performed by: INTERNAL MEDICINE

## 2018-03-01 PROCEDURE — 80048 BASIC METABOLIC PNL TOTAL CA: CPT | Performed by: INTERNAL MEDICINE

## 2018-03-01 RX ORDER — LISINOPRIL 20 MG/1
20 TABLET ORAL DAILY
Qty: 90 TABLET | Refills: 3 | Status: SHIPPED | OUTPATIENT
Start: 2018-03-01 | End: 2018-03-15

## 2018-03-01 RX ORDER — FUROSEMIDE 40 MG/1
40 TABLET ORAL DAILY PRN
Qty: 90 TABLET | Refills: 3 | Status: SHIPPED | OUTPATIENT
Start: 2018-03-01 | End: 2018-04-13 | Stop reason: SDUPTHER

## 2018-03-01 NOTE — PROGRESS NOTES
Subjective:     Encounter Date:03/01/2018      Patient ID: Joao Fonseca is a 62 y.o. male with coronary artery disease, chronic systolic heart failure, ICD implant place per Dr. Andino, hypertension who presents today for routine follow-up    Chief Complaint: Routine follow-up    Congestive Heart Failure   Presents for follow-up visit. Associated symptoms include edema, orthopnea, paroxysmal nocturnal dyspnea and unexpected weight change. Pertinent negatives include no abdominal pain, chest pain, chest pressure, claudication, muscle weakness, palpitations or shortness of breath. The symptoms have been worsening. His past medical history is significant for CAD. Compliance with total regimen is %. Compliance with diet is 51-75%. Compliance with exercise is %. Compliance with medications is %.   Coronary Artery Disease   Presents for follow-up visit. Symptoms include leg swelling and weight gain. Pertinent negatives include no chest pain, chest pressure, chest tightness, dizziness, muscle weakness, palpitations or shortness of breath. His past medical history is significant for CHF. The symptoms have been stable. Compliance with diet is variable. Compliance with exercise is variable. Compliance with medications is good.        The patient presents today for follow-up.  He has a history of chronic systolic congestive heart failure.  He has an ICD in place.  He notes that after his hospital stay in November, he has had slight increase in weight and has noticed lower extremity edema as well as having to get up at night due to shortness of breath.  This is been intermittent but he has noticed it more often lately.  He notes that he has not been very compliant with sodium and fluid restriction.  He has been compliant with all his medications.  He denies any chest pain, lightheadedness, dizziness, syncope.  Blood pressure has been doing well.  He is without any further complaints today.      Current  Outpatient Prescriptions:   •  acyclovir (ZOVIRAX) 400 MG tablet, Take 1 tablet by mouth Daily. Take no more than 5 doses a day., Disp: 90 tablet, Rfl: 2  •  albuterol (PROVENTIL HFA;VENTOLIN HFA) 108 (90 Base) MCG/ACT inhaler, Inhale 2 puffs Every 4 (Four) Hours As Needed for Wheezing or Shortness of Air., Disp: , Rfl:   •  aspirin 81 MG EC tablet, Take 1 tablet by mouth Daily., Disp: 90 tablet, Rfl: 2  •  atorvastatin (LIPITOR) 40 MG tablet, Take 1 tablet by mouth Every Night., Disp: 90 tablet, Rfl: 3  •  fluticasone (FLONASE) 50 MCG/ACT nasal spray, 2 sprays into each nostril Daily., Disp: , Rfl:   •  furosemide (LASIX) 40 MG tablet, Take 1 tablet by mouth Daily As Needed (edema, SOA)., Disp: 90 tablet, Rfl: 2  •  lisinopril (PRINIVIL,ZESTRIL) 20 MG tablet, Take 1 tablet by mouth Daily., Disp: 90 tablet, Rfl: 3  •  metFORMIN (GLUCOPHAGE) 1000 MG tablet, 1 po daily in the am and 1/2 po daily in the pm, Disp: 180 tablet, Rfl: 2  •  predniSONE (DELTASONE) 10 MG tablet, Daily dose: 5 tabs for 2 days, then 4 tabs for 2 days, then 3 tabs for 2 days, then 2 tabs for 2 days, then 1 tab for 2 days, Disp: 30 tablet, Rfl: 0    Allergies   Allergen Reactions   • Cephalexin Anaphylaxis     Causes swelling of tongue, eyes, throat     Social History   Substance Use Topics   • Smoking status: Former Smoker     Years: 25.00     Types: Cigarettes   • Smokeless tobacco: Never Used      Comment: QUIT 20 YEARS AGO   • Alcohol use No     Review of Systems   Constitution: Positive for unexpected weight change and weight gain. Negative for chills, fever, weakness, night sweats and weight loss.   HENT: Negative for congestion and sore throat.    Cardiovascular: Positive for leg swelling and paroxysmal nocturnal dyspnea. Negative for chest pain, claudication, dyspnea on exertion, irregular heartbeat, palpitations and syncope.   Respiratory: Negative for chest tightness, cough, hemoptysis, shortness of breath and wheezing.    Endocrine:  Negative for cold intolerance, heat intolerance, polydipsia and polyuria.   Hematologic/Lymphatic: Negative for bleeding problem. Does not bruise/bleed easily.   Musculoskeletal: Negative for muscle weakness.   Gastrointestinal: Negative for abdominal pain, hematemesis, hematochezia, melena, nausea and vomiting.   Genitourinary: Negative for bladder incontinence, dysuria and hematuria.   Neurological: Negative for dizziness, headaches, loss of balance, numbness, paresthesias and seizures.       ECG 12 Lead  Date/Time: 3/1/2018 11:04 AM  Performed by: JAISON GONZALEZ  Authorized by: JAISON GONZALEZ   Comparison: compared with previous ECG from 11/18/2017  Similar to previous ECG  Rhythm: sinus rhythm  Rate: normal  BPM: 75  Conduction: non-specific intraventricular conduction delay  QRS axis: left  Other findings: LAE  Clinical impression: abnormal ECG             Objective:     Physical Exam   Constitutional: He is oriented to person, place, and time. He appears well-developed and well-nourished. No distress.   HENT:   Head: Normocephalic and atraumatic.   Mouth/Throat: Oropharynx is clear and moist.   Eyes: EOM are normal. Pupils are equal, round, and reactive to light.   Neck: Normal range of motion. Neck supple. No JVD present. No thyromegaly present.   Cardiovascular: Normal rate, regular rhythm, S1 normal, S2 normal, normal heart sounds and intact distal pulses.  Exam reveals no gallop and no friction rub.    No murmur heard.  Pulmonary/Chest: Effort normal and breath sounds normal.   Abdominal: Soft. Bowel sounds are normal. He exhibits no distension. There is no tenderness.   Musculoskeletal: Normal range of motion. He exhibits no edema.   Neurological: He is alert and oriented to person, place, and time. No cranial nerve deficit.   Skin: Skin is warm and dry. No rash noted. No cyanosis or erythema. Nails show no clubbing.   Psychiatric: He has a normal mood and affect.   Vitals reviewed.    BP  "110/72 (BP Location: Left arm, Patient Position: Sitting)  Pulse 76  Ht 182.9 cm (72\")  Wt 106 kg (234 lb)  SpO2 99%  BMI 31.74 kg/m2        Assessment:          Diagnosis Plan   1. Systolic heart failure, chronic  Basic Metabolic Panel    ECG 12 Lead   2. Atherosclerosis of native coronary artery of native heart without angina pectoris     3. Essential hypertension     4. Mixed hyperlipidemia            Plan:       1.  Chronic systolic congestive heart failure: The patient has gained some weight and describes symptoms of orthopnea and PND and has some lower extremity edema.  His lungs are clear on examination today.  He says that he has not been compliant with his sodium and fluid restriction.  He also at this time takes diuretics as needed.  I will check a BMP today and ask him to start Lasix 40 mg daily over the next week and check a BMP in one week.  He says that he will have his primary care physician check his BMP.  In addition, we will have him follow-up with our [facility] in 2 weeks to reevaluate his symptoms.  In the meantime, continue current medications.  It may be worth considering switching this patient from lisinopril to Entresto at some point in the near future.    2.  Coronary artery disease: The patient is clinically stable with no ischemic symptoms.  He remains on aspirin, statin, beta blocker and ACE inhibitor therapies.  In addition to this patient's medical therapy for his coronary artery disease, he does have type II diabetes mellitus on metformin.  It could also be worth considering changing this patient to Jardiance the near future.    3.  Essential hypertension: The patient's blood pressure remains well-controlled.  Continue current medications    4.  Mixed hyperlipidemia: The patient's lipids are followed by his primary care physician.  He remains on Lipitor and is tolerating this well.    Follow-up: 2 weeks in the [facility] clinic.     "

## 2018-03-15 ENCOUNTER — OFFICE VISIT (OUTPATIENT)
Dept: CARDIOLOGY | Facility: CLINIC | Age: 63
End: 2018-03-15

## 2018-03-15 VITALS
SYSTOLIC BLOOD PRESSURE: 108 MMHG | DIASTOLIC BLOOD PRESSURE: 68 MMHG | BODY MASS INDEX: 30.88 KG/M2 | HEIGHT: 72 IN | WEIGHT: 228 LBS | OXYGEN SATURATION: 98 % | HEART RATE: 89 BPM

## 2018-03-15 DIAGNOSIS — G47.30 SLEEP APNEA, UNSPECIFIED TYPE: ICD-10-CM

## 2018-03-15 DIAGNOSIS — Z95.810 AICD (AUTOMATIC CARDIOVERTER/DEFIBRILLATOR) PRESENT: ICD-10-CM

## 2018-03-15 DIAGNOSIS — I10 ESSENTIAL HYPERTENSION: ICD-10-CM

## 2018-03-15 DIAGNOSIS — I50.22 NYHA CLASS 2 AND ACA/AHA STAGE C CHRONIC SYSTOLIC CONGESTIVE HEART FAILURE: Primary | ICD-10-CM

## 2018-03-15 DIAGNOSIS — I25.10 ATHEROSCLEROSIS OF NATIVE CORONARY ARTERY OF NATIVE HEART WITHOUT ANGINA PECTORIS: ICD-10-CM

## 2018-03-15 DIAGNOSIS — E11.3553 CONTROLLED TYPE 2 DIABETES MELLITUS WITH STABLE PROLIFERATIVE RETINOPATHY OF BOTH EYES, WITHOUT LONG-TERM CURRENT USE OF INSULIN (HCC): ICD-10-CM

## 2018-03-15 DIAGNOSIS — Z95.1 S/P CABG X 3: ICD-10-CM

## 2018-03-15 DIAGNOSIS — E66.9 OBESITY (BMI 30-39.9): ICD-10-CM

## 2018-03-15 DIAGNOSIS — E78.2 MIXED HYPERLIPIDEMIA: ICD-10-CM

## 2018-03-15 PROCEDURE — 99214 OFFICE O/P EST MOD 30 MIN: CPT | Performed by: NURSE PRACTITIONER

## 2018-03-15 NOTE — PATIENT INSTRUCTIONS
Obesity, Adult  Obesity is the condition of having too much total body fat. Being overweight or obese means that your weight is greater than what is considered healthy for your body size. Obesity is determined by a measurement called BMI. BMI is an estimate of body fat and is calculated from height and weight. For adults, a BMI of 30 or higher is considered obese.  Obesity can eventually lead to other health concerns and major illnesses, including:  · Stroke.  · Coronary artery disease (CAD).  · Type 2 diabetes.  · Some types of cancer, including cancers of the colon, breast, uterus, and gallbladder.  · Osteoarthritis.  · High blood pressure (hypertension).  · High cholesterol.  · Sleep apnea.  · Gallbladder stones.  · Infertility problems.  What are the causes?  The main cause of obesity is taking in (consuming) more calories than your body uses for energy. Other factors that contribute to this condition may include:  · Being born with genes that make you more likely to become obese.  · Having a medical condition that causes obesity. These conditions include:  ¨ Hypothyroidism.  ¨ Polycystic ovarian syndrome (PCOS).  ¨ Binge-eating disorder.  ¨ Cushing syndrome.  · Taking certain medicines, such as steroids, antidepressants, and seizure medicines.  · Not being physically active (sedentary lifestyle).  · Living where there are limited places to exercise safely or buy healthy foods.  · Not getting enough sleep.  What increases the risk?  The following factors may increase your risk of this condition:  · Having a family history of obesity.  · Being a woman of -American descent.  · Being a man of  descent.  What are the signs or symptoms?  Having excessive body fat is the main symptom of this condition.  How is this diagnosed?  This condition may be diagnosed based on:  · Your symptoms.  · Your medical history.  · A physical exam. Your health care provider may measure:  ¨ Your BMI. If you are an adult  with a BMI between 25 and less than 30, you are considered overweight. If you are an adult with a BMI of 30 or higher, you are considered obese.  ¨ The distances around your hips and your waist (circumferences). These may be compared to each other to help diagnose your condition.  ¨ Your skinfold thickness. Your health care provider may gently pinch a fold of your skin and measure it.  How is this treated?  Treatment for this condition often includes changing your lifestyle. Treatment may include some or all of the following:  · Dietary changes. Work with your health care provider and a dietitian to set a weight-loss goal that is healthy and reasonable for you. Dietary changes may include eating:  ¨ Smaller portions. A portion size is the amount of a particular food that is healthy for you to eat at one time. This varies from person to person.  ¨ Low-calorie or low-fat options.  ¨ More whole grains, fruits, and vegetables.  · Regular physical activity. This may include aerobic activity (cardio) and strength training.  · Medicine to help you lose weight. Your health care provider may prescribe medicine if you are unable to lose 1 pound a week after 6 weeks of eating more healthily and doing more physical activity.  · Surgery. Surgical options may include gastric banding and gastric bypass. Surgery may be done if:  ¨ Other treatments have not helped to improve your condition.  ¨ You have a BMI of 40 or higher.  ¨ You have life-threatening health problems related to obesity.  Follow these instructions at home:     Eating and drinking     · Follow recommendations from your health care provider about what you eat and drink. Your health care provider may advise you to:  ¨ Limit fast foods, sweets, and processed snack foods.  ¨ Choose low-fat options, such as low-fat milk instead of whole milk.  ¨ Eat 5 or more servings of fruits or vegetables every day.  ¨ Eat at home more often. This gives you more control over what you  eat.  ¨ Choose healthy foods when you eat out.  ¨ Learn what a healthy portion size is.  ¨ Keep low-fat snacks on hand.  ¨ Avoid sugary drinks, such as soda, fruit juice, iced tea sweetened with sugar, and flavored milk.  ¨ Eat a healthy breakfast.  · Drink enough water to keep your urine clear or pale yellow.  · Do not go without eating for long periods of time (do not fast) or follow a fad diet. Fasting and fad diets can be unhealthy and even dangerous.  Physical Activity   · Exercise regularly, as told by your health care provider. Ask your health care provider what types of exercise are safe for you and how often you should exercise.  · Warm up and stretch before being active.  · Cool down and stretch after being active.  · Rest between periods of activity.  Lifestyle   · Limit the time that you spend in front of your TV, computer, or video game system.  · Find ways to reward yourself that do not involve food.  · Limit alcohol intake to no more than 1 drink a day for nonpregnant women and 2 drinks a day for men. One drink equals 12 oz of beer, 5 oz of wine, or 1½ oz of hard liquor.  General instructions   · Keep a weight loss journal to keep track of the food you eat and how much you exercise you get.  · Take over-the-counter and prescription medicines only as told by your health care provider.  · Take vitamins and supplements only as told by your health care provider.  · Consider joining a support group. Your health care provider may be able to recommend a support group.  · Keep all follow-up visits as told by your health care provider. This is important.  Contact a health care provider if:  · You are unable to meet your weight loss goal after 6 weeks of dietary and lifestyle changes.  This information is not intended to replace advice given to you by your health care provider. Make sure you discuss any questions you have with your health care provider.  Document Released: 01/25/2006 Document Revised:  "05/22/2017 Document Reviewed: 10/05/2016  [employer] Interactive Patient Education © 2017 [employer].    Heart-Healthy Eating Plan  Heart-healthy meal planning includes:  · Limiting unhealthy fats.  · Increasing healthy fats.  · Making other small dietary changes.  You may need to talk with your doctor or a diet specialist (dietitian) to create an eating plan that is right for you.  What types of fat should I choose?  · Choose healthy fats. These include olive oil and canola oil, flaxseeds, walnuts, almonds, and seeds.  · Eat more omega-3 fats. These include salmon, mackerel, sardines, tuna, flaxseed oil, and ground flaxseeds. Try to eat fish at least twice each week.  · Limit saturated fats.  ¨ Saturated fats are often found in animal products, such as meats, butter, and cream.  ¨ Plant sources of saturated fats include palm oil, palm kernel oil, and coconut oil.  · Avoid foods with partially hydrogenated oils in them. These include stick margarine, some tub margarines, cookies, crackers, and other baked goods. These contain trans fats.  What general guidelines do I need to follow?  · Check food labels carefully. Identify foods with trans fats or high amounts of saturated fat.  · Fill one half of your plate with vegetables and green salads. Eat 4-5 servings of vegetables per day. A serving of vegetables is:  ¨ 1 cup of raw leafy vegetables.  ¨ ½ cup of raw or cooked cut-up vegetables.  ¨ ½ cup of vegetable juice.  · Fill one fourth of your plate with whole grains. Look for the word \"whole\" as the first word in the ingredient list.  · Fill one fourth of your plate with lean protein foods.  · Eat 4-5 servings of fruit per day. A serving of fruit is:  ¨ One medium whole fruit.  ¨ ¼ cup of dried fruit.  ¨ ½ cup of fresh, frozen, or canned fruit.  ¨ ½ cup of 100% fruit juice.  · Eat more foods that contain soluble fiber. These include apples, broccoli, carrots, beans, peas, and barley. Try to get 20-30 g of fiber per " day.  · Eat more home-cooked food. Eat less restaurant, buffet, and fast food.  · Limit or avoid alcohol.  · Limit foods high in starch and sugar.  · Avoid fried foods.  · Avoid frying your food. Try baking, boiling, grilling, or broiling it instead. You can also reduce fat by:  ¨ Removing the skin from poultry.  ¨ Removing all visible fats from meats.  ¨ Skimming the fat off of stews, soups, and gravies before serving them.  ¨ Steaming vegetables in water or broth.  · Lose weight if you are overweight.  · Eat 4-5 servings of nuts, legumes, and seeds per week:  ¨ One serving of dried beans or legumes equals ½ cup after being cooked.  ¨ One serving of nuts equals 1½ ounces.  ¨ One serving of seeds equals ½ ounce or one tablespoon.  · You may need to keep track of how much salt or sodium you eat. This is especially true if you have high blood pressure. Talk with your doctor or dietitian to get more information.  What foods can I eat?  Grains   Breads, including South African, white, esteban, wheat, raisin, rye, oatmeal, and Italian. Tortillas that are neither fried nor made with lard or trans fat. Low-fat rolls, including hotdog and hamburger buns and English muffins. Biscuits. Muffins. Waffles. Pancakes. Light popcorn. Whole-grain cereals. Flatbread. Ashley toast. Pretzels. Breadsticks. Rusks. Low-fat snacks. Low-fat crackers, including oyster, saltine, matzo, bob, animal, and rye. Rice and pasta, including brown rice and pastas that are made with whole wheat.  Vegetables   All vegetables.  Fruits   All fruits, but limit coconut.  Meats and Other Protein Sources   Lean, well-trimmed beef, veal, pork, and lamb. Chicken and turkey without skin. All fish and shellfish. Wild duck, rabbit, pheasant, and venison. Egg whites or low-cholesterol egg substitutes. Dried beans, peas, lentils, and tofu. Seeds and most nuts.  Dairy   Low-fat or nonfat cheeses, including ricotta, string, and mozzarella. Skim or 1% milk that is liquid,  powdered, or evaporated. Buttermilk that is made with low-fat milk. Nonfat or low-fat yogurt.  Beverages   Mineral water. Diet carbonated beverages.  Sweets and Desserts   Sherbets and fruit ices. Honey, jam, marmalade, jelly, and syrups. Meringues and gelatins. Pure sugar candy, such as hard candy, jelly beans, gumdrops, mints, marshmallows, and small amounts of dark chocolate. Ryan food cake.  Eat all sweets and desserts in moderation.  Fats and Oils   Nonhydrogenated (trans-free) margarines. Vegetable oils, including soybean, sesame, sunflower, olive, peanut, safflower, corn, canola, and cottonseed. Salad dressings or mayonnaise made with a vegetable oil. Limit added fats and oils that you use for cooking, baking, salads, and as spreads.  Other   Cocoa powder. Coffee and tea. All seasonings and condiments.  The items listed above may not be a complete list of recommended foods or beverages. Contact your dietitian for more options.   What foods are not recommended?  Grains   Breads that are made with saturated or trans fats, oils, or whole milk. Croissants. Butter rolls. Cheese breads. Sweet rolls. Donuts. Buttered popcorn. Chow mein noodles. High-fat crackers, such as cheese or butter crackers.  Meats and Other Protein Sources   Fatty meats, such as hotdogs, short ribs, sausage, spareribs, joshi, rib eye roast or steak, and mutton. High-fat deli meats, such as salami and bologna. Caviar. Domestic duck and goose. Organ meats, such as kidney, liver, sweetbreads, and heart.  Dairy   Cream, sour cream, cream cheese, and creamed cottage cheese. Whole-milk cheeses, including blue (hernan), [place], Brie, Todd, American, Havarti, Swiss, cheddar, Camembert, and [place]. Whole or 2% milk that is liquid, evaporated, or condensed. Whole buttermilk. Cream sauce or high-fat cheese sauce. Yogurt that is made from whole milk.  Beverages   Regular sodas and juice drinks with added sugar.  Sweets and Desserts   Frosting.  Pudding. Cookies. Cakes other than hannah food cake. Candy that has milk chocolate or white chocolate, hydrogenated fat, butter, coconut, or unknown ingredients. Buttered syrups. Full-fat ice cream or ice cream drinks.  Fats and Oils   Gravy that has suet, meat fat, or shortening. Cocoa butter, hydrogenated oils, palm oil, coconut oil, palm kernel oil. These can often be found in baked products, candy, fried foods, nondairy creamers, and whipped toppings. Solid fats and shortenings, including joshi fat, salt pork, lard, and butter. Nondairy cream substitutes, such as coffee creamers and sour cream substitutes. Salad dressings that are made of unknown oils, cheese, or sour cream.  The items listed above may not be a complete list of foods and beverages to avoid. Contact your dietitian for more information.   This information is not intended to replace advice given to you by your health care provider. Make sure you discuss any questions you have with your health care provider.  Document Released: 06/18/2013 Document Revised: 05/25/2017 Document Reviewed: 06/11/2015  [employer] Interactive Patient Education © 2017 [employer]    Exercising to Lose Weight  Exercising can help you to lose weight. In order to lose weight through exercise, you need to do vigorous-intensity exercise. You can tell that you are exercising with vigorous intensity if you are breathing very hard and fast and cannot hold a conversation while exercising.  Moderate-intensity exercise helps to maintain your current weight. You can tell that you are exercising at a moderate level if you have a higher heart rate and faster breathing, but you are still able to hold a conversation.  How often should I exercise?  Choose an activity that you enjoy and set realistic goals. Your health care provider can help you to make an activity plan that works for you. Exercise regularly as directed by your health care provider. This may include:  · Doing resistance  training twice each week, such as:  ¨ Push-ups.  ¨ Sit-ups.  ¨ Lifting weights.  ¨ Using resistance bands.  · Doing a given intensity of exercise for a given amount of time. Choose from these options:  ¨ 150 minutes of moderate-intensity exercise every week.  ¨ 75 minutes of vigorous-intensity exercise every week.  ¨ A mix of moderate-intensity and vigorous-intensity exercise every week.  Children, pregnant women, people who are out of shape, people who are overweight, and older adults may need to consult a health care provider for individual recommendations. If you have any sort of medical condition, be sure to consult your health care provider before starting a new exercise program.  What are some activities that can help me to lose weight?  · Walking at a rate of at least 4.5 miles an hour.  · Jogging or running at a rate of 5 miles per hour.  · Biking at a rate of at least 10 miles per hour.  · Lap swimming.  · Roller-skating or in-line skating.  · Cross-country skiing.  · Vigorous competitive sports, such as football, basketball, and soccer.  · Jumping rope.  · Aerobic dancing.  How can I be more active in my day-to-day activities?  · Use the stairs instead of the elevator.  · Take a walk during your lunch break.  · If you drive, park your car farther away from work or school.  · If you take public transportation, get off one stop early and walk the rest of the way.  · Make all of your phone calls while standing up and walking around.  · Get up, stretch, and walk around every 30 minutes throughout the day.  What guidelines should I follow while exercising?  · Do not exercise so much that you hurt yourself, feel dizzy, or get very short of breath.  · Consult your health care provider prior to starting a new exercise program.  · Wear comfortable clothes and shoes with good support.  · Drink plenty of water while you exercise to prevent dehydration or heat stroke. Body water is lost during exercise and must be  replaced.  · Work out until you breathe faster and your heart beats faster.  This information is not intended to replace advice given to you by your health care provider. Make sure you discuss any questions you have with your health care provider.  Document Released: 01/20/2012 Document Revised: 05/25/2017 Document Reviewed: 05/21/2015  [employer] Interactive Patient Education © 2017 [employer]  Sacubitril; Valsartan oral tablet  What is this medicine?  SACUBITRIL; VALSARTAN (sak UE bi tril; chapis MARGARET tan) is a combination of 2 drugs used to reduce the risk of death and hospitalizations in people with long-lasting heart failure. It is usually used with other medicines to treat heart failure.  This medicine may be used for other purposes; ask your health care provider or pharmacist if you have questions.  COMMON BRAND NAME(S): Entresto  What should I tell my health care provider before I take this medicine?  They need to know if you have any of these conditions:  -diabetes and take a medicine that contains aliskiren  -kidney disease  -liver disease  -an unusual or allergic reaction to sacubitril; valsartan, drugs called angiotensin converting enzyme (ACE) inhibitors, angiotensin II receptor blockers (ARBs), other medicines, foods, dyes, or preservatives  -pregnant or trying to get pregnant  -breast-feeding  How should I use this medicine?  Take this medicine by mouth with a glass of water. Follow the directions on the prescription label. You can take it with or without food. If it upsets your stomach, take it with food. Take your medicine at regular intervals. Do not take it more often than directed. Do not stop taking except on your doctor's advice.  Do not take this medicine for at least 36 hours before or after you take an ACE inhibitor medicine. Talk to your health care provider if you are not sure if you take an ACE inhibitor.  Talk to your pediatrician regarding the use of this medicine in children. Special care  may be needed.  Overdosage: If you think you have taken too much of this medicine contact a poison control center or emergency room at once.  NOTE: This medicine is only for you. Do not share this medicine with others.  What if I miss a dose?  If you miss a dose, take it as soon as you can. If it is almost time for next dose, take only that dose. Do not take double or extra doses.  What may interact with this medicine?  Do not take this medicine with any of the following medicines:  -aliskiren if you have diabetes  -angiotensin-converting enzyme (ACE) inhibitors, like benazepril, captopril, enalapril, fosinopril, lisinopril, or ramipril  This medicine may also interact with the following medicines:  -angiotensin II receptor blockers (ARBs) like azilsartan, candesartan, eprosartan, irbesartan, losartan, olmesartan, telmisartan, or valsartan  -lithium  -NSAIDS, medicines for pain and inflammation, like ibuprofen or naproxen  -potassium-sparing diuretics like amiloride, spironolactone, and triamterene  -potassium supplements  This list may not describe all possible interactions. Give your health care provider a list of all the medicines, herbs, non-prescription drugs, or dietary supplements you use. Also tell them if you smoke, drink alcohol, or use illegal drugs. Some items may interact with your medicine.  What should I watch for while using this medicine?  Tell your doctor or healthcare professional if your symptoms do not start to get better or if they get worse.  Do not become pregnant while taking this medicine. Women should inform their doctor if they wish to become pregnant or think they might be pregnant. There is a potential for serious side effects to an unborn child. Talk to your health care professional or pharmacist for more information.  You may get dizzy. Do not drive, use machinery, or do anything that needs mental alertness until you know how this medicine affects you. Do not stand or sit up quickly,  especially if you are an older patient. This reduces the risk of dizzy or fainting spells. Avoid alcoholic drinks; they can make you more dizzy.  What side effects may I notice from receiving this medicine?  Side effects that you should report to your doctor or health care professional as soon as possible:  -allergic reactions like skin rash, itching or hives, swelling of the face, lips, or tongue  -signs and symptoms of increased potassium like muscle weakness; chest pain; or fast, irregular heartbeat  -signs and symptoms of kidney injury like trouble passing urine or change in the amount of urine  -signs and symptoms of low blood pressure like feeling dizzy or lightheaded, or if you develop extreme fatigue  Side effects that usually do not require medical attention (report to your doctor or health care professional if they continue or are bothersome):  -cough  This list may not describe all possible side effects. Call your doctor for medical advice about side effects. You may report side effects to FDA at 4-469-BUY-9067.  Where should I keep my medicine?  Keep out of the reach of children.  Store at room temperature between 15 and 30 degrees C (59 and 86 degrees F). Throw away any unused medicine after the expiration date.  NOTE: This sheet is a summary. It may not cover all possible information. If you have questions about this medicine, talk to your doctor, pharmacist, or health care provider.  © 2018 [employer] (2017-02-01 13:54:19)    Heart Failure  Heart failure means your heart has trouble pumping blood. This makes it hard for your body to work well. Heart failure is usually a long-term (chronic) condition. You must take good care of yourself and follow your doctor's treatment plan.  Follow these instructions at home:  · Take your heart medicine as told by your doctor.  ¨ Do not stop taking medicine unless your doctor tells you to.  ¨ Do not skip any dose of medicine.  ¨ Refill your medicines  before they run out.  ¨ Take other medicines only as told by your doctor or pharmacist.  · Stay active if told by your doctor. The elderly and people with severe heart failure should talk with a doctor about physical activity.  · Eat heart-healthy foods. Choose foods that are without trans fat and are low in saturated fat, cholesterol, and salt (sodium). This includes fresh or frozen fruits and vegetables, fish, lean meats, fat-free or low-fat dairy foods, whole grains, and high-fiber foods. Lentils and dried peas and beans (legumes) are also good choices.  · Limit salt if told by your doctor.  · Cook in a healthy way. Roast, grill, broil, bake, poach, steam, or stir-amor foods.  · Limit fluids as told by your doctor.  · Weigh yourself every morning. Do this after you pee (urinate) and before you eat breakfast. Write down your weight to give to your doctor.  · Take your blood pressure and write it down if your doctor tells you to.  · Ask your doctor how to check your pulse. Check your pulse as told.  · Lose weight if told by your doctor.  · Stop smoking or chewing tobacco. Do not use gum or patches that help you quit without your doctor's approval.  · Schedule and go to doctor visits as told.  · Nonpregnant women should have no more than 1 drink a day. Men should have no more than 2 drinks a day. Talk to your doctor about drinking alcohol.  · Stop illegal drug use.  · Stay current with shots (immunizations).  · Manage your health conditions as told by your doctor.  · Learn to manage your stress.  · Rest when you are tired.  · If it is really hot outside:  ¨ Avoid intense activities.  ¨ Use air conditioning or fans, or get in a cooler place.  ¨ Avoid caffeine and alcohol.  ¨ Wear loose-fitting, lightweight, and light-colored clothing.  · If it is really cold outside:  ¨ Avoid intense activities.  ¨ Layer your clothing.  ¨ Wear mittens or gloves, a hat, and a scarf when going outside.  ¨ Avoid alcohol.  · Learn about  heart failure and get support as needed.  · Get help to maintain or improve your quality of life and your ability to care for yourself as needed.  Contact a doctor if:  · You gain weight quickly.  · You are more short of breath than usual.  · You cannot do your normal activities.  · You tire easily.  · You cough more than normal, especially with activity.  · You have any or more puffiness (swelling) in areas such as your hands, feet, ankles, or belly (abdomen).  · You cannot sleep because it is hard to breathe.  · You feel like your heart is beating fast (palpitations).  · You get dizzy or light-headed when you stand up.  Get help right away if:  · You have trouble breathing.  · There is a change in mental status, such as becoming less alert or not being able to focus.  · You have chest pain or discomfort.  · You faint.  This information is not intended to replace advice given to you by your health care provider. Make sure you discuss any questions you have with your health care provider.  Document Released: 09/26/2009 Document Revised: 05/25/2017 Document Reviewed: 02/03/2014  [employer] Interactive Patient Education © 2017 [employer]

## 2018-03-15 NOTE — PROGRESS NOTES
Subjective:     Encounter Date:03/15/2018      Patient ID: Joao Fonseca is a 62 y.o. male. He presents today for two week follow of NYHA class II chronic systolic congestive heart failure. He has a history of coronary artery disease s/p coronary artery bypass grafting X3, automatic internal cardiac defibrillator, hypertension and hyperlipidemia. He continues to report dyspnea with moderate exertion but states that his dyspnea has improved since last visit when his lasix was increased for one week. He reports intermittent bilateral lower extremity edema. He denies chest pain, palpitations, dizziness, syncope, orthopnea or PND. He states that overall he has felt much better since his last office visit.                              Chief Complaint:  Congestive Heart Failure   Presents for follow-up visit. Associated symptoms include edema. Pertinent negatives include no abdominal pain, chest pain, chest pressure, claudication, fatigue, muscle weakness, near-syncope, nocturia, orthopnea, palpitations, paroxysmal nocturnal dyspnea, shortness of breath or unexpected weight change. The symptoms have been stable. His past medical history is significant for CAD.   Coronary Artery Disease   Presents for follow-up visit. Symptoms include leg swelling. Pertinent negatives include no chest pain, chest pressure, chest tightness, dizziness, muscle weakness, palpitations, shortness of breath or weight gain. Risk factors include hyperlipidemia and obesity. His past medical history is significant for CHF. The symptoms have been stable. Compliance with diet is variable. Compliance with exercise is variable. Compliance with medications is good.   Hypertension   This is a chronic problem. The current episode started more than 1 year ago. The problem is controlled. Pertinent negatives include no anxiety, blurred vision, chest pain, headaches, malaise/fatigue, neck pain, orthopnea, palpitations, peripheral edema, PND, shortness of  breath or sweats. Risk factors for coronary artery disease include diabetes mellitus, dyslipidemia, male gender and obesity. Past treatments include ACE inhibitors, beta blockers and diuretics. The current treatment provides significant improvement. Hypertensive end-organ damage includes CAD/MI. Current antihypertension treatment includes ACE inhibitors, beta blockers and diuretics.   Hyperlipidemia   This is a chronic problem. The current episode started more than 1 year ago. The problem is controlled. Exacerbating diseases include diabetes and obesity. Pertinent negatives include no chest pain or shortness of breath. Current antihyperlipidemic treatment includes statins. The current treatment provides significant improvement of lipids.       The following portions of the patient's history were reviewed and updated as appropriate: allergies, current medications, past family history, past medical history, past social history, past surgical history and problem list.     Allergies   Allergen Reactions   • Cephalexin Anaphylaxis     Causes swelling of tongue, eyes, throat       Current Outpatient Prescriptions:   •  acyclovir (ZOVIRAX) 400 MG tablet, Take 1 tablet by mouth Daily. Take no more than 5 doses a day., Disp: 90 tablet, Rfl: 2  •  albuterol (PROVENTIL HFA;VENTOLIN HFA) 108 (90 Base) MCG/ACT inhaler, Inhale 2 puffs Every 4 (Four) Hours As Needed for Wheezing or Shortness of Air., Disp: , Rfl:   •  aspirin 81 MG EC tablet, Take 1 tablet by mouth Daily., Disp: 90 tablet, Rfl: 2  •  fluticasone (FLONASE) 50 MCG/ACT nasal spray, 2 sprays into each nostril As Needed., Disp: , Rfl:   •  furosemide (LASIX) 40 MG tablet, Take 1 tablet by mouth Daily As Needed (edema, SOA)., Disp: 90 tablet, Rfl: 3  •  metFORMIN (GLUCOPHAGE) 1000 MG tablet, 1 po daily in the am and 1/2 po daily in the pm (Patient taking differently: Daily With Breakfast. 1 po daily in the am and 1/2 po daily in the pm), Disp: 135 tablet, Rfl: 3  •   metoprolol tartrate (LOPRESSOR) 25 MG tablet, Take 25 mg by mouth 2 (Two) Times a Day., Disp: , Rfl:   •  sacubitril-valsartan (ENTRESTO) 24-26 MG tablet, Take 1 tablet by mouth Every 12 (Twelve) Hours., Disp: 60 tablet, Rfl: 0  Past Medical History:   Diagnosis Date   • Acute pansinusitis 1/4/2018   • Arrhythmia    • Cancer     malignant chest tumor   • Cardiomyopathy 12/27/2016   • CHF (congestive heart failure)    • Chronic laryngitis 1/4/2018   • Coronary artery disease    • Diabetes mellitus    • Disorder of liver 8/12/2013   • GERD (gastroesophageal reflux disease)    • History of adenomatous polyp of colon 06/28/2010    TUBULAR ADENOMA AT 40CM   • Hyperlipidemia    • Hypertension    • Obesity (BMI 30-39.9) 1/5/2018   • Pacemaker    • Shingles          Review of Systems   Constitution: Negative for chills, decreased appetite, fatigue, fever, weakness, malaise/fatigue, night sweats, unexpected weight change, weight gain and weight loss.   HENT: Negative for nosebleeds.    Eyes: Negative for blurred vision and visual disturbance.   Cardiovascular: Positive for dyspnea on exertion and leg swelling. Negative for chest pain, claudication, near-syncope, orthopnea, palpitations, paroxysmal nocturnal dyspnea and syncope.   Respiratory: Negative for chest tightness, cough, hemoptysis, shortness of breath, snoring and wheezing.    Endocrine: Negative for cold intolerance and heat intolerance.   Hematologic/Lymphatic: Does not bruise/bleed easily.   Skin: Negative for rash.   Musculoskeletal: Negative for back pain, falls, muscle weakness and neck pain.   Gastrointestinal: Negative for abdominal pain, change in bowel habit, constipation, diarrhea, dysphagia, heartburn, nausea and vomiting.   Genitourinary: Negative for hematuria and nocturia.   Neurological: Negative for dizziness, headaches and light-headedness.   Psychiatric/Behavioral: Negative for altered mental status.   Allergic/Immunologic: Negative for persistent  "infections.       Procedures  /68   Pulse 89   Ht 182.9 cm (72\")   Wt 103 kg (228 lb)   SpO2 98%   BMI 30.92 kg/m²        Objective:     Physical Exam   Constitutional: He is oriented to person, place, and time. Vital signs are normal. He appears well-developed and well-nourished. No distress.   HENT:   Head: Normocephalic and atraumatic.   Right Ear: External ear normal.   Left Ear: External ear normal.   Eyes: Conjunctivae are normal. Pupils are equal, round, and reactive to light. Right eye exhibits no discharge. Left eye exhibits no discharge.   Neck: Normal range of motion. Neck supple. No JVD present. Carotid bruit is not present. No thyromegaly present.   Cardiovascular: Normal rate, regular rhythm, normal heart sounds and intact distal pulses.  PMI is not displaced.  Exam reveals no gallop, no friction rub and no decreased pulses.    No murmur heard.  Pulses:       Radial pulses are 2+ on the right side, and 2+ on the left side.        Dorsalis pedis pulses are 2+ on the right side, and 2+ on the left side.        Posterior tibial pulses are 2+ on the right side, and 2+ on the left side.   Pulmonary/Chest: Effort normal and breath sounds normal. No respiratory distress. He has no decreased breath sounds. He has no wheezes. He has no rhonchi. He has no rales. He exhibits no tenderness.   Abdominal: Soft. Bowel sounds are normal. He exhibits no distension. There is no tenderness.   Musculoskeletal: Normal range of motion. He exhibits no edema.   Neurological: He is alert and oriented to person, place, and time.   Skin: Skin is warm and dry. No rash noted. He is not diaphoretic. No erythema. No pallor.   Psychiatric: He has a normal mood and affect. His behavior is normal. Judgment and thought content normal.   Vitals reviewed.      Lab Review:   Lab Results   Component Value Date    WBC 12.60 (H) 11/21/2017    HGB 15.6 11/21/2017    HCT 46.1 11/21/2017    MCV 93.1 11/21/2017     11/21/2017 "     Lab Results   Component Value Date    GLUCOSE 164 (H) 03/01/2018    BUN 24 (H) 03/01/2018    CREATININE 1.08 03/01/2018    EGFRIFNONA 70 03/01/2018    EGFRIFAFRI 73 11/08/2017    BCR 22.2 03/01/2018    K 4.4 03/01/2018    CO2 27.0 03/01/2018    CALCIUM 9.2 03/01/2018    PROTENTOTREF 6.8 11/08/2017    ALBUMIN 3.50 11/21/2017    LABIL2 1.3 11/21/2017    AST 16 11/21/2017    ALT 34 11/21/2017     Lab Results   Component Value Date    CHLPL 164 11/08/2017    CHLPL 186 11/07/2016    CHLPL 165 11/17/2015     Lab Results   Component Value Date    TRIG 65 11/08/2017    TRIG 65 11/07/2016    TRIG 161 (H) 11/17/2015     Lab Results   Component Value Date    HDL 46 11/08/2017    HDL 52 11/07/2016    HDL 40 11/17/2015     Lab Results   Component Value Date     (H) 11/08/2017     (H) 11/07/2016     (H) 11/17/2015           Assessment:          Diagnosis Plan   1. NYHA class 2 and MARYBETH/AHA stage C chronic systolic congestive heart failure  Stop lisinopril. Start Entresto 24/26 mg by mouth twice daily on the evening of 3/16/18 as pt states last dose of lisinopril was the evening of 3/14/18.    2. Atherosclerosis of native coronary artery of native heart without angina pectoris  No clinical signs of ischemia.    3. S/P CABG x 3     4. AICD (automatic cardioverter/defibrillator) present  Managed by Dr. Andino   5. Essential hypertension  Well controlled.    6. Mixed hyperlipidemia  Pt stopped Lipitor due to side effects. Recheck lipid panel prior to two week follow up.    7. Obesity (BMI 30-39.9)  Discussed the patient's BMI with him. BMI is above normal parameters. Follow-up plan includes:  educational material.     8. Controlled type 2 diabetes mellitus with stable proliferative retinopathy of both eyes, without long-term current use of insulin     9. Sleep apnea, unspecified type            Plan:       1. Stop lisinopril. Last dose was evening of 3/14/18.  2. Start Entresto 24/26 mg by mouth twice daily  starting the evening of 3/16/18.  3. Continue other medications as previously prescribed.  4.  Monitor and record daily blood pressure. Report readings consistently higher than 140/90 or consistently lower than 100/60.   5. Reviewed signs and symptoms of CHF and what to report with the patient. Patient instructed to restrict sodium and weigh daily. Report weight gain of greater than 2 lbs overnight or 5 lbs in 1 week. Pt verbalized understanding of instructions and plan of care.   6. Report any worsening symptoms.  7. Follow up with PCP for cholesterol and diabetes management and routine lab work.  8. Follow up with mid-level provider in 2 weeks to assess response to Entresto. Consider increasing dose if well tolerated.

## 2018-04-05 ENCOUNTER — OFFICE VISIT (OUTPATIENT)
Dept: CARDIOLOGY | Facility: CLINIC | Age: 63
End: 2018-04-05

## 2018-04-05 VITALS
WEIGHT: 227 LBS | OXYGEN SATURATION: 98 % | BODY MASS INDEX: 30.75 KG/M2 | HEIGHT: 72 IN | DIASTOLIC BLOOD PRESSURE: 60 MMHG | SYSTOLIC BLOOD PRESSURE: 102 MMHG | HEART RATE: 98 BPM

## 2018-04-05 DIAGNOSIS — Z95.1 S/P CABG X 3: ICD-10-CM

## 2018-04-05 DIAGNOSIS — I50.22 CHF (CONGESTIVE HEART FAILURE), NYHA CLASS II, CHRONIC, SYSTOLIC (HCC): Primary | ICD-10-CM

## 2018-04-05 DIAGNOSIS — E11.3553 CONTROLLED TYPE 2 DIABETES MELLITUS WITH STABLE PROLIFERATIVE RETINOPATHY OF BOTH EYES, WITHOUT LONG-TERM CURRENT USE OF INSULIN (HCC): ICD-10-CM

## 2018-04-05 DIAGNOSIS — R07.89 OTHER CHEST PAIN: ICD-10-CM

## 2018-04-05 DIAGNOSIS — I10 ESSENTIAL HYPERTENSION: ICD-10-CM

## 2018-04-05 DIAGNOSIS — I25.10 ATHEROSCLEROSIS OF NATIVE CORONARY ARTERY OF NATIVE HEART WITHOUT ANGINA PECTORIS: ICD-10-CM

## 2018-04-05 DIAGNOSIS — E66.9 OBESITY (BMI 30-39.9): ICD-10-CM

## 2018-04-05 DIAGNOSIS — Z95.810 AICD (AUTOMATIC CARDIOVERTER/DEFIBRILLATOR) PRESENT: ICD-10-CM

## 2018-04-05 DIAGNOSIS — E78.2 MIXED HYPERLIPIDEMIA: ICD-10-CM

## 2018-04-05 DIAGNOSIS — G47.30 SLEEP APNEA, UNSPECIFIED TYPE: ICD-10-CM

## 2018-04-05 PROCEDURE — 99214 OFFICE O/P EST MOD 30 MIN: CPT | Performed by: NURSE PRACTITIONER

## 2018-04-05 NOTE — PATIENT INSTRUCTIONS
Obesity, Adult  Obesity is the condition of having too much total body fat. Being overweight or obese means that your weight is greater than what is considered healthy for your body size. Obesity is determined by a measurement called BMI. BMI is an estimate of body fat and is calculated from height and weight. For adults, a BMI of 30 or higher is considered obese.  Obesity can eventually lead to other health concerns and major illnesses, including:  · Stroke.  · Coronary artery disease (CAD).  · Type 2 diabetes.  · Some types of cancer, including cancers of the colon, breast, uterus, and gallbladder.  · Osteoarthritis.  · High blood pressure (hypertension).  · High cholesterol.  · Sleep apnea.  · Gallbladder stones.  · Infertility problems.  What are the causes?  The main cause of obesity is taking in (consuming) more calories than your body uses for energy. Other factors that contribute to this condition may include:  · Being born with genes that make you more likely to become obese.  · Having a medical condition that causes obesity. These conditions include:  ¨ Hypothyroidism.  ¨ Polycystic ovarian syndrome (PCOS).  ¨ Binge-eating disorder.  ¨ Cushing syndrome.  · Taking certain medicines, such as steroids, antidepressants, and seizure medicines.  · Not being physically active (sedentary lifestyle).  · Living where there are limited places to exercise safely or buy healthy foods.  · Not getting enough sleep.  What increases the risk?  The following factors may increase your risk of this condition:  · Having a family history of obesity.  · Being a woman of -American descent.  · Being a man of  descent.  What are the signs or symptoms?  Having excessive body fat is the main symptom of this condition.  How is this diagnosed?  This condition may be diagnosed based on:  · Your symptoms.  · Your medical history.  · A physical exam. Your health care provider may measure:  ¨ Your BMI. If you are an adult  with a BMI between 25 and less than 30, you are considered overweight. If you are an adult with a BMI of 30 or higher, you are considered obese.  ¨ The distances around your hips and your waist (circumferences). These may be compared to each other to help diagnose your condition.  ¨ Your skinfold thickness. Your health care provider may gently pinch a fold of your skin and measure it.  How is this treated?  Treatment for this condition often includes changing your lifestyle. Treatment may include some or all of the following:  · Dietary changes. Work with your health care provider and a dietitian to set a weight-loss goal that is healthy and reasonable for you. Dietary changes may include eating:  ¨ Smaller portions. A portion size is the amount of a particular food that is healthy for you to eat at one time. This varies from person to person.  ¨ Low-calorie or low-fat options.  ¨ More whole grains, fruits, and vegetables.  · Regular physical activity. This may include aerobic activity (cardio) and strength training.  · Medicine to help you lose weight. Your health care provider may prescribe medicine if you are unable to lose 1 pound a week after 6 weeks of eating more healthily and doing more physical activity.  · Surgery. Surgical options may include gastric banding and gastric bypass. Surgery may be done if:  ¨ Other treatments have not helped to improve your condition.  ¨ You have a BMI of 40 or higher.  ¨ You have life-threatening health problems related to obesity.  Follow these instructions at home:     Eating and drinking     · Follow recommendations from your health care provider about what you eat and drink. Your health care provider may advise you to:  ¨ Limit fast foods, sweets, and processed snack foods.  ¨ Choose low-fat options, such as low-fat milk instead of whole milk.  ¨ Eat 5 or more servings of fruits or vegetables every day.  ¨ Eat at home more often. This gives you more control over what you  eat.  ¨ Choose healthy foods when you eat out.  ¨ Learn what a healthy portion size is.  ¨ Keep low-fat snacks on hand.  ¨ Avoid sugary drinks, such as soda, fruit juice, iced tea sweetened with sugar, and flavored milk.  ¨ Eat a healthy breakfast.  · Drink enough water to keep your urine clear or pale yellow.  · Do not go without eating for long periods of time (do not fast) or follow a fad diet. Fasting and fad diets can be unhealthy and even dangerous.  Physical Activity   · Exercise regularly, as told by your health care provider. Ask your health care provider what types of exercise are safe for you and how often you should exercise.  · Warm up and stretch before being active.  · Cool down and stretch after being active.  · Rest between periods of activity.  Lifestyle   · Limit the time that you spend in front of your TV, computer, or video game system.  · Find ways to reward yourself that do not involve food.  · Limit alcohol intake to no more than 1 drink a day for nonpregnant women and 2 drinks a day for men. One drink equals 12 oz of beer, 5 oz of wine, or 1½ oz of hard liquor.  General instructions   · Keep a weight loss journal to keep track of the food you eat and how much you exercise you get.  · Take over-the-counter and prescription medicines only as told by your health care provider.  · Take vitamins and supplements only as told by your health care provider.  · Consider joining a support group. Your health care provider may be able to recommend a support group.  · Keep all follow-up visits as told by your health care provider. This is important.  Contact a health care provider if:  · You are unable to meet your weight loss goal after 6 weeks of dietary and lifestyle changes.  This information is not intended to replace advice given to you by your health care provider. Make sure you discuss any questions you have with your health care provider.  Document Released: 01/25/2006 Document Revised:  "05/22/2017 Document Reviewed: 10/05/2016  [employer] Interactive Patient Education © 2017 [employer].    Heart-Healthy Eating Plan  Heart-healthy meal planning includes:  · Limiting unhealthy fats.  · Increasing healthy fats.  · Making other small dietary changes.  You may need to talk with your doctor or a diet specialist (dietitian) to create an eating plan that is right for you.  What types of fat should I choose?  · Choose healthy fats. These include olive oil and canola oil, flaxseeds, walnuts, almonds, and seeds.  · Eat more omega-3 fats. These include salmon, mackerel, sardines, tuna, flaxseed oil, and ground flaxseeds. Try to eat fish at least twice each week.  · Limit saturated fats.  ¨ Saturated fats are often found in animal products, such as meats, butter, and cream.  ¨ Plant sources of saturated fats include palm oil, palm kernel oil, and coconut oil.  · Avoid foods with partially hydrogenated oils in them. These include stick margarine, some tub margarines, cookies, crackers, and other baked goods. These contain trans fats.  What general guidelines do I need to follow?  · Check food labels carefully. Identify foods with trans fats or high amounts of saturated fat.  · Fill one half of your plate with vegetables and green salads. Eat 4-5 servings of vegetables per day. A serving of vegetables is:  ¨ 1 cup of raw leafy vegetables.  ¨ ½ cup of raw or cooked cut-up vegetables.  ¨ ½ cup of vegetable juice.  · Fill one fourth of your plate with whole grains. Look for the word \"whole\" as the first word in the ingredient list.  · Fill one fourth of your plate with lean protein foods.  · Eat 4-5 servings of fruit per day. A serving of fruit is:  ¨ One medium whole fruit.  ¨ ¼ cup of dried fruit.  ¨ ½ cup of fresh, frozen, or canned fruit.  ¨ ½ cup of 100% fruit juice.  · Eat more foods that contain soluble fiber. These include apples, broccoli, carrots, beans, peas, and barley. Try to get 20-30 g of fiber per " day.  · Eat more home-cooked food. Eat less restaurant, buffet, and fast food.  · Limit or avoid alcohol.  · Limit foods high in starch and sugar.  · Avoid fried foods.  · Avoid frying your food. Try baking, boiling, grilling, or broiling it instead. You can also reduce fat by:  ¨ Removing the skin from poultry.  ¨ Removing all visible fats from meats.  ¨ Skimming the fat off of stews, soups, and gravies before serving them.  ¨ Steaming vegetables in water or broth.  · Lose weight if you are overweight.  · Eat 4-5 servings of nuts, legumes, and seeds per week:  ¨ One serving of dried beans or legumes equals ½ cup after being cooked.  ¨ One serving of nuts equals 1½ ounces.  ¨ One serving of seeds equals ½ ounce or one tablespoon.  · You may need to keep track of how much salt or sodium you eat. This is especially true if you have high blood pressure. Talk with your doctor or dietitian to get more information.  What foods can I eat?  Grains   Breads, including Afghan, white, esteban, wheat, raisin, rye, oatmeal, and Italian. Tortillas that are neither fried nor made with lard or trans fat. Low-fat rolls, including hotdog and hamburger buns and English muffins. Biscuits. Muffins. Waffles. Pancakes. Light popcorn. Whole-grain cereals. Flatbread. Ashley toast. Pretzels. Breadsticks. Rusks. Low-fat snacks. Low-fat crackers, including oyster, saltine, matzo, bob, animal, and rye. Rice and pasta, including brown rice and pastas that are made with whole wheat.  Vegetables   All vegetables.  Fruits   All fruits, but limit coconut.  Meats and Other Protein Sources   Lean, well-trimmed beef, veal, pork, and lamb. Chicken and turkey without skin. All fish and shellfish. Wild duck, rabbit, pheasant, and venison. Egg whites or low-cholesterol egg substitutes. Dried beans, peas, lentils, and tofu. Seeds and most nuts.  Dairy   Low-fat or nonfat cheeses, including ricotta, string, and mozzarella. Skim or 1% milk that is liquid,  powdered, or evaporated. Buttermilk that is made with low-fat milk. Nonfat or low-fat yogurt.  Beverages   Mineral water. Diet carbonated beverages.  Sweets and Desserts   Sherbets and fruit ices. Honey, jam, marmalade, jelly, and syrups. Meringues and gelatins. Pure sugar candy, such as hard candy, jelly beans, gumdrops, mints, marshmallows, and small amounts of dark chocolate. Ryan food cake.  Eat all sweets and desserts in moderation.  Fats and Oils   Nonhydrogenated (trans-free) margarines. Vegetable oils, including soybean, sesame, sunflower, olive, peanut, safflower, corn, canola, and cottonseed. Salad dressings or mayonnaise made with a vegetable oil. Limit added fats and oils that you use for cooking, baking, salads, and as spreads.  Other   Cocoa powder. Coffee and tea. All seasonings and condiments.  The items listed above may not be a complete list of recommended foods or beverages. Contact your dietitian for more options.   What foods are not recommended?  Grains   Breads that are made with saturated or trans fats, oils, or whole milk. Croissants. Butter rolls. Cheese breads. Sweet rolls. Donuts. Buttered popcorn. Chow mein noodles. High-fat crackers, such as cheese or butter crackers.  Meats and Other Protein Sources   Fatty meats, such as hotdogs, short ribs, sausage, spareribs, joshi, rib eye roast or steak, and mutton. High-fat deli meats, such as salami and bologna. Caviar. Domestic duck and goose. Organ meats, such as kidney, liver, sweetbreads, and heart.  Dairy   Cream, sour cream, cream cheese, and creamed cottage cheese. Whole-milk cheeses, including blue (hernan), [place], Brie, Todd, American, Havarti, Swiss, cheddar, Camembert, and [place]. Whole or 2% milk that is liquid, evaporated, or condensed. Whole buttermilk. Cream sauce or high-fat cheese sauce. Yogurt that is made from whole milk.  Beverages   Regular sodas and juice drinks with added sugar.  Sweets and Desserts   Frosting.  Pudding. Cookies. Cakes other than hannah food cake. Candy that has milk chocolate or white chocolate, hydrogenated fat, butter, coconut, or unknown ingredients. Buttered syrups. Full-fat ice cream or ice cream drinks.  Fats and Oils   Gravy that has suet, meat fat, or shortening. Cocoa butter, hydrogenated oils, palm oil, coconut oil, palm kernel oil. These can often be found in baked products, candy, fried foods, nondairy creamers, and whipped toppings. Solid fats and shortenings, including joshi fat, salt pork, lard, and butter. Nondairy cream substitutes, such as coffee creamers and sour cream substitutes. Salad dressings that are made of unknown oils, cheese, or sour cream.  The items listed above may not be a complete list of foods and beverages to avoid. Contact your dietitian for more information.   This information is not intended to replace advice given to you by your health care provider. Make sure you discuss any questions you have with your health care provider.  Document Released: 06/18/2013 Document Revised: 05/25/2017 Document Reviewed: 06/11/2015  [employer] Interactive Patient Education © 2017 [employer]    Exercising to Lose Weight  Exercising can help you to lose weight. In order to lose weight through exercise, you need to do vigorous-intensity exercise. You can tell that you are exercising with vigorous intensity if you are breathing very hard and fast and cannot hold a conversation while exercising.  Moderate-intensity exercise helps to maintain your current weight. You can tell that you are exercising at a moderate level if you have a higher heart rate and faster breathing, but you are still able to hold a conversation.  How often should I exercise?  Choose an activity that you enjoy and set realistic goals. Your health care provider can help you to make an activity plan that works for you. Exercise regularly as directed by your health care provider. This may include:  · Doing resistance  training twice each week, such as:  ¨ Push-ups.  ¨ Sit-ups.  ¨ Lifting weights.  ¨ Using resistance bands.  · Doing a given intensity of exercise for a given amount of time. Choose from these options:  ¨ 150 minutes of moderate-intensity exercise every week.  ¨ 75 minutes of vigorous-intensity exercise every week.  ¨ A mix of moderate-intensity and vigorous-intensity exercise every week.  Children, pregnant women, people who are out of shape, people who are overweight, and older adults may need to consult a health care provider for individual recommendations. If you have any sort of medical condition, be sure to consult your health care provider before starting a new exercise program.  What are some activities that can help me to lose weight?  · Walking at a rate of at least 4.5 miles an hour.  · Jogging or running at a rate of 5 miles per hour.  · Biking at a rate of at least 10 miles per hour.  · Lap swimming.  · Roller-skating or in-line skating.  · Cross-country skiing.  · Vigorous competitive sports, such as football, basketball, and soccer.  · Jumping rope.  · Aerobic dancing.  How can I be more active in my day-to-day activities?  · Use the stairs instead of the elevator.  · Take a walk during your lunch break.  · If you drive, park your car farther away from work or school.  · If you take public transportation, get off one stop early and walk the rest of the way.  · Make all of your phone calls while standing up and walking around.  · Get up, stretch, and walk around every 30 minutes throughout the day.  What guidelines should I follow while exercising?  · Do not exercise so much that you hurt yourself, feel dizzy, or get very short of breath.  · Consult your health care provider prior to starting a new exercise program.  · Wear comfortable clothes and shoes with good support.  · Drink plenty of water while you exercise to prevent dehydration or heat stroke. Body water is lost during exercise and must be  replaced.  · Work out until you breathe faster and your heart beats faster.  This information is not intended to replace advice given to you by your health care provider. Make sure you discuss any questions you have with your health care provider.  Document Released: 01/20/2012 Document Revised: 05/25/2017 Document Reviewed: 05/21/2015  [employer] Interactive Patient Education © 2017 [employer]

## 2018-04-05 NOTE — PROGRESS NOTES
Subjective:     Encounter Date:04/05/2018      Patient ID: Joao Fonseca is a 62 y.o. male. He presents today for two week follow up for initiation of Entresto. He has a history of NYHA class II chronic systolic congestive heart failure, coronary artery disease s/p coronary artery bypass grafting X3 in 2015, automatic internal cardiac defibrillator, hypertension and hyperlipidemia. He reports an episode of left sided pressure about one week ago that lasted for about 1 1/2 days. The pain was constant and did not radiate. The pain was associated with increased fatigue. He continues to report dyspnea with moderate exertion but states that this is his baseline. He reports intermittent bilateral lower extremity edema. He denies palpitations, dizziness, syncope, orthopnea or PND.    Chief Complaint:  Congestive Heart Failure   Presents for follow-up visit. Associated symptoms include chest pain, chest pressure and edema. Pertinent negatives include no abdominal pain, claudication, fatigue, muscle weakness, near-syncope, nocturia, orthopnea, palpitations, paroxysmal nocturnal dyspnea, shortness of breath or unexpected weight change. The symptoms have been stable. His past medical history is significant for CAD.   Coronary Artery Disease   Presents for follow-up visit. Symptoms include chest pain, chest pressure and leg swelling. Pertinent negatives include no chest tightness, dizziness, muscle weakness, palpitations, shortness of breath or weight gain. Risk factors include hyperlipidemia. His past medical history is significant for CHF. The symptoms have been stable. Compliance with diet is variable. Compliance with exercise is variable. Compliance with medications is good.   Hypertension   This is a chronic problem. The current episode started more than 1 year ago. The problem is controlled. Associated symptoms include chest pain, malaise/fatigue and peripheral edema. Pertinent negatives include no anxiety, blurred  vision, headaches, neck pain, orthopnea, palpitations, PND, shortness of breath or sweats. Risk factors for coronary artery disease include male gender, obesity, dyslipidemia and diabetes mellitus. Past treatments include angiotensin blockers, diuretics and beta blockers. The current treatment provides significant improvement.   Hyperlipidemia   This is a chronic problem. The current episode started more than 1 year ago. Associated symptoms include chest pain. Pertinent negatives include no shortness of breath. He is currently on no antihyperlipidemic treatment. Risk factors for coronary artery disease include male sex, obesity, hypertension, diabetes mellitus and dyslipidemia.       The following portions of the patient's history were reviewed and updated as appropriate: allergies, current medications, past family history, past medical history, past social history, past surgical history and problem list.     Allergies   Allergen Reactions   • Cephalexin Anaphylaxis     Causes swelling of tongue, eyes, throat       Current Outpatient Prescriptions:   •  acyclovir (ZOVIRAX) 400 MG tablet, Take 1 tablet by mouth Daily. Take no more than 5 doses a day., Disp: 90 tablet, Rfl: 2  •  albuterol (PROVENTIL HFA;VENTOLIN HFA) 108 (90 Base) MCG/ACT inhaler, Inhale 2 puffs Every 4 (Four) Hours As Needed for Wheezing or Shortness of Air., Disp: , Rfl:   •  aspirin 81 MG EC tablet, Take 1 tablet by mouth Daily., Disp: 90 tablet, Rfl: 2  •  furosemide (LASIX) 40 MG tablet, Take 1 tablet by mouth Daily As Needed (edema, SOA)., Disp: 90 tablet, Rfl: 3  •  metFORMIN (GLUCOPHAGE) 1000 MG tablet, 1 po daily in the am and 1/2 po daily in the pm (Patient taking differently: Daily With Breakfast. 1 po daily in the am and 1/2 po daily in the pm), Disp: 135 tablet, Rfl: 3  •  metoprolol tartrate (LOPRESSOR) 25 MG tablet, Take 25 mg by mouth 2 (Two) Times a Day., Disp: , Rfl:   •  sacubitril-valsartan (ENTRESTO) 24-26 MG tablet, Take 1  tablet by mouth Every 12 (Twelve) Hours., Disp: 180 tablet, Rfl: 3  •  fluticasone (FLONASE) 50 MCG/ACT nasal spray, 2 sprays into each nostril As Needed., Disp: , Rfl:   Past Medical History:   Diagnosis Date   • Acute pansinusitis 1/4/2018   • Arrhythmia    • Cancer     malignant chest tumor   • Cardiomyopathy 12/27/2016   • CHF (congestive heart failure)    • Chronic laryngitis 1/4/2018   • Coronary artery disease    • Diabetes mellitus    • Disorder of liver 8/12/2013   • GERD (gastroesophageal reflux disease)    • History of adenomatous polyp of colon 06/28/2010    TUBULAR ADENOMA AT 40CM   • Hyperlipidemia    • Hypertension    • Obesity (BMI 30-39.9) 1/5/2018   • Pacemaker    • Shingles      Past Surgical History:   Procedure Laterality Date   • ANKLE SURGERY Right    • CARDIAC CATHETERIZATION     • CARDIAC ELECTROPHYSIOLOGY PROCEDURE Left 10/6/2016    Procedure: ICD DC new;  Surgeon: Zander Andino MD;  Location: [facility] CATH INVASIVE LOCATION;  Service:    • COLONOSCOPY  06/28/2010    biopsy at 41, 2 mm polyp supboptimal prep right clon    • COLONOSCOPY W/ POLYPECTOMY  06/28/2010    POLYP AT 40CM TUBULAR ADENOMA, SUBOPTIMAL PREP   • CORONARY ARTERY BYPASS GRAFT     • HEMORRHOIDECTOMY  1980   • HERNIA REPAIR     • PACEMAKER IMPLANTATION     • TONSILLECTOMY       Family History   Problem Relation Age of Onset   • Diabetes Mother    • Colon cancer Mother    • Heart disease Mother    • Hypertension Mother    • Stroke Mother    • Osteoporosis Mother    • Cancer Mother    • Heart disease Father    • Hypertension Father    • Cancer Father    • Bipolar disorder Sister    • Hypertension Brother    • Colon polyps Brother    • No Known Problems Son    • Heart disease Maternal Grandmother    • Diabetes Maternal Grandfather    • Heart disease Maternal Grandfather    • Heart disease Paternal Grandmother    • Diabetes Paternal Grandfather    • Heart disease Paternal Grandfather      Social History     Social History  "  • Marital status:      Spouse name: N/A   • Number of children: N/A   • Years of education: N/A     Occupational History   • Not on file.     Social History Main Topics   • Smoking status: Former Smoker     Years: 25.00     Types: Cigarettes   • Smokeless tobacco: Never Used      Comment: QUIT 20 YEARS AGO   • Alcohol use No   • Drug use: No   • Sexual activity: No     Other Topics Concern   • Not on file     Social History Narrative   • No narrative on file         Review of Systems   Constitution: Positive for malaise/fatigue. Negative for chills, decreased appetite, fatigue, fever, weakness, night sweats, unexpected weight change, weight gain and weight loss.   HENT: Negative for nosebleeds.    Eyes: Negative for blurred vision and visual disturbance.   Cardiovascular: Positive for chest pain, dyspnea on exertion and leg swelling. Negative for claudication, near-syncope, orthopnea, palpitations, paroxysmal nocturnal dyspnea and syncope.   Respiratory: Negative for chest tightness, cough, hemoptysis, shortness of breath, snoring and wheezing.    Endocrine: Negative for cold intolerance and heat intolerance.   Hematologic/Lymphatic: Does not bruise/bleed easily.   Skin: Negative for rash.   Musculoskeletal: Negative for back pain, falls, muscle weakness and neck pain.   Gastrointestinal: Negative for abdominal pain, change in bowel habit, constipation, diarrhea, dysphagia, heartburn, nausea and vomiting.   Genitourinary: Negative for hematuria and nocturia.   Neurological: Negative for dizziness, headaches and light-headedness.   Psychiatric/Behavioral: Negative for altered mental status.   Allergic/Immunologic: Negative for persistent infections.       Procedures      /60   Pulse 98   Ht 182.9 cm (72\")   Wt 103 kg (227 lb)   SpO2 98%   BMI 30.79 kg/m²     Objective:     Physical Exam   Constitutional: He is oriented to person, place, and time. Vital signs are normal. He appears well-developed " and well-nourished. No distress.   HENT:   Head: Normocephalic and atraumatic.   Right Ear: External ear normal.   Left Ear: External ear normal.   Eyes: Conjunctivae are normal. Pupils are equal, round, and reactive to light. Right eye exhibits no discharge. Left eye exhibits no discharge.   Neck: Normal range of motion. Neck supple. No JVD present. Carotid bruit is not present. No thyromegaly present.   Cardiovascular: Normal rate, regular rhythm, normal heart sounds and intact distal pulses.  PMI is not displaced.  Exam reveals no gallop, no friction rub and no decreased pulses.    No murmur heard.  Pulses:       Radial pulses are 2+ on the right side, and 2+ on the left side.        Dorsalis pedis pulses are 2+ on the right side, and 2+ on the left side.        Posterior tibial pulses are 2+ on the right side, and 2+ on the left side.   Pulmonary/Chest: Effort normal and breath sounds normal. No respiratory distress. He has no decreased breath sounds. He has no wheezes. He has no rhonchi. He has no rales. He exhibits no tenderness.   Abdominal: Soft. Bowel sounds are normal. He exhibits no distension. There is no tenderness.   Musculoskeletal: Normal range of motion. He exhibits no edema.   Neurological: He is alert and oriented to person, place, and time.   Skin: Skin is warm and dry. No rash noted. He is not diaphoretic. No erythema. No pallor.   Psychiatric: He has a normal mood and affect. His behavior is normal. Judgment and thought content normal.   Vitals reviewed.      Lab Review:   Lab Results   Component Value Date    WBC 12.60 (H) 11/21/2017    HGB 15.6 11/21/2017    HCT 46.1 11/21/2017    MCV 93.1 11/21/2017     11/21/2017     Lab Results   Component Value Date    GLUCOSE 164 (H) 03/01/2018    BUN 24 (H) 03/01/2018    CREATININE 1.08 03/01/2018    EGFRIFNONA 70 03/01/2018    EGFRIFAFRI 73 11/08/2017    BCR 22.2 03/01/2018    K 4.4 03/01/2018    CO2 27.0 03/01/2018    CALCIUM 9.2 03/01/2018     PROTENTOTREF 6.8 11/08/2017    ALBUMIN 3.50 11/21/2017    LABIL2 1.3 11/21/2017    AST 16 11/21/2017    ALT 34 11/21/2017     Lab Results   Component Value Date    CHLPL 164 11/08/2017    CHLPL 186 11/07/2016    CHLPL 165 11/17/2015     Lab Results   Component Value Date    TRIG 65 11/08/2017    TRIG 65 11/07/2016    TRIG 161 (H) 11/17/2015     Lab Results   Component Value Date    HDL 46 11/08/2017    HDL 52 11/07/2016    HDL 40 11/17/2015     Lab Results   Component Value Date     (H) 11/08/2017     (H) 11/07/2016     (H) 11/17/2015           Assessment:          Diagnosis Plan   1. CHF (congestive heart failure), NYHA class II, chronic, systolic  Compensated.    2. AICD (automatic cardioverter/defibrillator) present  Monitored by Dr. Andino.    3. Atherosclerosis of native coronary artery of native heart without angina pectoris  Stress Test With Myocardial Perfusion (1 Day)   4. S/P CABG x 3  Stress Test With Myocardial Perfusion (1 Day)   5. Essential hypertension  Well controlled.    6. Mixed hyperlipidemia  Pt to have lipid panel drawn, which was ordered at last office visit.    7. Obesity (BMI 30-39.9)  Discussed the patient's BMI with him. BMI is above normal parameters. Follow-up plan includes:  educational material.     8. Controlled type 2 diabetes mellitus with stable proliferative retinopathy of both eyes, without long-term current use of insulin     9. Sleep apnea, unspecified type     10. Other chest pain  Stress Test With Myocardial Perfusion (1 Day)          Plan:       1. Continue medications as previously prescribed.  2. Lexiscan.  3. Reviewed signs and symptoms of CHF and what to report with the patient. Patient instructed to restrict sodium and weigh daily. Report weight gain of greater than 2 lbs overnight or 5 lbs in 1 week. Pt verbalized understanding of instructions and plan of care.   4. Report any worsening symptoms.  5. Follow up with PCP for cholesterol, blood  pressure and diabetes management and routine lab work.  6. Follow up with mid-level provider in one month, or sooner if needed. If lexiscan negative and symptoms persist, consider adding anti-anginal.

## 2018-04-13 ENCOUNTER — OFFICE VISIT (OUTPATIENT)
Dept: INTERNAL MEDICINE | Facility: CLINIC | Age: 63
End: 2018-04-13

## 2018-04-13 ENCOUNTER — TELEPHONE (OUTPATIENT)
Dept: INTERNAL MEDICINE | Facility: CLINIC | Age: 63
End: 2018-04-13

## 2018-04-13 ENCOUNTER — LAB (OUTPATIENT)
Dept: LAB | Facility: HOSPITAL | Age: 63
End: 2018-04-13

## 2018-04-13 ENCOUNTER — HOSPITAL ENCOUNTER (OUTPATIENT)
Dept: GENERAL RADIOLOGY | Facility: HOSPITAL | Age: 63
Discharge: HOME | End: 2018-04-13
Admitting: NURSE PRACTITIONER

## 2018-04-13 VITALS
HEART RATE: 96 BPM | TEMPERATURE: 98 F | OXYGEN SATURATION: 98 % | BODY MASS INDEX: 31.52 KG/M2 | RESPIRATION RATE: 16 BRPM | WEIGHT: 232.4 LBS | SYSTOLIC BLOOD PRESSURE: 101 MMHG | DIASTOLIC BLOOD PRESSURE: 69 MMHG

## 2018-04-13 DIAGNOSIS — M25.511 CHRONIC RIGHT SHOULDER PAIN: ICD-10-CM

## 2018-04-13 DIAGNOSIS — G89.29 CHRONIC RIGHT SHOULDER PAIN: ICD-10-CM

## 2018-04-13 DIAGNOSIS — E11.9 TYPE 2 DIABETES MELLITUS WITH HEMOGLOBIN A1C GOAL OF LESS THAN 7.5% (HCC): ICD-10-CM

## 2018-04-13 DIAGNOSIS — I50.22 CHF (CONGESTIVE HEART FAILURE), NYHA CLASS II, CHRONIC, SYSTOLIC (HCC): Primary | ICD-10-CM

## 2018-04-13 DIAGNOSIS — M25.511 ARTHRALGIA OF RIGHT SHOULDER REGION: ICD-10-CM

## 2018-04-13 DIAGNOSIS — E11.9 TYPE 2 DIABETES MELLITUS WITHOUT COMPLICATION, WITHOUT LONG-TERM CURRENT USE OF INSULIN (HCC): Primary | ICD-10-CM

## 2018-04-13 DIAGNOSIS — B02.9 HERPES ZOSTER WITHOUT COMPLICATION: ICD-10-CM

## 2018-04-13 DIAGNOSIS — E78.2 MIXED HYPERLIPIDEMIA: ICD-10-CM

## 2018-04-13 DIAGNOSIS — I25.10 ATHEROSCLEROSIS OF NATIVE CORONARY ARTERY OF NATIVE HEART WITHOUT ANGINA PECTORIS: ICD-10-CM

## 2018-04-13 DIAGNOSIS — E78.2 MIXED HYPERLIPIDEMIA: Primary | ICD-10-CM

## 2018-04-13 DIAGNOSIS — I10 ESSENTIAL HYPERTENSION: ICD-10-CM

## 2018-04-13 PROBLEM — B95.5 STREPTOCOCCAL BACTEREMIA: Status: RESOLVED | Noted: 2017-11-20 | Resolved: 2018-04-13
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
## 2018-04-13 PROBLEM — R07.89 OTHER CHEST PAIN: Status: RESOLVED | Noted: 2018-04-05 | Resolved: 2018-04-13

## 2018-04-13 PROBLEM — A41.9 SEPSIS (HCC): Status: RESOLVED | Noted: 2017-11-20 | Resolved: 2018-04-13

## 2018-04-13 PROBLEM — R78.81 STREPTOCOCCAL BACTEREMIA: Status: RESOLVED | Noted: 2017-11-20 | Resolved: 2018-04-13

## 2018-04-13 PROBLEM — B35.1 ONYCHOMYCOSIS: Status: RESOLVED | Noted: 2017-02-07 | Resolved: 2018-04-13

## 2018-04-13 PROBLEM — J01.40 ACUTE PANSINUSITIS: Status: RESOLVED | Noted: 2018-01-04 | Resolved: 2018-04-13

## 2018-04-13 PROBLEM — J04.30 ADULT SUPRAGLOTTITIS: Status: RESOLVED | Noted: 2017-11-18 | Resolved: 2018-04-13

## 2018-04-13 LAB
ARTICHOKE IGE QN: 99 MG/DL (ref 0–99)
CHOLEST SERPL-MCNC: 159 MG/DL (ref 130–200)
HBA1C MFR BLD: 8.6 %
HDLC SERPL-MCNC: 43 MG/DL
LDLC/HDLC SERPL: 2.28 {RATIO}
TRIGL SERPL-MCNC: 90 MG/DL (ref 0–149)

## 2018-04-13 PROCEDURE — 73030 X-RAY EXAM OF SHOULDER: CPT

## 2018-04-13 PROCEDURE — 83036 HEMOGLOBIN GLYCOSYLATED A1C: CPT | Performed by: NURSE PRACTITIONER

## 2018-04-13 PROCEDURE — 99214 OFFICE O/P EST MOD 30 MIN: CPT | Performed by: NURSE PRACTITIONER

## 2018-04-13 PROCEDURE — 36415 COLL VENOUS BLD VENIPUNCTURE: CPT

## 2018-04-13 PROCEDURE — 80061 LIPID PANEL: CPT | Performed by: NURSE PRACTITIONER

## 2018-04-13 RX ORDER — ACYCLOVIR 400 MG/1
400 TABLET ORAL DAILY
Qty: 90 TABLET | Refills: 2 | Status: SHIPPED | OUTPATIENT
Start: 2018-04-13 | End: 2018-08-17 | Stop reason: SDUPTHER

## 2018-04-13 RX ORDER — ATORVASTATIN CALCIUM 40 MG/1
40 TABLET, FILM COATED ORAL NIGHTLY
Qty: 90 TABLET | Refills: 2 | Status: SHIPPED | OUTPATIENT
Start: 2018-04-13 | End: 2018-04-13 | Stop reason: DRUGHIGH

## 2018-04-13 RX ORDER — FUROSEMIDE 40 MG/1
40 TABLET ORAL DAILY PRN
Qty: 90 TABLET | Refills: 3 | Status: SHIPPED | OUTPATIENT
Start: 2018-04-13 | End: 2018-08-17 | Stop reason: SDUPTHER

## 2018-04-13 RX ORDER — ATORVASTATIN CALCIUM 80 MG/1
80 TABLET, FILM COATED ORAL DAILY
Qty: 90 TABLET | Refills: 3 | Status: SHIPPED | OUTPATIENT
Start: 2018-04-13 | End: 2018-05-16 | Stop reason: SINTOL

## 2018-04-13 NOTE — PROGRESS NOTES
CC: follow up CAD, systolic heart failure, T2DM    History:  Joao Fonseca is a 62 y.o. male who presents today for evaluation of the above problems.    Joao reports that he has been doing well with the exception of some increased shortness of breath related to his systolic heart failure.  He was seen in Dr. Henry's office on April 5 and has been given instructions related to this.  He is to call their office with any overnight gain of 2 pounds or any weekly gain of 5 pounds. His weight today is 232, which appears to be up 5 pounds from his visit with DIOR Valentino on 4/5, however, this is obviously not from the same scale, so there may be some discrepancy.  He also states that he has not been taking his atorvastatin, and in fact never picked this up when I ordered it at his last office visit.  He stated that he would not take anything unless Dr. Henry approved it due to previous complications related to prescriptions given by another provider.  He has been taking only 1 metformin in the AM instead of the 1 in the AM and 1/2 in the PM that was ordered.  He reports that he did have an eye exam by Dr. Zee in [place].  Additionally he reports that he has been having shoulder pain since his cardiac operation in 2015.  He states that his right shoulder is worse than his left and he is unable to raise his right arm over his head.  He states that this arm is painful if he lies on it at night and that he will experience tingling and sharp pains in the arm as well.  He says that he was previously told that this was normal following his operation, however, at the time he was only about 6 months postop.  This has never been evaluated with imaging.      ROS:  Review of Systems   Constitutional: Negative for chills, fatigue, fever and unexpected weight change.   HENT: Negative for congestion, postnasal drip, rhinorrhea, sinus pain, sinus pressure, sneezing and sore throat.    Eyes: Negative for visual  disturbance.   Respiratory: Positive for shortness of breath. Negative for cough, chest tightness and wheezing.    Cardiovascular: Negative for chest pain and leg swelling.   Gastrointestinal: Negative for abdominal distention, abdominal pain, blood in stool, constipation, diarrhea, nausea and vomiting.   Endocrine: Negative for polyuria.   Genitourinary: Negative for decreased urine volume, dysuria, hematuria and urgency.   Musculoskeletal: Positive for arthralgias. Negative for myalgias.   Skin: Negative for rash.   Allergic/Immunologic: Negative for environmental allergies.   Neurological: Negative for dizziness, seizures, light-headedness and headaches.   Psychiatric/Behavioral: Negative for dysphoric mood and sleep disturbance.       Allergies   Allergen Reactions   • Cephalexin Anaphylaxis     Causes swelling of tongue, eyes, throat     Past Medical History:   Diagnosis Date   • Acute pansinusitis 1/4/2018   • Arrhythmia    • Cancer     malignant chest tumor   • Cardiomyopathy 12/27/2016   • CHF (congestive heart failure)    • Chronic laryngitis 1/4/2018   • Coronary artery disease    • Diabetes mellitus    • Disorder of liver 8/12/2013   • GERD (gastroesophageal reflux disease)    • History of adenomatous polyp of colon 06/28/2010    TUBULAR ADENOMA AT 40CM   • Hyperlipidemia    • Hypertension    • Obesity (BMI 30-39.9) 1/5/2018   • Pacemaker    • Shingles      Past Surgical History:   Procedure Laterality Date   • ANKLE SURGERY Right    • CARDIAC CATHETERIZATION     • CARDIAC ELECTROPHYSIOLOGY PROCEDURE Left 10/6/2016    Procedure: ICD DC new;  Surgeon: Zander Andino MD;  Location: [facility] INVASIVE LOCATION;  Service:    • COLONOSCOPY  06/28/2010    biopsy at 41, 2 mm polyp supboptimal prep right clon    • COLONOSCOPY W/ POLYPECTOMY  06/28/2010    POLYP AT 40CM TUBULAR ADENOMA, SUBOPTIMAL PREP   • CORONARY ARTERY BYPASS GRAFT     • HEMORRHOIDECTOMY  1980   • HERNIA REPAIR     • PACEMAKER  IMPLANTATION     • TONSILLECTOMY       Family History   Problem Relation Age of Onset   • Diabetes Mother    • Colon cancer Mother    • Heart disease Mother    • Hypertension Mother    • Stroke Mother    • Osteoporosis Mother    • Cancer Mother    • Heart disease Father    • Hypertension Father    • Cancer Father    • Bipolar disorder Sister    • Hypertension Brother    • Colon polyps Brother    • No Known Problems Son    • Heart disease Maternal Grandmother    • Diabetes Maternal Grandfather    • Heart disease Maternal Grandfather    • Heart disease Paternal Grandmother    • Diabetes Paternal Grandfather    • Heart disease Paternal Grandfather       reports that he has quit smoking. His smoking use included Cigarettes. He quit after 25.00 years of use. He has never used smokeless tobacco. He reports that he does not drink alcohol or use drugs.      Current Outpatient Prescriptions:   •  acyclovir (ZOVIRAX) 400 MG tablet, Take 1 tablet by mouth Daily. Take no more than 5 doses a day., Disp: 90 tablet, Rfl: 2  •  albuterol (PROVENTIL HFA;VENTOLIN HFA) 108 (90 Base) MCG/ACT inhaler, Inhale 2 puffs Every 4 (Four) Hours As Needed for Wheezing or Shortness of Air., Disp: , Rfl:   •  [employer] ASPIRIN CHILD 81 MG chewable tablet, Chew 81 mg Daily., Disp: , Rfl: 2  •  fluticasone (FLONASE) 50 MCG/ACT nasal spray, 2 sprays into each nostril As Needed., Disp: , Rfl:   •  furosemide (LASIX) 40 MG tablet, Take 1 tablet by mouth Daily As Needed (edema, SOA)., Disp: 90 tablet, Rfl: 3  •  metFORMIN (GLUCOPHAGE) 1000 MG tablet, 1 po daily in the am and 1/2 po daily in the pm (Patient taking differently: Daily With Breakfast. 1 po daily in the am and 1/2 po daily in the pm), Disp: 135 tablet, Rfl: 3  •  metoprolol tartrate (LOPRESSOR) 25 MG tablet, Take 1 tablet by mouth Every 12 (Twelve) Hours., Disp: 180 tablet, Rfl: 2  •  sacubitril-valsartan (ENTRESTO) 24-26 MG tablet, Take 1 tablet by mouth Every 12 (Twelve) Hours., Disp: 180  tablet, Rfl: 3  •  atorvastatin (LIPITOR) 40 MG tablet, Take 1 tablet by mouth Every Night., Disp: 90 tablet, Rfl: 2    OBJECTIVE:  /69 (BP Location: Right arm, Patient Position: Sitting, Cuff Size: Adult)   Pulse 96   Temp 98 °F (36.7 °C) (Oral)   Resp 16   Wt 105 kg (232 lb 6.4 oz)   SpO2 98%   BMI 31.52 kg/m²    Physical Exam   Constitutional: He is oriented to person, place, and time. Vital signs are normal. He appears well-developed and well-nourished. He is cooperative. He does not have a sickly appearance.   HENT:   Head: Normocephalic.   Right Ear: Tympanic membrane, external ear and ear canal normal.   Left Ear: Tympanic membrane, external ear and ear canal normal.   Nose: Right sinus exhibits no maxillary sinus tenderness and no frontal sinus tenderness. Left sinus exhibits no maxillary sinus tenderness and no frontal sinus tenderness.   Mouth/Throat: Oropharynx is clear and moist and mucous membranes are normal. No posterior oropharyngeal edema.   Eyes: Conjunctivae and lids are normal. Right eye exhibits no discharge. Left eye exhibits no discharge.   Cardiovascular: Normal rate, regular rhythm and normal heart sounds.  Exam reveals no gallop and no friction rub.    No murmur heard.  Pulmonary/Chest: Effort normal and breath sounds normal. No tachypnea. No respiratory distress. He has no wheezes. He has no rales. He exhibits no tenderness.   Abdominal: Soft. Bowel sounds are normal. He exhibits no distension. There is no tenderness.   Musculoskeletal:        Right shoulder: He exhibits decreased range of motion.   Neurological: He is alert and oriented to person, place, and time.   Skin: Skin is warm, dry and intact. No rash noted. He is not diaphoretic. No erythema.   Psychiatric: He has a normal mood and affect. His speech is normal and behavior is normal. Thought content normal.   Vitals reviewed.      Assessment/Plan    Diagnoses and all orders for this visit:    CHF (congestive heart  failure), NYHA class II, chronic, systolic  Essential hypertension  -     furosemide (LASIX) 40 MG tablet; Take 1 tablet by mouth Daily As Needed (edema, SOA).  -     metoprolol tartrate (LOPRESSOR) 25 MG tablet; Take 1 tablet by mouth Every 12 (Twelve) Hours.  His weight does appear to be up some today. Blood pressure is well controlled.  Lung sounds are clear.  He does not appear to be in any acute distress, however, I will forward my note to his cardiology providers.    Atherosclerosis of native coronary artery of native heart without angina pectoris  Mixed hyperlipidemia       -     Atorvastatin 40 mg nightly  I did advise that Dr. Henry specifically mentioned this in his noted from March 1 and that he was under the impression that he was taking this. I did discuss with him that regardless of what his cholesterol numbers are that statins are used to reduce the risk of cardiovascular events and that with his significant cardiac history he did need to be on this medication.      Herpes zoster without complication  -     acyclovir (ZOVIRAX) 400 MG tablet; Take 1 tablet by mouth Daily. Take no more than 5 doses a day.  Refill per request.    Type 2 diabetes mellitus with hemoglobin A1c goal of less than 7.5%  -     Hemoglobin A1c; Future  He has not been taking his metformin as prescribed, however, he was under the impression that this was only ordered 1 time a day currently.  I did not inform him that my records showed he was supposed to be taking an additional half a tablet in the p.m.  I will evaluate his labs and then address any changes that need to be made.    Chronic right shoulder pain  -     XR Shoulder 2+ View Right; Future  The mobility of his right arm related to shoulder discomfort did appear to be significantly limited.  We will evaluate with x-ray, however, I do anticipate ordering physical therapy for him in regards to this.    An After Visit Summary was printed and given to the patient at  discharge.  Return in about 3 months (around 7/13/2018).         Danisha Tomlinson, APRN  4/13/2018

## 2018-04-13 NOTE — TELEPHONE ENCOUNTER
Please advise that his xray showed significant arthritis in the right shoulder.  I am going to refer him to PT to help him regain mobility in the shoulder.  Also, his hemoglobin A1C had gone up from 7.8 to 8.6.  He needs to start taking his metformin twice a day instead of once a day.  Also, limiting his carbohydrate intake to help reduce his blood sugar.  Lipid panel looked good, but I have still sent in the atorvastatin (Lipitor) for cardiovascular event prevention.

## 2018-04-20 ENCOUNTER — APPOINTMENT (OUTPATIENT)
Dept: CARDIOLOGY | Facility: HOSPITAL | Age: 63
End: 2018-04-20

## 2018-04-20 ENCOUNTER — HOSPITAL ENCOUNTER (OUTPATIENT)
Dept: CARDIOLOGY | Facility: HOSPITAL | Age: 63
End: 2018-04-20

## 2018-04-25 ENCOUNTER — TELEPHONE (OUTPATIENT)
Dept: INTERNAL MEDICINE | Facility: CLINIC | Age: 63
End: 2018-04-25

## 2018-04-25 NOTE — TELEPHONE ENCOUNTER
"Tried faxing script for glucometer and test strips several times and fax came back as \"not sent\" or \"error\" due to fax machine being busy.  Called [facility] in [place] and gave a verbal order to Pharmacist.    "

## 2018-04-25 NOTE — TELEPHONE ENCOUNTER
Patient is needing a script for a glucometer and test strips. He said that this was supposed to be sent in the other day, but the pharmacy still hasn't received the order.

## 2018-04-26 ENCOUNTER — TELEPHONE (OUTPATIENT)
Dept: CARDIOLOGY | Facility: CLINIC | Age: 63
End: 2018-04-26

## 2018-04-27 DIAGNOSIS — I50.22 CHRONIC SYSTOLIC CONGESTIVE HEART FAILURE (HCC): Primary | ICD-10-CM

## 2018-04-27 RX ORDER — LISINOPRIL 20 MG/1
20 TABLET ORAL DAILY
Qty: 90 TABLET | Refills: 3 | Status: SHIPPED | OUTPATIENT
Start: 2018-04-27 | End: 2018-05-09 | Stop reason: HOSPADM

## 2018-05-01 NOTE — TELEPHONE ENCOUNTER
I think that it is reasonable to go back onto the lisinopril.  I am not familiar with the symptoms occurring with the addition of Entresto, however this is the only thing that his change, it certainly could be related.  I would recommend stopping the Entresto and restarting lisinopril at the previous dose.  We can try to get him into see me on Monday of next week at 12:30.  If he needs to be seen sooner, we can see if the nurse practitioner clinic can accommodate him.

## 2018-05-04 ENCOUNTER — HOSPITAL ENCOUNTER (INPATIENT)
Facility: HOSPITAL | Age: 63
LOS: 5 days | Discharge: HOME | End: 2018-05-09
Attending: INTERNAL MEDICINE | Admitting: INTERNAL MEDICINE

## 2018-05-04 ENCOUNTER — APPOINTMENT (OUTPATIENT)
Dept: GENERAL RADIOLOGY | Facility: HOSPITAL | Age: 63
End: 2018-05-04

## 2018-05-04 ENCOUNTER — APPOINTMENT (OUTPATIENT)
Dept: CT IMAGING | Facility: HOSPITAL | Age: 63
End: 2018-05-04

## 2018-05-04 ENCOUNTER — APPOINTMENT (OUTPATIENT)
Dept: CARDIOLOGY | Facility: HOSPITAL | Age: 63
End: 2018-05-04

## 2018-05-04 DIAGNOSIS — I50.9 CONGESTIVE HEART FAILURE, UNSPECIFIED CONGESTIVE HEART FAILURE CHRONICITY, UNSPECIFIED CONGESTIVE HEART FAILURE TYPE: ICD-10-CM

## 2018-05-04 DIAGNOSIS — I50.22 CHF (CONGESTIVE HEART FAILURE), NYHA CLASS II, CHRONIC, SYSTOLIC (HCC): ICD-10-CM

## 2018-05-04 DIAGNOSIS — I48.91 ATRIAL FIBRILLATION, UNSPECIFIED TYPE (HCC): ICD-10-CM

## 2018-05-04 DIAGNOSIS — R07.9 CHEST PAIN, UNSPECIFIED TYPE: Primary | ICD-10-CM

## 2018-05-04 LAB
ALBUMIN SERPL-MCNC: 4.5 G/DL (ref 3.5–5)
ALBUMIN/GLOB SERPL: 1.8 G/DL (ref 1.1–2.5)
ALP SERPL-CCNC: 53 U/L (ref 24–120)
ALT SERPL W P-5'-P-CCNC: 43 U/L (ref 0–54)
AMMONIA BLD-SCNC: <9 UMOL/L (ref 9–33)
ANION GAP SERPL CALCULATED.3IONS-SCNC: 14 MMOL/L (ref 4–13)
APTT PPP: 31.2 SECONDS (ref 24.1–34.8)
AST SERPL-CCNC: 26 U/L (ref 7–45)
BASOPHILS # BLD AUTO: 0.06 10*3/MM3 (ref 0–0.2)
BASOPHILS NFR BLD AUTO: 0.9 % (ref 0–2)
BH CV ECHO MEAS - AI DEC SLOPE: 254 CM/SEC^2
BH CV ECHO MEAS - AI MAX PG: 35.8 MMHG
BH CV ECHO MEAS - AI MAX VEL: 299 CM/SEC
BH CV ECHO MEAS - AI P1/2T: 344.8 MSEC
BH CV ECHO MEAS - AO MAX PG (FULL): 8.1 MMHG
BH CV ECHO MEAS - AO MAX PG: 9.5 MMHG
BH CV ECHO MEAS - AO MEAN PG (FULL): 6 MMHG
BH CV ECHO MEAS - AO MEAN PG: 7 MMHG
BH CV ECHO MEAS - AO ROOT AREA (BSA CORRECTED): 1.8
BH CV ECHO MEAS - AO ROOT AREA: 13.2 CM^2
BH CV ECHO MEAS - AO ROOT DIAM: 4.1 CM
BH CV ECHO MEAS - AO V2 MAX: 154 CM/SEC
BH CV ECHO MEAS - AO V2 MEAN: 121 CM/SEC
BH CV ECHO MEAS - AO V2 VTI: 27.9 CM
BH CV ECHO MEAS - AVA(I,A): 2.4 CM^2
BH CV ECHO MEAS - AVA(I,D): 2.4 CM^2
BH CV ECHO MEAS - AVA(V,A): 2.2 CM^2
BH CV ECHO MEAS - AVA(V,D): 2.2 CM^2
BH CV ECHO MEAS - BSA(HAYCOCK): 2.3 M^2
BH CV ECHO MEAS - BSA: 2.2 M^2
BH CV ECHO MEAS - BZI_BMI: 30.5 KILOGRAMS/M^2
BH CV ECHO MEAS - BZI_METRIC_HEIGHT: 182.9 CM
BH CV ECHO MEAS - BZI_METRIC_WEIGHT: 102.1 KG
BH CV ECHO MEAS - CONTRAST EF 4CH: 21.5 ML/M^2
BH CV ECHO MEAS - EDV(CUBED): 439 ML
BH CV ECHO MEAS - EDV(MOD-SP4): 237 ML
BH CV ECHO MEAS - EDV(TEICH): 307.3 ML
BH CV ECHO MEAS - EF(CUBED): 25.2 %
BH CV ECHO MEAS - EF(MOD-SP4): 21.5 %
BH CV ECHO MEAS - EF(TEICH): 19.5 %
BH CV ECHO MEAS - ESV(CUBED): 328.5 ML
BH CV ECHO MEAS - ESV(MOD-SP4): 186 ML
BH CV ECHO MEAS - ESV(TEICH): 247.3 ML
BH CV ECHO MEAS - FS: 9.2 %
BH CV ECHO MEAS - IVS/LVPW: 0.92
BH CV ECHO MEAS - IVSD: 1.2 CM
BH CV ECHO MEAS - LA DIMENSION: 5.3 CM
BH CV ECHO MEAS - LA/AO: 1.3
BH CV ECHO MEAS - LAT PEAK E' VEL: 8 CM/SEC
BH CV ECHO MEAS - LV DIASTOLIC VOL/BSA (35-75): 105.8 ML/M^2
BH CV ECHO MEAS - LV MASS(C)D: 492.6 GRAMS
BH CV ECHO MEAS - LV MASS(C)DI: 219.9 GRAMS/M^2
BH CV ECHO MEAS - LV MAX PG: 1.3 MMHG
BH CV ECHO MEAS - LV MEAN PG: 1 MMHG
BH CV ECHO MEAS - LV SYSTOLIC VOL/BSA (12-30): 83 ML/M^2
BH CV ECHO MEAS - LV V1 MAX: 57.9 CM/SEC
BH CV ECHO MEAS - LV V1 MEAN: 43.5 CM/SEC
BH CV ECHO MEAS - LV V1 VTI: 11.6 CM
BH CV ECHO MEAS - LVIDD: 7.6 CM
BH CV ECHO MEAS - LVIDS: 6.9 CM
BH CV ECHO MEAS - LVLD AP4: 9.9 CM
BH CV ECHO MEAS - LVLS AP4: 9.6 CM
BH CV ECHO MEAS - LVOT AREA (M): 5.7 CM^2
BH CV ECHO MEAS - LVOT AREA: 5.7 CM^2
BH CV ECHO MEAS - LVOT DIAM: 2.7 CM
BH CV ECHO MEAS - LVPWD: 1.3 CM
BH CV ECHO MEAS - MED PEAK E' VEL: 4.7 CM/SEC
BH CV ECHO MEAS - MR ALIAS VEL: 38.5 CM/SEC
BH CV ECHO MEAS - MR ERO: 0.3 CM^2
BH CV ECHO MEAS - MR FLOW RATE: 118.5 CM^3/SEC
BH CV ECHO MEAS - MR MAX PG: 61.2 MMHG
BH CV ECHO MEAS - MR MAX VEL: 391 CM/SEC
BH CV ECHO MEAS - MR MEAN PG: 42 MMHG
BH CV ECHO MEAS - MR MEAN VEL: 307 CM/SEC
BH CV ECHO MEAS - MR PISA RADIUS: 0.7 CM
BH CV ECHO MEAS - MR PISA: 3.1 CM^2
BH CV ECHO MEAS - MR VOLUME: 37.6 ML
BH CV ECHO MEAS - MR VTI: 124 CM
BH CV ECHO MEAS - MV DEC TIME: 0.22 SEC
BH CV ECHO MEAS - MV E MAX VEL: 98.5 CM/SEC
BH CV ECHO MEAS - RAP SYSTOLE: 10 MMHG
BH CV ECHO MEAS - RVSP: 39.8 MMHG
BH CV ECHO MEAS - SI(AO): 164.5 ML/M^2
BH CV ECHO MEAS - SI(CUBED): 49.3 ML/M^2
BH CV ECHO MEAS - SI(LVOT): 29.7 ML/M^2
BH CV ECHO MEAS - SI(MOD-SP4): 22.8 ML/M^2
BH CV ECHO MEAS - SI(TEICH): 26.8 ML/M^2
BH CV ECHO MEAS - SV(AO): 368.4 ML
BH CV ECHO MEAS - SV(CUBED): 110.5 ML
BH CV ECHO MEAS - SV(LVOT): 66.4 ML
BH CV ECHO MEAS - SV(MOD-SP4): 51 ML
BH CV ECHO MEAS - SV(TEICH): 60 ML
BH CV ECHO MEAS - TR MAX VEL: 273 CM/SEC
BH CV ECHO MEASUREMENTS AVERAGE E/E' RATIO: 15.51
BILIRUB SERPL-MCNC: 1.9 MG/DL (ref 0.1–1)
BUN BLD-MCNC: 40 MG/DL (ref 5–21)
BUN/CREAT SERPL: 27.2 (ref 7–25)
CALCIUM SPEC-SCNC: 9 MG/DL (ref 8.4–10.4)
CHLORIDE SERPL-SCNC: 98 MMOL/L (ref 98–110)
CO2 SERPL-SCNC: 27 MMOL/L (ref 24–31)
CREAT BLD-MCNC: 1.47 MG/DL (ref 0.5–1.4)
D DIMER PPP FEU-MCNC: 0.34 MG/L (FEU) (ref 0–0.5)
DEPRECATED RDW RBC AUTO: 39.9 FL (ref 40–54)
EOSINOPHIL # BLD AUTO: 0.08 10*3/MM3 (ref 0–0.7)
EOSINOPHIL NFR BLD AUTO: 1.2 % (ref 0–4)
ERYTHROCYTE [DISTWIDTH] IN BLOOD BY AUTOMATED COUNT: 12.2 % (ref 12–15)
GFR SERPL CREATININE-BSD FRML MDRD: 49 ML/MIN/1.73
GLOBULIN UR ELPH-MCNC: 2.5 GM/DL
GLUCOSE BLD-MCNC: 192 MG/DL (ref 70–100)
GLUCOSE BLDC GLUCOMTR-MCNC: 120 MG/DL (ref 70–130)
GLUCOSE BLDC GLUCOMTR-MCNC: 280 MG/DL (ref 70–130)
HCT VFR BLD AUTO: 49.8 % (ref 40–52)
HGB BLD-MCNC: 17 G/DL (ref 14–18)
HOLD SPECIMEN: NORMAL
HOLD SPECIMEN: NORMAL
INR PPP: 0.97 (ref 0.91–1.09)
LEFT ATRIUM VOLUME INDEX: 44.4 ML/M2
LEFT ATRIUM VOLUME: 99.4 CM3
LYMPHOCYTES # BLD AUTO: 1.17 10*3/MM3 (ref 0.72–4.86)
LYMPHOCYTES NFR BLD AUTO: 17.8 % (ref 15–45)
MAXIMAL PREDICTED HEART RATE: 159 BPM
MCH RBC QN AUTO: 30.5 PG (ref 28–32)
MCHC RBC AUTO-ENTMCNC: 34.1 G/DL (ref 33–36)
MCV RBC AUTO: 89.4 FL (ref 82–95)
MONOCYTES # BLD AUTO: 0.33 10*3/MM3 (ref 0.19–1.3)
MONOCYTES NFR BLD AUTO: 5 % (ref 4–12)
NEUTROPHILS # BLD AUTO: 4.9 10*3/MM3 (ref 1.87–8.4)
NEUTROPHILS NFR BLD AUTO: 74.6 % (ref 39–78)
NT-PROBNP SERPL-MCNC: 3480 PG/ML (ref 0–900)
PLATELET # BLD AUTO: 127 10*3/MM3 (ref 130–400)
PMV BLD AUTO: 11.2 FL (ref 6–12)
POTASSIUM BLD-SCNC: 4.2 MMOL/L (ref 3.5–5.3)
PROT SERPL-MCNC: 7 G/DL (ref 6.3–8.7)
PROTHROMBIN TIME: 13.2 SECONDS (ref 11.9–14.6)
RBC # BLD AUTO: 5.57 10*6/MM3 (ref 4.8–5.9)
SODIUM BLD-SCNC: 139 MMOL/L (ref 135–145)
STRESS TARGET HR: 135 BPM
TROPONIN I SERPL-MCNC: 0.06 NG/ML (ref 0–0.03)
TROPONIN I SERPL-MCNC: 0.07 NG/ML (ref 0–0.03)
TROPONIN I SERPL-MCNC: 0.07 NG/ML (ref 0–0.03)
WBC NRBC COR # BLD: 6.57 10*3/MM3 (ref 4.8–10.8)
WHOLE BLOOD HOLD SPECIMEN: NORMAL
WHOLE BLOOD HOLD SPECIMEN: NORMAL

## 2018-05-04 PROCEDURE — 85379 FIBRIN DEGRADATION QUANT: CPT | Performed by: NURSE PRACTITIONER

## 2018-05-04 PROCEDURE — 99284 EMERGENCY DEPT VISIT MOD MDM: CPT

## 2018-05-04 PROCEDURE — 71045 X-RAY EXAM CHEST 1 VIEW: CPT

## 2018-05-04 PROCEDURE — 93010 ELECTROCARDIOGRAM REPORT: CPT | Performed by: INTERNAL MEDICINE

## 2018-05-04 PROCEDURE — 0 IOPAMIDOL PER 1 ML: Performed by: INTERNAL MEDICINE

## 2018-05-04 PROCEDURE — 99223 1ST HOSP IP/OBS HIGH 75: CPT | Performed by: INTERNAL MEDICINE

## 2018-05-04 PROCEDURE — G0378 HOSPITAL OBSERVATION PER HR: HCPCS

## 2018-05-04 PROCEDURE — 25010000002 ENOXAPARIN PER 10 MG: Performed by: NURSE PRACTITIONER

## 2018-05-04 PROCEDURE — 80053 COMPREHEN METABOLIC PANEL: CPT

## 2018-05-04 PROCEDURE — 84484 ASSAY OF TROPONIN QUANT: CPT | Performed by: NURSE PRACTITIONER

## 2018-05-04 PROCEDURE — 93005 ELECTROCARDIOGRAM TRACING: CPT | Performed by: INTERNAL MEDICINE

## 2018-05-04 PROCEDURE — 0399T ADULT TRANSTHORACIC ECHO COMPLETE W/ CONT IF NECESSARY PER PROTOCOL: CPT | Performed by: INTERNAL MEDICINE

## 2018-05-04 PROCEDURE — 93005 ELECTROCARDIOGRAM TRACING: CPT

## 2018-05-04 PROCEDURE — 83880 ASSAY OF NATRIURETIC PEPTIDE: CPT | Performed by: NURSE PRACTITIONER

## 2018-05-04 PROCEDURE — 85730 THROMBOPLASTIN TIME PARTIAL: CPT | Performed by: NURSE PRACTITIONER

## 2018-05-04 PROCEDURE — 84484 ASSAY OF TROPONIN QUANT: CPT

## 2018-05-04 PROCEDURE — 93306 TTE W/DOPPLER COMPLETE: CPT | Performed by: INTERNAL MEDICINE

## 2018-05-04 PROCEDURE — 82140 ASSAY OF AMMONIA: CPT | Performed by: NURSE PRACTITIONER

## 2018-05-04 PROCEDURE — 93306 TTE W/DOPPLER COMPLETE: CPT

## 2018-05-04 PROCEDURE — 84484 ASSAY OF TROPONIN QUANT: CPT | Performed by: INTERNAL MEDICINE

## 2018-05-04 PROCEDURE — 82962 GLUCOSE BLOOD TEST: CPT

## 2018-05-04 PROCEDURE — 0399T HC MYOCARDL STRAIN IMAG QUAN ASSMT PER SESS: CPT

## 2018-05-04 PROCEDURE — 36415 COLL VENOUS BLD VENIPUNCTURE: CPT | Performed by: NURSE PRACTITIONER

## 2018-05-04 PROCEDURE — 85610 PROTHROMBIN TIME: CPT | Performed by: NURSE PRACTITIONER

## 2018-05-04 PROCEDURE — 71275 CT ANGIOGRAPHY CHEST: CPT

## 2018-05-04 PROCEDURE — 63710000001 INSULIN LISPRO (HUMAN) PER 5 UNITS: Performed by: NURSE PRACTITIONER

## 2018-05-04 PROCEDURE — 85025 COMPLETE CBC W/AUTO DIFF WBC: CPT

## 2018-05-04 PROCEDURE — 4B02XTZ MEASUREMENT OF CARDIAC DEFIBRILLATOR, EXTERNAL APPROACH: ICD-10-PCS | Performed by: INTERNAL MEDICINE

## 2018-05-04 RX ORDER — SODIUM CHLORIDE 0.9 % (FLUSH) 0.9 %
10 SYRINGE (ML) INJECTION AS NEEDED
Status: DISCONTINUED | OUTPATIENT
Start: 2018-05-04 | End: 2018-05-09 | Stop reason: HOSPADM

## 2018-05-04 RX ORDER — ATORVASTATIN CALCIUM 40 MG/1
40 TABLET, FILM COATED ORAL NIGHTLY
Status: DISCONTINUED | OUTPATIENT
Start: 2018-05-04 | End: 2018-05-09 | Stop reason: HOSPADM

## 2018-05-04 RX ORDER — NICOTINE POLACRILEX 4 MG
15 LOZENGE BUCCAL
Status: DISCONTINUED | OUTPATIENT
Start: 2018-05-04 | End: 2018-05-09 | Stop reason: HOSPADM

## 2018-05-04 RX ORDER — ASPIRIN 81 MG/1
324 TABLET, CHEWABLE ORAL ONCE
Status: COMPLETED | OUTPATIENT
Start: 2018-05-04 | End: 2018-05-04

## 2018-05-04 RX ORDER — DEXTROSE MONOHYDRATE 25 G/50ML
25 INJECTION, SOLUTION INTRAVENOUS
Status: DISCONTINUED | OUTPATIENT
Start: 2018-05-04 | End: 2018-05-09 | Stop reason: HOSPADM

## 2018-05-04 RX ORDER — ACYCLOVIR 800 MG/1
400 TABLET ORAL DAILY
Status: DISCONTINUED | OUTPATIENT
Start: 2018-05-04 | End: 2018-05-09 | Stop reason: HOSPADM

## 2018-05-04 RX ORDER — ALBUTEROL SULFATE 2.5 MG/3ML
2.5 SOLUTION RESPIRATORY (INHALATION) EVERY 4 HOURS PRN
Status: DISCONTINUED | OUTPATIENT
Start: 2018-05-04 | End: 2018-05-09 | Stop reason: HOSPADM

## 2018-05-04 RX ORDER — ALBUTEROL SULFATE 90 UG/1
2 AEROSOL, METERED RESPIRATORY (INHALATION) EVERY 4 HOURS PRN
Status: DISCONTINUED | OUTPATIENT
Start: 2018-05-04 | End: 2018-05-04

## 2018-05-04 RX ADMIN — IOPAMIDOL 150 ML: 755 INJECTION, SOLUTION INTRAVENOUS at 15:29

## 2018-05-04 RX ADMIN — METOPROLOL TARTRATE 25 MG: 25 TABLET, FILM COATED ORAL at 17:40

## 2018-05-04 RX ADMIN — ENOXAPARIN SODIUM 100 MG: 100 INJECTION SUBCUTANEOUS at 17:39

## 2018-05-04 RX ADMIN — INSULIN LISPRO 6 UNITS: 100 INJECTION, SOLUTION INTRAVENOUS; SUBCUTANEOUS at 20:36

## 2018-05-04 RX ADMIN — DESMOPRESSIN ACETATE 40 MG: 0.2 TABLET ORAL at 20:36

## 2018-05-04 RX ADMIN — ASPIRIN 81 MG 324 MG: 81 TABLET ORAL at 13:06

## 2018-05-04 RX ADMIN — ACYCLOVIR 400 MG: 800 TABLET ORAL at 17:40

## 2018-05-04 NOTE — H&P
Chief Complaint   Patient presents with   • Chest Pain   • Shortness of Breath     HPI:  This is a 62-year-old male with a history of coronary artery disease, status post previous coronary artery bypass grafting (Dr. Wilson on 12/28/15, consisting of left internal mammary to left anterior descending artery, saphenous vein graft to the obtuse marginal, saphenous vein graft to the diagonal), chronic systolic congestive heart failure with an ICD in place, previous thymectomy after having a thymoma, hypertension, hyperlipidemia who presents with chief complaint of increasing shortness of breath.  The patient says that for about 2 weeks he has had progressive shortness of breath and dyspnea on exertion.  The patient was recently seen in our office and medications were adjusted at that time to further optimize his heart failure therapies.  Lisinopril was changed to Entresto.  Since that time, the patient says that he has felt generally fatigued.  He has noted his heart racing and pounding.  The patient also notes that his breath has been smelling like chlorine bleach.  Throughout this time, he has experienced, as noted, progressive shortness of breath and dyspnea on exertion, now to the point of shortness of breath with minimal exertion.  The patient has had some intermittent periods of sharp left-sided chest pain.  The patient uses one finger and points just left of midline, sharp, intermittent, moderate, no exacerbating or alleviating factors, no associated signs or symptoms.  The patient has never had this type of chest discomfort before.  The patient denies orthopnea but says that he has gotten up in the middle of the night short of breath on a few occasions.  The patient was short of breath therefore he increased his Lasix dose to double his prescribed dose.  He has noticed that his urine output has tapered off somewhat.  His weight has been stable and he denies any significant peripheral edema.  He has not  "experienced any cough, fevers, chills, wheezing.  At some point in the past couple of weeks, the patient noticed that his heart rate seemed to be variable and he notes a \"pounding\" in his chest intermittently.  He does have a history of an ICD placement but has never been known to have any form of atrial arrhythmia.  He initially called our office for a follow-up appointment.  However, his symptoms worsened today and therefore he presented to the emergency department.    Past Medical History:   Diagnosis Date   • Acute pansinusitis 1/4/2018   • Arrhythmia    • Cancer     malignant chest tumor   • Cardiomyopathy 12/27/2016   • CHF (congestive heart failure)    • Chronic laryngitis 1/4/2018   • Coronary artery disease    • Diabetes mellitus    • Disorder of liver 8/12/2013   • GERD (gastroesophageal reflux disease)    • History of adenomatous polyp of colon 06/28/2010    TUBULAR ADENOMA AT 40CM   • Hyperlipidemia    • Hypertension    • Obesity (BMI 30-39.9) 1/5/2018   • Pacemaker    • Shingles      Past Surgical History:   Procedure Laterality Date   • ANKLE SURGERY Right    • CARDIAC CATHETERIZATION     • CARDIAC ELECTROPHYSIOLOGY PROCEDURE Left 10/6/2016    Procedure: ICD DC new;  Surgeon: Zander Andino MD;  Location: [facility] CATH INVASIVE LOCATION;  Service:    • COLONOSCOPY  06/28/2010    biopsy at 41, 2 mm polyp supboptimal prep right clon    • COLONOSCOPY W/ POLYPECTOMY  06/28/2010    POLYP AT 40CM TUBULAR ADENOMA, SUBOPTIMAL PREP   • CORONARY ARTERY BYPASS GRAFT     • HEMORRHOIDECTOMY  1980   • HERNIA REPAIR     • PACEMAKER IMPLANTATION     • TONSILLECTOMY       No current facility-administered medications on file prior to encounter.      Current Outpatient Prescriptions on File Prior to Encounter   Medication Sig Dispense Refill   • acyclovir (ZOVIRAX) 400 MG tablet Take 1 tablet by mouth Daily. Take no more than 5 doses a day. 90 tablet 2   • albuterol (PROVENTIL HFA;VENTOLIN HFA) 108 (90 Base) MCG/ACT " inhaler Inhale 2 puffs Every 4 (Four) Hours As Needed for Wheezing or Shortness of Air.     • atorvastatin (LIPITOR) 80 MG tablet Take 1 tablet by mouth Daily. 90 tablet 3   • [facility] ASPIRIN CHILD 81 MG chewable tablet Chew 81 mg Daily.  2   • fluticasone (FLONASE) 50 MCG/ACT nasal spray 2 sprays into each nostril As Needed.     • furosemide (LASIX) 40 MG tablet Take 1 tablet by mouth Daily As Needed (edema, SOA). 90 tablet 3   • lisinopril (PRINIVIL,ZESTRIL) 20 MG tablet Take 1 tablet by mouth Daily. 90 tablet 3   • metFORMIN (GLUCOPHAGE) 1000 MG tablet Take 1 tablet by mouth 2 (Two) Times a Day With Meals. 180 tablet 2   • metoprolol tartrate (LOPRESSOR) 25 MG tablet Take 1 tablet by mouth Every 12 (Twelve) Hours. 180 tablet 2     Allergies   Allergen Reactions   • Cephalexin Anaphylaxis     Causes swelling of tongue, eyes, throat     Social History   Substance Use Topics   • Smoking status: Former Smoker     Years: 25.00     Types: Cigarettes   • Smokeless tobacco: Never Used      Comment: QUIT 20 YEARS AGO   • Alcohol use No     Family History   Problem Relation Age of Onset   • Diabetes Mother    • Colon cancer Mother    • Heart disease Mother    • Hypertension Mother    • Stroke Mother    • Osteoporosis Mother    • Cancer Mother    • Heart disease Father    • Hypertension Father    • Cancer Father    • Bipolar disorder Sister    • Hypertension Brother    • Colon polyps Brother    • No Known Problems Son    • Heart disease Maternal Grandmother    • Diabetes Maternal Grandfather    • Heart disease Maternal Grandfather    • Heart disease Paternal Grandmother    • Diabetes Paternal Grandfather    • Heart disease Paternal Grandfather      Review of Systems   Constitution: Positive for malaise/fatigue. Negative for chills, fever, night sweats and weight loss.   HENT: Negative for congestion and hearing loss.         Notes that breath smells like chlorine since starting Entresto   Eyes: Negative for blurred vision and  "pain.   Cardiovascular: Positive for chest pain, dyspnea on exertion, palpitations and paroxysmal nocturnal dyspnea. Negative for claudication, leg swelling, orthopnea and syncope.   Respiratory: Positive for shortness of breath. Negative for cough, hemoptysis and wheezing.    Endocrine: Negative for cold intolerance, heat intolerance, polydipsia and polyuria.   Hematologic/Lymphatic: Negative for adenopathy and bleeding problem. Does not bruise/bleed easily.   Skin: Negative for color change, poor wound healing and rash.   Musculoskeletal: Negative for arthritis, back pain, joint pain, joint swelling, myalgias and neck pain.   Gastrointestinal: Negative for abdominal pain, change in bowel habit, constipation, diarrhea, heartburn, hematochezia, melena, nausea and vomiting.   Genitourinary: Negative for dysuria, frequency, hematuria and nocturia.   Neurological: Negative for dizziness, focal weakness, headaches, light-headedness, loss of balance, numbness and seizures.   Psychiatric/Behavioral: Negative for altered mental status, memory loss and substance abuse.   Allergic/Immunologic: Negative for hives and persistent infections.     Physical Exam:    /88 (BP Location: Left arm, Patient Position: Lying)   Pulse 110   Temp 98.2 °F (36.8 °C) (Oral)   Resp 16   Ht 182.9 cm (72\")   Wt 102 kg (225 lb)   SpO2 100%   BMI 30.52 kg/m²     Physical Exam   Constitutional: He is oriented to person, place, and time. Vital signs are normal. He appears well-developed and well-nourished. He is cooperative.  Non-toxic appearance. No distress.   HENT:   Head: Normocephalic and atraumatic.   Right Ear: External ear normal.   Left Ear: External ear normal.   Nose: Nose normal.   Mouth/Throat: Uvula is midline, oropharynx is clear and moist and mucous membranes are normal. Mucous membranes are not pale, not dry and not cyanotic. No oropharyngeal exudate.   Eyes: EOM and lids are normal. Pupils are equal, round, and reactive " to light.   Neck: Normal range of motion. Neck supple. No hepatojugular reflux and no JVD present. Carotid bruit is not present. No tracheal deviation and no edema present. No thyroid mass and no thyromegaly present.   Cardiovascular: S1 normal, S2 normal, normal heart sounds, intact distal pulses and normal pulses.  An irregularly irregular rhythm present.  No extrasystoles are present. Tachycardia present.  PMI is not displaced.  Exam reveals no gallop and no friction rub.    No murmur heard.  Pulses:       Radial pulses are 2+ on the right side, and 2+ on the left side.        Femoral pulses are 2+ on the right side, and 2+ on the left side.       Dorsalis pedis pulses are 2+ on the right side, and 2+ on the left side.        Posterior tibial pulses are 2+ on the right side, and 2+ on the left side.   Pulmonary/Chest: Effort normal and breath sounds normal. No accessory muscle usage. No respiratory distress. He has no wheezes. He has no rales. He exhibits no tenderness.   Abdominal: Soft. Normal appearance and bowel sounds are normal. He exhibits no distension, no abdominal bruit and no pulsatile midline mass. There is no hepatosplenomegaly. There is no tenderness.   Musculoskeletal: Normal range of motion. He exhibits edema (trace pretibial edema). He exhibits no tenderness or deformity.   Lymphadenopathy:     He has no cervical adenopathy.   Neurological: He is oriented to person, place, and time. He has normal strength. No cranial nerve deficit.   Skin: Skin is warm, dry and intact. No rash noted. He is not diaphoretic. No cyanosis or erythema. Nails show no clubbing.   Psychiatric: He has a normal mood and affect. His speech is normal and behavior is normal. Thought content normal.   Nursing note and vitals reviewed.    Diagnostic Data:    Lab Results   Component Value Date    WBC 6.57 05/04/2018    HGB 17.0 05/04/2018    HCT 49.8 05/04/2018    MCV 89.4 05/04/2018     (L) 05/04/2018     Lab Results    Component Value Date    GLUCOSE 192 (H) 05/04/2018    CALCIUM 9.0 05/04/2018     05/04/2018    K 4.2 05/04/2018    CO2 27.0 05/04/2018    CL 98 05/04/2018    BUN 40 (H) 05/04/2018    CREATININE 1.47 (H) 05/04/2018    EGFRIFAFRI 73 11/08/2017    EGFRIFNONA 49 (L) 05/04/2018    BCR 27.2 (H) 05/04/2018    ANIONGAP 14.0 (H) 05/04/2018     Tn 0.058  BNP 3480  AST 26, ALT 43, Total bilirubin 1.9  D-dimer 0.34  Ammonia < 9  INR 0.97    CXR: Cardiomegaly without acute changes (personally viewed)    ECG: Afib with RVR, PVC, LAD, LBBB    ASSESSMENT/PLAN:    1.  Paroxysmal atrial fibrillation with rapid ventricular response  2.  Acute renal failure  3.  Atypical chest pain  4.  Chronic systolic congestive heart failure without evidence of acute exacerbation  5.  Mild thrombocytopenia without active bleeding  6.  Essential hypertension   7.  Mixed hyperlipidemia  8.  Type II diabetes mellitus without long-term use of insulin without obvious complication  9.  Troponin elevation, with baseline elevation of troponin based on previous readings.    - The patient's workup in the emergency department today essentially identified an elevated troponin that is consistent with prior readings, atrial fibrillation which is this patient's first diagnosis of such, acute renal insufficiency.  The patient seems to relate all of his symptoms to the starting of Entresto.  I have never seen this particular medication cause this constellation of symptoms, however we will certainly discontinue this at this particular time.  More likely, this patient has over diuresed himself as he has doubled his Lasix lately due to shortness of breath.  It may be that his shortness of breath, palpitations may be related to new onset atrial fibrillation.  - We will plan to admit to 4B with telemetry  - Hold diuretics and recheck BMP in AM to recheck renal function  - Continue to trend troponin levels although do not suspect ACS (patient with stable Tn  elevation at last visit as well)  - Hold Entresto, and may need to reinstitute ACE inhibitor therapy at a later date, pending BP and renal function  - Continue beta blocker therapy, titrated as needed for heart rate control  - Therapeutic anticoagulation with Lovenox at this time, given what is newly diagnosed atrial fibrillation - can convert to NOAC prior to discharge  - The patient's d-dimer is normal, however with his for found shortness of breath and new onset atrial fibrillation, it is at least reasonable to consider potential pulmonary embolus, although the patient has no obvious risk factors.  I will order a CT angiogram of the chest to further evaluate, which will also get some evaluation of the patient's lungs as well.  His chest x-ray, of note, was largely unremarkable.  - Check echocardiogram, as the patient's last echo was in 2016.  - Hold metformin and cover with SSI  - Decrease statin dose to previous dose as this was also increased at last office visit  - Full code

## 2018-05-04 NOTE — TELEPHONE ENCOUNTER
Spoke with patient and he was on his way to ER with chest pain, shortness of breath, feels like his heart is going to beat out of his chest. Patient still ask that we go ahead and make appointment for him on Monday.

## 2018-05-04 NOTE — PLAN OF CARE
Problem: Patient Care Overview  Goal: Plan of Care Review  Outcome: Ongoing (interventions implemented as appropriate)   05/04/18 1884   Coping/Psychosocial   Plan of Care Reviewed With patient   Plan of Care Review   Progress no change   OTHER   Outcome Summary VSS, pt reports chest pain 2/10, constant aching, MD aware, pt has no further questions or complaints at this time, family member x1 at the bedside, initiating plan of care at this time, will review orders, will continue to monitor.      Goal: Discharge Needs Assessment  Outcome: Ongoing (interventions implemented as appropriate)    Goal: Interprofessional Rounds/Family Conf  Outcome: Ongoing (interventions implemented as appropriate)      Problem: Cardiac: ACS (Acute Coronary Syndrome) (Adult)  Goal: Signs and Symptoms of Listed Potential Problems Will be Absent, Minimized or Managed (Cardiac: ACS)  Outcome: Ongoing (interventions implemented as appropriate)

## 2018-05-04 NOTE — ED PROVIDER NOTES
Subjective   Is a 62-year-old  male presents ER today with complaint of chest pain shortness of breath.  Patient reports he has had shortness of breath for the past 2 weeks after his medication was changed from lisinopril to the nostril.  The patient states that he also feels as though he is Clear.  He states that his weight has not changed over the past 2 weeks.  The patient has no new swelling in his lower extremities.  The patient reports that since starting the stroke his wife is told him that his breath smelled like bleach.  The patient states that he has not used any bleach products recently.  The patient reports that last night he began to develop some chest pain in the left side of his chest.  It was a nonradiating pain.  The patient states that the pain is a 2 out of 10 at this time and actually is better.  The patient is resting comfortably on the bed and in no acute distress.  She denies any nausea vomiting or diaphoresis with this.    Patient has a history of cardiomyopathy and congestive heart failure.  Patient is status post coronary artery bypass grafting ×3 2015.  The patient does have a pacemaker and defibrillator was placed several years ago.  He reports a history of diabetes hypertension CAD and hyperlipidemia as well.  The patient does normally follow with Dr. Henry with cardiology for this.  He presents here today for further evaluation.        History provided by:  Patient   used: No    Chest Pain   Pain location:  L chest  Pain quality: aching and dull    Pain radiates to:  Does not radiate  Pain severity:  Mild  Onset quality:  Sudden  Duration:  1 day  Timing:  Constant  Progression:  Improving  Chronicity:  New  Context: not breathing, not drug use, not eating, not intercourse, not lifting, not movement, not raising an arm, not stress and not trauma    Relieved by:  Nothing  Worsened by:  Nothing  Ineffective treatments:  None tried  Associated symptoms:  palpitations and shortness of breath    Associated symptoms: no abdominal pain, no AICD problem, no altered mental status, no anorexia, no anxiety, no back pain, no claudication, no cough, no diaphoresis, no dizziness, no dysphagia, no fatigue, no fever, no headache, no heartburn, no lower extremity edema, no nausea, no near-syncope, no numbness, no orthopnea, no PND, no syncope, no vomiting and no weakness    Risk factors: coronary artery disease, diabetes mellitus, high cholesterol, hypertension, male sex and obesity    Risk factors: no aortic disease, no birth control, no Jose-Danlos syndrome, no immobilization, no Marfan's syndrome, not pregnant, no prior DVT/PE, no smoking and no surgery        Review of Systems   Constitutional: Negative for diaphoresis, fatigue and fever.   HENT: Negative for trouble swallowing.    Respiratory: Positive for shortness of breath. Negative for cough.    Cardiovascular: Positive for chest pain and palpitations. Negative for orthopnea, claudication, syncope, PND and near-syncope.   Gastrointestinal: Negative for abdominal pain, anorexia, heartburn, nausea and vomiting.   Musculoskeletal: Negative for back pain.   Neurological: Negative for dizziness, weakness, numbness and headaches.   All other systems reviewed and are negative.      Past Medical History:   Diagnosis Date   • Acute pansinusitis 1/4/2018   • Arrhythmia    • Cancer     malignant chest tumor   • Cardiomyopathy 12/27/2016   • CHF (congestive heart failure)    • Chronic laryngitis 1/4/2018   • Coronary artery disease    • Diabetes mellitus    • Disorder of liver 8/12/2013   • GERD (gastroesophageal reflux disease)    • History of adenomatous polyp of colon 06/28/2010    TUBULAR ADENOMA AT 40CM   • Hyperlipidemia    • Hypertension    • Obesity (BMI 30-39.9) 1/5/2018   • Pacemaker    • Shingles        Allergies   Allergen Reactions   • Cephalexin Anaphylaxis     Causes swelling of tongue, eyes, throat       Past  Surgical History:   Procedure Laterality Date   • ANKLE SURGERY Right    • CARDIAC CATHETERIZATION     • CARDIAC ELECTROPHYSIOLOGY PROCEDURE Left 10/6/2016    Procedure: ICD DC new;  Surgeon: Zander Andino MD;  Location:  [facility] CATH INVASIVE LOCATION;  Service:    • COLONOSCOPY  06/28/2010    biopsy at 41, 2 mm polyp supboptimal prep right clon    • COLONOSCOPY W/ POLYPECTOMY  06/28/2010    POLYP AT 40CM TUBULAR ADENOMA, SUBOPTIMAL PREP   • CORONARY ARTERY BYPASS GRAFT     • HEMORRHOIDECTOMY  1980   • HERNIA REPAIR     • PACEMAKER IMPLANTATION     • TONSILLECTOMY         Family History   Problem Relation Age of Onset   • Diabetes Mother    • Colon cancer Mother    • Heart disease Mother    • Hypertension Mother    • Stroke Mother    • Osteoporosis Mother    • Cancer Mother    • Heart disease Father    • Hypertension Father    • Cancer Father    • Bipolar disorder Sister    • Hypertension Brother    • Colon polyps Brother    • No Known Problems Son    • Heart disease Maternal Grandmother    • Diabetes Maternal Grandfather    • Heart disease Maternal Grandfather    • Heart disease Paternal Grandmother    • Diabetes Paternal Grandfather    • Heart disease Paternal Grandfather        Social History     Social History   • Marital status:      Social History Main Topics   • Smoking status: Former Smoker     Years: 25.00     Types: Cigarettes   • Smokeless tobacco: Never Used      Comment: QUIT 20 YEARS AGO   • Alcohol use No   • Drug use: No   • Sexual activity: No     Other Topics Concern   • Not on file           Objective   Physical Exam   Constitutional: He is oriented to person, place, and time. He appears well-developed and well-nourished.   HENT:   Head: Normocephalic and atraumatic.   Cardiovascular: Normal rate, regular rhythm and normal heart sounds.    Pulmonary/Chest: Effort normal and breath sounds normal.   Abdominal: Soft. Bowel sounds are normal.   Neurological: He is alert and oriented to  person, place, and time.   Skin: Skin is warm and dry. Capillary refill takes less than 2 seconds.   Psychiatric: He has a normal mood and affect.   Nursing note and vitals reviewed.      Procedures           ED Course  ED Course   Comment By Time   The patient's labs are reviewed.  We did check an ammonia level to the smell of bleach on his breath patient reported this was negative.  The BNP was 3480.  This is similar to his other labs in the past.  D-dimer was negative.  Troponin was 0.058.  The patient has chronically elevated troponins.  The patient's CMP showed Ab1 at 40 and creatinine 1.4.  This is slightly elevated from his previous labs.  Bilirubin also elevated at 1.9. Pretty Fisher, APRN 05/04 1417   X-ray shows marked cardiomegaly.  No acute findings noted.  Review today was compared to the from November 2017. No signficant changes noted.  The patient's pacemaker was interrogated.  This did show a run of Vtach for 23 beats.  Pretty Fisher, APRN 05/04 1421   At this time I have discussed with pt case with Dr. Meza who agrees with plan of care. I have placed a call to cardiology and discussed pt case with TESSA Mcallister with cardiology. They will send someone to evaluate pt. Pt and wife updated on all labs and radiology reports and status.  Pretty Fisher, APRN 05/04 1422   Spoke with ANA Bradley with cardiology; pt will be admitted to their services at this time.  Pretty Fisher, APRN 05/04 1511                  MDM  Number of Diagnoses or Management Options  Atrial fibrillation, unspecified type: new and requires workup  Chest pain, unspecified type: new and requires workup  Congestive heart failure, unspecified congestive heart failure chronicity, unspecified congestive heart failure type: new and requires workup     Amount and/or Complexity of Data Reviewed  Clinical lab tests: ordered and reviewed  Tests in the radiology section of CPT®: ordered and reviewed  Tests in the medicine section of  CPT®: ordered and reviewed  Decide to obtain previous medical records or to obtain history from someone other than the patient: yes  Discuss the patient with other providers: yes    Patient Progress  Patient progress: stable        Final diagnoses:   Chest pain, unspecified type   Atrial fibrillation, unspecified type   Congestive heart failure, unspecified congestive heart failure chronicity, unspecified congestive heart failure type            Pretty Fisher, APRN  05/04/18 1513

## 2018-05-05 LAB
ANION GAP SERPL CALCULATED.3IONS-SCNC: 17 MMOL/L (ref 4–13)
BUN BLD-MCNC: 41 MG/DL (ref 5–21)
BUN/CREAT SERPL: 25.8 (ref 7–25)
CALCIUM SPEC-SCNC: 8.8 MG/DL (ref 8.4–10.4)
CHLORIDE SERPL-SCNC: 97 MMOL/L (ref 98–110)
CO2 SERPL-SCNC: 24 MMOL/L (ref 24–31)
CREAT BLD-MCNC: 1.59 MG/DL (ref 0.5–1.4)
GFR SERPL CREATININE-BSD FRML MDRD: 44 ML/MIN/1.73
GLUCOSE BLD-MCNC: 203 MG/DL (ref 70–100)
GLUCOSE BLDC GLUCOMTR-MCNC: 172 MG/DL (ref 70–130)
GLUCOSE BLDC GLUCOMTR-MCNC: 190 MG/DL (ref 70–130)
GLUCOSE BLDC GLUCOMTR-MCNC: 205 MG/DL (ref 70–130)
GLUCOSE BLDC GLUCOMTR-MCNC: 228 MG/DL (ref 70–130)
POTASSIUM BLD-SCNC: 3.7 MMOL/L (ref 3.5–5.3)
SODIUM BLD-SCNC: 138 MMOL/L (ref 135–145)

## 2018-05-05 PROCEDURE — 25010000002 ENOXAPARIN PER 10 MG: Performed by: NURSE PRACTITIONER

## 2018-05-05 PROCEDURE — 99232 SBSQ HOSP IP/OBS MODERATE 35: CPT | Performed by: NURSE PRACTITIONER

## 2018-05-05 PROCEDURE — 82962 GLUCOSE BLOOD TEST: CPT

## 2018-05-05 PROCEDURE — G0378 HOSPITAL OBSERVATION PER HR: HCPCS

## 2018-05-05 PROCEDURE — 63710000001 INSULIN LISPRO (HUMAN) PER 5 UNITS: Performed by: NURSE PRACTITIONER

## 2018-05-05 PROCEDURE — 80048 BASIC METABOLIC PNL TOTAL CA: CPT | Performed by: NURSE PRACTITIONER

## 2018-05-05 RX ORDER — METOPROLOL TARTRATE 50 MG/1
50 TABLET, FILM COATED ORAL EVERY 12 HOURS SCHEDULED
Status: DISCONTINUED | OUTPATIENT
Start: 2018-05-05 | End: 2018-05-06

## 2018-05-05 RX ADMIN — INSULIN LISPRO 2 UNITS: 100 INJECTION, SOLUTION INTRAVENOUS; SUBCUTANEOUS at 20:17

## 2018-05-05 RX ADMIN — DESMOPRESSIN ACETATE 40 MG: 0.2 TABLET ORAL at 20:17

## 2018-05-05 RX ADMIN — INSULIN LISPRO 4 UNITS: 100 INJECTION, SOLUTION INTRAVENOUS; SUBCUTANEOUS at 08:33

## 2018-05-05 RX ADMIN — ENOXAPARIN SODIUM 100 MG: 100 INJECTION SUBCUTANEOUS at 05:56

## 2018-05-05 RX ADMIN — METOPROLOL TARTRATE 25 MG: 25 TABLET, FILM COATED ORAL at 14:03

## 2018-05-05 RX ADMIN — ACYCLOVIR 400 MG: 800 TABLET ORAL at 17:02

## 2018-05-05 RX ADMIN — INSULIN LISPRO 2 UNITS: 100 INJECTION, SOLUTION INTRAVENOUS; SUBCUTANEOUS at 17:02

## 2018-05-05 RX ADMIN — METOPROLOL TARTRATE 50 MG: 50 TABLET, FILM COATED ORAL at 20:17

## 2018-05-05 RX ADMIN — INSULIN LISPRO 4 UNITS: 100 INJECTION, SOLUTION INTRAVENOUS; SUBCUTANEOUS at 11:52

## 2018-05-05 RX ADMIN — ENOXAPARIN SODIUM 100 MG: 100 INJECTION SUBCUTANEOUS at 17:02

## 2018-05-05 RX ADMIN — METOPROLOL TARTRATE 25 MG: 25 TABLET, FILM COATED ORAL at 08:33

## 2018-05-05 NOTE — PLAN OF CARE
Problem: Patient Care Overview  Goal: Plan of Care Review  Outcome: Ongoing (interventions implemented as appropriate)   05/05/18 0422   Coping/Psychosocial   Plan of Care Reviewed With patient   Plan of Care Review   Progress no change   OTHER   Outcome Summary VSS. Afib . Pt got up to 169 with activity. Pt reports constant chest pain 2/10. Will continue to monitor.        Problem: Cardiac: ACS (Acute Coronary Syndrome) (Adult)  Goal: Signs and Symptoms of Listed Potential Problems Will be Absent, Minimized or Managed (Cardiac: ACS)  Outcome: Ongoing (interventions implemented as appropriate)   05/05/18 0422   Goal/Outcome Evaluation   Problems Assessed (Acute Coronary Syndrome) all   Problems Present (Acute Coronary Syn) chest pain (angina)

## 2018-05-05 NOTE — PLAN OF CARE
Problem: Patient Care Overview  Goal: Plan of Care Review  Outcome: Ongoing (interventions implemented as appropriate)   05/05/18 1537   Coping/Psychosocial   Plan of Care Reviewed With patient   Plan of Care Review   Progress no change   OTHER   Outcome Summary VSS, Afib 102-135, up to 175 7 bt VT, c/o mild int CP reproducible with palpation, no new issues noted at this time/cont to monitor     Goal: Individualization and Mutuality  Outcome: Ongoing (interventions implemented as appropriate)    Goal: Discharge Needs Assessment  Outcome: Ongoing (interventions implemented as appropriate)    Goal: Interprofessional Rounds/Family Conf  Outcome: Ongoing (interventions implemented as appropriate)      Problem: Cardiac: ACS (Acute Coronary Syndrome) (Adult)  Goal: Signs and Symptoms of Listed Potential Problems Will be Absent, Minimized or Managed (Cardiac: ACS)  Outcome: Ongoing (interventions implemented as appropriate)      Problem: Arrhythmia/Dysrhythmia (Symptomatic) (Adult)  Goal: Signs and Symptoms of Listed Potential Problems Will be Absent, Minimized or Managed (Arrhythmia/Dysrhythmia)  Outcome: Ongoing (interventions implemented as appropriate)

## 2018-05-05 NOTE — PROGRESS NOTES
[facility] -  Progress Note     LOS: 0 days   Patient Care Team:  Hernandez Dupree DO as PCP - General (Internal Medicine)  Corina Haddad RN as Registered Nurse  Phil Henry MD as Cardiologist (Cardiology)    Chief Complaint: shortness of breath, palpitations     Subjective     Interval History:     Patient Complaints: The patient reports he continues to have palpitations and shortness of breath with minimal exertion. He states he is urinating a lot and his shortness of breath improves after urination. He admits fatigue. He denies chest pain. His HR is in the low 100s at rest and gets up to 150s with minimal exertion. The tachycardia worsens his dyspnea.         Review of Systems:     Review of Systems   Constitutional: Positive for fatigue. Negative for diaphoresis, fever and unexpected weight change.   HENT: Negative for nosebleeds.    Respiratory: Positive for shortness of breath. Negative for apnea, cough, chest tightness and wheezing.    Cardiovascular: Positive for palpitations. Negative for chest pain and leg swelling.   Gastrointestinal: Negative for abdominal distention, nausea and vomiting.   Genitourinary: Negative for hematuria.   Musculoskeletal: Negative for gait problem.   Skin: Negative for color change.   Neurological: Positive for weakness. Negative for dizziness, syncope and light-headedness.     Objective     Vital Sign Min/Max for last 24 hours  Temp  Min: 98 °F (36.7 °C)  Max: 98.6 °F (37 °C)   BP  Min: 100/67  Max: 144/90   Pulse  Min: 97  Max: 110   Resp  Min: 14  Max: 20   SpO2  Min: 98 %  Max: 99 %   No Data Recorded   Weight  Min: 103 kg (226 lb 4 oz)  Max: 103 kg (226 lb 4 oz)     1    05/04/18  1643   Weight: 103 kg (226 lb 4 oz)       Physical Exam:    Physical Exam   Constitutional: He is oriented to person, place, and time. He appears well-developed and well-nourished. No distress.   HENT:   Head: Normocephalic and atraumatic.   Eyes: Pupils  are equal, round, and reactive to light.   Neck: Normal range of motion. Neck supple. No JVD present. No thyromegaly present.   Cardiovascular: Normal heart sounds and intact distal pulses.  An irregular rhythm present. Tachycardia present.  Exam reveals no gallop and no friction rub.    No murmur heard.  Pulmonary/Chest: Effort normal and breath sounds normal. No respiratory distress. He has no wheezes. He has no rales. He exhibits no tenderness.   Abdominal: Soft. Bowel sounds are normal. He exhibits no distension. There is no tenderness.   Musculoskeletal: Normal range of motion. He exhibits no edema.   Neurological: He is alert and oriented to person, place, and time. No cranial nerve deficit.   Skin: Skin is warm and dry. He is not diaphoretic.   Psychiatric: He has a normal mood and affect. His behavior is normal.     Results Review:   Lab Results (last 72 hours)     Procedure Component Value Units Date/Time    POC Glucose Once [[id number]]  (Abnormal) Collected:  05/05/18 1124    Specimen:  Blood Updated:  05/05/18 1158     Glucose 228 (H) mg/dL      Comment: : [id number] [employer] ID: [id number]       POC Glucose Once [[id number]]  (Abnormal) Collected:  05/05/18 0744    Specimen:  Blood Updated:  05/05/18 0813     Glucose 205 (H) mg/dL      Comment: : [id number] [employer] ID: [id number]       Basic Metabolic Panel [[id number]]  (Abnormal) Collected:  05/05/18 0331    Specimen:  Blood Updated:  05/05/18 0351     Glucose 203 (H) mg/dL      BUN 41 (H) mg/dL      Creatinine 1.59 (H) mg/dL      Sodium 138 mmol/L      Potassium 3.7 mmol/L      Chloride 97 (L) mmol/L      CO2 24.0 mmol/L      Calcium 8.8 mg/dL      eGFR Non African Amer 44 (L) mL/min/1.73      BUN/Creatinine Ratio 25.8 (H)     Anion Gap 17.0 (H) mmol/L     Narrative:       GFR Normal >60  Chronic Kidney Disease <60  Kidney Failure <15    Troponin [[id number]]  (Abnormal) Collected:  05/04/18 7395    Specimen:  Blood Updated:   05/04/18 2341     Troponin I 0.056 (H) ng/mL     Troponin [[id number]]  (Abnormal) Collected:  05/04/18 2022    Specimen:  Blood Updated:  05/04/18 2054     Troponin I 0.064 (H) ng/mL     POC Glucose Once [[id number]]  (Abnormal) Collected:  05/04/18 2020    Specimen:  Blood Updated:  05/04/18 2031     Glucose 280 (H) mg/dL      Comment: : [id number] [facility] [id number]       Troponin [[id number]]  (Abnormal) Collected:  05/04/18 1753    Specimen:  Blood Updated:  05/04/18 1827     Troponin I 0.066 (H) ng/mL     POC Glucose Once [[id number]]  (Normal) Collected:  05/04/18 1717    Specimen:  Blood Updated:  05/04/18 1748     Glucose 120 mg/dL      Comment: : [id number] [facility]: [id number]       Troponin [[id number]]  (Abnormal) Collected:  05/04/18 1456    Specimen:  Blood Updated:  05/04/18 1525     Troponin I 0.067 (H) ng/mL     D-dimer, Quantitative [[id number]]  (Normal) Collected:  05/04/18 1120    Specimen:  Blood Updated:  05/04/18 1242     D-Dimer, Quantitative 0.34 mg/L (FEU)     Narrative:       Reference Range is 0-0.50 mg/L FEU. However, results <0.50 mg/L FEU tends to rule out DVT or PE. Results >0.50 mg/L FEU are not useful in predicting absence or presence of DVT or PE.    Protime-INR [[id number]]  (Normal) Collected:  05/04/18 1120    Specimen:  Blood Updated:  05/04/18 1242     Protime 13.2 Seconds      INR 0.97    aPTT [[id number]]  (Normal) Collected:  05/04/18 1120    Specimen:  Blood Updated:  05/04/18 1242     PTT 31.2 seconds     [place] Draw [[id number]] Collected:  05/04/18 1120    Specimen:  Blood Updated:  05/04/18 1230    Narrative:       The following orders were created for panel order [place] Draw.  Procedure                               Abnormality         Status                     ---------                               -----------         ------                     Light Blue Top[[id number]]                                   Final result               Green  Top (Gel)[[id number]]                                  Final result               Lavender Top[[id number]]                                     Final result               Red Top[[id number]]                                          Final result                 Please view results for these tests on the individual orders.    Light Blue Top [[id number]] Collected:  05/04/18 1120    Specimen:  Blood Updated:  05/04/18 1230     Extra Tube hold for add-on     Comment: Auto resulted       Green Top (Gel) [[id number]] Collected:  05/04/18 1120    Specimen:  Blood Updated:  05/04/18 1230     Extra Tube Hold for add-ons.     Comment: Auto resulted.       Lavender Top [[id number]] Collected:  05/04/18 1120    Specimen:  Blood Updated:  05/04/18 1230     Extra Tube hold for add-on     Comment: Auto resulted       Red Top [[id number]] Collected:  05/04/18 1120    Specimen:  Blood Updated:  05/04/18 1230     Extra Tube Hold for add-ons.     Comment: Auto resulted.       Ammonia [[id number]]  (Abnormal) Collected:  05/04/18 1209    Specimen:  Blood Updated:  05/04/18 1226     Ammonia <9 (L) umol/L     BNP [[id number]]  (Abnormal) Collected:  05/04/18 1120    Specimen:  Blood Updated:  05/04/18 1225     proBNP 3,480.0 (H) pg/mL     Troponin [[id number]]  (Abnormal) Collected:  05/04/18 1120    Specimen:  Blood Updated:  05/04/18 1152     Troponin I 0.058 (H) ng/mL     Comprehensive Metabolic Panel [[id number]]  (Abnormal) Collected:  05/04/18 1120    Specimen:  Blood Updated:  05/04/18 1141     Glucose 192 (H) mg/dL      BUN 40 (H) mg/dL      Creatinine 1.47 (H) mg/dL      Sodium 139 mmol/L      Potassium 4.2 mmol/L      Chloride 98 mmol/L      CO2 27.0 mmol/L      Calcium 9.0 mg/dL      Total Protein 7.0 g/dL      Albumin 4.50 g/dL      ALT (SGPT) 43 U/L      AST (SGOT) 26 U/L      Alkaline Phosphatase 53 U/L      Total Bilirubin 1.9 (H) mg/dL      eGFR Non African Amer 49 (L) mL/min/1.73      Globulin 2.5 gm/dL      A/G Ratio 1.8 g/dL       BUN/Creatinine Ratio 27.2 (H)     Anion Gap 14.0 (H) mmol/L     CBC & Differential [[id number]] Collected:  05/04/18 1120    Specimen:  Blood Updated:  05/04/18 1135    Narrative:       The following orders were created for panel order CBC & Differential.  Procedure                               Abnormality         Status                     ---------                               -----------         ------                     Manual Differential[[id number]]                                                         CBC Auto Differential[[id number]]        Abnormal            Final result                 Please view results for these tests on the individual orders.    CBC Auto Differential [[id number]]  (Abnormal) Collected:  05/04/18 1120    Specimen:  Blood Updated:  05/04/18 1135     WBC 6.57 10*3/mm3      RBC 5.57 10*6/mm3      Hemoglobin 17.0 g/dL      Hematocrit 49.8 %      MCV 89.4 fL      MCH 30.5 pg      MCHC 34.1 g/dL      RDW 12.2 %      RDW-SD 39.9 (L) fl      MPV 11.2 fL      Platelets 127 (L) 10*3/mm3      Neutrophil % 74.6 %      Lymphocyte % 17.8 %      Monocyte % 5.0 %      Eosinophil % 1.2 %      Basophil % 0.9 %      Neutrophils, Absolute 4.90 10*3/mm3      Lymphocytes, Absolute 1.17 10*3/mm3      Monocytes, Absolute 0.33 10*3/mm3      Eosinophils, Absolute 0.08 10*3/mm3      Basophils, Absolute 0.06 10*3/mm3               Echo EF Estimated  No results found for: ECHOEFEST      Cath Ejection Fraction Quantitative  No results found for: CATHEF        Medication Review: yes  Current Facility-Administered Medications   Medication Dose Route Frequency Provider Last Rate Last Dose   • acyclovir (ZOVIRAX) tablet 400 mg  400 mg Oral Daily Kirk Cohn APRN   400 mg at 05/04/18 1740   • albuterol (PROVENTIL) nebulizer solution 0.083% 2.5 mg/3mL  2.5 mg Nebulization Q4H PRN Phil Henry MD       • atorvastatin (LIPITOR) tablet 40 mg  40 mg Oral Nightly Kirk Cohn APRN   40 mg at 05/04/18 2036    • dextrose (D50W) solution 25 g  25 g Intravenous Q15 Min PRN DIOR Taveras       • dextrose (GLUTOSE) oral gel 15 g  15 g Oral Q15 Min PRN DIOR Taveras       • enoxaparin (LOVENOX) syringe 100 mg  1 mg/kg Subcutaneous Q12H DIOR Taveras   100 mg at 05/05/18 0556   • glucagon (human recombinant) (GLUCAGEN DIAGNOSTIC) injection 1 mg  1 mg Subcutaneous PRN DIOR Taveras       • insulin lispro (humaLOG) injection 2-9 Units  2-9 Units Subcutaneous 4x Daily With Meals & Nightly DIOR Taveras   4 Units at 05/05/18 1152   • metoprolol tartrate (LOPRESSOR) tablet 50 mg  50 mg Oral Q12H DIOR Akins       • sodium chloride 0.9 % flush 10 mL  10 mL Intravenous PRN Phil Henry MD             Assessment/Plan     Paroxysmal atrial fibrillation, rapid ventricular response  Acute kidney injury  Atypical chest pain  Minimal troponin elevation- 0.067  Chronic systolic heart failure without acute exacerbation - ICD in place   Diabetes mellitus type 2  Hypertension   Hyperlipidemia- on statin   Mild thrombocytopenia  Ascending aortic aneurysm - 5.2 cm - ?CTS referral     Plan-  Increase lopressor to 50 mg BID for better rate control  Encourage oral hydration and repeat BMP in am   Entresto and lasix on hold  If renal function continues to worsen tomorrow, may need IV fluids or nephrology consult  Continue therapeutic lovenox     DIOR Adams  05/05/18  1:39 PM

## 2018-05-06 LAB
ANION GAP SERPL CALCULATED.3IONS-SCNC: 14 MMOL/L (ref 4–13)
BUN BLD-MCNC: 47 MG/DL (ref 5–21)
BUN/CREAT SERPL: 36.2 (ref 7–25)
CALCIUM SPEC-SCNC: 8.9 MG/DL (ref 8.4–10.4)
CHLORIDE SERPL-SCNC: 100 MMOL/L (ref 98–110)
CO2 SERPL-SCNC: 24 MMOL/L (ref 24–31)
CREAT BLD-MCNC: 1.3 MG/DL (ref 0.5–1.4)
GFR SERPL CREATININE-BSD FRML MDRD: 56 ML/MIN/1.73
GLUCOSE BLD-MCNC: 163 MG/DL (ref 70–100)
GLUCOSE BLDC GLUCOMTR-MCNC: 177 MG/DL (ref 70–130)
GLUCOSE BLDC GLUCOMTR-MCNC: 223 MG/DL (ref 70–130)
GLUCOSE BLDC GLUCOMTR-MCNC: 232 MG/DL (ref 70–130)
GLUCOSE BLDC GLUCOMTR-MCNC: 235 MG/DL (ref 70–130)
GLUCOSE BLDC GLUCOMTR-MCNC: 242 MG/DL (ref 70–130)
NT-PROBNP SERPL-MCNC: 4420 PG/ML (ref 0–900)
POTASSIUM BLD-SCNC: 4 MMOL/L (ref 3.5–5.3)
SODIUM BLD-SCNC: 138 MMOL/L (ref 135–145)
TROPONIN I SERPL-MCNC: 0.04 NG/ML (ref 0–0.03)

## 2018-05-06 PROCEDURE — 82962 GLUCOSE BLOOD TEST: CPT

## 2018-05-06 PROCEDURE — G0378 HOSPITAL OBSERVATION PER HR: HCPCS

## 2018-05-06 PROCEDURE — 83880 ASSAY OF NATRIURETIC PEPTIDE: CPT | Performed by: PHYSICIAN ASSISTANT

## 2018-05-06 PROCEDURE — 84484 ASSAY OF TROPONIN QUANT: CPT | Performed by: PHYSICIAN ASSISTANT

## 2018-05-06 PROCEDURE — 93005 ELECTROCARDIOGRAM TRACING: CPT | Performed by: PHYSICIAN ASSISTANT

## 2018-05-06 PROCEDURE — 99232 SBSQ HOSP IP/OBS MODERATE 35: CPT | Performed by: INTERNAL MEDICINE

## 2018-05-06 PROCEDURE — 63710000001 INSULIN LISPRO (HUMAN) PER 5 UNITS: Performed by: NURSE PRACTITIONER

## 2018-05-06 PROCEDURE — 93010 ELECTROCARDIOGRAM REPORT: CPT | Performed by: INTERNAL MEDICINE

## 2018-05-06 PROCEDURE — 80048 BASIC METABOLIC PNL TOTAL CA: CPT | Performed by: NURSE PRACTITIONER

## 2018-05-06 PROCEDURE — 25010000002 ENOXAPARIN PER 10 MG: Performed by: NURSE PRACTITIONER

## 2018-05-06 RX ORDER — DILTIAZEM HYDROCHLORIDE 5 MG/ML
10 INJECTION INTRAVENOUS ONCE
Status: COMPLETED | OUTPATIENT
Start: 2018-05-06 | End: 2018-05-06

## 2018-05-06 RX ADMIN — ENOXAPARIN SODIUM 100 MG: 100 INJECTION SUBCUTANEOUS at 05:57

## 2018-05-06 RX ADMIN — INSULIN LISPRO 4 UNITS: 100 INJECTION, SOLUTION INTRAVENOUS; SUBCUTANEOUS at 20:18

## 2018-05-06 RX ADMIN — ENOXAPARIN SODIUM 100 MG: 100 INJECTION SUBCUTANEOUS at 17:05

## 2018-05-06 RX ADMIN — INSULIN LISPRO 2 UNITS: 100 INJECTION, SOLUTION INTRAVENOUS; SUBCUTANEOUS at 08:38

## 2018-05-06 RX ADMIN — METOPROLOL TARTRATE 50 MG: 50 TABLET, FILM COATED ORAL at 08:38

## 2018-05-06 RX ADMIN — DESMOPRESSIN ACETATE 40 MG: 0.2 TABLET ORAL at 20:18

## 2018-05-06 RX ADMIN — INSULIN LISPRO 4 UNITS: 100 INJECTION, SOLUTION INTRAVENOUS; SUBCUTANEOUS at 17:05

## 2018-05-06 RX ADMIN — DILTIAZEM HYDROCHLORIDE 10 MG: 5 INJECTION INTRAVENOUS at 11:41

## 2018-05-06 RX ADMIN — METOPROLOL TARTRATE 75 MG: 50 TABLET ORAL at 20:18

## 2018-05-06 RX ADMIN — ACYCLOVIR 400 MG: 800 TABLET ORAL at 17:06

## 2018-05-06 RX ADMIN — INSULIN LISPRO 4 UNITS: 100 INJECTION, SOLUTION INTRAVENOUS; SUBCUTANEOUS at 12:31

## 2018-05-06 NOTE — PROGRESS NOTES
"[facility], [facility] Progress Note     LOS: 0 days   Patient Care Team:  Hernandez Dupree DO as PCP - General (Internal Medicine)  Corina Haddad RN as Registered Nurse  Phil Henry MD as Cardiologist (Cardiology)    Chief Complaint:  SOB, heart palpitations    Subjective     C/o SOB and heart palpiations    Review of Systems:   A 10-point review of systems is obtained and negative except for otherwise mentioned above.    Objective     Vital Sign Min/Max for last 24 hours  Temp  Min: 96.9 °F (36.1 °C)  Max: 99 °F (37.2 °C)   BP  Min: 104/89  Max: 144/90   Pulse  Min: 51  Max: 122   Resp  Min: 18  Max: 20   SpO2  Min: 93 %  Max: 99 %   No Data Recorded   Weight  Min: 102 kg (225 lb 10 oz)  Max: 102 kg (225 lb 10 oz)     Flowsheet Rows    Flowsheet Row First Filed Value   Admission Height 182.9 cm (72\") Documented at 05/04/2018 1124   Admission Weight 102 kg (225 lb) Documented at 05/04/2018 1124          Physical Exam:   General Appearance: alert, appears stated age and cooperative  Lungs: clear to auscultation, respirations regular, respirations even and respirations unlabored  Heart: IRR, tachycardic     Results Review:     I reviewed the patient's new clinical results.      Results from last 7 days  Lab Units 05/04/18  1120   WBC 10*3/mm3 6.57   HEMOGLOBIN g/dL 17.0   HEMATOCRIT % 49.8   PLATELETS 10*3/mm3 127*       Results from last 7 days  Lab Units 05/06/18  0355   SODIUM mmol/L 138   POTASSIUM mmol/L 4.0   CHLORIDE mmol/L 100   CO2 mmol/L 24.0   BUN mg/dL 47*   CREATININE mg/dL 1.30   GLUCOSE mg/dL 163*   CALCIUM mg/dL 8.9       Results from last 7 days  Lab Units 05/06/18  0355  05/04/18  1120   SODIUM mmol/L 138  < > 139   POTASSIUM mmol/L 4.0  < > 4.2   CHLORIDE mmol/L 100  < > 98   CO2 mmol/L 24.0  < > 27.0   BUN mg/dL 47*  < > 40*   CREATININE mg/dL 1.30  < > 1.47*   CALCIUM mg/dL 8.9  < > 9.0   BILIRUBIN mg/dL  --   --  1.9*   ALK PHOS U/L  --   --  " 53   ALT (SGPT) U/L  --   --  43   AST (SGOT) U/L  --   --  26   GLUCOSE mg/dL 163*  < > 192*   < > = values in this interval not displayed.    Results from last 7 days  Lab Units 05/04/18 2255 05/04/18 2022 05/04/18  1753   TROPONIN I ng/mL 0.056* 0.064* 0.066*               Medication Review: yes    Assessment/Plan     1.  PAF with RVR  2.  ANNIE, improved  3.  Atypical CP  4.  Compensated CHF  5.  S/p ICD  6.  T2DM  7.  HTN  8.  HL  9. AAA, CTS c/s?    Increase lopressor d/t uncontrolled HR.      Kady Bliss PA-C  05/06/18  9:00 AM

## 2018-05-06 NOTE — PLAN OF CARE
Problem: Patient Care Overview  Goal: Plan of Care Review  Outcome: Ongoing (interventions implemented as appropriate)   05/06/18 0310   Coping/Psychosocial   Plan of Care Reviewed With patient   Plan of Care Review   Progress no change   OTHER   Outcome Summary VSS. No c/o pain this shift. Continued SOA with mild exertion. Labs this AM. Will continue to monitor.        Problem: Cardiac: ACS (Acute Coronary Syndrome) (Adult)  Goal: Signs and Symptoms of Listed Potential Problems Will be Absent, Minimized or Managed (Cardiac: ACS)  Outcome: Ongoing (interventions implemented as appropriate)   05/05/18 1537   Goal/Outcome Evaluation   Problems Assessed (Acute Coronary Syndrome) all   Problems Present (Acute Coronary Syn) dysrhythmia/arrhythmia;situational response       Problem: Arrhythmia/Dysrhythmia (Symptomatic) (Adult)  Goal: Signs and Symptoms of Listed Potential Problems Will be Absent, Minimized or Managed (Arrhythmia/Dysrhythmia)  Outcome: Ongoing (interventions implemented as appropriate)   05/05/18 1537   Goal/Outcome Evaluation   Problems Assessed (Arrhythmia/Dysrhythmia) all   Problems Present (Dysrhythmia) electrophysiologic conduction defect;situational response

## 2018-05-06 NOTE — PLAN OF CARE
Problem: Patient Care Overview  Goal: Plan of Care Review  Outcome: Ongoing (interventions implemented as appropriate)   05/06/18 8370   Coping/Psychosocial   Plan of Care Reviewed With patient   Plan of Care Review   Progress no change   OTHER   Outcome Summary Patient complained of right sided chest pain earlier this am, pt was diaphoretic, and soa. ekg and troponin was obtained. md aware. Patient had 2 more episodes of soa and diaphoretic. Vital signs were normal with every episode. Afib 102-114 Got up to 167 afib with ambulation.        Problem: Cardiac: ACS (Acute Coronary Syndrome) (Adult)  Goal: Signs and Symptoms of Listed Potential Problems Will be Absent, Minimized or Managed (Cardiac: ACS)  Outcome: Ongoing (interventions implemented as appropriate)      Problem: Arrhythmia/Dysrhythmia (Symptomatic) (Adult)  Goal: Signs and Symptoms of Listed Potential Problems Will be Absent, Minimized or Managed (Arrhythmia/Dysrhythmia)  Outcome: Ongoing (interventions implemented as appropriate)

## 2018-05-07 PROBLEM — R07.9 CHEST PAIN: Status: ACTIVE | Noted: 2018-05-07

## 2018-05-07 LAB
ANION GAP SERPL CALCULATED.3IONS-SCNC: 13 MMOL/L (ref 4–13)
BUN BLD-MCNC: 54 MG/DL (ref 5–21)
BUN/CREAT SERPL: 35.8 (ref 7–25)
CALCIUM SPEC-SCNC: 9 MG/DL (ref 8.4–10.4)
CHLORIDE SERPL-SCNC: 94 MMOL/L (ref 98–110)
CO2 SERPL-SCNC: 27 MMOL/L (ref 24–31)
CREAT BLD-MCNC: 1.51 MG/DL (ref 0.5–1.4)
GFR SERPL CREATININE-BSD FRML MDRD: 47 ML/MIN/1.73
GLUCOSE BLD-MCNC: 167 MG/DL (ref 70–100)
GLUCOSE BLDC GLUCOMTR-MCNC: 188 MG/DL (ref 70–130)
GLUCOSE BLDC GLUCOMTR-MCNC: 194 MG/DL (ref 70–130)
GLUCOSE BLDC GLUCOMTR-MCNC: 207 MG/DL (ref 70–130)
GLUCOSE BLDC GLUCOMTR-MCNC: 221 MG/DL (ref 70–130)
POTASSIUM BLD-SCNC: 3.9 MMOL/L (ref 3.5–5.3)
SODIUM BLD-SCNC: 134 MMOL/L (ref 135–145)

## 2018-05-07 PROCEDURE — 94760 N-INVAS EAR/PLS OXIMETRY 1: CPT

## 2018-05-07 PROCEDURE — 82962 GLUCOSE BLOOD TEST: CPT

## 2018-05-07 PROCEDURE — 80048 BASIC METABOLIC PNL TOTAL CA: CPT | Performed by: INTERNAL MEDICINE

## 2018-05-07 PROCEDURE — 99232 SBSQ HOSP IP/OBS MODERATE 35: CPT | Performed by: INTERNAL MEDICINE

## 2018-05-07 PROCEDURE — 25010000002 ENOXAPARIN PER 10 MG: Performed by: NURSE PRACTITIONER

## 2018-05-07 PROCEDURE — 63710000001 INSULIN LISPRO (HUMAN) PER 5 UNITS: Performed by: NURSE PRACTITIONER

## 2018-05-07 PROCEDURE — 94799 UNLISTED PULMONARY SVC/PX: CPT

## 2018-05-07 RX ADMIN — INSULIN LISPRO 4 UNITS: 100 INJECTION, SOLUTION INTRAVENOUS; SUBCUTANEOUS at 11:52

## 2018-05-07 RX ADMIN — INSULIN LISPRO 2 UNITS: 100 INJECTION, SOLUTION INTRAVENOUS; SUBCUTANEOUS at 08:19

## 2018-05-07 RX ADMIN — ENOXAPARIN SODIUM 100 MG: 100 INJECTION SUBCUTANEOUS at 05:40

## 2018-05-07 RX ADMIN — ENOXAPARIN SODIUM 100 MG: 100 INJECTION SUBCUTANEOUS at 17:10

## 2018-05-07 RX ADMIN — DESMOPRESSIN ACETATE 40 MG: 0.2 TABLET ORAL at 20:50

## 2018-05-07 RX ADMIN — INSULIN LISPRO 2 UNITS: 100 INJECTION, SOLUTION INTRAVENOUS; SUBCUTANEOUS at 17:10

## 2018-05-07 RX ADMIN — METOPROLOL TARTRATE 75 MG: 50 TABLET ORAL at 08:15

## 2018-05-07 RX ADMIN — METOPROLOL TARTRATE 75 MG: 50 TABLET ORAL at 20:50

## 2018-05-07 RX ADMIN — INSULIN LISPRO 4 UNITS: 100 INJECTION, SOLUTION INTRAVENOUS; SUBCUTANEOUS at 20:50

## 2018-05-07 RX ADMIN — ACYCLOVIR 400 MG: 800 TABLET ORAL at 17:10

## 2018-05-07 NOTE — PLAN OF CARE
Problem: Patient Care Overview  Goal: Plan of Care Review  Outcome: Ongoing (interventions implemented as appropriate)   05/07/18 0412   Coping/Psychosocial   Plan of Care Reviewed With patient   Plan of Care Review   Progress no change   OTHER   Outcome Summary VSS. AF . Continued c/o SOA. O2 sat WNL. O2 @ 2L. No other issues at this time. Will continue to monitor.        Problem: Cardiac: ACS (Acute Coronary Syndrome) (Adult)  Goal: Signs and Symptoms of Listed Potential Problems Will be Absent, Minimized or Managed (Cardiac: ACS)  Outcome: Ongoing (interventions implemented as appropriate)   05/06/18 1754   Goal/Outcome Evaluation   Problems Assessed (Acute Coronary Syndrome) all   Problems Present (Acute Coronary Syn) dysrhythmia/arrhythmia       Problem: Arrhythmia/Dysrhythmia (Symptomatic) (Adult)  Goal: Signs and Symptoms of Listed Potential Problems Will be Absent, Minimized or Managed (Arrhythmia/Dysrhythmia)  Outcome: Ongoing (interventions implemented as appropriate)   05/06/18 9624   Goal/Outcome Evaluation   Problems Assessed (Arrhythmia/Dysrhythmia) all   Problems Present (Dysrhythmia) electrophysiologic conduction defect

## 2018-05-07 NOTE — PROGRESS NOTES
"  Chief Complaint   Patient presents with   • Chest Pain   • Shortness of Breath     S: While there were no acute events overnight, the patient does complain of intermittent periods of shortness of breath.  HE describes these as starting while at rest and having a \"weird feeling\" associated with these, at which point, he immediately needs to urinate and after urination, the symptoms are completely resolved.  He has been noted to be in atrial fibrillation still with elevated HR with ambulation.  He denies chest pain.  No orthopnea, PND, edema.    Medications: Reviewed    Review of Systems: All pertinent negative and positives as noted above.  Otherwise, all systems reviewed and found to be negative.    Telemetry: atrial fibrillation with HR variable 's/    O:  /84 (BP Location: Right arm, Patient Position: Lying)   Pulse 109   Temp 98.1 °F (36.7 °C) (Temporal Artery )   Resp 18   Ht 182.9 cm (72\")   Wt 103 kg (226 lb 4 oz)   SpO2 96%   BMI 30.69 kg/m²     Temp:  [97.5 °F (36.4 °C)-98.1 °F (36.7 °C)] 98.1 °F (36.7 °C)  Heart Rate:  [] 109  Resp:  [16-18] 18  BP: ()/(62-84) 119/84      Intake/Output Summary (Last 24 hours) at 05/07/18 1109  Last data filed at 05/06/18 1752   Gross per 24 hour   Intake              480 ml   Output              250 ml   Net              230 ml     Gen: NAD, although mildly tachypneic  CV: Irreg, irreg, no mrg  Pulm: CTAB  GI: s, nt, nd, +bs  Ext: wwp, no edema    Diagnostic Data:    Lab Results   Component Value Date    WBC 6.57 05/04/2018    HGB 17.0 05/04/2018    HCT 49.8 05/04/2018    MCV 89.4 05/04/2018     (L) 05/04/2018     Lab Results   Component Value Date    GLUCOSE 167 (H) 05/07/2018    CALCIUM 9.0 05/07/2018     (L) 05/07/2018    K 3.9 05/07/2018    CO2 27.0 05/07/2018    CL 94 (L) 05/07/2018    BUN 54 (H) 05/07/2018    CREATININE 1.51 (H) 05/07/2018    EGFRIFAFRI 73 11/08/2017    EGFRIFNONA 47 (L) 05/07/2018    BCR 35.8 (H) " 05/07/2018    ANIONGAP 13.0 05/07/2018     CTA chest 5/4/18:  1. No CT angiographic evidence of pulmonary embolus or acute aortic  pathology.   2. Aneurysm dilatation of the ascending thoracic aorta measuring 5.2 cm  in greatest AP projection.  3. Small bilateral pleural effusions. Cardiomegaly with pulmonary  vasculature prominence. Mild pulmonary venous hypertension considered.  No parenchymal infiltrates.    Echocardiogram 5/4/18:  · Left ventricular systolic function is severely decreased. Estimated EF appears to be in the range of 21 - 25%.  · Left ventricular diastolic dysfunction.  · The left ventricular cavity is severely dilated.  · Left ventricular wall thickness is consistent with mild concentric hypertrophy.  · Interatrial septal defect present.  · Mild-to-moderate mitral valve regurgitation is present  · Moderate aortic valve regurgitation is present.  · Mild tricuspid valve regurgitation is present. Estimated right ventricular systolic pressure from tricuspid regurgitation is mildly elevated (35-45 mmHg).  · Borderline dilation of the aortic root is present.    ASSESSMENT/PLAN:     1.  Paroxysmal atrial fibrillation still with variable HR control  2.  Acute renal failure - variable renal function (however baseline Cr ~ 1.08)  3.  Atypical chest pain - now resolved  4.  Chronic systolic congestive heart failure without evidence of acute exacerbation  5.  Mild thrombocytopenia without active bleeding  6.  Essential hypertension   7.  Mixed hyperlipidemia  8.  Type II diabetes mellitus without long-term use of insulin without obvious complication  9.  Troponin elevation, with baseline elevation of troponin based on previous readings - no significant change - not consistent with ACS  10. Shortness of breath - out of proportion to clinical exam findings, consider related to afib with RVR  11. Ascending aortic aneurysm  12. Mild to moderate MR  13. Moderate AI  14. Chronic diastolic dysfunction without acute  exacerbation     - Over the weekend, the patient has continued to have difficulties with heart rate control.  Therefore, I think that trying to restore sinus rhythm is important at this time.  The patient did have breakfast this morning, therefore I will make him nothing by mouth after midnight and plan for transesophageal echocardiogram and cardioversion tomorrow morning.  It is unclear to me whether or not the patient's episodes of shortness of breath correlated with periods of rapid ventricular response, however they seem somewhat related.  The patient's clinical examination is largely unremarkable today.  His CT scan did not show any significant reasons for the patient to be shortness of breath nor does his echocardiogram show any obvious reason for the patient to be so profoundly short of breath at times.  Therefore, with the knowledge of atrial fibrillation, we will try to restore sinus rhythm and see if this helps with the patient's symptoms.  - Continue therapeutic anticoagulation with Lovenox at this time.  - I have encouraged increased oral intake today to see if this may help with the patient's renal dysfunction.  Previously, this patient had a baseline creatinine of around 1 although his renal function here has waxed and waned.  Prior to admission, the patient had increased his diuretics dose as he had been significantly short of breath, therefore this patient could be simply somewhat dehydrated at this time.  - We will have the patient follow-up with Dr. Wilson regarding his dilated ascending aorta.  - Certainly, with transesophageal echocardiography tomorrow, we will have a better look at the patient's mitral valve regurgitation to assure that there is nothing more than mild to moderate as was indicated on the transthoracic echocardiogram and also evaluate the patient's aortic insufficiency as well.  - Cardiac ischemia seems doubtful at this particular time.  The patient's symptoms do not correlate with  "activities.  His troponin level showed no significant rise or fall, all in the setting of renal insufficiency.  Therefore, with no significant troponin elevation and very atypical symptoms, I am not inclined to perform any further ischemic evaluations at this particular time, however this may be considered in the future.  - It should be noted that this patient's symptoms seem very atypical and somewhat unrelated, meaning that his shortness of breath, \"chlorine smelling breath\", urge to urinate causing resolution of symptoms, therefore somewhat difficult to piece together exactly what has gone on with this patient thus far.  However, what is clear is that the patient does have atrial fibrillation with difficulty in heart rate control and renal insufficiency.  Therefore, we are treating these etiologies first before going any further.  - Continue to check daily basic metabolic panels and I will also check a CBC once again tomorrow.  - It should also be noted that this patient would not allow our nurse practitioners to evaluate him this morning.  - Otherwise, continue current care.  "

## 2018-05-07 NOTE — PLAN OF CARE
Problem: Patient Care Overview  Goal: Plan of Care Review  Outcome: Ongoing (interventions implemented as appropriate)   05/07/18 9745   Coping/Psychosocial   Plan of Care Reviewed With patient   Plan of Care Review   Progress improving   OTHER   Outcome Summary Patient Had no complaints of pain today. had no episodes of dizziness. Still complains of SOA with exertion. Afib on tele. Plan for cardioversion tomorrow if still in Afib. Encouraged PO water intake today.        Problem: Cardiac: ACS (Acute Coronary Syndrome) (Adult)  Goal: Signs and Symptoms of Listed Potential Problems Will be Absent, Minimized or Managed (Cardiac: ACS)  Outcome: Ongoing (interventions implemented as appropriate)      Problem: Arrhythmia/Dysrhythmia (Symptomatic) (Adult)  Goal: Signs and Symptoms of Listed Potential Problems Will be Absent, Minimized or Managed (Arrhythmia/Dysrhythmia)  Outcome: Ongoing (interventions implemented as appropriate)

## 2018-05-07 NOTE — PLAN OF CARE
Problem: Patient Care Overview  Goal: Plan of Care Review  Outcome: Ongoing (interventions implemented as appropriate)   05/07/18 7185   Coping/Psychosocial   Plan of Care Reviewed With patient;spouse   Plan of Care Review   Progress no change   OTHER   Outcome Summary Initial RDN eval. Consult received for LACE. Pt reports good appetite but dislikes food at hospital and modified diet. Denies following any diet at home and when diet edu attempted he refused, stating he isn't going to listen to any body anyway. PO intake 37.5%/8 meals over 3 days, however feel Pt's spouse may be bringing in food from outside of hospital. Will continue to follow.

## 2018-05-08 ENCOUNTER — APPOINTMENT (OUTPATIENT)
Dept: CARDIOLOGY | Facility: HOSPITAL | Age: 63
End: 2018-05-08
Attending: INTERNAL MEDICINE

## 2018-05-08 LAB
ANION GAP SERPL CALCULATED.3IONS-SCNC: 12 MMOL/L (ref 4–13)
BUN BLD-MCNC: 62 MG/DL (ref 5–21)
BUN/CREAT SERPL: 37.1 (ref 7–25)
CALCIUM SPEC-SCNC: 9.1 MG/DL (ref 8.4–10.4)
CHLORIDE SERPL-SCNC: 96 MMOL/L (ref 98–110)
CO2 SERPL-SCNC: 28 MMOL/L (ref 24–31)
CREAT BLD-MCNC: 1.67 MG/DL (ref 0.5–1.4)
DEPRECATED RDW RBC AUTO: 40.2 FL (ref 40–54)
ERYTHROCYTE [DISTWIDTH] IN BLOOD BY AUTOMATED COUNT: 12.3 % (ref 12–15)
GFR SERPL CREATININE-BSD FRML MDRD: 42 ML/MIN/1.73
GLUCOSE BLD-MCNC: 197 MG/DL (ref 70–100)
GLUCOSE BLDC GLUCOMTR-MCNC: 197 MG/DL (ref 70–130)
GLUCOSE BLDC GLUCOMTR-MCNC: 281 MG/DL (ref 70–130)
GLUCOSE BLDC GLUCOMTR-MCNC: 285 MG/DL (ref 70–130)
HCT VFR BLD AUTO: 49.9 % (ref 40–52)
HGB BLD-MCNC: 17.2 G/DL (ref 14–18)
MCH RBC QN AUTO: 30.9 PG (ref 28–32)
MCHC RBC AUTO-ENTMCNC: 34.5 G/DL (ref 33–36)
MCV RBC AUTO: 89.6 FL (ref 82–95)
PLATELET # BLD AUTO: 152 10*3/MM3 (ref 130–400)
PMV BLD AUTO: 11.6 FL (ref 6–12)
POTASSIUM BLD-SCNC: 4.3 MMOL/L (ref 3.5–5.3)
RBC # BLD AUTO: 5.57 10*6/MM3 (ref 4.8–5.9)
SODIUM BLD-SCNC: 136 MMOL/L (ref 135–145)
WBC NRBC COR # BLD: 9.12 10*3/MM3 (ref 4.8–10.8)

## 2018-05-08 PROCEDURE — 93312 ECHO TRANSESOPHAGEAL: CPT

## 2018-05-08 PROCEDURE — 25010000002 MIDAZOLAM PER 1 MG

## 2018-05-08 PROCEDURE — 93320 DOPPLER ECHO COMPLETE: CPT | Performed by: INTERNAL MEDICINE

## 2018-05-08 PROCEDURE — 93325 DOPPLER ECHO COLOR FLOW MAPG: CPT

## 2018-05-08 PROCEDURE — 63710000001 INSULIN LISPRO (HUMAN) PER 5 UNITS: Performed by: NURSE PRACTITIONER

## 2018-05-08 PROCEDURE — 82962 GLUCOSE BLOOD TEST: CPT

## 2018-05-08 PROCEDURE — 85027 COMPLETE CBC AUTOMATED: CPT | Performed by: INTERNAL MEDICINE

## 2018-05-08 PROCEDURE — 92960 CARDIOVERSION ELECTRIC EXT: CPT

## 2018-05-08 PROCEDURE — 80048 BASIC METABOLIC PNL TOTAL CA: CPT | Performed by: NURSE PRACTITIONER

## 2018-05-08 PROCEDURE — 93320 DOPPLER ECHO COMPLETE: CPT

## 2018-05-08 PROCEDURE — 25010000002 MIDAZOLAM PER 1 MG: Performed by: INTERNAL MEDICINE

## 2018-05-08 PROCEDURE — 25010000002 FENTANYL CITRATE (PF) 100 MCG/2ML SOLUTION

## 2018-05-08 PROCEDURE — 25010000002 FENTANYL CITRATE (PF) 100 MCG/2ML SOLUTION: Performed by: INTERNAL MEDICINE

## 2018-05-08 PROCEDURE — 93312 ECHO TRANSESOPHAGEAL: CPT | Performed by: INTERNAL MEDICINE

## 2018-05-08 PROCEDURE — 25810000003 DEXTROSE-NACL PER 500 ML: Performed by: INTERNAL MEDICINE

## 2018-05-08 PROCEDURE — 92960 CARDIOVERSION ELECTRIC EXT: CPT | Performed by: INTERNAL MEDICINE

## 2018-05-08 PROCEDURE — 93325 DOPPLER ECHO COLOR FLOW MAPG: CPT | Performed by: INTERNAL MEDICINE

## 2018-05-08 PROCEDURE — 99232 SBSQ HOSP IP/OBS MODERATE 35: CPT | Performed by: INTERNAL MEDICINE

## 2018-05-08 PROCEDURE — 25010000002 ENOXAPARIN PER 10 MG: Performed by: NURSE PRACTITIONER

## 2018-05-08 PROCEDURE — 5A2204Z RESTORATION OF CARDIAC RHYTHM, SINGLE: ICD-10-PCS | Performed by: INTERNAL MEDICINE

## 2018-05-08 RX ORDER — FENTANYL CITRATE 50 UG/ML
INJECTION, SOLUTION INTRAMUSCULAR; INTRAVENOUS
Status: COMPLETED
Start: 2018-05-08 | End: 2018-05-08

## 2018-05-08 RX ORDER — DEXTROSE AND SODIUM CHLORIDE 5; .9 G/100ML; G/100ML
100 INJECTION, SOLUTION INTRAVENOUS CONTINUOUS
Status: DISCONTINUED | OUTPATIENT
Start: 2018-05-08 | End: 2018-05-09 | Stop reason: HOSPADM

## 2018-05-08 RX ORDER — CALCIUM CARBONATE 200(500)MG
2 TABLET,CHEWABLE ORAL 3 TIMES DAILY PRN
Status: DISCONTINUED | OUTPATIENT
Start: 2018-05-08 | End: 2018-05-09 | Stop reason: HOSPADM

## 2018-05-08 RX ORDER — DOCUSATE SODIUM 100 MG/1
100 CAPSULE, LIQUID FILLED ORAL 2 TIMES DAILY
Status: DISCONTINUED | OUTPATIENT
Start: 2018-05-08 | End: 2018-05-09 | Stop reason: HOSPADM

## 2018-05-08 RX ORDER — MIDAZOLAM HYDROCHLORIDE 1 MG/ML
INJECTION INTRAMUSCULAR; INTRAVENOUS
Status: COMPLETED
Start: 2018-05-08 | End: 2018-05-08

## 2018-05-08 RX ORDER — BISACODYL 10 MG
10 SUPPOSITORY, RECTAL RECTAL DAILY PRN
Status: DISCONTINUED | OUTPATIENT
Start: 2018-05-08 | End: 2018-05-09 | Stop reason: HOSPADM

## 2018-05-08 RX ORDER — SODIUM CHLORIDE 9 MG/ML
50 INJECTION, SOLUTION INTRAVENOUS CONTINUOUS
Status: DISCONTINUED | OUTPATIENT
Start: 2018-05-08 | End: 2018-05-09 | Stop reason: HOSPADM

## 2018-05-08 RX ORDER — MIDAZOLAM HYDROCHLORIDE 1 MG/ML
INJECTION INTRAMUSCULAR; INTRAVENOUS
Status: DISCONTINUED | OUTPATIENT
Start: 2018-05-08 | End: 2018-05-09 | Stop reason: HOSPADM

## 2018-05-08 RX ORDER — CALCIUM CARBONATE 200(500)MG
750 TABLET,CHEWABLE ORAL 3 TIMES DAILY PRN
Status: DISCONTINUED | OUTPATIENT
Start: 2018-05-08 | End: 2018-05-08

## 2018-05-08 RX ORDER — DOCUSATE SODIUM 100 MG/1
100 CAPSULE, LIQUID FILLED ORAL 2 TIMES DAILY
Status: DISCONTINUED | OUTPATIENT
Start: 2018-05-08 | End: 2018-05-08

## 2018-05-08 RX ORDER — FENTANYL CITRATE 50 UG/ML
INJECTION, SOLUTION INTRAMUSCULAR; INTRAVENOUS
Status: COMPLETED | OUTPATIENT
Start: 2018-05-08 | End: 2018-05-08

## 2018-05-08 RX ORDER — MIDAZOLAM HYDROCHLORIDE 1 MG/ML
INJECTION INTRAMUSCULAR; INTRAVENOUS
Status: COMPLETED | OUTPATIENT
Start: 2018-05-08 | End: 2018-05-08

## 2018-05-08 RX ADMIN — INSULIN LISPRO 6 UNITS: 100 INJECTION, SOLUTION INTRAVENOUS; SUBCUTANEOUS at 17:01

## 2018-05-08 RX ADMIN — ENOXAPARIN SODIUM 100 MG: 100 INJECTION SUBCUTANEOUS at 05:28

## 2018-05-08 RX ADMIN — FENTANYL CITRATE 50 MCG: 50 INJECTION, SOLUTION INTRAMUSCULAR; INTRAVENOUS at 08:30

## 2018-05-08 RX ADMIN — DOCUSATE SODIUM 100 MG: 100 CAPSULE, LIQUID FILLED ORAL at 14:12

## 2018-05-08 RX ADMIN — SODIUM CHLORIDE 50 ML/HR: 9 INJECTION, SOLUTION INTRAVENOUS at 08:00

## 2018-05-08 RX ADMIN — DOCUSATE SODIUM 100 MG: 100 CAPSULE, LIQUID FILLED ORAL at 20:32

## 2018-05-08 RX ADMIN — DEXTROSE AND SODIUM CHLORIDE 100 ML/HR: 5; 900 INJECTION, SOLUTION INTRAVENOUS at 10:25

## 2018-05-08 RX ADMIN — FENTANYL CITRATE 25 MCG: 50 INJECTION, SOLUTION INTRAMUSCULAR; INTRAVENOUS at 08:33

## 2018-05-08 RX ADMIN — ENOXAPARIN SODIUM 100 MG: 100 INJECTION SUBCUTANEOUS at 17:01

## 2018-05-08 RX ADMIN — Medication 2 TABLET: at 14:12

## 2018-05-08 RX ADMIN — FENTANYL CITRATE: 50 INJECTION, SOLUTION INTRAMUSCULAR; INTRAVENOUS at 10:50

## 2018-05-08 RX ADMIN — MIDAZOLAM 2 MG: 1 INJECTION INTRAMUSCULAR; INTRAVENOUS at 08:30

## 2018-05-08 RX ADMIN — INSULIN LISPRO 2 UNITS: 100 INJECTION, SOLUTION INTRAVENOUS; SUBCUTANEOUS at 12:15

## 2018-05-08 RX ADMIN — ACYCLOVIR 400 MG: 800 TABLET ORAL at 17:01

## 2018-05-08 RX ADMIN — MIDAZOLAM 1 MG: 1 INJECTION INTRAMUSCULAR; INTRAVENOUS at 08:33

## 2018-05-08 RX ADMIN — MIDAZOLAM 1 MG: 1 INJECTION INTRAMUSCULAR; INTRAVENOUS at 08:35

## 2018-05-08 RX ADMIN — MIDAZOLAM: 1 INJECTION INTRAMUSCULAR; INTRAVENOUS at 10:50

## 2018-05-08 RX ADMIN — METOPROLOL TARTRATE 75 MG: 50 TABLET ORAL at 20:32

## 2018-05-08 RX ADMIN — INSULIN LISPRO 6 UNITS: 100 INJECTION, SOLUTION INTRAVENOUS; SUBCUTANEOUS at 20:32

## 2018-05-08 RX ADMIN — TOPICAL ANESTHETIC: 200 SPRAY DENTAL; PERIODONTAL at 10:49

## 2018-05-08 RX ADMIN — DESMOPRESSIN ACETATE 40 MG: 0.2 TABLET ORAL at 20:32

## 2018-05-08 RX ADMIN — BISACODYL 10 MG: 10 SUPPOSITORY RECTAL at 18:03

## 2018-05-08 RX ADMIN — METOPROLOL TARTRATE 75 MG: 50 TABLET ORAL at 10:24

## 2018-05-08 NOTE — PLAN OF CARE
Problem: Patient Care Overview  Goal: Plan of Care Review  Outcome: Ongoing (interventions implemented as appropriate)   05/08/18 8818   Coping/Psychosocial   Plan of Care Reviewed With patient;spouse   Plan of Care Review   Progress improving   OTHER   Outcome Summary VSS. Pt went for cardioversion and JULIO CESAR today. Pt converted to NSR after one shock and has maintained NSR 79-85 today. No c/o pain. Pt c/o SOA at times. Medicated with Tums for indigestion. Colace given for BM this afternoon. Cont to monitor overall condition.        Problem: Cardiac: ACS (Acute Coronary Syndrome) (Adult)  Goal: Signs and Symptoms of Listed Potential Problems Will be Absent, Minimized or Managed (Cardiac: ACS)  Outcome: Ongoing (interventions implemented as appropriate)      Problem: Arrhythmia/Dysrhythmia (Symptomatic) (Adult)  Goal: Signs and Symptoms of Listed Potential Problems Will be Absent, Minimized or Managed (Arrhythmia/Dysrhythmia)  Outcome: Ongoing (interventions implemented as appropriate)

## 2018-05-08 NOTE — PROGRESS NOTES
Discharge Planning Assessment  [facility]     Patient Name: Joao Fonseca  MRN: [id number]  Today's Date: 5/8/2018    Admit Date: 5/4/2018          Discharge Needs Assessment     Row Name 05/08/18 1022       Living Environment    Lives With spouse    Name(s) of Who Lives With Patient Jayna    Current Living Arrangements home/apartment/condo    Primary Care Provided by self    Provides Primary Care For no one    Family Caregiver if Needed none    Quality of Family Relationships involved;supportive    Able to Return to Prior Arrangements yes       Resource/Environmental Concerns    Resource/Environmental Concerns none    Transportation Concerns car, none       Transition Planning    Patient/Family Anticipates Transition to home with family    Patient/Family Anticipated Services at Transition none    Transportation Anticipated family or friend will provide       Discharge Needs Assessment    Readmission Within the Last 30 Days no previous admission in last 30 days    Concerns to be Addressed no discharge needs identified    Equipment Currently Used at Home none    Anticipated Changes Related to Illness none    Equipment Needed After Discharge none            Discharge Plan     Row Name 05/08/18 1022       Plan    Plan Home    Patient/Family in Agreement with Plan yes    Plan Comments Spoke with pt/wife in room to assess for home needs. Pt will return home with wife as before. Pt is independent and denies home needs.  He does not wish for Nond[employer]ational transitional visit at home. [place].         Destination     No service coordination in this encounter.      Durable Medical Equipment     No service coordination in this encounter.      Dialysis/Infusion     No service coordination in this encounter.      Home Medical Care     No service coordination in this encounter.      Social Care     No service coordination in this encounter.                Demographic Summary    No documentation.           Functional Status     No documentation.           Psychosocial    No documentation.           Abuse/Neglect    No documentation.           Legal    No documentation.           Substance Abuse    No documentation.           Patient Forms    No documentation.         RYAN Deluna

## 2018-05-08 NOTE — PROGRESS NOTES
"  Chief Complaint   Patient presents with   • Chest Pain   • Shortness of Breath     S: No acute events overnight.  Still with shortness of breath at times.  Remains in atrial fibrillation.    Medications: Reviewed    Review of Systems: All pertinent negative and positives as noted above.  Otherwise, all systems reviewed and found to be negative.    Telemetry: atrial fibrillation with variable HR control    O:  /83   Pulse 107   Temp 97.8 °F (36.6 °C) (Temporal Artery )   Resp 18   Ht 182.9 cm (72\")   Wt 103 kg (227 lb)   SpO2 99%   BMI 30.79 kg/m²     Temp:  [97.1 °F (36.2 °C)-98 °F (36.7 °C)] 97.8 °F (36.6 °C)  Heart Rate:  [] 107  Resp:  [16-22] 18  BP: ()/(60-87) 106/83    Intake/Output Summary (Last 24 hours) at 05/08/18 0851  Last data filed at 05/08/18 0531   Gross per 24 hour   Intake             1440 ml   Output              625 ml   Net              815 ml     Gen: NAD  CV: irreg, irreg  Pulm: Relatively CTAB  GI: s, nt, nd, +bs  Ext: no c/c/e    Diagnostic Data:    Lab Results   Component Value Date    WBC 9.12 05/08/2018    HGB 17.2 05/08/2018    HCT 49.9 05/08/2018    MCV 89.6 05/08/2018     05/08/2018     Lab Results   Component Value Date    GLUCOSE 197 (H) 05/08/2018    CALCIUM 9.1 05/08/2018     05/08/2018    K 4.3 05/08/2018    CO2 28.0 05/08/2018    CL 96 (L) 05/08/2018    BUN 62 (H) 05/08/2018    CREATININE 1.67 (H) 05/08/2018    EGFRIFAFRI 73 11/08/2017    EGFRIFNONA 42 (L) 05/08/2018    BCR 37.1 (H) 05/08/2018    ANIONGAP 12.0 05/08/2018     ASSESSMENT/PLAN:     1.  Paroxysmal atrial fibrillation - now status post DCCV today  2.  Acute renal failure - variable renal function (however baseline Cr ~ 1.08)  3.  Atypical chest pain - now resolved  4.  Chronic systolic congestive heart failure without evidence of acute exacerbation  5.  Mild thrombocytopenia without active bleeding  6.  Essential hypertension   7.  Mixed hyperlipidemia  8.  Type II diabetes " mellitus without long-term use of insulin without obvious complication  9.  Troponin elevation, with baseline elevation of troponin based on previous readings - no significant change - not consistent with ACS  10. Shortness of breath - out of proportion to clinical exam findings, consider related to afib with RVR  11. Ascending aortic aneurysm  12. Mild to moderate MR  13. Moderate AI  14. Chronic diastolic dysfunction without acute exacerbation     - Now back in NSR after JULIO CESAR and cardioversion today - monitor on telemetry overnight  - Continue therapeutic anticoagulation with Lovenox at this time - change to NOAC in near future  - Clinically, does not appear grossly volume overloaded but continues to have significant shortness of breath.  It is unclear whether this is related to atrial fibrillation - with restoration of NSR, we can see how symptoms respond.  If still short of breath in AM, despite being back in NSR, especially with variable renal function, could consider RHC to further evaluate filling pressures and hemodynamics.  - Will give gentle IVF today with the assumption that he is likely somewhat volume down at this time and recheck BMP in AM.  - Otherwise, continue current care today.

## 2018-05-08 NOTE — PLAN OF CARE
Problem: Patient Care Overview  Goal: Plan of Care Review  Outcome: Ongoing (interventions implemented as appropriate)   05/08/18 0502   Coping/Psychosocial   Plan of Care Reviewed With patient;family   Plan of Care Review   Progress no change   OTHER   Outcome Summary No c/o of pain voiced. Still c/o of SOA with exertion. Afib/Aflutter on tele. Plans for cardioversion this am. VSS.     Goal: Individualization and Mutuality  Outcome: Ongoing (interventions implemented as appropriate)    Goal: Discharge Needs Assessment  Outcome: Ongoing (interventions implemented as appropriate)      Problem: Cardiac: ACS (Acute Coronary Syndrome) (Adult)  Goal: Signs and Symptoms of Listed Potential Problems Will be Absent, Minimized or Managed (Cardiac: ACS)  Outcome: Ongoing (interventions implemented as appropriate)      Problem: Arrhythmia/Dysrhythmia (Symptomatic) (Adult)  Goal: Signs and Symptoms of Listed Potential Problems Will be Absent, Minimized or Managed (Arrhythmia/Dysrhythmia)  Outcome: Ongoing (interventions implemented as appropriate)

## 2018-05-09 VITALS
TEMPERATURE: 97.9 F | OXYGEN SATURATION: 99 % | HEIGHT: 72 IN | RESPIRATION RATE: 18 BRPM | WEIGHT: 230.6 LBS | HEART RATE: 77 BPM | DIASTOLIC BLOOD PRESSURE: 81 MMHG | BODY MASS INDEX: 31.23 KG/M2 | SYSTOLIC BLOOD PRESSURE: 111 MMHG

## 2018-05-09 LAB
ANION GAP SERPL CALCULATED.3IONS-SCNC: 12 MMOL/L (ref 4–13)
BUN BLD-MCNC: 51 MG/DL (ref 5–21)
BUN/CREAT SERPL: 38.1 (ref 7–25)
CALCIUM SPEC-SCNC: 7.7 MG/DL (ref 8.4–10.4)
CHLORIDE SERPL-SCNC: 103 MMOL/L (ref 98–110)
CO2 SERPL-SCNC: 23 MMOL/L (ref 24–31)
CREAT BLD-MCNC: 1.34 MG/DL (ref 0.5–1.4)
GFR SERPL CREATININE-BSD FRML MDRD: 54 ML/MIN/1.73
GLUCOSE BLD-MCNC: 160 MG/DL (ref 70–100)
GLUCOSE BLDC GLUCOMTR-MCNC: 172 MG/DL (ref 70–130)
POTASSIUM BLD-SCNC: 3.5 MMOL/L (ref 3.5–5.3)
SODIUM BLD-SCNC: 138 MMOL/L (ref 135–145)

## 2018-05-09 PROCEDURE — 99238 HOSP IP/OBS DSCHRG MGMT 30/<: CPT | Performed by: INTERNAL MEDICINE

## 2018-05-09 PROCEDURE — 80048 BASIC METABOLIC PNL TOTAL CA: CPT | Performed by: INTERNAL MEDICINE

## 2018-05-09 PROCEDURE — 82962 GLUCOSE BLOOD TEST: CPT

## 2018-05-09 RX ORDER — METOPROLOL TARTRATE 75 MG/1
75 TABLET, FILM COATED ORAL EVERY 12 HOURS SCHEDULED
Qty: 60 TABLET | Refills: 11 | Status: SHIPPED | OUTPATIENT
Start: 2018-05-09 | End: 2018-08-17 | Stop reason: SDUPTHER

## 2018-05-09 RX ADMIN — APIXABAN 5 MG: 5 TABLET, FILM COATED ORAL at 09:03

## 2018-05-09 RX ADMIN — SODIUM CHLORIDE 50 ML/HR: 9 INJECTION, SOLUTION INTRAVENOUS at 06:17

## 2018-05-09 RX ADMIN — METOPROLOL TARTRATE 75 MG: 50 TABLET ORAL at 09:03

## 2018-05-09 NOTE — DISCHARGE INSTRUCTIONS
BMP in 1 week  Increase Lopressor to 75 mg BID  Hold Entresto and Lisinopril  Educated patient on the importance of daily weights. Call office if 2-3 pound weight gain overnight or 5 pounds in a week. Discussed low sodium diet, less than 2g daily.   Appointment with Dr. Wilson for Anuerysm  Eliquis twice a day  Stop Aspirin

## 2018-05-09 NOTE — DISCHARGE SUMMARY
"[facility] HEART GROUP DISCHARGE    Date of Discharge:  5/9/2018    Discharge Diagnosis: Active Problems:    Paroxysmal Atrial fibrillation - new onset, s/p CV - stable    Atypical Chest pain - resolved    Nonsustained Ventricular Tachycardia - stable,a symptomatic    Essential Hypertension     Chronic systolic congestive heart failure    Type II DM    Ascending Aortic Aneurysm    Moderate AI    Mild to Moderate MR    Hospital Course  Patient is a 62 y.o. male presented with increasing shortness of breath and atypical chest pain. Patient was found to be a new onset atrial fibrillation, dehydrated secondary to increasing lasix at home. Patient had a JULIO CESAR/Cardiovesion yesterday which was successful. Patient appears to feel better today after maintaining NSR. Patient did have a brief run of V-Tach which patient was asymptomatic. Patient was given gentle hydration and renal function has improved. Patient reported \"chlorine breath\" after change in entresto therefore this has been stopped. Patient had Lopressor increased for improvement of rate control. ACE- which patient was previously on prior to entresto was held due to ANNIE and hypotension. Patient is dressed and ready to go home this morning on my entry into the room- he has been seen by Dr. Henry this morning who educated patient on medication changes. Patient had a CTA of chest which showed aneurysm dilation of ascending thoracic aorta 5.2- Dr. Wilson performed patient's CABG- will have follow up with him. Patient has remained euvolemic. Breathing has improved- not orthopneic.     Procedures Performed       Consults:   Consults     No orders found from 4/5/2018 to 5/5/2018.          Pertinent Test Results:   Lab Results (last 72 hours)     Procedure Component Value Units Date/Time    Basic Metabolic Panel [[id number]]  (Abnormal) Collected:  05/09/18 0723    Specimen:  Blood Updated:  05/09/18 0824     Glucose 160 (H) mg/dL      BUN 51 (H) mg/dL      " Creatinine 1.34 mg/dL      Sodium 138 mmol/L      Potassium 3.5 mmol/L      Chloride 103 mmol/L      CO2 23.0 (L) mmol/L      Calcium 7.7 (L) mg/dL      eGFR Non African Amer 54 (L) mL/min/1.73      BUN/Creatinine Ratio 38.1 (H)     Anion Gap 12.0 mmol/L     Narrative:       GFR Normal >60  Chronic Kidney Disease <60  Kidney Failure <15    POC Glucose Once [[id number]]  (Abnormal) Collected:  05/09/18 0750    Specimen:  Blood Updated:  05/09/18 0821     Glucose 172 (H) mg/dL      Comment: : [id number] Emerson EuraisaMeter ID: [id number]       POC Glucose Once [[id number]]  (Abnormal) Collected:  05/08/18 1958    Specimen:  Blood Updated:  05/08/18 2009     Glucose 285 (H) mg/dL      Comment: : [id number] Melva AshleyMeter ID: [id number]       POC Glucose Once [[id number]]  (Abnormal) Collected:  05/08/18 1603    Specimen:  Blood Updated:  05/08/18 1633     Glucose 281 (H) mg/dL      Comment: : [id number] Emerson EuraisaMeter ID: [id number]       POC Glucose Once [[id number]]  (Abnormal) Collected:  05/08/18 1131    Specimen:  Blood Updated:  05/08/18 1212     Glucose 197 (H) mg/dL      Comment: : [id number] Emerson EuraisaMeter ID: [id number]       Basic Metabolic Panel [[id number]]  (Abnormal) Collected:  05/08/18 0635    Specimen:  Blood Updated:  05/08/18 0730     Glucose 197 (H) mg/dL      BUN 62 (H) mg/dL      Creatinine 1.67 (H) mg/dL      Sodium 136 mmol/L      Potassium 4.3 mmol/L      Chloride 96 (L) mmol/L      CO2 28.0 mmol/L      Calcium 9.1 mg/dL      eGFR Non African Amer 42 (L) mL/min/1.73      BUN/Creatinine Ratio 37.1 (H)     Anion Gap 12.0 mmol/L     Narrative:       GFR Normal >60  Chronic Kidney Disease <60  Kidney Failure <15    CBC (No Diff) [[id number]]  (Normal) Collected:  05/08/18 0635    Specimen:  Blood Updated:  05/08/18 0701     WBC 9.12 10*3/mm3      RBC 5.57 10*6/mm3      Hemoglobin 17.2 g/dL      Hematocrit 49.9 %      MCV 89.6 fL      MCH 30.9 pg      MCHC 34.5 g/dL      RDW  12.3 %      RDW-SD 40.2 fl      MPV 11.6 fL      Platelets 152 10*3/mm3     POC Glucose Once [[id number]]  (Abnormal) Collected:  05/07/18 2009    Specimen:  Blood Updated:  05/07/18 2024     Glucose 207 (H) mg/dL      Comment: : [id number] Hatchett MichaelMeter ID: [id number]       POC Glucose Once [[id number]]  (Abnormal) Collected:  05/07/18 1647    Specimen:  Blood Updated:  05/07/18 1702     Glucose 194 (H) mg/dL      Comment: : [id number] Toby CorinnatrenaMeter ID: [id number]       POC Glucose Once [[id number]]  (Abnormal) Collected:  05/07/18 1114    Specimen:  Blood Updated:  05/07/18 1129     Glucose 221 (H) mg/dL      Comment: : [id number] Toby CorinnatrenaMeter ID: [id number]       Basic Metabolic Panel [[id number]]  (Abnormal) Collected:  05/07/18 0821    Specimen:  Blood Updated:  05/07/18 0922     Glucose 167 (H) mg/dL      BUN 54 (H) mg/dL      Creatinine 1.51 (H) mg/dL      Sodium 134 (L) mmol/L      Potassium 3.9 mmol/L      Chloride 94 (L) mmol/L      CO2 27.0 mmol/L      Calcium 9.0 mg/dL      eGFR Non African Amer 47 (L) mL/min/1.73      BUN/Creatinine Ratio 35.8 (H)     Anion Gap 13.0 mmol/L     Narrative:       GFR Normal >60  Chronic Kidney Disease <60  Kidney Failure <15    POC Glucose Once [[id number]]  (Abnormal) Collected:  05/07/18 0736    Specimen:  Blood Updated:  05/07/18 0804     Glucose 188 (H) mg/dL      Comment: : [id number] Toby WeinsteinelaMeter ID: [id number]       POC Glucose Once [[id number]]  (Abnormal) Collected:  05/06/18 2009    Specimen:  Blood Updated:  05/06/18 2020     Glucose 242 (H) mg/dL      Comment: : [id number] Stephie Martinezeter ID: [id number]       POC Glucose Once [[id number]]  (Abnormal) Collected:  05/06/18 1607    Specimen:  Blood Updated:  05/06/18 1620     Glucose 235 (H) mg/dL      Comment: : [id number] Zander Caldera ID: [id number]       POC Glucose Once [[id number]]  (Abnormal) Collected:  05/06/18 1146    Specimen:  Blood Updated:  05/06/18  1215     Glucose 232 (H) mg/dL      Comment: : [id number] Zander VargasndaMeter ID: [id number]       BNP [[id number]]  (Abnormal) Collected:  05/06/18 1028    Specimen:  Blood Updated:  05/06/18 1103     proBNP 4,420.0 (H) pg/mL     Troponin [[id number]]  (Abnormal) Collected:  05/06/18 1028    Specimen:  Blood Updated:  05/06/18 1103     Troponin I 0.042 (H) ng/mL     POC Glucose Once [[id number]]  (Abnormal) Collected:  05/06/18 1010    Specimen:  Blood Updated:  05/06/18 1036     Glucose 223 (H) mg/dL      Comment: : [id number] Constantino RobertsonunaMeter ID: [id number]           ECHO:  · Left ventricular systolic function is severely decreased. Estimated EF appears to be in the range of 21 - 25%.  · Left ventricular diastolic dysfunction.  · The left ventricular cavity is severely dilated.  · Left ventricular wall thickness is consistent with mild concentric hypertrophy.  · Interatrial septal defect present.  · Mild-to-moderate mitral valve regurgitation is present  · Moderate aortic valve regurgitation is present.  · Mild tricuspid valve regurgitation is present. Estimated right ventricular systolic pressure from tricuspid regurgitation is mildly elevated (35-45 mmHg).  · Borderline dilation of the aortic root is present.     Condition on Discharge:  Stable    Physical Exam at Discharge    Vital Signs  Temp:  [97.4 °F (36.3 °C)-98.7 °F (37.1 °C)] 97.9 °F (36.6 °C)  Heart Rate:  [77-88] 77  Resp:  [18-23] 18  BP: (106-115)/(77-86) 111/81    Physical Exam:  Physical Exam   Constitutional: He is oriented to person, place, and time. He appears well-developed and well-nourished.   HENT:   Head: Normocephalic and atraumatic.   Eyes: Pupils are equal, round, and reactive to light.   Neck: Normal range of motion. Neck supple. No JVD present. Carotid bruit is not present.   Cardiovascular: Normal rate, regular rhythm, normal heart sounds and intact distal pulses.    Pulmonary/Chest: Effort normal and breath sounds normal.    Abdominal: Soft. Bowel sounds are normal.   Musculoskeletal: Normal range of motion.   Neurological: He is alert and oriented to person, place, and time. He has normal reflexes.   Skin: Skin is warm and dry.   Psychiatric: He has a normal mood and affect. His behavior is normal. Judgment and thought content normal.       Discharge Disposition  Home or Self Care    Discharge Medications   Joao Fonseca   Home Medication Instructions JORGE:[id number]    Printed on:05/09/18 9340   Medication Information                      acyclovir (ZOVIRAX) 400 MG tablet  Take 1 tablet by mouth Daily. Take no more than 5 doses a day.             albuterol (PROVENTIL HFA;VENTOLIN HFA) 108 (90 Base) MCG/ACT inhaler  Inhale 2 puffs Every 4 (Four) Hours As Needed for Wheezing or Shortness of Air.             apixaban (ELIQUIS) 5 MG tablet tablet  Take 1 tablet by mouth Every 12 (Twelve) Hours.             atorvastatin (LIPITOR) 80 MG tablet  Take 1 tablet by mouth Daily.             furosemide (LASIX) 40 MG tablet  Take 1 tablet by mouth Daily As Needed (edema, SOA).             metFORMIN (GLUCOPHAGE) 1000 MG tablet  Take 1 tablet by mouth 2 (Two) Times a Day With Meals.             metoprolol tartrate 75 MG tablet  Take 75 mg by mouth Every 12 (Twelve) Hours.                 Discharge Diet: Heart Healthy, Low Salt    Activity at Discharge: Gradually resume normal activities    Follow-up Appointments  Future Appointments  Date Time Provider Department Center   7/13/2018 8:00 AM Hernandez Dupree DO [facility] None       Plan:  Patient is ready to be discharged home this morning- patient has been seen by Dr. Henry. Reviewed instructions with patient. Which includes starting Eliquis 5mg BID- discussed side effects and to follow up for injury. Lopressor increased to 75 BID- would consider transition to Toprol XL in future. Stop Entresto due to side effect- likely would not use in future. Would not resume Lisinopril at this time-  will repeat BMP in 1 week if renal function stable would resume Lisinopril if can tolerate. Continue Lasix as needed. Daily weights and low salt diet. Follow up with Dr. Wilson for management and screening of ascending aortic aneurysm- appointment in 3 months. Encouraged Oral Intake.      DIOR Taveras  05/09/18  9:20 AM

## 2018-05-09 NOTE — PAYOR COMM NOTE
"Joao Zhong (62 y.o. Male) [id number]   D/C Notification    [facility]  shawna phone   Fax        Date of Birth Social Security Number Address Home Phone MRN    1955  PO   [facility] [address] [phone] [id number]    Hoahaoism Marital Status          [facility]        Admission Date Admission Type Admitting Provider Attending Provider Department, Room/Bed    5/4/18 Emergency Phil Henry MD  [facility] 4B, 405/1    Discharge Date Discharge Disposition Discharge Destination        5/9/2018 Home or Self Care              Attending Provider:  (none)   Allergies:  Cephalexin    Isolation:  None   Infection:  None   Code Status:  FULL    Ht:  182.9 cm (72\")   Wt:  105 kg (230 lb 9.6 oz)    Admission Cmt:  None   Principal Problem:  None                Active Insurance as of 5/4/2018     Primary Coverage     Payor Plan Insurance Group Employer/Plan Group    [employer] BLUE CROSS AN[employer]M BLUE CROSS BLUE SHIELD PPO [id number]     Payor Plan Address Payor Plan Phone Number Effective From Effective To    [address] [phone] 4/23/2017     [place] [address]       Subscriber Name Subscriber Birth Date Member ID       JOAO ZHONG 1955 [id number]                 Emergency Contacts      (Rel.) Home Phone Work Phone Mobile Phone    Jayna Zhong (Spouse) [phone] -- [phone]    Stella Stevens (Daughter) [phone] -- --    Manish Garcia (Son) [phone] -- --               Discharge Summary      DIOR Taveras at 5/9/2018  9:20 AM          [facility] HEART GROUP DISCHARGE    Date of Discharge:  5/9/2018    Discharge Diagnosis: Active Problems:    Paroxysmal Atrial fibrillation - new onset, s/p CV - stable    Atypical Chest pain - resolved    Nonsustained Ventricular Tachycardia - stable,a symptomatic    Essential Hypertension     Chronic systolic congestive heart failure    Type II " "DM    Ascending Aortic Aneurysm    Moderate AI    Mild to Moderate MR    Hospital Course  Patient is a 62 y.o. male presented with increasing shortness of breath and atypical chest pain. Patient was found to be a new onset atrial fibrillation, dehydrated secondary to increasing lasix at home. Patient had a JULIO CESAR/Cardiovesion yesterday which was successful. Patient appears to feel better today after maintaining NSR. Patient did have a brief run of V-Tach which patient was asymptomatic. Patient was given gentle hydration and renal function has improved. Patient reported \"chlorine breath\" after change in entresto therefore this has been stopped. Patient had Lopressor increased for improvement of rate control. ACE- which patient was previously on prior to entresto was held due to ANNIE and hypotension. Patient is dressed and ready to go home this morning on my entry into the room- he has been seen by Dr. Henry this morning who educated patient on medication changes. Patient had a CTA of chest which showed aneurysm dilation of ascending thoracic aorta 5.2- Dr. Wilson performed patient's CABG- will have follow up with him. Patient has remained euvolemic. Breathing has improved- not orthopneic.     Procedures Performed       Consults:   Consults     No orders found from 4/5/2018 to 5/5/2018.          Pertinent Test Results:   Lab Results (last 72 hours)     Procedure Component Value Units Date/Time    Basic Metabolic Panel [[id number]]  (Abnormal) Collected:  05/09/18 0723    Specimen:  Blood Updated:  05/09/18 0824     Glucose 160 (H) mg/dL      BUN 51 (H) mg/dL      Creatinine 1.34 mg/dL      Sodium 138 mmol/L      Potassium 3.5 mmol/L      Chloride 103 mmol/L      CO2 23.0 (L) mmol/L      Calcium 7.7 (L) mg/dL      eGFR Non African Amer 54 (L) mL/min/1.73      BUN/Creatinine Ratio 38.1 (H)     Anion Gap 12.0 mmol/L     Narrative:       GFR Normal >60  Chronic Kidney Disease <60  Kidney Failure <15    POC Glucose Once " [[id number]]  (Abnormal) Collected:  05/09/18 0750    Specimen:  Blood Updated:  05/09/18 0821     Glucose 172 (H) mg/dL      Comment: : [id number] Emerson EuraisaMeter ID: [id number]       POC Glucose Once [[id number]]  (Abnormal) Collected:  05/08/18 1958    Specimen:  Blood Updated:  05/08/18 2009     Glucose 285 (H) mg/dL      Comment: : [id number] Melva GodinezleyMeter ID: [id number]       POC Glucose Once [[id number]]  (Abnormal) Collected:  05/08/18 1603    Specimen:  Blood Updated:  05/08/18 1633     Glucose 281 (H) mg/dL      Comment: : [id number] Emerson EuraisaMeter ID: [id number]       POC Glucose Once [[id number]]  (Abnormal) Collected:  05/08/18 1131    Specimen:  Blood Updated:  05/08/18 1212     Glucose 197 (H) mg/dL      Comment: : [id number] Emerson EuraisaMeter ID: [id number]       Basic Metabolic Panel [[id number]]  (Abnormal) Collected:  05/08/18 0635    Specimen:  Blood Updated:  05/08/18 0730     Glucose 197 (H) mg/dL      BUN 62 (H) mg/dL      Creatinine 1.67 (H) mg/dL      Sodium 136 mmol/L      Potassium 4.3 mmol/L      Chloride 96 (L) mmol/L      CO2 28.0 mmol/L      Calcium 9.1 mg/dL      eGFR Non African Amer 42 (L) mL/min/1.73      BUN/Creatinine Ratio 37.1 (H)     Anion Gap 12.0 mmol/L     Narrative:       GFR Normal >60  Chronic Kidney Disease <60  Kidney Failure <15    CBC (No Diff) [[id number]]  (Normal) Collected:  05/08/18 0635    Specimen:  Blood Updated:  05/08/18 0701     WBC 9.12 10*3/mm3      RBC 5.57 10*6/mm3      Hemoglobin 17.2 g/dL      Hematocrit 49.9 %      MCV 89.6 fL      MCH 30.9 pg      MCHC 34.5 g/dL      RDW 12.3 %      RDW-SD 40.2 fl      MPV 11.6 fL      Platelets 152 10*3/mm3     POC Glucose Once [[id number]]  (Abnormal) Collected:  05/07/18 2009    Specimen:  Blood Updated:  05/07/18 2024     Glucose 207 (H) mg/dL      Comment: : [id number] Hat[facility]tt [facility]: [id number]       POC Glucose Once [[id number]]  (Abnormal) Collected:  05/07/18 1647     Specimen:  Blood Updated:  05/07/18 1702     Glucose 194 (H) mg/dL      Comment: : [id number] Toby BaldwinMeter ID: [id number]       POC Glucose Once [[id number]]  (Abnormal) Collected:  05/07/18 1114    Specimen:  Blood Updated:  05/07/18 1129     Glucose 221 (H) mg/dL      Comment: : [id number] Toby CorinnatrenaMeter ID: [id number]       Basic Metabolic Panel [[id number]]  (Abnormal) Collected:  05/07/18 0821    Specimen:  Blood Updated:  05/07/18 0922     Glucose 167 (H) mg/dL      BUN 54 (H) mg/dL      Creatinine 1.51 (H) mg/dL      Sodium 134 (L) mmol/L      Potassium 3.9 mmol/L      Chloride 94 (L) mmol/L      CO2 27.0 mmol/L      Calcium 9.0 mg/dL      eGFR Non African Amer 47 (L) mL/min/1.73      BUN/Creatinine Ratio 35.8 (H)     Anion Gap 13.0 mmol/L     Narrative:       GFR Normal >60  Chronic Kidney Disease <60  Kidney Failure <15    POC Glucose Once [[id number]]  (Abnormal) Collected:  05/07/18 0736    Specimen:  Blood Updated:  05/07/18 0804     Glucose 188 (H) mg/dL      Comment: : [id number] Toby BaldwinMeter ID: [id number]       POC Glucose Once [[id number]]  (Abnormal) Collected:  05/06/18 2009    Specimen:  Blood Updated:  05/06/18 2020     Glucose 242 (H) mg/dL      Comment: : [id number] Stephie Jernigan ID: [id number]       POC Glucose Once [[id number]]  (Abnormal) Collected:  05/06/18 1607    Specimen:  Blood Updated:  05/06/18 1620     Glucose 235 (H) mg/dL      Comment: : [id number] Zander Caldera ID: [id number]       POC Glucose Once [[id number]]  (Abnormal) Collected:  05/06/18 1146    Specimen:  Blood Updated:  05/06/18 1215     Glucose 232 (H) mg/dL      Comment: : [id number] Zander ChenaMelainey ID: [id number]       BNP [[id number]]  (Abnormal) Collected:  05/06/18 1028    Specimen:  Blood Updated:  05/06/18 1103     proBNP 4,420.0 (H) pg/mL     Troponin [[id number]]  (Abnormal) Collected:  05/06/18 1028    Specimen:  Blood Updated:  05/06/18 1103     Troponin I 0.042 (H)  ng/mL     POC Glucose Once [[id number]]  (Abnormal) Collected:  05/06/18 1010    Specimen:  Blood Updated:  05/06/18 1036     Glucose 223 (H) mg/dL      Comment: : Nahid ArriagaMeter ID: [id number]           ECHO:  · Left ventricular systolic function is severely decreased. Estimated EF appears to be in the range of 21 - 25%.  · Left ventricular diastolic dysfunction.  · The left ventricular cavity is severely dilated.  · Left ventricular wall thickness is consistent with mild concentric hypertrophy.  · Interatrial septal defect present.  · Mild-to-moderate mitral valve regurgitation is present  · Moderate aortic valve regurgitation is present.  · Mild tricuspid valve regurgitation is present. Estimated right ventricular systolic pressure from tricuspid regurgitation is mildly elevated (35-45 mmHg).  · Borderline dilation of the aortic root is present.     Condition on Discharge:  Stable    Physical Exam at Discharge    Vital Signs  Temp:  [97.4 °F (36.3 °C)-98.7 °F (37.1 °C)] 97.9 °F (36.6 °C)  Heart Rate:  [77-88] 77  Resp:  [18-23] 18  BP: (106-115)/(77-86) 111/81    Physical Exam:  Physical Exam   Constitutional: He is oriented to person, place, and time. He appears well-developed and well-nourished.   HENT:   Head: Normocephalic and atraumatic.   Eyes: Pupils are equal, round, and reactive to light.   Neck: Normal range of motion. Neck supple. No JVD present. Carotid bruit is not present.   Cardiovascular: Normal rate, regular rhythm, normal heart sounds and intact distal pulses.    Pulmonary/Chest: Effort normal and breath sounds normal.   Abdominal: Soft. Bowel sounds are normal.   Musculoskeletal: Normal range of motion.   Neurological: He is alert and oriented to person, place, and time. He has normal reflexes.   Skin: Skin is warm and dry.   Psychiatric: He has a normal mood and affect. His behavior is normal. Judgment and thought content normal.       Discharge Disposition  Home or Self  Care    Discharge Medications   Joao Fonseca   Home Medication Instructions JORGE:[id number]    Printed on:05/09/18 0920   Medication Information                      acyclovir (ZOVIRAX) 400 MG tablet  Take 1 tablet by mouth Daily. Take no more than 5 doses a day.             albuterol (PROVENTIL HFA;VENTOLIN HFA) 108 (90 Base) MCG/ACT inhaler  Inhale 2 puffs Every 4 (Four) Hours As Needed for Wheezing or Shortness of Air.             apixaban (ELIQUIS) 5 MG tablet tablet  Take 1 tablet by mouth Every 12 (Twelve) Hours.             atorvastatin (LIPITOR) 80 MG tablet  Take 1 tablet by mouth Daily.             furosemide (LASIX) 40 MG tablet  Take 1 tablet by mouth Daily As Needed (edema, SOA).             metFORMIN (GLUCOPHAGE) 1000 MG tablet  Take 1 tablet by mouth 2 (Two) Times a Day With Meals.             metoprolol tartrate 75 MG tablet  Take 75 mg by mouth Every 12 (Twelve) Hours.                 Discharge Diet: Heart Healthy, Low Salt    Activity at Discharge: Gradually resume normal activities    Follow-up Appointments  Future Appointments  Date Time Provider Department Center   7/13/2018 8:00 AM Hernandez Dupree DO [facility] None       Plan:  Patient is ready to be discharged home this morning- patient has been seen by Dr. Henry. Reviewed instructions with patient. Which includes starting Eliquis 5mg BID- discussed side effects and to follow up for injury. Lopressor increased to 75 BID- would consider transition to Toprol XL in future. Stop Entresto due to side effect- likely would not use in future. Would not resume Lisinopril at this time- will repeat BMP in 1 week if renal function stable would resume Lisinopril if can tolerate. Continue Lasix as needed. Daily weights and low salt diet. Follow up with Dr. Wilson for management and screening of ascending aortic aneurysm- appointment in 3 months. Encouraged Oral Intake.      DIOR Taveras  05/09/18  9:20 AM                Electronically  signed by DIOR Taveras at 5/9/2018  9:34 AM       Discharge Order     Start     Ordered    05/09/18 0919  Discharge patient  Once     Expected Discharge Date:  05/09/18    Discharge Disposition:  Home or Self Care    Physician of Record:  JAISON GONZALEZ [[id number]]    Physician of Record selection review needed?:  No       Question Answer Comment   Physician of Record JAISNO GONZALEZ    Physician of Record selection review needed? No        05/09/18 0919

## 2018-05-09 NOTE — PLAN OF CARE
Problem: Patient Care Overview  Goal: Plan of Care Review  Outcome: Ongoing (interventions implemented as appropriate)   05/09/18 0356   Coping/Psychosocial   Plan of Care Reviewed With patient   Plan of Care Review   Progress improving   OTHER   Outcome Summary No c/o pain. C/o SOA at rest no acute resp. difficulity noted. O2 appled at 1 l/m per pt request. Remains NSR 74-97 Pt NPO for poss heart cath today.       Problem: Cardiac: ACS (Acute Coronary Syndrome) (Adult)  Goal: Signs and Symptoms of Listed Potential Problems Will be Absent, Minimized or Managed (Cardiac: ACS)  Outcome: Ongoing (interventions implemented as appropriate)   05/09/18 0356   Goal/Outcome Evaluation   Problems Assessed (Acute Coronary Syndrome) all   Problems Present (Acute Coronary Syn) situational response       Problem: Arrhythmia/Dysrhythmia (Symptomatic) (Adult)  Goal: Signs and Symptoms of Listed Potential Problems Will be Absent, Minimized or Managed (Arrhythmia/Dysrhythmia)  Outcome: Ongoing (interventions implemented as appropriate)   05/09/18 0356   Goal/Outcome Evaluation   Problems Assessed (Arrhythmia/Dysrhythmia) all   Problems Present (Dysrhythmia) electrophysiologic conduction defect;situational response

## 2018-05-10 ENCOUNTER — TRANSITIONAL CARE MANAGEMENT TELEPHONE ENCOUNTER (OUTPATIENT)
Dept: INTERNAL MEDICINE | Facility: CLINIC | Age: 63
End: 2018-05-10

## 2018-05-10 NOTE — OUTREACH NOTE
Patient stated he will not see an APRN ever again and he will not take any medication from any provider other than his Cardiologist.

## 2018-05-14 ENCOUNTER — TELEPHONE (OUTPATIENT)
Dept: CARDIOLOGY | Facility: CLINIC | Age: 63
End: 2018-05-14

## 2018-05-16 ENCOUNTER — LAB (OUTPATIENT)
Dept: LAB | Facility: HOSPITAL | Age: 63
End: 2018-05-16

## 2018-05-16 ENCOUNTER — OFFICE VISIT (OUTPATIENT)
Dept: CARDIOLOGY | Facility: CLINIC | Age: 63
End: 2018-05-16

## 2018-05-16 VITALS
SYSTOLIC BLOOD PRESSURE: 110 MMHG | OXYGEN SATURATION: 100 % | HEIGHT: 72 IN | HEART RATE: 92 BPM | BODY MASS INDEX: 31.42 KG/M2 | DIASTOLIC BLOOD PRESSURE: 84 MMHG | WEIGHT: 232 LBS

## 2018-05-16 DIAGNOSIS — I25.10 CORONARY ARTERY DISEASE INVOLVING NATIVE CORONARY ARTERY OF NATIVE HEART WITHOUT ANGINA PECTORIS: ICD-10-CM

## 2018-05-16 DIAGNOSIS — E78.2 MIXED HYPERLIPIDEMIA: ICD-10-CM

## 2018-05-16 DIAGNOSIS — I50.22 CHF (CONGESTIVE HEART FAILURE), NYHA CLASS II, CHRONIC, SYSTOLIC (HCC): Primary | ICD-10-CM

## 2018-05-16 DIAGNOSIS — Z91.14 NON COMPLIANCE W MEDICATION REGIMEN: ICD-10-CM

## 2018-05-16 DIAGNOSIS — I50.22 CHF (CONGESTIVE HEART FAILURE), NYHA CLASS II, CHRONIC, SYSTOLIC (HCC): ICD-10-CM

## 2018-05-16 DIAGNOSIS — E66.9 OBESITY (BMI 30-39.9): ICD-10-CM

## 2018-05-16 DIAGNOSIS — I48.91 ATRIAL FIBRILLATION STATUS POST CARDIOVERSION (HCC): ICD-10-CM

## 2018-05-16 DIAGNOSIS — I10 ESSENTIAL HYPERTENSION: ICD-10-CM

## 2018-05-16 DIAGNOSIS — Z95.810 AICD (AUTOMATIC CARDIOVERTER/DEFIBRILLATOR) PRESENT: ICD-10-CM

## 2018-05-16 DIAGNOSIS — Z95.1 S/P CABG X 3: ICD-10-CM

## 2018-05-16 PROBLEM — I48.0 PAROXYSMAL ATRIAL FIBRILLATION (HCC): Status: ACTIVE | Noted: 2018-05-04

## 2018-05-16 PROBLEM — Z91.148 NON COMPLIANCE W MEDICATION REGIMEN: Status: ACTIVE | Noted: 2018-05-16

## 2018-05-16 LAB
ANION GAP SERPL CALCULATED.3IONS-SCNC: 10 MMOL/L (ref 4–13)
BUN BLD-MCNC: 23 MG/DL (ref 5–21)
BUN/CREAT SERPL: 22.3 (ref 7–25)
CALCIUM SPEC-SCNC: 9.2 MG/DL (ref 8.4–10.4)
CHLORIDE SERPL-SCNC: 100 MMOL/L (ref 98–110)
CO2 SERPL-SCNC: 29 MMOL/L (ref 24–31)
CREAT BLD-MCNC: 1.03 MG/DL (ref 0.5–1.4)
GFR SERPL CREATININE-BSD FRML MDRD: 73 ML/MIN/1.73
GLUCOSE BLD-MCNC: 213 MG/DL (ref 70–100)
POTASSIUM BLD-SCNC: 4.7 MMOL/L (ref 3.5–5.3)
SODIUM BLD-SCNC: 139 MMOL/L (ref 135–145)

## 2018-05-16 PROCEDURE — 80048 BASIC METABOLIC PNL TOTAL CA: CPT | Performed by: NURSE PRACTITIONER

## 2018-05-16 PROCEDURE — 36415 COLL VENOUS BLD VENIPUNCTURE: CPT

## 2018-05-16 PROCEDURE — 99214 OFFICE O/P EST MOD 30 MIN: CPT | Performed by: NURSE PRACTITIONER

## 2018-05-16 PROCEDURE — 93000 ELECTROCARDIOGRAM COMPLETE: CPT | Performed by: NURSE PRACTITIONER

## 2018-05-16 RX ORDER — LISINOPRIL 5 MG/1
5 TABLET ORAL DAILY
COMMUNITY
End: 2018-08-17 | Stop reason: SDUPTHER

## 2018-05-16 RX ORDER — ASPIRIN 81 MG/1
81 TABLET, CHEWABLE ORAL DAILY
COMMUNITY
End: 2018-08-17 | Stop reason: SDUPTHER

## 2018-05-16 NOTE — PATIENT INSTRUCTIONS
Obesity, Adult  Obesity is the condition of having too much total body fat. Being overweight or obese means that your weight is greater than what is considered healthy for your body size. Obesity is determined by a measurement called BMI. BMI is an estimate of body fat and is calculated from height and weight. For adults, a BMI of 30 or higher is considered obese.  Obesity can eventually lead to other health concerns and major illnesses, including:  · Stroke.  · Coronary artery disease (CAD).  · Type 2 diabetes.  · Some types of cancer, including cancers of the colon, breast, uterus, and gallbladder.  · Osteoarthritis.  · High blood pressure (hypertension).  · High cholesterol.  · Sleep apnea.  · Gallbladder stones.  · Infertility problems.  What are the causes?  The main cause of obesity is taking in (consuming) more calories than your body uses for energy. Other factors that contribute to this condition may include:  · Being born with genes that make you more likely to become obese.  · Having a medical condition that causes obesity. These conditions include:  ¨ Hypothyroidism.  ¨ Polycystic ovarian syndrome (PCOS).  ¨ Binge-eating disorder.  ¨ Cushing syndrome.  · Taking certain medicines, such as steroids, antidepressants, and seizure medicines.  · Not being physically active (sedentary lifestyle).  · Living where there are limited places to exercise safely or buy healthy foods.  · Not getting enough sleep.  What increases the risk?  The following factors may increase your risk of this condition:  · Having a family history of obesity.  · Being a woman of -American descent.  · Being a man of  descent.  What are the signs or symptoms?  Having excessive body fat is the main symptom of this condition.  How is this diagnosed?  This condition may be diagnosed based on:  · Your symptoms.  · Your medical history.  · A physical exam. Your health care provider may measure:  ¨ Your BMI. If you are an adult  with a BMI between 25 and less than 30, you are considered overweight. If you are an adult with a BMI of 30 or higher, you are considered obese.  ¨ The distances around your hips and your waist (circumferences). These may be compared to each other to help diagnose your condition.  ¨ Your skinfold thickness. Your health care provider may gently pinch a fold of your skin and measure it.  How is this treated?  Treatment for this condition often includes changing your lifestyle. Treatment may include some or all of the following:  · Dietary changes. Work with your health care provider and a dietitian to set a weight-loss goal that is healthy and reasonable for you. Dietary changes may include eating:  ¨ Smaller portions. A portion size is the amount of a particular food that is healthy for you to eat at one time. This varies from person to person.  ¨ Low-calorie or low-fat options.  ¨ More whole grains, fruits, and vegetables.  · Regular physical activity. This may include aerobic activity (cardio) and strength training.  · Medicine to help you lose weight. Your health care provider may prescribe medicine if you are unable to lose 1 pound a week after 6 weeks of eating more healthily and doing more physical activity.  · Surgery. Surgical options may include gastric banding and gastric bypass. Surgery may be done if:  ¨ Other treatments have not helped to improve your condition.  ¨ You have a BMI of 40 or higher.  ¨ You have life-threatening health problems related to obesity.  Follow these instructions at home:     Eating and drinking     · Follow recommendations from your health care provider about what you eat and drink. Your health care provider may advise you to:  ¨ Limit fast foods, sweets, and processed snack foods.  ¨ Choose low-fat options, such as low-fat milk instead of whole milk.  ¨ Eat 5 or more servings of fruits or vegetables every day.  ¨ Eat at home more often. This gives you more control over what you  eat.  ¨ Choose healthy foods when you eat out.  ¨ Learn what a healthy portion size is.  ¨ Keep low-fat snacks on hand.  ¨ Avoid sugary drinks, such as soda, fruit juice, iced tea sweetened with sugar, and flavored milk.  ¨ Eat a healthy breakfast.  · Drink enough water to keep your urine clear or pale yellow.  · Do not go without eating for long periods of time (do not fast) or follow a fad diet. Fasting and fad diets can be unhealthy and even dangerous.  Physical Activity   · Exercise regularly, as told by your health care provider. Ask your health care provider what types of exercise are safe for you and how often you should exercise.  · Warm up and stretch before being active.  · Cool down and stretch after being active.  · Rest between periods of activity.  Lifestyle   · Limit the time that you spend in front of your TV, computer, or video game system.  · Find ways to reward yourself that do not involve food.  · Limit alcohol intake to no more than 1 drink a day for nonpregnant women and 2 drinks a day for men. One drink equals 12 oz of beer, 5 oz of wine, or 1½ oz of hard liquor.  General instructions   · Keep a weight loss journal to keep track of the food you eat and how much you exercise you get.  · Take over-the-counter and prescription medicines only as told by your health care provider.  · Take vitamins and supplements only as told by your health care provider.  · Consider joining a support group. Your health care provider may be able to recommend a support group.  · Keep all follow-up visits as told by your health care provider. This is important.  Contact a health care provider if:  · You are unable to meet your weight loss goal after 6 weeks of dietary and lifestyle changes.  This information is not intended to replace advice given to you by your health care provider. Make sure you discuss any questions you have with your health care provider.  Document Released: 01/25/2006 Document Revised:  05/22/2017 Document Reviewed: 10/05/2016  [employer] Interactive Patient Education © 2017 [employer]    Heart-Healthy Eating Plan  Many factors influence your heart health, including eating and exercise habits. Heart (coronary) risk increases with abnormal blood fat (lipid) levels. Heart-healthy meal planning includes limiting unhealthy fats, increasing healthy fats, and making other small dietary changes. This includes maintaining a healthy body weight to help keep lipid levels within a normal range.  What is my plan?  Your health care provider recommends that you:  · Get no more than _________% of the total calories in your daily diet from fat.  · Limit your intake of saturated fat to less than _________% of your total calories each day.  · Limit the amount of cholesterol in your diet to less than _________ mg per day.  What types of fat should I choose?  · Choose healthy fats more often. Choose monounsaturated and polyunsaturated fats, such as olive oil and canola oil, flaxseeds, walnuts, almonds, and seeds.  · Eat more omega-3 fats. Good choices include salmon, mackerel, sardines, tuna, flaxseed oil, and ground flaxseeds. Aim to eat fish at least two times each week.  · Limit saturated fats. Saturated fats are primarily found in animal products, such as meats, butter, and cream. Plant sources of saturated fats include palm oil, palm kernel oil, and coconut oil.  · Avoid foods with partially hydrogenated oils in them. These contain trans fats. Examples of foods that contain trans fats are stick margarine, some tub margarines, cookies, crackers, and other baked goods.  What general guidelines do I need to follow?  · Check food labels carefully to identify foods with trans fats or high amounts of saturated fat.  · Fill one half of your plate with vegetables and green salads. Eat 4-5 servings of vegetables per day. A serving of vegetables equals 1 cup of raw leafy vegetables, ½ cup of raw or cooked cut-up  "vegetables, or ½ cup of vegetable juice.  · Fill one fourth of your plate with whole grains. Look for the word \"whole\" as the first word in the ingredient list.  · Fill one fourth of your plate with lean protein foods.  · Eat 4-5 servings of fruit per day. A serving of fruit equals one medium whole fruit, ¼ cup of dried fruit, ½ cup of fresh, frozen, or canned fruit, or ½ cup of 100% fruit juice.  · Eat more foods that contain soluble fiber. Examples of foods that contain this type of fiber are apples, broccoli, carrots, beans, peas, and barley. Aim to get 20-30 g of fiber per day.  · Eat more home-cooked food and less restaurant, buffet, and fast food.  · Limit or avoid alcohol.  · Limit foods that are high in starch and sugar.  · Avoid fried foods.  · Cook foods by using methods other than frying. Baking, boiling, grilling, and broiling are all great options. Other fat-reducing suggestions include:  ¨ Removing the skin from poultry.  ¨ Removing all visible fats from meats.  ¨ Skimming the fat off of stews, soups, and gravies before serving them.  ¨ Steaming vegetables in water or broth.  · Lose weight if you are overweight. Losing just 5-10% of your initial body weight can help your overall health and prevent diseases such as diabetes and heart disease.  · Increase your consumption of nuts, legumes, and seeds to 4-5 servings per week. One serving of dried beans or legumes equals ½ cup after being cooked, one serving of nuts equals 1½ ounces, and one serving of seeds equals ½ ounce or 1 tablespoon.  · You may need to monitor your salt (sodium) intake, especially if you have high blood pressure. Talk with your health care provider or dietitian to get more information about reducing sodium.  What foods can I eat?  Grains     Breads, including Burkinan, white, esteban, wheat, raisin, rye, oatmeal, and Italian. Tortillas that are neither fried nor made with lard or trans fat. Low-fat rolls, including hotdog and hamburger " buns and English muffins. Biscuits. Muffins. Waffles. Pancakes. Light popcorn. Whole-grain cereals. Flatbread. [place] toast. Pretzels. Breadsticks. Rusks. Low-fat snacks and crackers, including oyster, saltine, matzo, bob, animal, and rye. Rice and pasta, including brown rice and those that are made with whole wheat.  Vegetables   All vegetables.  Fruits   All fruits, but limit coconut.  Meats and Other Protein Sources   Lean, well-trimmed beef, veal, pork, and lamb. Chicken and turkey without skin. All fish and shellfish. Wild duck, rabbit, pheasant, and venison. Egg whites or low-cholesterol egg substitutes. Dried beans, peas, lentils, and tofu. Seeds and most nuts.  Dairy   Low-fat or nonfat cheeses, including ricotta, string, and mozzarella. Skim or 1% milk that is liquid, powdered, or evaporated. Buttermilk that is made with low-fat milk. Nonfat or low-fat yogurt.  Beverages   Mineral water. Diet carbonated beverages.  Sweets and Desserts   Sherbets and fruit ices. Honey, jam, marmalade, jelly, and syrups. Meringues and gelatins. Pure sugar candy, such as hard candy, jelly beans, gumdrops, mints, marshmallows, and small amounts of dark chocolate. Ryan food cake.  Eat all sweets and desserts in moderation.  Fats and Oils   Nonhydrogenated (trans-free) margarines. Vegetable oils, including soybean, sesame, sunflower, olive, peanut, safflower, corn, canola, and cottonseed. Salad dressings or mayonnaise that are made with a vegetable oil. Limit added fats and oils that you use for cooking, baking, salads, and as spreads.  Other   Cocoa powder. Coffee and tea. All seasonings and condiments.  The items listed above may not be a complete list of recommended foods or beverages. Contact your dietitian for more options.   What foods are not recommended?  Grains   Breads that are made with saturated or trans fats, oils, or whole milk. Croissants. Butter rolls. Cheese breads. Sweet rolls. Donuts. Buttered popcorn. Chow  mein noodles. High-fat crackers, such as cheese or butter crackers.  Meats and Other Protein Sources   Fatty meats, such as hotdogs, short ribs, sausage, spareribs, joshi, ribeye roast or steak, and mutton. High-fat deli meats, such as salami and bologna. Caviar. Domestic duck and goose. Organ meats, such as kidney, liver, sweetbreads, brains, gizzard, chitterlings, and heart.  Dairy   Cream, sour cream, cream cheese, and creamed cottage cheese. Whole milk cheeses, including blue (hernan), [place], Brie, Todd, American, Havarti, Swiss, cheddar, Camembert, and [place]. Whole or 2% milk that is liquid, evaporated, or condensed. Whole buttermilk. Cream sauce or high-fat cheese sauce. Yogurt that is made from whole milk.  Beverages   Regular sodas and drinks with added sugar.  Sweets and Desserts   Frosting. Pudding. Cookies. Cakes other than hannah food cake. Candy that has milk chocolate or white chocolate, hydrogenated fat, butter, coconut, or unknown ingredients. Buttered syrups. Full-fat ice cream or ice cream drinks.  Fats and Oils   Gravy that has suet, meat fat, or shortening. Cocoa butter, hydrogenated oils, palm oil, coconut oil, palm kernel oil. These can often be found in baked products, candy, fried foods, nondairy creamers, and whipped toppings. Solid fats and shortenings, including joshi fat, salt pork, lard, and butter. Nondairy cream substitutes, such as coffee creamers and sour cream substitutes. Salad dressings that are made of unknown oils, cheese, or sour cream.  The items listed above may not be a complete list of foods and beverages to avoid. Contact your dietitian for more information.   This information is not intended to replace advice given to you by your health care provider. Make sure you discuss any questions you have with your health care provider.  Document Released: 09/26/2009 Document Revised: 07/07/2017 Document Reviewed: 06/11/2015  [employer] Interactive Patient Education © 2017  [employer]    Exercising to Lose Weight  Exercising can help you to lose weight. In order to lose weight through exercise, you need to do vigorous-intensity exercise. You can tell that you are exercising with vigorous intensity if you are breathing very hard and fast and cannot hold a conversation while exercising.  Moderate-intensity exercise helps to maintain your current weight. You can tell that you are exercising at a moderate level if you have a higher heart rate and faster breathing, but you are still able to hold a conversation.  How often should I exercise?  Choose an activity that you enjoy and set realistic goals. Your health care provider can help you to make an activity plan that works for you. Exercise regularly as directed by your health care provider. This may include:  · Doing resistance training twice each week, such as:  ¨ Push-ups.  ¨ Sit-ups.  ¨ Lifting weights.  ¨ Using resistance bands.  · Doing a given intensity of exercise for a given amount of time. Choose from these options:  ¨ 150 minutes of moderate-intensity exercise every week.  ¨ 75 minutes of vigorous-intensity exercise every week.  ¨ A mix of moderate-intensity and vigorous-intensity exercise every week.  Children, pregnant women, people who are out of shape, people who are overweight, and older adults may need to consult a health care provider for individual recommendations. If you have any sort of medical condition, be sure to consult your health care provider before starting a new exercise program.  What are some activities that can help me to lose weight?  · Walking at a rate of at least 4.5 miles an hour.  · Jogging or running at a rate of 5 miles per hour.  · Biking at a rate of at least 10 miles per hour.  · Lap swimming.  · Roller-skating or in-line skating.  · Cross-country skiing.  · Vigorous competitive sports, such as football, basketball, and soccer.  · Jumping rope.  · Aerobic dancing.  How can I be more active in  my day-to-day activities?  · Use the stairs instead of the elevator.  · Take a walk during your lunch break.  · If you drive, park your car farther away from work or school.  · If you take public transportation, get off one stop early and walk the rest of the way.  · Make all of your phone calls while standing up and walking around.  · Get up, stretch, and walk around every 30 minutes throughout the day.  What guidelines should I follow while exercising?  · Do not exercise so much that you hurt yourself, feel dizzy, or get very short of breath.  · Consult your health care provider prior to starting a new exercise program.  · Wear comfortable clothes and shoes with good support.  · Drink plenty of water while you exercise to prevent dehydration or heat stroke. Body water is lost during exercise and must be replaced.  · Work out until you breathe faster and your heart beats faster.  This information is not intended to replace advice given to you by your health care provider. Make sure you discuss any questions you have with your health care provider.  Document Released: 01/20/2012 Document Revised: 05/25/2017 Document Reviewed: 05/21/2015  [employer] Interactive Patient Education © 2017 [employer]

## 2018-05-16 NOTE — PROGRESS NOTES
"    Subjective:     Encounter Date:05/16/2018      Patient ID: Joao Fonseca is a 62 y.o. male. He presents today for one week follow-up of hospital discharge for atrial fibrillation with rapid ventricular response and acute renal failure with complains of increased shortness of breath and fatigue. He was cardioverted and started on Eliquis. Previous to that admission, he had been started on Entresto for optimization of heart failure treatment. The pt had also admittedly been \"doubling\" and \"tripling\" dose of lasix at home on his own accord prior to admission. Entresto and lisinopril were stopped. Since discharge, he has stopped Eliquis on his own accord due to complaints of bilateral lower extremity edema. He has stopped Lipitor of his own accord due to fatigue. He has also resumed lisinopril on his own accord. BMP today reviewed, revealing normalization of kidney function with creatinine of 1.03, BUN of 23, potassium of 4.7 and GFR of 73. He has a history of NYHA class II chronic systolic congestive heart failure, coronary artery disease s/p coronary artery bypass grafting X3 in 2015, automatic internal cardiac defibrillator, type 2 diabetes mellitus, obesity, hypertension and hyperlipidemia.  He continues to complain of fatigue but states this is improving. He reports improvement in bilateral lower extremity edema since stopping Eliquis. He denies chest pain, palpitations, dizziness, syncope, orthopnea or PND. He has been counseled at length regarding increased risk of stroke following cardioversion for atrial fibrillation without being anticoagulated. He states that he has researched anticoagulants and found that \"if you have an allergic response to one, you will more than likely be allergic to them all\". He states \"I'm not taking any of them, I would rather just die\". He also states that he is \"considering suing Entresto, because there is no warning that is causes atrial fibrillation\". He states that he has " "\"researched it, and 60% of people started on Entresto go into atrial fibrillation\". Pt requests not to be seen by mid-level providers in the future, stating \"my insurance pays for me to see a specialist, and that is an MD\". He states \"the only reason I agreed to see you today was because he's (Dr. Henry) in training.\"  Pt made several complaints about care provided while at [facility], including charge for hospitalist to \"come into his room and ask his name,\" \"food that wasn't fit to feed a dog\" etc. He states \"I may just be done with [employer], they're only about money\".     Chief Complaint:  Congestive Heart Failure   Presents for follow-up visit. Associated symptoms include edema and fatigue. Pertinent negatives include no abdominal pain, chest pain, chest pressure, claudication, muscle weakness, near-syncope, nocturia, orthopnea, palpitations, paroxysmal nocturnal dyspnea, shortness of breath or unexpected weight change. The symptoms have been stable. His past medical history is significant for CAD. Compliance problems include medication side effects.    Coronary Artery Disease   Presents for follow-up visit. Symptoms include leg swelling. Pertinent negatives include no chest pain, chest pressure, chest tightness, dizziness, muscle weakness, palpitations, shortness of breath or weight gain. Risk factors include hyperlipidemia. Risk factors do not include hypertension. His past medical history is significant for CHF. The symptoms have been stable. Compliance with diet is variable. Compliance with exercise is variable. Compliance with medications is good.   Hypertension   This is a chronic problem. The current episode started more than 1 year ago. The problem is controlled. Associated symptoms include malaise/fatigue and peripheral edema. Pertinent negatives include no anxiety, blurred vision, chest pain, headaches, neck pain, orthopnea, palpitations, PND, shortness of breath or sweats. Risk factors for coronary artery " disease include male gender, obesity, dyslipidemia and diabetes mellitus. Current antihypertension treatment includes ACE inhibitors, beta blockers and diuretics. The current treatment provides significant improvement. Compliance problems include medication side effects.  Hypertensive end-organ damage includes CAD/MI and heart failure.   Hyperlipidemia   This is a chronic problem. The current episode started more than 1 year ago. The problem is uncontrolled. Pertinent negatives include no chest pain or shortness of breath. He is currently on no antihyperlipidemic treatment. Compliance problems include medication side effects.  Risk factors for coronary artery disease include hypertension, obesity, dyslipidemia, diabetes mellitus and male sex.   Atrial Fibrillation   Presents for follow-up visit. Symptoms are negative for an AICD problem, bradycardia, chest pain, dizziness, hemodynamic instability, hypertension, hypotension, pacemaker problem, palpitations, shortness of breath, syncope, tachycardia and weakness. The symptoms have been stable. Past medical history includes atrial fibrillation, CAD, CHF and hyperlipidemia. Medication compliance problems include medication side effects.       The following portions of the patient's history were reviewed and updated as appropriate: allergies, current medications, past family history, past medical history, past social history, past surgical history and problem list.     Allergies   Allergen Reactions   • Cephalexin Anaphylaxis     Causes swelling of tongue, eyes, throat   Current outpatient and discharge medications have been reconciled for the patient.  Reviewed by: DIOR Camarena      Current Outpatient Prescriptions:   •  acyclovir (ZOVIRAX) 400 MG tablet, Take 1 tablet by mouth Daily. Take no more than 5 doses a day., Disp: 90 tablet, Rfl: 2  •  albuterol (PROVENTIL HFA;VENTOLIN HFA) 108 (90 Base) MCG/ACT inhaler, Inhale 2 puffs Every 4 (Four) Hours As Needed  for Wheezing or Shortness of Air., Disp: , Rfl:   •  aspirin 81 MG chewable tablet, Chew 81 mg Daily., Disp: , Rfl:   •  furosemide (LASIX) 40 MG tablet, Take 1 tablet by mouth Daily As Needed (edema, SOA)., Disp: 90 tablet, Rfl: 3  •  lisinopril (PRINIVIL,ZESTRIL) 5 MG tablet, Take 5 mg by mouth Daily., Disp: , Rfl:   •  metFORMIN (GLUCOPHAGE) 1000 MG tablet, Take 1 tablet by mouth 2 (Two) Times a Day With Meals., Disp: 180 tablet, Rfl: 2  •  metoprolol tartrate 75 MG tablet, Take 75 mg by mouth Every 12 (Twelve) Hours., Disp: 60 tablet, Rfl: 11  Past Medical History:   Diagnosis Date   • Acute pansinusitis 1/4/2018   • Arrhythmia    • Cancer     malignant chest tumor   • Cardiomyopathy 12/27/2016   • CHF (congestive heart failure)    • Chronic laryngitis 1/4/2018   • Coronary artery disease    • Diabetes mellitus    • Disorder of liver 8/12/2013   • GERD (gastroesophageal reflux disease)    • History of adenomatous polyp of colon 06/28/2010    TUBULAR ADENOMA AT 40CM   • Hyperlipidemia    • Hypertension    • Obesity (BMI 30-39.9) 1/5/2018   • Pacemaker    • Shingles      Past Surgical History:   Procedure Laterality Date   • ANKLE SURGERY Right    • CARDIAC CATHETERIZATION     • CARDIAC ELECTROPHYSIOLOGY PROCEDURE Left 10/6/2016    Procedure: ICD DC new;  Surgeon: Zander Andino MD;  Location: [facility] CATH INVASIVE LOC[facility];  Service:    • COLONOSCOPY  06/28/2010    biopsy at 41, 2 mm polyp supboptimal prep right clon    • COLONOSCOPY W/ POLYPECTOMY  06/28/2010    POLYP AT 40CM TUBULAR ADENOMA, SUBOPTIMAL PREP   • CORONARY ARTERY BYPASS GRAFT     • HEMORRHOIDECTOMY  1980   • HERNIA REPAIR     • PACEMAKER IMPLANTATION     • TONSILLECTOMY       Family History   Problem Relation Age of Onset   • Diabetes Mother    • Colon cancer Mother    • Heart disease Mother    • Hypertension Mother    • Stroke Mother    • Osteoporosis Mother    • Cancer Mother    • Heart disease Father    • Hypertension Father    • Cancer  Father    • Bipolar disorder Sister    • Hypertension Brother    • Colon polyps Brother    • No Known Problems Son    • Heart disease Maternal Grandmother    • Diabetes Maternal Grandfather    • Heart disease Maternal Grandfather    • Heart disease Paternal Grandmother    • Diabetes Paternal Grandfather    • Heart disease Paternal Grandfather      Social History     Social History   • Marital status:      Spouse name: N/A   • Number of children: N/A   • Years of education: N/A     Occupational History   • Not on file.     Social History Main Topics   • Smoking status: Former Smoker     Years: 25.00     Types: Cigarettes   • Smokeless tobacco: Never Used      Comment: QUIT 20 YEARS AGO   • Alcohol use No   • Drug use: No   • Sexual activity: No     Other Topics Concern   • Not on file     Social History Narrative   • No narrative on file         Review of Systems   Constitution: Positive for fatigue and malaise/fatigue. Negative for chills, decreased appetite, fever, weakness, night sweats, unexpected weight change, weight gain and weight loss.   HENT: Negative for nosebleeds.    Eyes: Negative for blurred vision and visual disturbance.   Cardiovascular: Positive for leg swelling. Negative for chest pain, claudication, dyspnea on exertion, near-syncope, orthopnea, palpitations, paroxysmal nocturnal dyspnea and syncope.   Respiratory: Negative for chest tightness, cough, hemoptysis, shortness of breath, snoring and wheezing.    Endocrine: Negative for cold intolerance and heat intolerance.   Hematologic/Lymphatic: Does not bruise/bleed easily.   Skin: Negative for rash.   Musculoskeletal: Negative for back pain, falls, muscle weakness and neck pain.   Gastrointestinal: Negative for abdominal pain, change in bowel habit, constipation, diarrhea, dysphagia, heartburn, nausea and vomiting.   Genitourinary: Negative for hematuria and nocturia.   Neurological: Negative for dizziness, headaches and light-headedness.  "  Psychiatric/Behavioral: Negative for altered mental status.   Allergic/Immunologic: Negative for persistent infections.         ECG 12 Lead  Date/Time: 5/16/2018 5:09 PM  Performed by: DALTON RHOADES  Authorized by: DALTON RHOADES   Comparison: compared with previous ECG from 5/6/2018  Rhythm: sinus rhythm  Rate: normal  BPM: 92  QRS axis: left  Other findings: LVH and LAE  Clinical impression: abnormal ECG          /84 (BP Location: Left arm, Patient Position: Sitting, Cuff Size: Adult)   Pulse 92   Ht 182.9 cm (72.01\")   Wt 105 kg (232 lb)   SpO2 100%   BMI 31.46 kg/m²        Objective:     Physical Exam   Constitutional: He is oriented to person, place, and time. Vital signs are normal. He appears well-developed and well-nourished. No distress.   HENT:   Head: Normocephalic and atraumatic.   Right Ear: External ear normal.   Left Ear: External ear normal.   Eyes: Conjunctivae are normal. Pupils are equal, round, and reactive to light. Right eye exhibits no discharge. Left eye exhibits no discharge.   Neck: Normal range of motion. Neck supple. No JVD present. Carotid bruit is not present. No thyromegaly present.   Cardiovascular: Normal rate, regular rhythm, normal heart sounds and intact distal pulses.  PMI is not displaced.  Exam reveals no gallop, no friction rub and no decreased pulses.    No murmur heard.  Pulses:       Radial pulses are 2+ on the right side, and 2+ on the left side.        Dorsalis pedis pulses are 2+ on the right side, and 2+ on the left side.        Posterior tibial pulses are 2+ on the right side, and 2+ on the left side.   Pulmonary/Chest: Effort normal and breath sounds normal. No respiratory distress. He has no decreased breath sounds. He has no wheezes. He has no rhonchi. He has no rales. He exhibits no tenderness.   Abdominal: Soft. Bowel sounds are normal. He exhibits no distension. There is no tenderness.   Musculoskeletal: Normal range of motion. He exhibits no " edema.   Neurological: He is alert and oriented to person, place, and time.   Skin: Skin is warm and dry. No rash noted. He is not diaphoretic. No erythema. No pallor.   Psychiatric: He has a normal mood and affect. His behavior is normal. Judgment and thought content normal.   Vitals reviewed.      Lab Review:   Lab Results   Component Value Date    WBC 9.12 05/08/2018    HGB 17.2 05/08/2018    HCT 49.9 05/08/2018    MCV 89.6 05/08/2018     05/08/2018     Lab Results   Component Value Date    GLUCOSE 213 (H) 05/16/2018    BUN 23 (H) 05/16/2018    CREATININE 1.03 05/16/2018    EGFRIFNONA 73 05/16/2018    EGFRIFAFRI 73 11/08/2017    BCR 22.3 05/16/2018    K 4.7 05/16/2018    CO2 29.0 05/16/2018    CALCIUM 9.2 05/16/2018    PROTENTOTREF 6.8 11/08/2017    ALBUMIN 4.50 05/04/2018    LABIL2 1.8 05/04/2018    AST 26 05/04/2018    ALT 43 05/04/2018     Lab Results   Component Value Date    CHOL 159 04/13/2018    CHLPL 164 11/08/2017    CHLPL 186 11/07/2016    CHLPL 165 11/17/2015     Lab Results   Component Value Date    TRIG 90 04/13/2018    TRIG 65 11/08/2017    TRIG 65 11/07/2016     Lab Results   Component Value Date    HDL 43 04/13/2018    HDL 46 11/08/2017    HDL 52 11/07/2016     Lab Results   Component Value Date    LDL 99 04/13/2018     (H) 11/08/2017     (H) 11/07/2016           Assessment:          Diagnosis Plan   1. CHF (congestive heart failure), NYHA class II, chronic, systolic  Compensated.    2. Coronary artery disease involving native coronary artery of native heart without angina pectoris  No clinical signs of ischemia.    3. S/P CABG x 3     4. AICD (automatic cardioverter/defibrillator) present  Followed by Dr. Andino.    5. Atrial fibrillation status post cardioversion  Maintaining sinus rhythm. Refuses anticoagulation.    6. Essential hypertension  Well controlled.    7. Mixed hyperlipidemia  Not controlled. Refuses statin.    8. Obesity (BMI 30-39.9)  Patient's Body mass index is  "31.46 kg/m². BMI is above normal parameters. Recommendations include: educational material.     9. Non compliance w medication regimen  Refuses DOAC. Starts, stops and adjusts multiple medications on his own accord.           Plan:       1. Pt counseled at length regarding increased risk of stroke following cardioversion for atrial fibrillation with no anticoagulation. He refuses DOAC.   2. Continue current medications as pt states \"I'm not changing anything, I'm going back to what I was on before all of this\".   3. Report any worsening symptoms.   4. Reviewed signs and symptoms of CHF and what to report with the patient. Patient instructed to restrict sodium and weigh daily. Report weight gain of greater than 2 lbs overnight or 5 lbs in 1 week. Pt verbalized understanding of instructions and plan of care.   5. Follow up with PCP for blood pressure and cholesterol management and routine lab work.  6. Continue heart healthy diet and regular exercise as tolerated.   7. Follow up with Dr. Andino as scheduled for AICD interrogation.   8. Follow up with Dr. Henry in one month, or sooner if needed. Pt refuses to see mid-level providers in the future.          "

## 2018-06-20 ENCOUNTER — OFFICE VISIT (OUTPATIENT)
Dept: CARDIOLOGY | Facility: CLINIC | Age: 63
End: 2018-06-20

## 2018-06-20 ENCOUNTER — CLINICAL SUPPORT NO REQUIREMENTS (OUTPATIENT)
Dept: CARDIOLOGY | Facility: CLINIC | Age: 63
End: 2018-06-20

## 2018-06-20 VITALS
HEIGHT: 72 IN | BODY MASS INDEX: 30.83 KG/M2 | DIASTOLIC BLOOD PRESSURE: 82 MMHG | OXYGEN SATURATION: 100 % | HEART RATE: 82 BPM | SYSTOLIC BLOOD PRESSURE: 110 MMHG | WEIGHT: 227.6 LBS

## 2018-06-20 DIAGNOSIS — I42.9 CARDIOMYOPATHY, UNSPECIFIED TYPE (HCC): ICD-10-CM

## 2018-06-20 DIAGNOSIS — I50.22 CHF (CONGESTIVE HEART FAILURE), NYHA CLASS II, CHRONIC, SYSTOLIC (HCC): ICD-10-CM

## 2018-06-20 DIAGNOSIS — I48.0 PAF (PAROXYSMAL ATRIAL FIBRILLATION) (HCC): ICD-10-CM

## 2018-06-20 DIAGNOSIS — I10 ESSENTIAL HYPERTENSION: ICD-10-CM

## 2018-06-20 DIAGNOSIS — I25.10 CORONARY ARTERY DISEASE INVOLVING NATIVE CORONARY ARTERY OF NATIVE HEART WITHOUT ANGINA PECTORIS: Primary | ICD-10-CM

## 2018-06-20 DIAGNOSIS — Z95.810 AICD (AUTOMATIC CARDIOVERTER/DEFIBRILLATOR) PRESENT: Primary | ICD-10-CM

## 2018-06-20 PROCEDURE — 99214 OFFICE O/P EST MOD 30 MIN: CPT | Performed by: INTERNAL MEDICINE

## 2018-06-20 RX ORDER — BLOOD-GLUCOSE METER
EACH MISCELLANEOUS
Refills: 0 | COMMUNITY
Start: 2018-04-25 | End: 2021-04-29

## 2018-06-20 NOTE — PROGRESS NOTES
Subjective:     Encounter Date:06/20/2018      Patient ID: Joao Fonseca is a 63 y.o. male with coronary artery disease, chronic systolic congestive heart failure, ICD implant place, hypertension who presents today for follow-up.    Chief Complaint: Follow-up    Congestive Heart Failure   Presents for follow-up visit. Associated symptoms include fatigue and shortness of breath. Pertinent negatives include no abdominal pain, claudication, edema, muscle weakness, near-syncope, orthopnea, palpitations, paroxysmal nocturnal dyspnea or unexpected weight change. The symptoms have been improving. His past medical history is significant for CAD. Compliance with total regimen is %. Compliance with diet is %. Compliance with exercise is %. Compliance with medications is %.   Coronary Artery Disease   Presents for follow-up visit. Symptoms include shortness of breath. Pertinent negatives include no chest tightness, dizziness, leg swelling, muscle weakness or palpitations. His past medical history is significant for CHF. The symptoms have been stable. Compliance with diet is variable. Compliance with exercise is variable. Compliance with medications is good.     This patient presents today for follow-up.  He was recently hospitalized in May with paroxysmal atrial fibrillation.  He underwent cardioversion and then discontinued Eliquis of his own accord and will not take this in the future.  This was documented in the last office note by DIOR Valentino.  The patient has had no recurrence of palpitations.  The patient has a history of chronic systolic congestive heart failure.  He says that he has some generalized fatigue as well as some shortness of breath and dyspnea on exertion however his symptoms are not significant at this time.  Overall, the patient says that his shortness of breath, which was one of the main reasons he was hospitalized back in May, has improved significantly since being out  of the hospital.  The patient is currently tolerated all of his medications well.  As stated, he is no longer infected in taking any anticoagulation, despite being in atrial fibrillation.  The patient was also started on Entresto but he thinks that this led to his atrial fibrillation and worsening renal function and therefore he will not be taking this any longer.  However, he is comfortable on the current medications that he is on at this time.  During the hospital stay, the patient was found to have an ascending aortic aneurysm and has follow-up with Dr. Wilson in the future.  Last weekend, the patient woke up and was feeling somewhat dizzy and then received therapy from his ICD ×1.  After this, the patient was somewhat diaphoretic and dizzy for a brief period but this resolved.  He has been shocked one other time by his device.  Long-term, his device has been followed by Dr. Andino.  Outside of this episode over the weekend, the patient has otherwise been feeling okay lately.      Current Outpatient Prescriptions:   •  acyclovir (ZOVIRAX) 400 MG tablet, Take 1 tablet by mouth Daily. Take no more than 5 doses a day., Disp: 90 tablet, Rfl: 2  •  albuterol (PROVENTIL HFA;VENTOLIN HFA) 108 (90 Base) MCG/ACT inhaler, Inhale 2 puffs Every 4 (Four) Hours As Needed for Wheezing or Shortness of Air., Disp: , Rfl:   •  aspirin 81 MG chewable tablet, Chew 81 mg Daily., Disp: , Rfl:   •  Blood Glucose Monitoring Suppl (ONE TOUCH ULTRA 2) w/Device kit, USE AS DIRECTED PER PACKAGE INSTRUCTIONS, Disp: , Rfl: 0  •  furosemide (LASIX) 40 MG tablet, Take 1 tablet by mouth Daily As Needed (edema, SOA)., Disp: 90 tablet, Rfl: 3  •  lisinopril (PRINIVIL,ZESTRIL) 5 MG tablet, Take 5 mg by mouth Daily., Disp: , Rfl:   •  metFORMIN (GLUCOPHAGE) 1000 MG tablet, Take 1 tablet by mouth 2 (Two) Times a Day With Meals., Disp: 180 tablet, Rfl: 2  •  metoprolol tartrate 75 MG tablet, Take 75 mg by mouth Every 12 (Twelve) Hours., Disp: 60 tablet,  Rfl: 11    Allergies   Allergen Reactions   • Cephalexin Anaphylaxis     Causes swelling of tongue, eyes, throat     Social History   Substance Use Topics   • Smoking status: Former Smoker     Years: 25.00     Types: Cigarettes   • Smokeless tobacco: Never Used      Comment: QUIT 20 YEARS AGO   • Alcohol use No     Review of Systems   Constitution: Positive for fatigue and malaise/fatigue. Negative for chills, fever, weakness, night sweats, unexpected weight change and weight loss.   Cardiovascular: Positive for dyspnea on exertion. Negative for claudication, irregular heartbeat, leg swelling, near-syncope, orthopnea, palpitations, paroxysmal nocturnal dyspnea and syncope.   Respiratory: Positive for shortness of breath. Negative for chest tightness, cough, hemoptysis and wheezing.    Hematologic/Lymphatic: Negative for bleeding problem. Does not bruise/bleed easily.   Musculoskeletal: Negative for muscle weakness.   Gastrointestinal: Negative for abdominal pain, hematemesis, hematochezia, melena, nausea and vomiting.   Genitourinary: Negative for dysuria and hematuria.   Neurological: Negative for dizziness, headaches, loss of balance and numbness.       Procedures: None today       Objective:     Physical Exam   Constitutional: He is oriented to person, place, and time. He appears well-developed and well-nourished. No distress.   HENT:   Head: Normocephalic and atraumatic.   Mouth/Throat: Oropharynx is clear and moist.   Eyes: EOM are normal. Pupils are equal, round, and reactive to light.   Neck: Normal range of motion. Neck supple. No JVD present. No thyromegaly present.   Cardiovascular: Normal rate, regular rhythm, S1 normal, S2 normal, normal heart sounds and intact distal pulses.  Exam reveals no gallop and no friction rub.    No murmur heard.  Pulmonary/Chest: Effort normal and breath sounds normal.   Abdominal: Soft. Bowel sounds are normal. He exhibits no distension. There is no tenderness.  "  Musculoskeletal: Normal range of motion. He exhibits no edema.   Neurological: He is alert and oriented to person, place, and time. No cranial nerve deficit.   Skin: Skin is warm and dry. No rash noted. No cyanosis or erythema. Nails show no clubbing.   Psychiatric: He has a normal mood and affect.   Vitals reviewed.    /82 (BP Location: Right arm, Patient Position: Sitting)   Pulse 82   Ht 182.9 cm (72\")   Wt 103 kg (227 lb 9.6 oz)   SpO2 100%   BMI 30.87 kg/m²     Data/Lab Review:     CTA Chest 5/4/18:  1. No CT angiographic evidence of pulmonary embolus or acute aortic  pathology.   2. Aneurysm dilatation of the ascending thoracic aorta measuring 5.2 cm  in greatest AP projection.  3. Small bilateral pleural effusions. Cardiomegaly with pulmonary  vasculature prominence. Mild pulmonary venous hypertension considered.  No parenchymal infiltrates.    Echocardiogram 5/4/18:  · Left ventricular systolic function is severely decreased. Estimated EF appears to be in the range of 21 - 25%.  · Left ventricular diastolic dysfunction.  · The left ventricular cavity is severely dilated.  · Left ventricular wall thickness is consistent with mild concentric hypertrophy.  · Interatrial septal defect present.  · Mild-to-moderate mitral valve regurgitation is present  · Moderate aortic valve regurgitation is present.  · Mild tricuspid valve regurgitation is present. Estimated right ventricular systolic pressure from tricuspid regurgitation is mildly elevated (35-45 mmHg).  · Borderline dilation of the aortic root is present.      Assessment:          Diagnosis Plan   1. Coronary artery disease involving native coronary artery of native heart without angina pectoris     2. CHF (congestive heart failure), NYHA class II, chronic, systolic     3. Essential hypertension     4. PAF (paroxysmal atrial fibrillation)            Plan:       1.  Coronary artery disease: Clinically stable at this time with no ischemic symptoms. "  The patient remains on aspirin, beta blocker therapies.    2.  Chronic systolic congestive heart failure: The patient continues to have class II symptoms.  He appears to be euvolemic on examination today and remains on beta blocker and ACE inhibitor therapies.  He did not tolerate the switch to Entresto therefore will remain on lisinopril.  The patient would like his ICD interrogations transferred to our office and we will set this up for him today and inquire about why he received therapy over the weekend.    3.  Essential hypertension: Blood pressure remains well-controlled.  Continue current medications.    4.  Paroxysmal atrial fibrillation: No obvious recurrence of atrial fibrillation.  The patient has refused anticoagulation.    Follow-up: 6 months unless otherwise needed sooner.  It should also be noted that the patient does have an aortic aneurysm and is scheduled to see Dr. Wilson and he was encouraged to keep this follow-up appointment.

## 2018-07-05 NOTE — PROGRESS NOTES
Single Chamber AICD Evaluation Report  IN OFFICE INTERROGATION    June 20, 2018    Primary Cardiologist: Elaine, previously followed by Dr. Andino  : [employer] Model: Evera MRI  Implant date: 10/06/2016    Reason for evaluation: provider requested  Indication for AICD: chronic systolic congestive heart failure and cardiomyopathy    Measurements  Ventricular sensing - R wave: 15.5 mV  Ventricular threshold: 0.5V @ 0.4 ms  Ventricular lead impedance: 475 ohms  Shock Impedance: RV 46 ohms  SVC 55 ohms      Diagnostic Data  Paced: <0.1 %    Episodes recorded since 5/04/2018  1 High Rate NS episode (duration approximately 3 seconds, average ventricular rate 276 bpm) followed by 1 VF episode:  Appears to be VT in VF rate zone, duration approximately 18 seconds.  No ATP administered s/t rate 286 bpm.  Successfully treated with 35J shock.  No episodes have occurred since shock on June 17, 2018.  7 NS-VT episodes:  Longest duration approximately 5 seconds, average ventricular rate 219 bpm.    Battery status: satisfactory, estimated 10.4 years remaining     Final Parameters  Mode: VVI  Lower rate: 40 bpm   Ventricular - Amplitude: 2 V   Pulse width: 0.4 ms   Sensitivity: 0.3 mV   Changes made: No changes made.  Conclusions: normal AICD function, no therapy delivered, stable pacing and sensing thresholds and adequate battery reserve    Follow up: 3 months; Patient requests to have care transferred from Dr. Andino's office to Dr. Henry and our device clinic.  Changed over in [facility].

## 2018-07-13 ENCOUNTER — OFFICE VISIT (OUTPATIENT)
Dept: INTERNAL MEDICINE | Facility: CLINIC | Age: 63
End: 2018-07-13

## 2018-07-13 VITALS
OXYGEN SATURATION: 97 % | SYSTOLIC BLOOD PRESSURE: 104 MMHG | HEIGHT: 72 IN | WEIGHT: 230 LBS | RESPIRATION RATE: 16 BRPM | HEART RATE: 86 BPM | DIASTOLIC BLOOD PRESSURE: 72 MMHG | BODY MASS INDEX: 31.15 KG/M2

## 2018-07-13 DIAGNOSIS — I10 ESSENTIAL HYPERTENSION: ICD-10-CM

## 2018-07-13 DIAGNOSIS — I25.10 CORONARY ARTERY DISEASE INVOLVING NATIVE CORONARY ARTERY OF NATIVE HEART WITHOUT ANGINA PECTORIS: ICD-10-CM

## 2018-07-13 DIAGNOSIS — E78.2 MIXED HYPERLIPIDEMIA: ICD-10-CM

## 2018-07-13 DIAGNOSIS — I48.0 PAF (PAROXYSMAL ATRIAL FIBRILLATION) (HCC): ICD-10-CM

## 2018-07-13 DIAGNOSIS — M25.421 ELBOW EFFUSION, RIGHT: ICD-10-CM

## 2018-07-13 DIAGNOSIS — E11.65 TYPE 2 DIABETES MELLITUS WITH HYPERGLYCEMIA, WITHOUT LONG-TERM CURRENT USE OF INSULIN (HCC): ICD-10-CM

## 2018-07-13 DIAGNOSIS — I50.22 CHF (CONGESTIVE HEART FAILURE), NYHA CLASS II, CHRONIC, SYSTOLIC (HCC): ICD-10-CM

## 2018-07-13 DIAGNOSIS — E66.09 CLASS 1 OBESITY DUE TO EXCESS CALORIES WITH SERIOUS COMORBIDITY AND BODY MASS INDEX (BMI) OF 31.0 TO 31.9 IN ADULT: ICD-10-CM

## 2018-07-13 DIAGNOSIS — M25.421 ELBOW SWELLING, RIGHT: Primary | ICD-10-CM

## 2018-07-13 LAB — HBA1C MFR BLD: 8 %

## 2018-07-13 PROCEDURE — 99214 OFFICE O/P EST MOD 30 MIN: CPT | Performed by: INTERNAL MEDICINE

## 2018-07-13 PROCEDURE — 83036 HEMOGLOBIN GLYCOSYLATED A1C: CPT | Performed by: INTERNAL MEDICINE

## 2018-07-13 NOTE — PROGRESS NOTES
CC: Right elbow pain    History:  Joao Fonseca is a 63 y.o. male who presents today for follow-up for evaluation of the above:  He complains of right elbow pain for the past 2 weeks that has been painful to the touch.  It is soft, but causes him constant pain on palpation.  He does have shortness of breath that he is unable to determine the etiology of.  Related to his systolic heart failure, he does continue on a beta blocker, ACE inhibitor, and diuretic therapy.  He has some swelling, but notes this has been stable.  He notes his illness of breath has been slightly worse.  He has a single focus of musculoskeletal chest wall pain, but notes no anginal symptoms and continues on aspirin, beta blocker, but has been intolerant to atorvastatin in the past and is averse to trying any alternative therapy.    ROS:  Review of Systems   Constitutional: Negative for chills and fever.   Respiratory: Positive for shortness of breath. Negative for cough.    Cardiovascular: Negative for chest pain and palpitations.   Gastrointestinal: Positive for abdominal pain (from previous blood thinner in hospital).   Musculoskeletal: Positive for arthralgias and myalgias.       Mr. Fonseca  reports that he has quit smoking. His smoking use included Cigarettes. He quit after 25.00 years of use. He has never used smokeless tobacco. He reports that he does not drink alcohol or use drugs.      Current Outpatient Prescriptions:   •  acyclovir (ZOVIRAX) 400 MG tablet, Take 1 tablet by mouth Daily. Take no more than 5 doses a day., Disp: 90 tablet, Rfl: 2  •  albuterol (PROVENTIL HFA;VENTOLIN HFA) 108 (90 Base) MCG/ACT inhaler, Inhale 2 puffs Every 4 (Four) Hours As Needed for Wheezing or Shortness of Air., Disp: , Rfl:   •  aspirin 81 MG chewable tablet, Chew 81 mg Daily., Disp: , Rfl:   •  Blood Glucose Monitoring Suppl (ONE TOUCH ULTRA 2) w/Device kit, USE AS DIRECTED PER PACKAGE INSTRUCTIONS, Disp: , Rfl: 0  •  furosemide (LASIX) 40 MG  "tablet, Take 1 tablet by mouth Daily As Needed (edema, SOA)., Disp: 90 tablet, Rfl: 3  •  lisinopril (PRINIVIL,ZESTRIL) 5 MG tablet, Take 5 mg by mouth Daily., Disp: , Rfl:   •  metFORMIN (GLUCOPHAGE) 1000 MG tablet, Take 1 tablet by mouth 2 (Two) Times a Day With Meals., Disp: 180 tablet, Rfl: 2  •  metoprolol tartrate 75 MG tablet, Take 75 mg by mouth Every 12 (Twelve) Hours., Disp: 60 tablet, Rfl: 11      OBJECTIVE:  /72 (BP Location: Left arm, Patient Position: Sitting, Cuff Size: Adult)   Pulse 86   Resp 16   Ht 182.9 cm (72\")   Wt 104 kg (230 lb)   SpO2 97%   BMI 31.19 kg/m²    Physical Exam   Constitutional: He is oriented to person, place, and time. He appears well-nourished. No distress.   Cardiovascular: Normal rate, regular rhythm and normal heart sounds.    No murmur heard.  Pulmonary/Chest: Effort normal. No respiratory distress. He has no wheezes. He has rales (Lower lung fields bilatearally).   Abdominal: Soft. There is no tenderness.   Musculoskeletal: He exhibits edema (1+ in BLE to the knee).   Neurological: He is alert and oriented to person, place, and time.   Skin:   Hyperpigmentation consistent with stasis on the posterior of the left calf.   Psychiatric: He has a normal mood and affect.       Assessment/Plan    Diagnoses and all orders for this visit:    Elbow swelling, right  Elbow effusion, right  -     Ambulatory Referral to Orthopedic Surgery  Given elbow swelling and effusion, we will refer to orthopedics for further evaluation and possible intervention.    Type 2 diabetes mellitus with hyperglycemia, without long-term current use of insulin (CMS/[facility])  -     POC Glycosylated Hemoglobin (Hb A1C)  A1c is 8.0% in the office today.  This represents suboptimal control, but he is unwilling to add additional pharmacologic therapy.  He is recommended to watch his diet carefully and is given information regarding nutrition and diabetes.  He is on an ACE inhibitor and is intolerant to " statin with aversion to trying alternative therapy.    CHF (congestive heart failure), NYHA class II, chronic, systolic (CMS/HCC)  He appears slightly hypervolemic with crackles in the lower lung fields as well as 1+ edema in the bilateral lower extremities with skin changes of stasis dermatitis.  He will continue on his current medications as he is averse to changing.  We discussed that his shortness of breath could be stemming solely from his heart failure.  He did not tolerate Entresto, but is on appropriate therapy at this time.  However, his EF of 20-25% could represent a single etiology or be part of another process, though this has been waxing and waning.    Coronary artery disease involving native coronary artery of native heart without angina pectoris  Stable without angina on aspirin and beta blocker.  He is averse to statin therapy.    PAF (paroxysmal atrial fibrillation) (CMS/[facility])  Anticoagulation with aspirin and he is rate controlled on metoprolol.  No symptoms at this time.    Essential hypertension  Well controlled, BP goal for age is <140/90 per JNC 8 guidelines and continue current medications    Mixed hyperlipidemia  As documented above, he remains averse to taking any further statin therapy.    Class 1 obesity due to excess calories with serious comorbidity and body mass index (BMI) of 31.0 to 31.9 in adult  Recommended attention to portion control and being careful about the types and timing of meals for the purpose of weight management.      An After Visit Summary was printed and given to the patient at discharge.  Return in about 6 months (around 1/13/2019) for Medicare Wellness. Sooner if problems arise.         Hernandez Dupree D.O. 7/13/2018

## 2018-07-13 NOTE — PATIENT INSTRUCTIONS
Diabetes Mellitus and Nutrition  When you have diabetes (diabetes mellitus), it is very important to have healthy eating habits because your blood sugar (glucose) levels are greatly affected by what you eat and drink. Eating healthy foods in the appropriate amounts, at about the same times every day, can help you:  · Control your blood glucose.  · Lower your risk of heart disease.  · Improve your blood pressure.  · Reach or maintain a healthy weight.    Every person with diabetes is different, and each person has different needs for a meal plan. Your health care provider may recommend that you work with a diet and nutrition specialist (dietitian) to make a meal plan that is best for you. Your meal plan may vary depending on factors such as:  · The calories you need.  · The medicines you take.  · Your weight.  · Your blood glucose, blood pressure, and cholesterol levels.  · Your activity level.  · Other health conditions you have, such as heart or kidney disease.    How do carbohydrates affect me?  Carbohydrates affect your blood glucose level more than any other type of food. Eating carbohydrates naturally increases the amount of glucose in your blood. Carbohydrate counting is a method for keeping track of how many carbohydrates you eat. Counting carbohydrates is important to keep your blood glucose at a healthy level, especially if you use insulin or take certain oral diabetes medicines.  It is important to know how many carbohydrates you can safely have in each meal. This is different for every person. Your dietitian can help you calculate how many carbohydrates you should have at each meal and for snack.  Foods that contain carbohydrates include:  · Bread, cereal, rice, pasta, and crackers.  · Potatoes and corn.  · Peas, beans, and lentils.  · Milk and yogurt.  · Fruit and juice.  · Desserts, such as cakes, cookies, ice cream, and candy.    How does alcohol affect me?  Alcohol can cause a sudden decrease in  "blood glucose (hypoglycemia), especially if you use insulin or take certain oral diabetes medicines. Hypoglycemia can be a life-threatening condition. Symptoms of hypoglycemia (sleepiness, dizziness, and confusion) are similar to symptoms of having too much alcohol.  If your health care provider says that alcohol is safe for you, follow these guidelines:  · Limit alcohol intake to no more than 1 drink per day for nonpregnant women and 2 drinks per day for men. One drink equals 12 oz of beer, 5 oz of wine, or 1½ oz of hard liquor.  · Do not drink on an empty stomach.  · Keep yourself hydrated with water, diet soda, or unsweetened iced tea.  · Keep in mind that regular soda, juice, and other mixers may contain a lot of sugar and must be counted as carbohydrates.    What are tips for following this plan?  Reading food labels  · Start by checking the serving size on the label. The amount of calories, carbohydrates, fats, and other nutrients listed on the label are based on one serving of the food. Many foods contain more than one serving per package.  · Check the total grams (g) of carbohydrates in one serving. You can calculate the number of servings of carbohydrates in one serving by dividing the total carbohydrates by 15. For example, if a food has 30 g of total carbohydrates, it would be equal to 2 servings of carbohydrates.  · Check the number of grams (g) of saturated and trans fats in one serving. Choose foods that have low or no amount of these fats.  · Check the number of milligrams (mg) of sodium in one serving. Most people should limit total sodium intake to less than 2,300 mg per day.  · Always check the nutrition information of foods labeled as \"low-fat\" or \"nonfat\". These foods may be higher in added sugar or refined carbohydrates and should be avoided.  · Talk to your dietitian to identify your daily goals for nutrients listed on the label.  Shopping  · Avoid buying canned, premade, or processed foods. " These foods tend to be high in fat, sodium, and added sugar.  · Shop around the outside edge of the grocery store. This includes fresh fruits and vegetables, bulk grains, fresh meats, and fresh dairy.  Cooking  · Use low-heat cooking methods, such as baking, instead of high-heat cooking methods like deep frying.  · Cook using healthy oils, such as olive, canola, or sunflower oil.  · Avoid cooking with butter, cream, or high-fat meats.  Meal planning  · Eat meals and snacks regularly, preferably at the same times every day. Avoid going long periods of time without eating.  · Eat foods high in fiber, such as fresh fruits, vegetables, beans, and whole grains. Talk to your dietitian about how many servings of carbohydrates you can eat at each meal.  · Eat 4-6 ounces of lean protein each day, such as lean meat, chicken, fish, eggs, or tofu. 1 ounce is equal to 1 ounce of meat, chicken, or fish, 1 egg, or 1/4 cup of tofu.  · Eat some foods each day that contain healthy fats, such as avocado, nuts, seeds, and fish.  Lifestyle    · Check your blood glucose regularly.  · Exercise at least 30 minutes 5 or more days each week, or as told by your health care provider.  · Take medicines as told by your health care provider.  · Do not use any products that contain nicotine or tobacco, such as cigarettes and e-cigarettes. If you need help quitting, ask your health care provider.  · Work with a counselor or diabetes educator to identify strategies to manage stress and any emotional and social challenges.  What are some questions to ask my health care provider?  · Do I need to meet with a diabetes educator?  · Do I need to meet with a dietitian?  · What number can I call if I have questions?  · When are the best times to check my blood glucose?  Where to find more information:  · American Diabetes Association: diabetes.org/food-and-fitness/food  · Academy of Nutrition and Dietetics:  www.eatright.org/resources/health/diseases-and-conditions/diabetes  · National [place] of Diabetes and Digestive and Kidney Diseases (NIH): www.niddk.nih.gov/health-information/diabetes/overview/diet-eating-physical-activity  Summary  · A healthy meal plan will help you control your blood glucose and maintain a healthy lifestyle.  · Working with a diet and nutrition specialist (dietitian) can help you make a meal plan that is best for you.  · Keep in mind that carbohydrates and alcohol have immediate effects on your blood glucose levels. It is important to count carbohydrates and to use alcohol carefully.  This information is not intended to replace advice given to you by your health care provider. Make sure you discuss any questions you have with your health care provider.  Document Released: 09/14/2006 Document Revised: 01/22/2018 Document Reviewed: 01/22/2018  [employer] Patient Education © 2018 [employer]

## 2018-07-13 NOTE — PROGRESS NOTES
CC: right elbow pain    History:  Joao Fonseca is a 63 y.o. male who presents today for evaluation of the above problems.  Right elbow pain for 2 weeks. Not tolerated statin.     ROS:  Review of Systems    Allergies   Allergen Reactions   • Cephalexin Anaphylaxis     Causes swelling of tongue, eyes, throat   • Atorvastatin Unknown (See Comments)     fatigue     Past Medical History:   Diagnosis Date   • Acute pansinusitis 1/4/2018   • Arrhythmia    • Cancer (CMS/HCC)     malignant chest tumor   • Cardiomyopathy (CMS/HCC) 12/27/2016   • CHF (congestive heart failure) (CMS/HCC)    • Chronic laryngitis 1/4/2018   • Coronary artery disease    • Diabetes mellitus (CMS/HCC)    • Disorder of liver 8/12/2013   • GERD (gastroesophageal reflux disease)    • History of adenomatous polyp of colon 06/28/2010    TUBULAR ADENOMA AT 40CM   • Hyperlipidemia    • Hypertension    • Obesity (BMI 30-39.9) 1/5/2018   • Pacemaker    • Shingles      Past Surgical History:   Procedure Laterality Date   • ANKLE SURGERY Right    • CARDIAC CATHETERIZATION     • CARDIAC ELECTROPHYSIOLOGY PROCEDURE Left 10/6/2016    Procedure: ICD DC new;  Surgeon: Zander Andino MD;  Location: [facility] CATH INVASIVE LOCATION;  Service:    • COLONOSCOPY  06/28/2010    biopsy at 41, 2 mm polyp supboptimal prep right clon    • COLONOSCOPY W/ POLYPECTOMY  06/28/2010    POLYP AT 40CM TUBULAR ADENOMA, SUBOPTIMAL PREP   • CORONARY ARTERY BYPASS GRAFT     • HEMORRHOIDECTOMY  1980   • HERNIA REPAIR     • PACEMAKER IMPLANTATION     • TONSILLECTOMY       Family History   Problem Relation Age of Onset   • Diabetes Mother    • Colon cancer Mother    • Heart disease Mother    • Hypertension Mother    • Stroke Mother    • Osteoporosis Mother    • Cancer Mother    • Heart disease Father    • Hypertension Father    • Cancer Father    • Bipolar disorder Sister    • Hypertension Brother    • Colon polyps Brother    • No Known Problems Son    • Heart disease Maternal  "Grandmother    • Diabetes Maternal Grandfather    • Heart disease Maternal Grandfather    • Heart disease Paternal Grandmother    • Diabetes Paternal Grandfather    • Heart disease Paternal Grandfather       reports that he has quit smoking. His smoking use included Cigarettes. He quit after 25.00 years of use. He has never used smokeless tobacco. He reports that he does not drink alcohol or use drugs.      Current Outpatient Prescriptions:   •  acyclovir (ZOVIRAX) 400 MG tablet, Take 1 tablet by mouth Daily. Take no more than 5 doses a day., Disp: 90 tablet, Rfl: 2  •  albuterol (PROVENTIL HFA;VENTOLIN HFA) 108 (90 Base) MCG/ACT inhaler, Inhale 2 puffs Every 4 (Four) Hours As Needed for Wheezing or Shortness of Air., Disp: , Rfl:   •  aspirin 81 MG chewable tablet, Chew 81 mg Daily., Disp: , Rfl:   •  Blood Glucose Monitoring Suppl (ONE TOUCH ULTRA 2) w/Device kit, USE AS DIRECTED PER PACKAGE INSTRUCTIONS, Disp: , Rfl: 0  •  furosemide (LASIX) 40 MG tablet, Take 1 tablet by mouth Daily As Needed (edema, SOA)., Disp: 90 tablet, Rfl: 3  •  lisinopril (PRINIVIL,ZESTRIL) 5 MG tablet, Take 5 mg by mouth Daily., Disp: , Rfl:   •  metFORMIN (GLUCOPHAGE) 1000 MG tablet, Take 1 tablet by mouth 2 (Two) Times a Day With Meals., Disp: 180 tablet, Rfl: 2  •  metoprolol tartrate 75 MG tablet, Take 75 mg by mouth Every 12 (Twelve) Hours., Disp: 60 tablet, Rfl: 11    OBJECTIVE:  /72 (BP Location: Left arm, Patient Position: Sitting, Cuff Size: Adult)   Pulse 86   Resp 16   Ht 182.9 cm (72\")   Wt 104 kg (230 lb)   SpO2 97%   BMI 31.19 kg/m²    Physical Exam    Assessment/Plan    Diagnoses and all orders for this visit:    Type 2 diabetes mellitus without complication, without long-term current use of insulin (CMS/[facility])  -     POC Glycosylated Hemoglobin (Hb A1C)    PAF (paroxysmal atrial fibrillation) (CMS/[facility])    Essential hypertension    Coronary artery disease involving native coronary artery of native heart without " angina pectoris    CHF (congestive heart failure), NYHA class II, chronic, systolic (CMS/HCC)    Mixed hyperlipidemia    Class 1 obesity due to excess calories with serious comorbidity and body mass index (BMI) of 31.0 to 31.9 in adult    Elbow swelling, right  -     Ambulatory Referral to Orthopedic Surgery    Elbow effusion, right  -     Ambulatory Referral to Orthopedic Surgery        An After Visit Summary was printed and given to the patient at discharge.  Return in about 6 months (around 1/13/2019) for Medicare Wellness.         Hernandez Dupree D.O. 7/13/2018

## 2018-08-17 ENCOUNTER — OFFICE VISIT (OUTPATIENT)
Dept: CARDIAC SURGERY | Facility: CLINIC | Age: 63
End: 2018-08-17

## 2018-08-17 VITALS
DIASTOLIC BLOOD PRESSURE: 72 MMHG | OXYGEN SATURATION: 99 % | HEIGHT: 72 IN | HEART RATE: 89 BPM | SYSTOLIC BLOOD PRESSURE: 110 MMHG | BODY MASS INDEX: 31.15 KG/M2 | WEIGHT: 230 LBS

## 2018-08-17 DIAGNOSIS — B02.9 HERPES ZOSTER WITHOUT COMPLICATION: ICD-10-CM

## 2018-08-17 DIAGNOSIS — I50.22 CHF (CONGESTIVE HEART FAILURE), NYHA CLASS II, CHRONIC, SYSTOLIC (HCC): Primary | ICD-10-CM

## 2018-08-17 DIAGNOSIS — I10 ESSENTIAL HYPERTENSION: ICD-10-CM

## 2018-08-17 DIAGNOSIS — I71.20 THORACIC AORTIC ANEURYSM WITHOUT RUPTURE (HCC): ICD-10-CM

## 2018-08-17 DIAGNOSIS — E11.9 TYPE 2 DIABETES MELLITUS WITH HEMOGLOBIN A1C GOAL OF LESS THAN 7.5% (HCC): ICD-10-CM

## 2018-08-17 PROCEDURE — 99213 OFFICE O/P EST LOW 20 MIN: CPT | Performed by: THORACIC SURGERY (CARDIOTHORACIC VASCULAR SURGERY)

## 2018-08-20 RX ORDER — ACYCLOVIR 400 MG/1
400 TABLET ORAL DAILY
Qty: 90 TABLET | Refills: 2 | Status: SHIPPED | OUTPATIENT
Start: 2018-08-20 | End: 2019-05-22 | Stop reason: SDUPTHER

## 2018-08-20 RX ORDER — ASPIRIN 81 MG/1
81 TABLET, CHEWABLE ORAL DAILY
Qty: 90 TABLET | Refills: 3 | Status: SHIPPED | OUTPATIENT
Start: 2018-08-20 | End: 2020-01-27 | Stop reason: ALTCHOICE

## 2018-08-20 RX ORDER — FUROSEMIDE 40 MG/1
40 TABLET ORAL DAILY PRN
Qty: 90 TABLET | Refills: 3 | Status: SHIPPED | OUTPATIENT
Start: 2018-08-20 | End: 2019-08-26 | Stop reason: SDUPTHER

## 2018-08-20 RX ORDER — METOPROLOL TARTRATE 75 MG/1
75 TABLET, FILM COATED ORAL EVERY 12 HOURS SCHEDULED
Qty: 180 TABLET | Refills: 3 | Status: SHIPPED | OUTPATIENT
Start: 2018-08-20 | End: 2019-06-20 | Stop reason: ALTCHOICE

## 2018-08-20 RX ORDER — LISINOPRIL 5 MG/1
5 TABLET ORAL DAILY
Qty: 90 TABLET | Refills: 3 | Status: SHIPPED | OUTPATIENT
Start: 2018-08-20 | End: 2019-08-26 | Stop reason: SDUPTHER

## 2018-08-31 PROBLEM — I71.20 THORACIC AORTIC ANEURYSM WITHOUT RUPTURE (HCC): Status: ACTIVE | Noted: 2018-08-31

## 2018-09-26 ENCOUNTER — TELEPHONE (OUTPATIENT)
Dept: VASCULAR SURGERY | Facility: CLINIC | Age: 63
End: 2018-09-26

## 2018-09-26 NOTE — TELEPHONE ENCOUNTER
Left message reminding Mr Fonseca of his appointment for Thursday, September 27th, 2018 at 145 pm with Dr Froylan De La Cruz. Also advised if he had any questions or needed to reschedule to please call the office at [phone].

## 2018-09-27 ENCOUNTER — OFFICE VISIT (OUTPATIENT)
Dept: VASCULAR SURGERY | Facility: CLINIC | Age: 63
End: 2018-09-27

## 2018-09-27 VITALS
WEIGHT: 223 LBS | BODY MASS INDEX: 30.2 KG/M2 | DIASTOLIC BLOOD PRESSURE: 78 MMHG | HEART RATE: 91 BPM | SYSTOLIC BLOOD PRESSURE: 124 MMHG | OXYGEN SATURATION: 98 % | HEIGHT: 72 IN

## 2018-09-27 DIAGNOSIS — E78.2 MIXED HYPERLIPIDEMIA: ICD-10-CM

## 2018-09-27 DIAGNOSIS — I87.2 VENOUS (PERIPHERAL) INSUFFICIENCY: Primary | ICD-10-CM

## 2018-09-27 DIAGNOSIS — I83.812 VARICOSE VEINS OF LEFT LOWER EXTREMITY WITH PAIN: ICD-10-CM

## 2018-09-27 DIAGNOSIS — M79.89 LEG SWELLING: ICD-10-CM

## 2018-09-27 DIAGNOSIS — I10 ESSENTIAL HYPERTENSION: ICD-10-CM

## 2018-09-27 PROCEDURE — 99204 OFFICE O/P NEW MOD 45 MIN: CPT | Performed by: SURGERY

## 2018-09-27 NOTE — PROGRESS NOTES
09/27/2018      Hernandez Dupree,   [address]  [place] [address]    Joao Fonseca  1955    Chief Complaint   Patient presents with   • Establish Care     Painful Varicose Veins with Swelling. Patient denies any stroke like symptoms.    • Varicose Veins     Patient states left leg hurts all the time with varicose vein and has hard spot in it as well.        Dear Hernandez Dupree, :      HPI  I had the pleasure of seeing your patient Joao Fonseca in the office today.  Thank you kindly for this consultation.  As you recall, Joao Fonseca is a 63 y.o.  male who you are currently following for routine health maintenance.  He does follow with Dr. Wilson for a 5.2 cm ascending aortic aneurysm, and recommended conservative management until reaches 5.5 cm.  He would require a redo median sternotomy.  He did have previous vein stripping about 4 years ago in [place].  He denies any history of DVT or trauma.  His left leg only bothers him.      Past Medical History:   Diagnosis Date   • Acute pansinusitis 1/4/2018   • Arrhythmia    • Cancer (CMS/HCC)     malignant chest tumor   • Cardiomyopathy (CMS/HCC) 12/27/2016   • CHF (congestive heart failure) (CMS/HCC)    • Chronic laryngitis 1/4/2018   • Coronary artery disease    • Diabetes mellitus (CMS/HCC)    • Disorder of liver 8/12/2013   • GERD (gastroesophageal reflux disease)    • History of adenomatous polyp of colon 06/28/2010    TUBULAR ADENOMA AT 40CM   • Hyperlipidemia    • Hypertension    • Obesity (BMI 30-39.9) 1/5/2018   • Pacemaker    • Shingles        Past Surgical History:   Procedure Laterality Date   • ANKLE SURGERY Right    • CARDIAC CATHETERIZATION     • CARDIAC ELECTROPHYSIOLOGY PROCEDURE Left 10/6/2016    Procedure: ICD DC new;  Surgeon: Zander Andino MD;  Location: [facility] INVASIVE LOCATION;  Service:    • COLONOSCOPY  06/28/2010    biopsy at 41, 2 mm polyp supboptimal prep right clon    • COLONOSCOPY W/  POLYPECTOMY  06/28/2010    POLYP AT 40CM TUBULAR ADENOMA, SUBOPTIMAL PREP   • CORONARY ARTERY BYPASS GRAFT     • HEMORRHOIDECTOMY  1980   • HERNIA REPAIR     • PACEMAKER IMPLANTATION     • TONSILLECTOMY         Family History   Problem Relation Age of Onset   • Diabetes Mother    • Colon cancer Mother    • Heart disease Mother    • Hypertension Mother    • Stroke Mother    • Osteoporosis Mother    • Cancer Mother    • Heart disease Father    • Hypertension Father    • Cancer Father    • Bipolar disorder Sister    • Hypertension Brother    • Colon polyps Brother    • No Known Problems Son    • Heart disease Maternal Grandmother    • Diabetes Maternal Grandfather    • Heart disease Maternal Grandfather    • Heart disease Paternal Grandmother    • Diabetes Paternal Grandfather    • Heart disease Paternal Grandfather        Social History     Social History   • Marital status:      Spouse name: N/A   • Number of children: N/A   • Years of education: N/A     Occupational History   • Not on file.     Social History Main Topics   • Smoking status: Former Smoker     Years: 25.00     Types: Cigarettes   • Smokeless tobacco: Never Used      Comment: QUIT 20 YEARS AGO   • Alcohol use No   • Drug use: No   • Sexual activity: No     Other Topics Concern   • Not on file     Social History Narrative   • No narrative on file       Allergies   Allergen Reactions   • Cephalexin Anaphylaxis     Causes swelling of tongue, eyes, throat   • Atorvastatin Unknown (See Comments)     fatigue       Prior to Admission medications    Medication Sig Start Date End Date Taking? Authorizing Provider   acyclovir (ZOVIRAX) 400 MG tablet Take 1 tablet by mouth Daily. Take no more than 5 doses a day. 8/20/18  Yes Phil Henry MD   albuterol (PROVENTIL HFA;VENTOLIN HFA) 108 (90 Base) MCG/ACT inhaler Inhale 2 puffs Every 4 (Four) Hours As Needed for Wheezing or Shortness of Air. 1/5/18  Yes Danisha Tomlinson APRN   aspirin 81 MG  "chewable tablet Chew 1 tablet Daily. 8/20/18  Yes Phil Henry MD   Blood Glucose Monitoring Suppl (ONE TOUCH ULTRA 2) w/Device kit USE AS DIRECTED PER PACKAGE INSTRUCTIONS 4/25/18  Yes Provider, MD Miguel Angel   furosemide (LASIX) 40 MG tablet Take 1 tablet by mouth Daily As Needed (edema, SOA). 8/20/18  Yes Phil Henry MD   lisinopril (PRINIVIL,ZESTRIL) 5 MG tablet Take 1 tablet by mouth Daily. 8/20/18  Yes Phil Henry MD   metFORMIN (GLUCOPHAGE) 1000 MG tablet Take 1 tablet by mouth 2 (Two) Times a Day With Meals. 8/20/18  Yes Phil Henry MD   Metoprolol Tartrate 75 MG tablet Take 75 mg by mouth Every 12 (Twelve) Hours. 8/20/18  Yes Phil Henry MD       Review of Systems   Constitutional: Negative.    HENT: Negative.    Eyes: Negative.    Respiratory: Negative.    Cardiovascular: Positive for leg swelling.   Gastrointestinal: Negative.    Endocrine: Negative.    Genitourinary: Negative.    Musculoskeletal: Negative.    Skin: Negative.    Allergic/Immunologic: Negative.    Neurological: Negative.    Hematological: Negative.    Psychiatric/Behavioral: Negative.    All other systems reviewed and are negative.      /78 (BP Location: Left arm, Patient Position: Sitting, Cuff Size: Adult)   Pulse 91   Ht 182.9 cm (72\")   Wt 101 kg (223 lb)   SpO2 98%   BMI 30.24 kg/m²   Physical Exam   Constitutional: He is oriented to person, place, and time. He appears well-developed and well-nourished.   HENT:   Head: Normocephalic and atraumatic.   Eyes: Pupils are equal, round, and reactive to light. Conjunctivae are normal. No scleral icterus.   Neck: Normal range of motion. Neck supple. No thyromegaly present.   Cardiovascular: Normal rate, regular rhythm, normal heart sounds and intact distal pulses.    Varicose veins to lower extremities, left tender, no erythema  Venous stasis   Pulmonary/Chest: Effort normal and breath sounds normal.   Abdominal: Soft. " Bowel sounds are normal.   Musculoskeletal: Normal range of motion.   Neurological: He is alert and oriented to person, place, and time.   Skin: Skin is warm and dry.   Psychiatric: He has a normal mood and affect. His behavior is normal. Judgment and thought content normal.   Nursing note and vitals reviewed.      No results found.    Patient Active Problem List   Diagnosis   • CHF (congestive heart failure), NYHA class II, chronic, systolic (CMS/HCC)   • Coronary artery disease involving native coronary artery of native heart without angina pectoris   • Intraventricular conduction delay   • Herpes labialis   • Disorder of liver   • Reflux involving intestinal tract   • Abdominal hernia   • Cardiomyopathy (CMS/HCC)   • Hx of colonic polyps   • Family hx of colon cancer   • Type 2 diabetes mellitus without complication, without long-term current use of insulin (CMS/HCC)   • Essential hypertension   • Eczema   • AICD (automatic cardioverter/defibrillator) present   • Varicosities of leg   • Right elbow pain   • Mixed hyperlipidemia   • Chronic laryngitis   • Wheezing   • Herpes zoster without complication   • Class 1 obesity due to excess calories with serious comorbidity and body mass index (BMI) of 31.0 to 31.9 in adult   • History of colonic polyps   • Chronic sinusitis   • Chronic rhinitis   • Sleep apnea   • S/P CABG x 3   • PAF (paroxysmal atrial fibrillation) (CMS/[facility])   • Chest pain   • Non compliance w medication regimen   • Thoracic aortic aneurysm without rupture (CMS/HCC)         ICD-10-CM ICD-9-CM   1. Venous (peripheral) insufficiency I87.2 459.81   2. Leg swelling M79.89 729.81   3. Varicose veins of left lower extremity with pain I83.812 454.8   4. Essential hypertension I10 401.9   5. Mixed hyperlipidemia E78.2 272.2       Lab Frequency Next Occurrence   Stress Test With Myocardial Perfusion (1 Day) Once 04/12/2018   Lipid Panel Once 07/13/2018   Basic Metabolic Panel Once 05/02/2018   CT Angiogram  Chest With Contrast Once 11/20/2018       Plan: After thoroughly evaluating Joao Fonseca, I believe the best course of action is to proceed with a 2 leg VVI.  He does have evidence of significant venous insufficiency, varicose veins, and stasis dermatitis in the left lower extremity.  Most of his discomfort is in this left leg.  Based off the testing he will likely be a great candidate for endovenous closure.  I will see him back in the next 1-2 weeks with this test to see what further treatment will be necessary. I did discuss vascular risk factors as they pertain to the progression of vascular disease including controlling hypertension and hyperlipidemia.  The patient can continue taking their current medication regimen as previously planned.  This was all discussed in full with complete understanding.    Thank you for allowing me to participate in the care of your patient.  Please do not hesitate with any questions or concerns.  I will keep you aware of any further encounters with Joao Fonseca.        Sincerely yours,         Froylan De La Cruz, [facility], DO

## 2018-10-03 ENCOUNTER — TELEPHONE (OUTPATIENT)
Dept: VASCULAR SURGERY | Facility: CLINIC | Age: 63
End: 2018-10-03

## 2018-10-03 NOTE — TELEPHONE ENCOUNTER
Left message reminding Mr Fonseca of his appointments for Thursday, October 4th, 2018. Reminded [facility]30 [facility] for testing and follow up afterwards at 11 am with Dr De La Cruz. Also advised if he had any questions or needed to reschedule to please call the office at [phone].

## 2018-10-04 ENCOUNTER — HOSPITAL ENCOUNTER (OUTPATIENT)
Dept: ULTRASOUND IMAGING | Facility: HOSPITAL | Age: 63
Discharge: HOME | End: 2018-10-04
Admitting: NURSE PRACTITIONER

## 2018-10-04 ENCOUNTER — OFFICE VISIT (OUTPATIENT)
Dept: VASCULAR SURGERY | Facility: CLINIC | Age: 63
End: 2018-10-04

## 2018-10-04 VITALS
BODY MASS INDEX: 30.2 KG/M2 | SYSTOLIC BLOOD PRESSURE: 118 MMHG | HEIGHT: 72 IN | HEART RATE: 91 BPM | OXYGEN SATURATION: 99 % | DIASTOLIC BLOOD PRESSURE: 70 MMHG | WEIGHT: 223 LBS

## 2018-10-04 DIAGNOSIS — Z51.81 ENCOUNTER FOR MONITORING ANTIPLATELET THERAPY: ICD-10-CM

## 2018-10-04 DIAGNOSIS — I83.812 VARICOSE VEINS OF LEFT LOWER EXTREMITY WITH PAIN: ICD-10-CM

## 2018-10-04 DIAGNOSIS — Z79.02 ENCOUNTER FOR MONITORING ANTIPLATELET THERAPY: ICD-10-CM

## 2018-10-04 DIAGNOSIS — M79.89 LEG SWELLING: ICD-10-CM

## 2018-10-04 DIAGNOSIS — I87.2 VENOUS (PERIPHERAL) INSUFFICIENCY: Primary | ICD-10-CM

## 2018-10-04 DIAGNOSIS — Z01.818 PREOP TESTING: ICD-10-CM

## 2018-10-04 PROCEDURE — 99214 OFFICE O/P EST MOD 30 MIN: CPT | Performed by: NURSE PRACTITIONER

## 2018-10-04 PROCEDURE — 93970 EXTREMITY STUDY: CPT | Performed by: SURGERY

## 2018-10-04 PROCEDURE — 93970 EXTREMITY STUDY: CPT

## 2018-10-04 NOTE — H&P
10/04/2018         Hernnadez Dupree,   [address]  [place] [address]     Joao Fonseca  1955          Chief Complaint   Patient presents with   • Follow-up       1 Week Follow Up For Venous (Peripheral) Insufficiency. Test 10/04/2018  pad venous lower ext bilat. Patient denies any stroke like symptoms.          Dear Hernandez Dupree,          HPI  I had the pleasure of seeing your patient Joao Fonseca in the office today.   As you recall, Joao Fonseca is a 63 y.o.  male who you are currently following for routine health maintenance.  He does follow with Dr. Wilson for a 5.2 cm ascending aortic aneurysm, and recommended conservative management until reaches 5.5 cm.  He would require a redo median sternotomy.  He did have previous vein stripping about 4 years ago in [place].  He denies any history of DVT or trauma.  His left leg only bothers him.  He did have noninvasive testing performed today, which I did review in office.       Past Medical History:   Diagnosis Date   • Acute pansinusitis 1/4/2018   • Arrhythmia    • Cancer (CMS/HCC)     malignant chest tumor   • Cardiomyopathy (CMS/HCC) 12/27/2016   • CHF (congestive heart failure) (CMS/HCC)    • Chronic laryngitis 1/4/2018   • Coronary artery disease    • Diabetes mellitus (CMS/HCC)    • Disorder of liver 8/12/2013   • GERD (gastroesophageal reflux disease)    • History of adenomatous polyp of colon 06/28/2010    TUBULAR ADENOMA AT 40CM   • Hyperlipidemia    • Hypertension    • Obesity (BMI 30-39.9) 1/5/2018   • Pacemaker    • Shingles      Past Surgical History:   Procedure Laterality Date   • ANKLE SURGERY Right    • CARDIAC CATHETERIZATION     • CARDIAC ELECTROPHYSIOLOGY PROCEDURE Left 10/6/2016    Procedure: ICD DC new;  Surgeon: Zander Andino MD;  Location: [facility] CATH INVASIVE LOCATION;  Service:    • COLONOSCOPY  06/28/2010    biopsy at 41, 2 mm polyp supboptimal prep right clon    • COLONOSCOPY W/  POLYPECTOMY  06/28/2010    POLYP AT 40CM TUBULAR ADENOMA, SUBOPTIMAL PREP   • CORONARY ARTERY BYPASS GRAFT     • HEMORRHOIDECTOMY  1980   • HERNIA REPAIR     • PACEMAKER IMPLANTATION     • TONSILLECTOMY       Family History   Problem Relation Age of Onset   • Diabetes Mother    • Colon cancer Mother    • Heart disease Mother    • Hypertension Mother    • Stroke Mother    • Osteoporosis Mother    • Cancer Mother    • Heart disease Father    • Hypertension Father    • Cancer Father    • Bipolar disorder Sister    • Hypertension Brother    • Colon polyps Brother    • No Known Problems Son    • Heart disease Maternal Grandmother    • Diabetes Maternal Grandfather    • Heart disease Maternal Grandfather    • Heart disease Paternal Grandmother    • Diabetes Paternal Grandfather    • Heart disease Paternal Grandfather      Social History   Substance Use Topics   • Smoking status: Former Smoker     Years: 25.00     Types: Cigarettes   • Smokeless tobacco: Never Used      Comment: QUIT 20 YEARS AGO   • Alcohol use No     Allergies   Allergen Reactions   • Cephalexin Anaphylaxis     Causes swelling of tongue, eyes, throat   • Atorvastatin Unknown (See Comments)     fatigue       Current Outpatient Prescriptions:   •  acyclovir (ZOVIRAX) 400 MG tablet, Take 1 tablet by mouth Daily. Take no more than 5 doses a day., Disp: 90 tablet, Rfl: 2  •  albuterol (PROVENTIL HFA;VENTOLIN HFA) 108 (90 Base) MCG/ACT inhaler, Inhale 2 puffs Every 4 (Four) Hours As Needed for Wheezing or Shortness of Air., Disp: , Rfl:   •  aspirin 81 MG chewable tablet, Chew 1 tablet Daily., Disp: 90 tablet, Rfl: 3  •  Blood Glucose Monitoring Suppl (ONE TOUCH ULTRA 2) w/Device kit, USE AS DIRECTED PER PACKAGE INSTRUCTIONS, Disp: , Rfl: 0  •  furosemide (LASIX) 40 MG tablet, Take 1 tablet by mouth Daily As Needed (edema, SOA)., Disp: 90 tablet, Rfl: 3  •  lisinopril (PRINIVIL,ZESTRIL) 5 MG tablet, Take 1 tablet by mouth Daily., Disp: 90 tablet, Rfl: 3  •   "metFORMIN (GLUCOPHAGE) 1000 MG tablet, Take 1 tablet by mouth 2 (Two) Times a Day With Meals., Disp: 180 tablet, Rfl: 3  •  Metoprolol Tartrate 75 MG tablet, Take 75 mg by mouth Every 12 (Twelve) Hours., Disp: 180 tablet, Rfl: 3     Review of Systems   Constitutional: Negative.    HENT: Negative.    Eyes: Negative.    Respiratory: Negative.    Cardiovascular: Positive for leg swelling.   Gastrointestinal: Negative.    Endocrine: Negative.    Genitourinary: Negative.    Musculoskeletal: Negative.    Skin: Negative.    Allergic/Immunologic: Negative.    Neurological: Negative.    Hematological: Negative.    Psychiatric/Behavioral: Negative.    All other systems reviewed and are negative.        /70 (BP Location: Left arm, Patient Position: Sitting, Cuff Size: Adult)   Pulse 91   Ht 182.9 cm (72\")   Wt 101 kg (223 lb)   SpO2 99%   BMI 30.24 kg/m²   Physical Exam   Constitutional: He is oriented to person, place, and time. He appears well-developed and well-nourished.   HENT:   Head: Normocephalic and atraumatic.   Eyes: Pupils are equal, round, and reactive to light. Conjunctivae are normal. No scleral icterus.   Neck: Normal range of motion. Neck supple. No thyromegaly present.   Cardiovascular: Normal rate, regular rhythm, normal heart sounds and intact distal pulses.    Varicose veins to lower extremities, left tender, no erythema  Venous stasis   Pulmonary/Chest: Effort normal and breath sounds normal.   Abdominal: Soft. Bowel sounds are normal.   Musculoskeletal: Normal range of motion.   Neurological: He is alert and oriented to person, place, and time.   Skin: Skin is warm and dry.   Psychiatric: He has a normal mood and affect. His behavior is normal. Judgment and thought content normal.   Nursing note and vitals reviewed.       No results found.         Patient Active Problem List   Diagnosis   • CHF (congestive heart failure), NYHA class II, chronic, systolic (CMS/HCC)   • Coronary artery disease " involving native coronary artery of native heart without angina pectoris   • Intraventricular conduction delay   • Herpes labialis   • Disorder of liver   • Reflux involving intestinal tract   • Abdominal hernia   • Cardiomyopathy (CMS/HCC)   • Hx of colonic polyps   • Family hx of colon cancer   • Type 2 diabetes mellitus without complication, without long-term current use of insulin (CMS/HCC)   • Essential hypertension   • Eczema   • AICD (automatic cardioverter/defibrillator) present   • Varicosities of leg   • Right elbow pain   • Mixed hyperlipidemia   • Chronic laryngitis   • Wheezing   • Herpes zoster without complication   • Class 1 obesity due to excess calories with serious comorbidity and body mass index (BMI) of 31.0 to 31.9 in adult   • History of colonic polyps   • Chronic sinusitis   • Chronic rhinitis   • Sleep apnea   • S/P CABG x 3   • PAF (paroxysmal atrial fibrillation) (CMS/HCC)   • Chest pain   • Non compliance w medication regimen   • Thoracic aortic aneurysm without rupture (CMS/HCC)   • Venous (peripheral) insufficiency   • Preop testing             ICD-10-CM ICD-9-CM   1. Venous (peripheral) insufficiency I87.2 459.81   2. Preop testing Z01.818 V72.84   3. Encounter for monitoring antiplatelet therapy Z51.81 V58.83     Z79.02 V58.63         Plan: After thoroughly evaluating Joao Fonseca, I believe the best course of action is to proceed with a left lower extremity radiofrequency ablation of the greater saphenous vein.  Risks of radiofrequency ablation include, but are not limited to, bleeding, infection, vessel damage, nerve damage, DVT, phlebitis, and pulmonary embolus.  The patient understands these risks and wishes to proceed with procedure. He does have evidence of significant venous insufficiency, varicose veins, and stasis dermatitis in the left lower extremity.  Most of his discomfort is in this left leg.  I did discuss vascular risk factors as they pertain to the progression of  vascular disease including controlling hypertension and hyperlipidemia.  The patient can continue taking their current medication regimen as previously planned.  This was all discussed in full with complete understanding.     Thank you for allowing me to participate in the care of your patient.  Please do not hesitate with any questions or concerns.  I will keep you aware of any further encounters with Joao Fonseca.           Sincerely yours,           Shari Cool, DIOR uDpree, Hernandez Fermin, DO

## 2018-10-04 NOTE — PROGRESS NOTES
"10/04/2018       Hernandez Dupree,   [address]  [facility] [address]    Joao Fonseca  1955    Chief Complaint   Patient presents with   • Follow-up     1 Week Follow Up For Venous (Peripheral) Insufficiency. Test 10/04/2018 US pad venous lower ext bilat. Patient denies any stroke like symptoms.        Dear Hernandez Dupree,        HPI  I had the pleasure of seeing your patient Joao Fonseca in the office today.   As you recall, Joao Fonseca is a 63 y.o.  male who you are currently following for routine health maintenance.  He does follow with Dr. Wilson for a 5.2 cm ascending aortic aneurysm, and recommended conservative management until reaches 5.5 cm.  He would require a redo median sternotomy.  He did have previous vein stripping about 4 years ago in [place].  He denies any history of DVT or trauma.  His left leg only bothers him.  He did have noninvasive testing performed today, which I did review in office.      Review of Systems   Constitutional: Negative.    HENT: Negative.    Eyes: Negative.    Respiratory: Negative.    Cardiovascular: Positive for leg swelling.   Gastrointestinal: Negative.    Endocrine: Negative.    Genitourinary: Negative.    Musculoskeletal: Negative.    Skin: Negative.    Allergic/Immunologic: Negative.    Neurological: Negative.    Hematological: Negative.    Psychiatric/Behavioral: Negative.    All other systems reviewed and are negative.      /70 (BP Location: Left arm, Patient Position: Sitting, Cuff Size: Adult)   Pulse 91   Ht 182.9 cm (72\")   Wt 101 kg (223 lb)   SpO2 99%   BMI 30.24 kg/m²   Physical Exam   Constitutional: He is oriented to person, place, and time. He appears well-developed and well-nourished.   HENT:   Head: Normocephalic and atraumatic.   Eyes: Pupils are equal, round, and reactive to light. Conjunctivae are normal. No scleral icterus.   Neck: Normal range of motion. Neck supple. No thyromegaly present. "   Cardiovascular: Normal rate, regular rhythm, normal heart sounds and intact distal pulses.    Varicose veins to lower extremities, left tender, no erythema  Venous stasis   Pulmonary/Chest: Effort normal and breath sounds normal.   Abdominal: Soft. Bowel sounds are normal.   Musculoskeletal: Normal range of motion.   Neurological: He is alert and oriented to person, place, and time.   Skin: Skin is warm and dry.   Psychiatric: He has a normal mood and affect. His behavior is normal. Judgment and thought content normal.   Nursing note and vitals reviewed.      No results found.    Patient Active Problem List   Diagnosis   • CHF (congestive heart failure), NYHA class II, chronic, systolic (CMS/HCC)   • Coronary artery disease involving native coronary artery of native heart without angina pectoris   • Intraventricular conduction delay   • Herpes labialis   • Disorder of liver   • Reflux involving intestinal tract   • Abdominal hernia   • Cardiomyopathy (CMS/HCC)   • Hx of colonic polyps   • Family hx of colon cancer   • Type 2 diabetes mellitus without complication, without long-term current use of insulin (CMS/HCC)   • Essential hypertension   • Eczema   • AICD (automatic cardioverter/defibrillator) present   • Varicosities of leg   • Right elbow pain   • Mixed hyperlipidemia   • Chronic laryngitis   • Wheezing   • Herpes zoster without complication   • Class 1 obesity due to excess calories with serious comorbidity and body mass index (BMI) of 31.0 to 31.9 in adult   • History of colonic polyps   • Chronic sinusitis   • Chronic rhinitis   • Sleep apnea   • S/P CABG x 3   • PAF (paroxysmal atrial fibrillation) (CMS/HCC)   • Chest pain   • Non compliance w medication regimen   • Thoracic aortic aneurysm without rupture (CMS/HCC)   • Venous (peripheral) insufficiency   • Preop testing         ICD-10-CM ICD-9-CM   1. Venous (peripheral) insufficiency I87.2 459.81   2. Preop testing Z01.818 V72.84   3. Encounter for  monitoring antiplatelet therapy Z51.81 V58.83    Z79.02 V58.63       Plan: After thoroughly evaluating Joao Fonseca, I believe the best course of action is to proceed with a left lower extremity radiofrequency ablation of the greater saphenous vein.  Risks of radiofrequency ablation include, but are not limited to, bleeding, infection, vessel damage, nerve damage, DVT, phlebitis, and pulmonary embolus.  The patient understands these risks and wishes to proceed with procedure. He does have evidence of significant venous insufficiency, varicose veins, and stasis dermatitis in the left lower extremity.  Most of his discomfort is in this left leg.  I did discuss vascular risk factors as they pertain to the progression of vascular disease including controlling hypertension and hyperlipidemia.  The patient can continue taking their current medication regimen as previously planned.  This was all discussed in full with complete understanding.    Thank you for allowing me to participate in the care of your patient.  Please do not hesitate with any questions or concerns.  I will keep you aware of any further encounters with Joao Fonseca.        Sincerely yours,         Shari Cool, DIOR Dupree, Hernandez Fermin, DO

## 2018-10-08 ENCOUNTER — HOSPITAL ENCOUNTER (OUTPATIENT)
Dept: GENERAL RADIOLOGY | Facility: HOSPITAL | Age: 63
Discharge: HOME | End: 2018-10-08
Admitting: NURSE PRACTITIONER

## 2018-10-08 ENCOUNTER — APPOINTMENT (OUTPATIENT)
Dept: PREADMISSION TESTING | Facility: HOSPITAL | Age: 63
End: 2018-10-08

## 2018-10-08 VITALS
WEIGHT: 232.59 LBS | OXYGEN SATURATION: 99 % | BODY MASS INDEX: 31.5 KG/M2 | HEART RATE: 92 BPM | SYSTOLIC BLOOD PRESSURE: 135 MMHG | DIASTOLIC BLOOD PRESSURE: 74 MMHG | HEIGHT: 72 IN

## 2018-10-08 DIAGNOSIS — Z79.02 ENCOUNTER FOR MONITORING ANTIPLATELET THERAPY: ICD-10-CM

## 2018-10-08 DIAGNOSIS — Z01.818 PREOP TESTING: ICD-10-CM

## 2018-10-08 DIAGNOSIS — I87.2 VENOUS (PERIPHERAL) INSUFFICIENCY: ICD-10-CM

## 2018-10-08 DIAGNOSIS — Z51.81 ENCOUNTER FOR MONITORING ANTIPLATELET THERAPY: ICD-10-CM

## 2018-10-08 LAB
ANION GAP SERPL CALCULATED.3IONS-SCNC: 12 MMOL/L (ref 4–13)
APTT PPP: 30 SECONDS (ref 24.1–34.8)
BASOPHILS # BLD AUTO: 0.05 10*3/MM3 (ref 0–0.2)
BASOPHILS NFR BLD AUTO: 1 % (ref 0–2)
BUN BLD-MCNC: 21 MG/DL (ref 5–21)
BUN/CREAT SERPL: 16.9 (ref 7–25)
CALCIUM SPEC-SCNC: 9.2 MG/DL (ref 8.4–10.4)
CHLORIDE SERPL-SCNC: 102 MMOL/L (ref 98–110)
CO2 SERPL-SCNC: 27 MMOL/L (ref 24–31)
CREAT BLD-MCNC: 1.24 MG/DL (ref 0.5–1.4)
DEPRECATED RDW RBC AUTO: 44.3 FL (ref 40–54)
EOSINOPHIL # BLD AUTO: 0.04 10*3/MM3 (ref 0–0.7)
EOSINOPHIL NFR BLD AUTO: 0.8 % (ref 0–4)
ERYTHROCYTE [DISTWIDTH] IN BLOOD BY AUTOMATED COUNT: 13.1 % (ref 12–15)
GFR SERPL CREATININE-BSD FRML MDRD: 59 ML/MIN/1.73
GLUCOSE BLD-MCNC: 223 MG/DL (ref 70–100)
HCT VFR BLD AUTO: 46.6 % (ref 40–52)
HGB BLD-MCNC: 15.8 G/DL (ref 14–18)
INR PPP: 1 (ref 0.91–1.09)
LYMPHOCYTES # BLD AUTO: 1.13 10*3/MM3 (ref 0.72–4.86)
LYMPHOCYTES NFR BLD AUTO: 22.5 % (ref 15–45)
MCH RBC QN AUTO: 31.5 PG (ref 28–32)
MCHC RBC AUTO-ENTMCNC: 33.9 G/DL (ref 33–36)
MCV RBC AUTO: 92.8 FL (ref 82–95)
MONOCYTES # BLD AUTO: 0.31 10*3/MM3 (ref 0.19–1.3)
MONOCYTES NFR BLD AUTO: 6.2 % (ref 4–12)
NEUTROPHILS # BLD AUTO: 3.47 10*3/MM3 (ref 1.87–8.4)
NEUTROPHILS NFR BLD AUTO: 69.1 % (ref 39–78)
PLATELET # BLD AUTO: 118 10*3/MM3 (ref 130–400)
PMV BLD AUTO: 10.9 FL (ref 6–12)
POTASSIUM BLD-SCNC: 3.8 MMOL/L (ref 3.5–5.3)
PROTHROMBIN TIME: 13.5 SECONDS (ref 11.9–14.6)
RBC # BLD AUTO: 5.02 10*6/MM3 (ref 4.8–5.9)
SODIUM BLD-SCNC: 141 MMOL/L (ref 135–145)
WBC NRBC COR # BLD: 5.02 10*3/MM3 (ref 4.8–10.8)

## 2018-10-08 PROCEDURE — 85610 PROTHROMBIN TIME: CPT | Performed by: NURSE PRACTITIONER

## 2018-10-08 PROCEDURE — 85025 COMPLETE CBC W/AUTO DIFF WBC: CPT | Performed by: NURSE PRACTITIONER

## 2018-10-08 PROCEDURE — 71046 X-RAY EXAM CHEST 2 VIEWS: CPT

## 2018-10-08 PROCEDURE — 93005 ELECTROCARDIOGRAM TRACING: CPT

## 2018-10-08 PROCEDURE — 93010 ELECTROCARDIOGRAM REPORT: CPT | Performed by: INTERNAL MEDICINE

## 2018-10-08 PROCEDURE — 80048 BASIC METABOLIC PNL TOTAL CA: CPT | Performed by: NURSE PRACTITIONER

## 2018-10-08 PROCEDURE — 85730 THROMBOPLASTIN TIME PARTIAL: CPT | Performed by: NURSE PRACTITIONER

## 2018-10-08 PROCEDURE — 36415 COLL VENOUS BLD VENIPUNCTURE: CPT

## 2018-10-08 NOTE — DISCHARGE INSTRUCTIONS
DAY OF SURGERY INSTRUCTIONS    LEFT SAPHENOUS VEIN RADIO FREQUENCY ABLATION    DR VAZQUEZ      DATE OF SURGERY: OCT 15 2018    ARRIVAL TIME: AS DIRECTED BY OFFICE    DAY OF SURGERY TAKE ONLY THESE MEDICATIONS UNLESS OTHERWISE INSTRUCTED BY YOUR PHYSICIAN: METOPROLOL        MANAGING PAIN AFTER SURGERY    We know you are probably wondering what your pain will be like after surgery.  Following surgery it is unrealistic to expect you will not have pain.   Pain is how our bodies let us know that something is wrong or cautions us to be careful.  That said, our goal is to make your pain tolerable.    Methods we may use to treat your pain include (oral or IV medications, PCAs, epidurals, nerve blocks, etc.)   While some procedures require IV pain medications for a short time after surgery, transitioning to pain medications by mouth allows for better management of pain.   Your nurse will encourage you to take oral pain medications whenever possible.  IV medications work almost immediately, but only last a short while.  Taking medications by mouth allows for a more constant level of medication in your blood stream for a longer period of time.      Once your pain is out of control it is harder to get back under control.  It is important you are aware when your next dose of pain medication is due.  If you are admitted, your nurse may write the time of your next dose on the white board in your room to help you remember.      We are interested in your pain and encourage you to inform us about aggravating factors during your visit.   Many times a simple repositioning every few hours can make a big difference.    If your physician says it is okay, do not let your pain prevent you from getting out of bed. Be sure to call your nurse for assistance prior to getting up so you do not fall.      Before surgery, please decide your tolerable pain goal.  These faces help describe the pain ratings we use on a 0-10 scale.   Be prepared to  tell us your goal and whether or not you take pain or anxiety medications at home.          BEFORE YOU COME TO THE HOSPITAL  (Pre-op instructions)  • Do not eat, drink, smoke or chew gum after midnight the night before surgery.  This also includes no mints.  • Morning of surgery take only the medicines you have been instructed with a sip of water unless otherwise instructed  by your physician.  • Do not shave, wear makeup or dark nail polish.  • Remove all jewelry including rings.  • Leave anything you consider valuable at home.  • Leave your suitcase in the car until after your surgery.  • Bring the following with you if applicable:  o Picture ID and insurance, Medicare or Medicaid cards  o Co-pay/deductible required by insurance (cash, check, credit card)  o Copy of advance directive, living will or power-of- documents if not brought to PAT  o CPAP or BIPAP mask and tubing  o Relaxation aids (MP3 player, book, magazine)  • On the day of surgery check in at registration located at the main entrance of the hospital.       Outpatient Surgery Guidelines, Adult  Outpatient procedures are those for which the person having the procedure is allowed to go home the same day as the procedure. Various procedures are done on an outpatient basis. You should follow some general guidelines if you will be having an outpatient procedure.  LET YOUR HEALTH CARE PROVIDER KNOW ABOUT:  · Any allergies you have.  · All medicines you are taking, including vitamins, herbs, eye drops, creams, and over-the-counter medicines.  · Previous problems you or members of your family have had with the use of anesthetics.  · Any blood disorders you have.  · Previous surgeries you have had.  · Medical conditions you have.  RISKS AND COMPLICATIONS  Your health care provider will discuss possible risks and complications with you before surgery. Common risks and complications include:    · Problems due to the use of anesthetics.  · Blood loss and  replacement (does not apply to minor surgical procedures).  · Temporary increase in pain due to surgery.  · Uncorrected pain or problems that the surgery was meant to correct.  · Infection.  · New damage.  BEFORE THE PROCEDURE  · Ask your health care provider about changing or stopping your regular medicines. You may need to stop taking certain medicines in the days or weeks before the procedure.  · Stop smoking at least 2 weeks before surgery. This lowers your risk for complications during and after surgery. Ask your health care provider for help with this if needed.  · Eat your usual meals and a light supper the day before surgery. Continue fluid intake. Do not drink alcohol.  · Do not eat or drink after midnight the night before your surgery.   · Arrange for someone to take you home and to stay with you for 24 hours after the procedure. Medicine given for your procedure may affect your ability to drive or to care for yourself.  · Call your health care provider's office if you develop an illness or problem that may prevent you from safely having your procedure.  AFTER THE PROCEDURE  After surgery, you will be taken to a recovery area, where your progress will be monitored. If there are no complications, you will be allowed to go home when you are awake, stable, and taking fluids well. You may have numbness around the surgical site. Healing will take some time. You will have tenderness at the surgical site and may have some swelling and bruising. You may also have some nausea.  HOME CARE INSTRUCTIONS  · Do not drive for 24 hours, or as directed by your health care provider. Do not drive while taking prescription pain medicines.  · Do not drink alcohol for 24 hours.  · Do not make important decisions or sign legal documents for 24 hours.  · You may resume a normal diet and activities as directed.  · Do not lift anything heavier than 10 pounds (4.5 kg) or play contact sports until your health care provider says it is  okay.  · Change your bandages (dressings) as directed.  · Only take over-the-counter or prescription medicines as directed by your health care provider.  · Follow up with your health care provider as directed.  SEEK MEDICAL CARE IF:  · You have increased bleeding (more than a small spot) from the surgical site.  · You have redness, swelling, or increasing pain in the wound.  · You see pus coming from the wound.  · You have a fever.  · You notice a bad smell coming from the wound or dressing.  · You feel lightheaded or faint.  · You develop a rash.  · You have trouble breathing.  · You develop allergies.  MAKE SURE YOU:  · Understand these instructions.  · Will watch your condition.  · Will get help right away if you are not doing well or get worse.     This information is not intended to replace advice given to you by your health care provider. Make sure you discuss any questions you have with your health care provider.     Document Released: 09/12/2002 Document Revised: 05/03/2016 Document Reviewed: 05/22/2014  [employer] Interactive Patient Education ©2016 [employer]       Fall Prevention in Hospitals, Adult  As a hospital patient, your condition and the treatments you receive can increase your risk for falls. Some additional risk factors for falls in a hospital include:  · Being in an unfamiliar environment.  · Being on bed rest.  · Your surgery.  · Taking certain medicines.  · Your tubing requirements, such as intravenous (IV) therapy or catheters.  It is important that you learn how to decrease fall risks while at the hospital. Below are important tips that can help prevent falls.  SAFETY TIPS FOR PREVENTING FALLS  Talk about your risk of falling.  · Ask your health care provider why you are at risk for falling. Is it your medicine, illness, tubing placement, or something else?  · Make a plan with your health care provider to keep you safe from falls.  · Ask your health care provider or pharmacist about side  effects of your medicines. Some medicines can make you dizzy or affect your coordination.  Ask for help.  · Ask for help before getting out of bed. You may need to press your call button.  · Ask for assistance in getting safely to the toilet.  · Ask for a walker or cane to be put at your bedside. Ask that most of the side rails on your bed be placed up before your health care provider leaves the room.  · Ask family or friends to sit with you.  · Ask for things that are out of your reach, such as your glasses, hearing aids, telephone, bedside table, or call button.  Follow these tips to avoid falling:  · Stay lying or seated, rather than standing, while waiting for help.  · Wear rubber-soled slippers or shoes whenever you walk in the hospital.  · Avoid quick, sudden movements.  ¨ Change positions slowly.  ¨ Sit on the side of your bed before standing.  ¨ Stand up slowly and wait before you start to walk.  · Let your health care provider know if there is a spill on the floor.  · Pay careful attention to the medical equipment, electrical cords, and tubes around you.  · When you need help, use your call button by your bed or in the bathroom. Wait for one of your health care providers to help you.  · If you feel dizzy or unsure of your footing, return to bed and wait for assistance.  · Avoid being distracted by the TV, telephone, or another person in your room.  · Do not lean or support yourself on rolling objects, such as IV poles or bedside tables.     This information is not intended to replace advice given to you by your health care provider. Make sure you discuss any questions you have with your health care provider.     Document Released: 12/15/2001 Document Revised: 01/08/2016 Document Reviewed: 08/25/2013  [employer] Interactive Patient Education ©2016 [employer]       Surgical Site Infections FAQs  What is a Surgical Site Infection (SSI)?  A surgical site infection is an infection that occurs after surgery in  the part of the body where the surgery took place. Most patients who have surgery do not develop an infection. However, infections develop in about 1 to 3 out of every 100 patients who have surgery.  Some of the common symptoms of a surgical site infection are:  · Redness and pain around the area where you had surgery  · Drainage of cloudy fluid from your surgical wound  · Fever  Can SSIs be treated?  Yes. Most surgical site infections can be treated with antibiotics. The antibiotic given to you depends on the bacteria (germs) causing the infection. Sometimes patients with SSIs also need another surgery to treat the infection.  What are some of the things that hospitals are doing to prevent SSIs?  To prevent SSIs, doctors, nurses, and other healthcare providers:  · Clean their hands and arms up to their elbows with an antiseptic agent just before the surgery.  · Clean their hands with soap and water or an alcohol-based hand rub before and after caring for each patient.  · May remove some of your hair immediately before your surgery using electric clippers if the hair is in the same area where the procedure will occur. They should not shave you with a razor.  · Wear special hair covers, masks, gowns, and gloves during surgery to keep the surgery area clean.  · Give you antibiotics before your surgery starts. In most cases, you should get antibiotics within 60 minutes before the surgery starts and the antibiotics should be stopped within 24 hours after surgery.  · Clean the skin at the site of your surgery with a special soap that kills germs.  What can I do to help prevent SSIs?  Before your surgery:  · Tell your doctor about other medical problems you may have. Health problems such as allergies, diabetes, and obesity could affect your surgery and your treatment.  · Quit smoking. Patients who smoke get more infections. Talk to your doctor about how you can quit before your surgery.  · Do not shave near where you will  have surgery. Shaving with a razor can irritate your skin and make it easier to develop an infection.  At the time of your surgery:  · Speak up if someone tries to shave you with a razor before surgery. Ask why you need to be shaved and talk with your surgeon if you have any concerns.  · Ask if you will get antibiotics before surgery.  After your surgery:  · Make sure that your healthcare providers clean their hands before examining you, either with soap and water or an alcohol-based hand rub.  · If you do not see your providers clean their hands, please ask them to do so.  · Family and friends who visit you should not touch the surgical wound or dressings.  · Family and friends should clean their hands with soap and water or an alcohol-based hand rub before and after visiting you. If you do not see them clean their hands, ask them to clean their hands.  What do I need to do when I go home from the hospital?  · Before you go home, your doctor or nurse should explain everything you need to know about taking care of your wound. Make sure you understand how to care for your wound before you leave the hospital.  · Always clean your hands before and after caring for your wound.  · Before you go home, make sure you know who to contact if you have questions or problems after you get home.  · If you have any symptoms of an infection, such as redness and pain at the surgery site, drainage, or fever, call your doctor immediately.  If you have additional questions, please ask your doctor or nurse.  Developed and co-sponsored by The Society for Healthcare Epidemiology of [place] (SHEA); Infectious Diseases Society of [place] (IDSA); American Hospital Association; Association for Professionals in Infection Control and Epidemiology (APIC); Centers for Disease Control and Prevention (CDC); and The Joint Commission.     This information is not intended to replace advice given to you by your health care provider. Make sure you  discuss any questions you have with your health care provider.     Document Released: 12/23/2014 Document Revised: 01/08/2016 Document Reviewed: 03/02/2016  [employer] Interactive Patient Education ©2016 [employer].       [facility]  CHG 4% Patient Instruction Sheet    Preparing the Skin Before Surgery  Preparing or “prepping” skin before surgery can reduce the risk of infection at the surgical site. To make the process easier,[facility] has chosen 4% Chlorhexidine Gluconate (CHG) antiseptic solution.   The steps below outline the prepping process and should be carefully followed.                                                                                                                                                      Prep the skin at the following time(s):                                                      We recommend you shower the night before surgery, and again the morning of surgery with the 4% CHG antiseptic solution using half of the bottle and a cloth each time.  Dress in clean clothes/sleepwear after showering.  See instructions below for application.          Do not apply any lotions or moisturizers.       Do not shave the area to be prepped for at least 2 days prior to surgery.    Clipping the hair may be done immediately prior to your surgery at the hospital    if needed.    Directions:  Thoroughly rinse your body with water.  Apply 4% CHG to a cloth and wash skin gently, paying special attention to the operative site.  Rinse again thoroughly.  Once you have begun using this product do not apply anything else to your skin. If itching or redness persists, rinse affected areas and discontinue use.    When using this product:  • Keep out of eyes, ears, and mouth.  • If solution should contact these areas, rinse out promptly and thoroughly with water.  • For external use only.  • Do not use in genital area, on your face or head.      PATIENT/FAMILY/RESPONSIBLE PARTY VERBALIZES UNDERSTANDING  OF ABOVE EDUCATION.  COPY OF PAIN SCALE GIVEN AND REVIEWED WITH VERBALIZED UNDERSTANDING.

## 2018-10-12 ENCOUNTER — TELEPHONE (OUTPATIENT)
Dept: VASCULAR SURGERY | Facility: CLINIC | Age: 63
End: 2018-10-12

## 2018-10-15 ENCOUNTER — HOSPITAL ENCOUNTER (OUTPATIENT)
Facility: HOSPITAL | Age: 63
Setting detail: HOSPITAL OUTPATIENT SURGERY
Discharge: HOME | End: 2018-10-15
Attending: SURGERY | Admitting: SURGERY

## 2018-10-15 ENCOUNTER — APPOINTMENT (OUTPATIENT)
Dept: ULTRASOUND IMAGING | Facility: HOSPITAL | Age: 63
End: 2018-10-15

## 2018-10-15 ENCOUNTER — ANESTHESIA EVENT (OUTPATIENT)
Dept: PERIOP | Facility: HOSPITAL | Age: 63
End: 2018-10-15

## 2018-10-15 ENCOUNTER — ANESTHESIA (OUTPATIENT)
Dept: PERIOP | Facility: HOSPITAL | Age: 63
End: 2018-10-15

## 2018-10-15 VITALS
TEMPERATURE: 97.1 F | DIASTOLIC BLOOD PRESSURE: 72 MMHG | SYSTOLIC BLOOD PRESSURE: 108 MMHG | HEART RATE: 81 BPM | RESPIRATION RATE: 16 BRPM | OXYGEN SATURATION: 96 %

## 2018-10-15 DIAGNOSIS — Z01.818 PREOP TESTING: ICD-10-CM

## 2018-10-15 DIAGNOSIS — I87.392 CHRONIC VENOUS HYPERTENSION (IDIOPATHIC) WITH OTHER COMPLICATIONS OF LEFT LOWER EXTREMITY: ICD-10-CM

## 2018-10-15 DIAGNOSIS — I87.2 VENOUS (PERIPHERAL) INSUFFICIENCY: ICD-10-CM

## 2018-10-15 LAB
ABO GROUP BLD: NORMAL
BLD GP AB SCN SERPL QL: NEGATIVE
GLUCOSE BLDC GLUCOMTR-MCNC: 209 MG/DL (ref 70–130)
RH BLD: POSITIVE
T&S EXPIRATION DATE: NORMAL

## 2018-10-15 PROCEDURE — 86901 BLOOD TYPING SEROLOGIC RH(D): CPT | Performed by: NURSE PRACTITIONER

## 2018-10-15 PROCEDURE — C1894 INTRO/SHEATH, NON-LASER: HCPCS | Performed by: SURGERY

## 2018-10-15 PROCEDURE — 25010000002 FENTANYL CITRATE (PF) 100 MCG/2ML SOLUTION: Performed by: NURSE ANESTHETIST, CERTIFIED REGISTERED

## 2018-10-15 PROCEDURE — C1888 ENDOVAS NON-CARDIAC ABL CATH: HCPCS | Performed by: SURGERY

## 2018-10-15 PROCEDURE — 86850 RBC ANTIBODY SCREEN: CPT | Performed by: NURSE PRACTITIONER

## 2018-10-15 PROCEDURE — 25010000002 PROPOFOL 1000 MG/ML EMULSION: Performed by: NURSE ANESTHETIST, CERTIFIED REGISTERED

## 2018-10-15 PROCEDURE — 86900 BLOOD TYPING SEROLOGIC ABO: CPT | Performed by: NURSE PRACTITIONER

## 2018-10-15 PROCEDURE — 25010000002 MIDAZOLAM PER 1 MG: Performed by: ANESTHESIOLOGY

## 2018-10-15 PROCEDURE — 76937 US GUIDE VASCULAR ACCESS: CPT

## 2018-10-15 PROCEDURE — 36475 ENDOVENOUS RF 1ST VEIN: CPT | Performed by: SURGERY

## 2018-10-15 PROCEDURE — 25010000002 VANCOMYCIN 10 G RECONSTITUTED SOLUTION: Performed by: NURSE PRACTITIONER

## 2018-10-15 PROCEDURE — 82962 GLUCOSE BLOOD TEST: CPT

## 2018-10-15 RX ORDER — SODIUM CHLORIDE, SODIUM LACTATE, POTASSIUM CHLORIDE, CALCIUM CHLORIDE 600; 310; 30; 20 MG/100ML; MG/100ML; MG/100ML; MG/100ML
100 INJECTION, SOLUTION INTRAVENOUS CONTINUOUS
Status: DISCONTINUED | OUTPATIENT
Start: 2018-10-15 | End: 2018-10-15 | Stop reason: HOSPADM

## 2018-10-15 RX ORDER — SODIUM CHLORIDE 9 MG/ML
INJECTION, SOLUTION INTRAVENOUS AS NEEDED
Status: DISCONTINUED | OUTPATIENT
Start: 2018-10-15 | End: 2018-10-15 | Stop reason: HOSPADM

## 2018-10-15 RX ORDER — MIDAZOLAM HYDROCHLORIDE 1 MG/ML
2 INJECTION INTRAMUSCULAR; INTRAVENOUS
Status: DISCONTINUED | OUTPATIENT
Start: 2018-10-15 | End: 2018-10-15 | Stop reason: HOSPADM

## 2018-10-15 RX ORDER — METOCLOPRAMIDE HYDROCHLORIDE 5 MG/ML
5 INJECTION INTRAMUSCULAR; INTRAVENOUS
Status: CANCELLED | OUTPATIENT
Start: 2018-10-15

## 2018-10-15 RX ORDER — SODIUM CHLORIDE 0.9 % (FLUSH) 0.9 %
3 SYRINGE (ML) INJECTION AS NEEDED
Status: DISCONTINUED | OUTPATIENT
Start: 2018-10-15 | End: 2018-10-15 | Stop reason: HOSPADM

## 2018-10-15 RX ORDER — ONDANSETRON 2 MG/ML
4 INJECTION INTRAMUSCULAR; INTRAVENOUS ONCE AS NEEDED
Status: CANCELLED | OUTPATIENT
Start: 2018-10-15

## 2018-10-15 RX ORDER — OXYCODONE AND ACETAMINOPHEN 7.5; 325 MG/1; MG/1
2 TABLET ORAL ONCE AS NEEDED
Status: CANCELLED | OUTPATIENT
Start: 2018-10-15

## 2018-10-15 RX ORDER — SODIUM CHLORIDE, SODIUM LACTATE, POTASSIUM CHLORIDE, CALCIUM CHLORIDE 600; 310; 30; 20 MG/100ML; MG/100ML; MG/100ML; MG/100ML
1000 INJECTION, SOLUTION INTRAVENOUS CONTINUOUS
Status: DISCONTINUED | OUTPATIENT
Start: 2018-10-15 | End: 2018-10-15 | Stop reason: HOSPADM

## 2018-10-15 RX ORDER — SODIUM CHLORIDE 0.9 % (FLUSH) 0.9 %
1-10 SYRINGE (ML) INJECTION AS NEEDED
Status: DISCONTINUED | OUTPATIENT
Start: 2018-10-15 | End: 2018-10-15 | Stop reason: HOSPADM

## 2018-10-15 RX ORDER — MEPERIDINE HYDROCHLORIDE 25 MG/ML
12.5 INJECTION INTRAMUSCULAR; INTRAVENOUS; SUBCUTANEOUS
Status: CANCELLED | OUTPATIENT
Start: 2018-10-15 | End: 2018-10-16

## 2018-10-15 RX ORDER — HYDROCODONE BITARTRATE AND ACETAMINOPHEN 5; 325 MG/1; MG/1
1 TABLET ORAL ONCE AS NEEDED
Status: DISCONTINUED | OUTPATIENT
Start: 2018-10-15 | End: 2018-10-15 | Stop reason: HOSPADM

## 2018-10-15 RX ORDER — SODIUM CHLORIDE 0.9 % (FLUSH) 0.9 %
3 SYRINGE (ML) INJECTION EVERY 12 HOURS SCHEDULED
Status: DISCONTINUED | OUTPATIENT
Start: 2018-10-15 | End: 2018-10-15 | Stop reason: HOSPADM

## 2018-10-15 RX ORDER — NALOXONE HCL 0.4 MG/ML
0.4 VIAL (ML) INJECTION AS NEEDED
Status: CANCELLED | OUTPATIENT
Start: 2018-10-15

## 2018-10-15 RX ORDER — ONDANSETRON 2 MG/ML
4 INJECTION INTRAMUSCULAR; INTRAVENOUS ONCE AS NEEDED
Status: DISCONTINUED | OUTPATIENT
Start: 2018-10-15 | End: 2018-10-15 | Stop reason: HOSPADM

## 2018-10-15 RX ORDER — ACETAMINOPHEN 500 MG
1000 TABLET ORAL ONCE
Status: COMPLETED | OUTPATIENT
Start: 2018-10-15 | End: 2018-10-15

## 2018-10-15 RX ORDER — FENTANYL CITRATE 50 UG/ML
INJECTION, SOLUTION INTRAMUSCULAR; INTRAVENOUS AS NEEDED
Status: DISCONTINUED | OUTPATIENT
Start: 2018-10-15 | End: 2018-10-15 | Stop reason: SURG

## 2018-10-15 RX ORDER — OXYCODONE AND ACETAMINOPHEN 10; 325 MG/1; MG/1
1 TABLET ORAL ONCE AS NEEDED
Status: CANCELLED | OUTPATIENT
Start: 2018-10-15

## 2018-10-15 RX ORDER — LABETALOL HYDROCHLORIDE 5 MG/ML
5 INJECTION, SOLUTION INTRAVENOUS
Status: CANCELLED | OUTPATIENT
Start: 2018-10-15

## 2018-10-15 RX ORDER — HYDROCODONE BITARTRATE AND ACETAMINOPHEN 5; 325 MG/1; MG/1
1-2 TABLET ORAL EVERY 6 HOURS PRN
Qty: 20 TABLET | Refills: 0 | Status: SHIPPED | OUTPATIENT
Start: 2018-10-15 | End: 2018-12-20

## 2018-10-15 RX ORDER — IPRATROPIUM BROMIDE AND ALBUTEROL SULFATE 2.5; .5 MG/3ML; MG/3ML
3 SOLUTION RESPIRATORY (INHALATION) ONCE AS NEEDED
Status: CANCELLED | OUTPATIENT
Start: 2018-10-15

## 2018-10-15 RX ORDER — IBUPROFEN 600 MG/1
600 TABLET ORAL ONCE AS NEEDED
Status: CANCELLED | OUTPATIENT
Start: 2018-10-15

## 2018-10-15 RX ORDER — FENTANYL CITRATE 50 UG/ML
25 INJECTION, SOLUTION INTRAMUSCULAR; INTRAVENOUS AS NEEDED
Status: CANCELLED | OUTPATIENT
Start: 2018-10-15

## 2018-10-15 RX ORDER — MIDAZOLAM HYDROCHLORIDE 1 MG/ML
1 INJECTION INTRAMUSCULAR; INTRAVENOUS
Status: DISCONTINUED | OUTPATIENT
Start: 2018-10-15 | End: 2018-10-15 | Stop reason: HOSPADM

## 2018-10-15 RX ADMIN — ACETAMINOPHEN 1000 MG: 500 TABLET, FILM COATED ORAL at 09:52

## 2018-10-15 RX ADMIN — PROPOFOL 100 MCG/KG/MIN: 10 INJECTION, EMULSION INTRAVENOUS at 11:14

## 2018-10-15 RX ADMIN — VANCOMYCIN HYDROCHLORIDE 1500 MG: 10 INJECTION, POWDER, LYOPHILIZED, FOR SOLUTION INTRAVENOUS at 09:31

## 2018-10-15 RX ADMIN — FENTANYL CITRATE 50 MCG: 50 INJECTION, SOLUTION INTRAMUSCULAR; INTRAVENOUS at 11:15

## 2018-10-15 RX ADMIN — LIDOCAINE HYDROCHLORIDE 0.5 ML: 10 INJECTION, SOLUTION EPIDURAL; INFILTRATION; INTRACAUDAL; PERINEURAL at 08:34

## 2018-10-15 RX ADMIN — FENTANYL CITRATE 50 MCG: 50 INJECTION, SOLUTION INTRAMUSCULAR; INTRAVENOUS at 11:13

## 2018-10-15 RX ADMIN — MIDAZOLAM HYDROCHLORIDE 2 MG: 1 INJECTION, SOLUTION INTRAMUSCULAR; INTRAVENOUS at 09:52

## 2018-10-15 RX ADMIN — SODIUM CHLORIDE, POTASSIUM CHLORIDE, SODIUM LACTATE AND CALCIUM CHLORIDE 1000 ML: 600; 310; 30; 20 INJECTION, SOLUTION INTRAVENOUS at 08:34

## 2018-10-15 NOTE — BRIEF OP NOTE
SAPHENOUS VEIN RADIO FREQUENCY ABLATION  Progress Note    Joao Fonseca  10/15/2018    Pre-op Diagnosis:   Venous (peripheral) insufficiency [I87.2]  Preop testing [Z01.818]       Post-Op Diagnosis Codes:     * Venous (peripheral) insufficiency [I87.2]     * Preop testing [Z01.818]    Procedure/CPT® Codes:      Procedure(s):  LEFT SAPHENOUS VEIN RADIO FREQUENCY ABLATION    Surgeon(s):  Froylan De La Cruz DO    Anesthesia: Monitor Anesthesia Care    Staff:   Circulator: Madeleine Alcantar RN  Scrub Person: Nadja Haddad  Assistant: Dav Holt  Vascular Ultrasound Technician: Benson Toro    Estimated Blood Loss: 5ml    Urine Voided: * No values recorded between 10/15/2018 11:00 AM and 10/15/2018 11:34 AM *    Specimens:                None        Complications: none      Froylan De La Cruz DO     Date: 10/15/2018  Time: 11:34 AM

## 2018-10-15 NOTE — ANESTHESIA PREPROCEDURE EVALUATION
Anesthesia Evaluation     Patient summary reviewed   no history of anesthetic complications:  NPO Solid Status: > 8 hours  NPO Liquid Status: > 8 hours           Airway   Mallampati: II  TM distance: >3 FB  Neck ROM: full  No difficulty expected  Dental - normal exam     Pulmonary    (+) sleep apnea,     ROS comment: S/P thymoma resection  Cardiovascular     ECG reviewed  Patient on routine beta blocker and Beta blocker given within 24 hours of surgery    (+) pacemaker (medtronic) ICD, hypertension, valvular problems/murmurs AI and MR, CAD, CABG, dysrhythmias Atrial Fib, CHF (EF 21-25%), PVD (5.2 cm ascending aortic aneurysm), hyperlipidemia,     ROS comment: Echo:  ·Left ventricular systolic function is severely decreased. Estimated EF appears to be in the range of 21 - 25%.  ·Left ventricular diastolic dysfunction.  ·The left ventricular cavity is severely dilated.  ·Left ventricular wall thickness is consistent with mild concentric hypertrophy.  ·Interatrial septal defect present.  ·Mild-to-moderate mitral valve regurgitation is present  ·Moderate aortic valve regurgitation is present.  ·Mild tricuspid valve regurgitation is present. Estimated right ventricular systolic pressure from tricuspid regurgitation is mildly elevated (35-45 mmHg).  ·Borderline dilation of the aortic root is present.    Neuro/Psych  (-) seizures, TIA, CVA  GI/Hepatic/Renal/Endo    (+) obesity,   diabetes mellitus,     Musculoskeletal     Abdominal    Substance History      OB/GYN          Other      history of cancer (thymoma s/p resection)             AICD interrogation:  June 20, 2018     Primary Cardiologist: Elaine, previously followed by Dr. Andino  : Medtronic Model: Evera MRI  Implant date: 10/06/2016     Reason for evaluation: provider requested  Indication for AICD: chronic systolic congestive heart failure and cardiomyopathy     Measurements  Ventricular sensing - R wave: 15.5 mV  Ventricular threshold: 0.5V @ 0.4  ms  Ventricular lead impedance: 475 ohms  Shock Impedance: RV 46 ohms  SVC 55 ohms        Diagnostic Data  Paced: <0.1 %     Episodes recorded since 5/04/2018  1 High Rate NS episode (duration approximately 3 seconds, average ventricular rate 276 bpm) followed by 1 VF episode:  Appears to be VT in VF rate zone, duration approximately 18 seconds.  No ATP administered s/t rate 286 bpm.  Successfully treated with 35J shock.  No episodes have occurred since shock on June 17, 2018.  7 NS-VT episodes:  Longest duration approximately 5 seconds, average ventricular rate 219 bpm.     Battery status: satisfactory, estimated 10.4 years remaining      Final Parameters  Mode: VVI  Lower rate: 40 bpm   Ventricular - Amplitude: 2 V   Pulse width: 0.4 ms   Sensitivity: 0.3 mV   Changes made: No changes made.  Conclusions: normal AICD function, no therapy delivered, stable pacing and sensing thresholds and adequate battery reserve       Anesthesia Plan    ASA 3     MAC     intravenous induction   Anesthetic plan, all risks, benefits, and alternatives have been provided, discussed and informed consent has been obtained with: patient.

## 2018-10-15 NOTE — OP NOTE
Joao Fonseca  10/15/2018     PREOPERATIVE DIAGNOSIS: Venous (peripheral) insufficiency [I87.2]  Preop testing [Z01.818]     POSTOPERATIVE DIAGNOSIS: Post-Op Diagnosis Codes:     * Venous (peripheral) insufficiency [I87.2]     * Preop testing [Z01.818]     PROCEDURE PERFORMED:   1.  Ultrasound-guided cannulation of the left lower extremity greater saphenous vein  2.  Radiofrequency ablation of the left lower extremity greater saphenous vein     SURGEON: Froylan De La Cruz DO      ANESTHESIA: Mac    PREPARATION: Routine.    STAFF: Circulator: Madeleine Alcantar RN  Scrub Person: Nadja Haddad  Assistant: Dav Holt  Vascular Ultrasound Technician: Benson Toro    ESTIMATED BLOOD LOSS: 5 ML    SPECIMENS: None    COMPLICATIONS: None    INDICATIONS: Joao Fonseca is a 63 y.o. male who had previous vein stripping about 4 years ago in [place].  He denies any history of DVT or trauma.  His left leg only bothers him.  He did have noninvasive testing performed today, which I did review in office.  The indications, risks, and possible complications of the procedure were explained to the patient, who voiced understanding and wished to proceed with surgery.     PROCEDURE IN DETAIL:   The patient was taken to the operating room and placed on the operating table in a supine position. After Mac anesthesia was obtained, the left lower extremity was prepped and draped in a sterile manner.  Under ultrasound guidance and using a micropuncture technique the left lower extremity greater saphenous vein was cannulated and a microwire was placed.  This was confirmed under ultrasound.  A small stab incision was made with 11 blade and a 7 [place] sheath was placed.  The patient was placed in Trendelenburg position and the saphenofemoral junction was identified under ultrasound.  The catheter was placed up to the junction and pulled back 3 cm and marked.  Next, tumescent fluid was instilled along the entire length of the vein  under ultrasound guidance.  Once sufficient tumescent fluid was placed radio frequency ablation had commenced.  There was a total of 7 RF cycles for a total treatment length of 39.5 cm for a total treatment time of 2 minutes and 20 seconds.  There was an average of 15 W at an average temperature 120°C.  Upon completion of the ablation the catheter and sheath were removed.  Direct pressure was held for an additional 5-10 minutes of ensure hemostasis.  An Ace wrap was placed from the toes to the groin.  Sterile dressings were applied. The patient tolerated the procedure well. Sponge and needle counts were correct. The patient was then awakened and transferred to the outpatient center in stable condition.  Froylan De La Cruz DO  Date: 10/15/2018 Time: 11:38 AM     CC:Hernandez Dupree DO

## 2018-10-15 NOTE — H&P (VIEW-ONLY)
10/04/2018         Hernandez Dupree,   [address]  [place] [address]     Joao Fonseca  1955          Chief Complaint   Patient presents with   • Follow-up       1 Week Follow Up For Venous (Peripheral) Insufficiency. Test 10/04/2018  pad venous lower ext bilat. Patient denies any stroke like symptoms.          Dear Hernandez Dupree,          HPI  I had the pleasure of seeing your patient Joao Fonseca in the office today.   As you recall, Joao Fonseca is a 63 y.o.  male who you are currently following for routine health maintenance.  He does follow with Dr. Wilson for a 5.2 cm ascending aortic aneurysm, and recommended conservative management until reaches 5.5 cm.  He would require a redo median sternotomy.  He did have previous vein stripping about 4 years ago in [place].  He denies any history of DVT or trauma.  His left leg only bothers him.  He did have noninvasive testing performed today, which I did review in office.       Past Medical History:   Diagnosis Date   • Acute pansinusitis 1/4/2018   • Arrhythmia    • Cancer (CMS/HCC)     malignant chest tumor   • Cardiomyopathy (CMS/HCC) 12/27/2016   • CHF (congestive heart failure) (CMS/HCC)    • Chronic laryngitis 1/4/2018   • Coronary artery disease    • Diabetes mellitus (CMS/HCC)    • Disorder of liver 8/12/2013   • GERD (gastroesophageal reflux disease)    • History of adenomatous polyp of colon 06/28/2010    TUBULAR ADENOMA AT 40CM   • Hyperlipidemia    • Hypertension    • Obesity (BMI 30-39.9) 1/5/2018   • Pacemaker    • Shingles      Past Surgical History:   Procedure Laterality Date   • ANKLE SURGERY Right    • CARDIAC CATHETERIZATION     • CARDIAC ELECTROPHYSIOLOGY PROCEDURE Left 10/6/2016    Procedure: ICD DC new;  Surgeon: Zander Andino MD;  Location: [facility] CATH INVASIVE LOCATION;  Service:    • COLONOSCOPY  06/28/2010    biopsy at 41, 2 mm polyp supboptimal prep right clon    • COLONOSCOPY W/  POLYPECTOMY  06/28/2010    POLYP AT 40CM TUBULAR ADENOMA, SUBOPTIMAL PREP   • CORONARY ARTERY BYPASS GRAFT     • HEMORRHOIDECTOMY  1980   • HERNIA REPAIR     • PACEMAKER IMPLANTATION     • TONSILLECTOMY       Family History   Problem Relation Age of Onset   • Diabetes Mother    • Colon cancer Mother    • Heart disease Mother    • Hypertension Mother    • Stroke Mother    • Osteoporosis Mother    • Cancer Mother    • Heart disease Father    • Hypertension Father    • Cancer Father    • Bipolar disorder Sister    • Hypertension Brother    • Colon polyps Brother    • No Known Problems Son    • Heart disease Maternal Grandmother    • Diabetes Maternal Grandfather    • Heart disease Maternal Grandfather    • Heart disease Paternal Grandmother    • Diabetes Paternal Grandfather    • Heart disease Paternal Grandfather      Social History   Substance Use Topics   • Smoking status: Former Smoker     Years: 25.00     Types: Cigarettes   • Smokeless tobacco: Never Used      Comment: QUIT 20 YEARS AGO   • Alcohol use No     Allergies   Allergen Reactions   • Cephalexin Anaphylaxis     Causes swelling of tongue, eyes, throat   • Atorvastatin Unknown (See Comments)     fatigue       Current Outpatient Prescriptions:   •  acyclovir (ZOVIRAX) 400 MG tablet, Take 1 tablet by mouth Daily. Take no more than 5 doses a day., Disp: 90 tablet, Rfl: 2  •  albuterol (PROVENTIL HFA;VENTOLIN HFA) 108 (90 Base) MCG/ACT inhaler, Inhale 2 puffs Every 4 (Four) Hours As Needed for Wheezing or Shortness of Air., Disp: , Rfl:   •  aspirin 81 MG chewable tablet, Chew 1 tablet Daily., Disp: 90 tablet, Rfl: 3  •  Blood Glucose Monitoring Suppl (ONE TOUCH ULTRA 2) w/Device kit, USE AS DIRECTED PER PACKAGE INSTRUCTIONS, Disp: , Rfl: 0  •  furosemide (LASIX) 40 MG tablet, Take 1 tablet by mouth Daily As Needed (edema, SOA)., Disp: 90 tablet, Rfl: 3  •  lisinopril (PRINIVIL,ZESTRIL) 5 MG tablet, Take 1 tablet by mouth Daily., Disp: 90 tablet, Rfl: 3  •   "metFORMIN (GLUCOPHAGE) 1000 MG tablet, Take 1 tablet by mouth 2 (Two) Times a Day With Meals., Disp: 180 tablet, Rfl: 3  •  Metoprolol Tartrate 75 MG tablet, Take 75 mg by mouth Every 12 (Twelve) Hours., Disp: 180 tablet, Rfl: 3     Review of Systems   Constitutional: Negative.    HENT: Negative.    Eyes: Negative.    Respiratory: Negative.    Cardiovascular: Positive for leg swelling.   Gastrointestinal: Negative.    Endocrine: Negative.    Genitourinary: Negative.    Musculoskeletal: Negative.    Skin: Negative.    Allergic/Immunologic: Negative.    Neurological: Negative.    Hematological: Negative.    Psychiatric/Behavioral: Negative.    All other systems reviewed and are negative.        /70 (BP Location: Left arm, Patient Position: Sitting, Cuff Size: Adult)   Pulse 91   Ht 182.9 cm (72\")   Wt 101 kg (223 lb)   SpO2 99%   BMI 30.24 kg/m²   Physical Exam   Constitutional: He is oriented to person, place, and time. He appears well-developed and well-nourished.   HENT:   Head: Normocephalic and atraumatic.   Eyes: Pupils are equal, round, and reactive to light. Conjunctivae are normal. No scleral icterus.   Neck: Normal range of motion. Neck supple. No thyromegaly present.   Cardiovascular: Normal rate, regular rhythm, normal heart sounds and intact distal pulses.    Varicose veins to lower extremities, left tender, no erythema  Venous stasis   Pulmonary/Chest: Effort normal and breath sounds normal.   Abdominal: Soft. Bowel sounds are normal.   Musculoskeletal: Normal range of motion.   Neurological: He is alert and oriented to person, place, and time.   Skin: Skin is warm and dry.   Psychiatric: He has a normal mood and affect. His behavior is normal. Judgment and thought content normal.   Nursing note and vitals reviewed.       No results found.         Patient Active Problem List   Diagnosis   • CHF (congestive heart failure), NYHA class II, chronic, systolic (CMS/HCC)   • Coronary artery disease " involving native coronary artery of native heart without angina pectoris   • Intraventricular conduction delay   • Herpes labialis   • Disorder of liver   • Reflux involving intestinal tract   • Abdominal hernia   • Cardiomyopathy (CMS/HCC)   • Hx of colonic polyps   • Family hx of colon cancer   • Type 2 diabetes mellitus without complication, without long-term current use of insulin (CMS/HCC)   • Essential hypertension   • Eczema   • AICD (automatic cardioverter/defibrillator) present   • Varicosities of leg   • Right elbow pain   • Mixed hyperlipidemia   • Chronic laryngitis   • Wheezing   • Herpes zoster without complication   • Class 1 obesity due to excess calories with serious comorbidity and body mass index (BMI) of 31.0 to 31.9 in adult   • History of colonic polyps   • Chronic sinusitis   • Chronic rhinitis   • Sleep apnea   • S/P CABG x 3   • PAF (paroxysmal atrial fibrillation) (CMS/HCC)   • Chest pain   • Non compliance w medication regimen   • Thoracic aortic aneurysm without rupture (CMS/HCC)   • Venous (peripheral) insufficiency   • Preop testing             ICD-10-CM ICD-9-CM   1. Venous (peripheral) insufficiency I87.2 459.81   2. Preop testing Z01.818 V72.84   3. Encounter for monitoring antiplatelet therapy Z51.81 V58.83     Z79.02 V58.63         Plan: After thoroughly evaluating Joao Fonseca, I believe the best course of action is to proceed with a left lower extremity radiofrequency ablation of the greater saphenous vein.  Risks of radiofrequency ablation include, but are not limited to, bleeding, infection, vessel damage, nerve damage, DVT, phlebitis, and pulmonary embolus.  The patient understands these risks and wishes to proceed with procedure. He does have evidence of significant venous insufficiency, varicose veins, and stasis dermatitis in the left lower extremity.  Most of his discomfort is in this left leg.  I did discuss vascular risk factors as they pertain to the progression of  vascular disease including controlling hypertension and hyperlipidemia.  The patient can continue taking their current medication regimen as previously planned.  This was all discussed in full with complete understanding.     Thank you for allowing me to participate in the care of your patient.  Please do not hesitate with any questions or concerns.  I will keep you aware of any further encounters with Joao Fonseca.           Sincerely yours,           Shari Cool, DIOR Dupree, Hernandez Fermin, DO

## 2018-10-15 NOTE — DISCHARGE INSTRUCTIONS
YOUR NEXT PAIN MEDICATION IS DUE AT______________        Moderate Conscious Sedation, Adult, Care After  Refer to this sheet in the next few weeks. These instructions provide you with information on caring for yourself after your procedure. Your health care provider may also give you more specific instructions. Your treatment has been planned according to current medical practices, but problems sometimes occur. Call your health care provider if you have any problems or questions after your procedure.  WHAT TO EXPECT AFTER THE PROCEDURE    After your procedure:  · You may feel sleepy, clumsy, and have poor balance for several hours.  · Vomiting may occur if you eat too soon after the procedure.  HOME CARE INSTRUCTIONS  · Do not participate in any activities where you could become injured for at least 24 hours. Do not:  ¨ Drive.  ¨ Swim.  ¨ Ride a bicycle.  ¨ Operate heavy machinery.  ¨ Cook.  ¨ Use power tools.  ¨ Climb ladders.  ¨ Work from a high place.  · Do not make important decisions or sign legal documents until you are improved.  · If you vomit, drink water, juice, or soup when you can drink without vomiting. Make sure you have little or no nausea before eating solid foods.  · Only take over-the-counter or prescription medicines for pain, discomfort, or fever as directed by your health care provider.  · Make sure you and your family fully understand everything about the medicines given to you, including what side effects may occur.  · You should not drink alcohol, take sleeping pills, or take medicines that cause drowsiness for at least 24 hours.  · If you smoke, do not smoke without supervision.  · If you are feeling better, you may resume normal activities 24 hours after you were sedated.  · Keep all appointments with your health care provider.  SEEK MEDICAL CARE IF:  · Your skin is pale or bluish in color.  · You continue to feel nauseous or vomit.  · Your pain is getting worse and is not helped by  medicine.  · You have bleeding or swelling.  · You are still sleepy or feeling clumsy after 24 hours.  SEEK IMMEDIATE MEDICAL CARE IF:  · You develop a rash.  · You have difficulty breathing.  · You develop any type of allergic problem.  · You have a fever.  MAKE SURE YOU:  · Understand these instructions.  · Will watch your condition.  · Will get help right away if you are not doing well or get worse.     This information is not intended to replace advice given to you by your health care provider. Make sure you discuss any questions you have with your health care provider.     Document Released: 10/08/2014 Document Revised: 01/08/2016 Document Reviewed: 10/08/2014  [employer] Interactive Patient Education ©2016 [employer]         CALL YOUR PHYSICIAN IF YOU EXPERIENCE  INCREASED PAIN NOT HELPED BY YOUR PAIN MEDICATION.        Fall Prevention in the Home      Falls can cause injuries. They can happen to people of all ages. There are many things you can do to make your home safe and to help prevent falls.    WHAT CAN I DO ON THE OUTSIDE OF MY HOME?  · Regularly fix the edges of walkways and driveways and fix any cracks.  · Remove anything that might make you trip as you walk through a door, such as a raised step or threshold.  · Trim any bushes or trees on the path to your home.  · Use bright outdoor lighting.  · Clear any walking paths of anything that might make someone trip, such as rocks or tools.  · Regularly check to see if handrails are loose or broken. Make sure that both sides of any steps have handrails.  · Any raised decks and porches should have guardrails on the edges.  · Have any leaves, snow, or ice cleared regularly.  · Use sand or salt on walking paths during winter.  · Clean up any spills in your garage right away. This includes oil or grease spills.  WHAT CAN I DO IN THE BATHROOM?    · Use night lights.  · Install grab bars by the toilet and in the tub and shower. Do not use towel bars as grab  bars.  · Use non-skid mats or decals in the tub or shower.  · If you need to sit down in the shower, use a plastic, non-slip stool.  · Keep the floor dry. Clean up any water that spills on the floor as soon as it happens.  · Remove soap buildup in the tub or shower regularly.  · Attach bath mats securely with double-sided non-slip rug tape.  · Do not have throw rugs and other things on the floor that can make you trip.  WHAT CAN I DO IN THE BEDROOM?  · Use night lights.  · Make sure that you have a light by your bed that is easy to reach.  · Do not use any sheets or blankets that are too big for your bed. They should not hang down onto the floor.  · Have a firm chair that has side arms. You can use this for support while you get dressed.  · Do not have throw rugs and other things on the floor that can make you trip.  WHAT CAN I DO IN THE KITCHEN?  · Clean up any spills right away.  · Avoid walking on wet floors.  · Keep items that you use a lot in easy-to-reach places.  · If you need to reach something above you, use a strong step stool that has a grab bar.  · Keep electrical cords out of the way.  · Do not use floor polish or wax that makes floors slippery. If you must use wax, use non-skid floor wax.  · Do not have throw rugs and other things on the floor that can make you trip.  WHAT CAN I DO WITH MY STAIRS?  · Do not leave any items on the stairs.  · Make sure that there are handrails on both sides of the stairs and use them. Fix handrails that are broken or loose. Make sure that handrails are as long as the stairways.  · Check any carpeting to make sure that it is firmly attached to the stairs. Fix any carpet that is loose or worn.  · Avoid having throw rugs at the top or bottom of the stairs. If you do have throw rugs, attach them to the floor with carpet tape.  · Make sure that you have a light switch at the top of the stairs and the bottom of the stairs. If you do not have them, ask someone to add them for  you.  WHAT ELSE CAN I DO TO HELP PREVENT FALLS?  · Wear shoes that:  ¨ Do not have high heels.  ¨ Have rubber bottoms.  ¨ Are comfortable and fit you well.  ¨ Are closed at the toe. Do not wear sandals.  · If you use a stepladder:  ¨ Make sure that it is fully opened. Do not climb a closed stepladder.  ¨ Make sure that both sides of the stepladder are locked into place.  ¨ Ask someone to hold it for you, if possible.  · Clearly reno and make sure that you can see:  ¨ Any grab bars or handrails.  ¨ First and last steps.  ¨ Where the edge of each step is.  · Use tools that help you move around (mobility aids) if they are needed. These include:  ¨ Canes.  ¨ Walkers.  ¨ Scooters.  ¨ Crutches.  · Turn on the lights when you go into a dark area. Replace any light bulbs as soon as they burn out.  · Set up your furniture so you have a clear path. Avoid moving your furniture around.  · If any of your floors are uneven, fix them.  · If there are any pets around you, be aware of where they are.  · Review your medicines with your doctor. Some medicines can make you feel dizzy. This can increase your chance of falling.  Ask your doctor what other things that you can do to help prevent falls.     This information is not intended to replace advice given to you by your health care provider. Make sure you discuss any questions you have with your health care provider.     Document Released: 10/14/2010 Document Revised: 05/03/2016 Document Reviewed: 01/22/2016  [employer]vier Interactive Patient Education ©2016 [employer]     PATIENT/FAMILY/RESPONSIBLE PARTY VERBALIZES UNDERSTANDING OF ABOVE EDUCATION.  COPY OF PAIN SCALE GIVEN AND REVIEWED WITH VERBALIZED UNDERSTANDING.

## 2018-10-15 NOTE — ANESTHESIA POSTPROCEDURE EVALUATION
Patient: Joao Fonseca    Procedure Summary     Date:  10/15/18 Room / Location:  [facility] OR  / [facility] HYBRID OR 12    Anesthesia Start:  1109 Anesthesia Stop:  1148    Procedure:  LEFT SAPHENOUS VEIN RADIO FREQUENCY ABLATION (Left Leg Lower) Diagnosis:       Venous (peripheral) insufficiency      Preop testing      (Venous (peripheral) insufficiency [I87.2])      (Preop testing [Z01.818])    Surgeon:  Froylan De La Cruz DO Provider:  Jesus Posada CRNA    Anesthesia Type:  MAC ASA Status:  3          Anesthesia Type: MAC  Last vitals  BP   128/89 (10/15/18 0816)   Temp       Pulse   86 (10/15/18 0926)   Resp   16 (10/15/18 0816)     SpO2   99 % (10/15/18 0926)     Post Anesthesia Care and Evaluation    Patient location during evaluation: PHASE II  Patient participation: complete - patient participated  Level of consciousness: awake and sleepy but conscious  Pain score: 0  Pain management: adequate  Airway patency: patent  Anesthetic complications: No anesthetic complications    Cardiovascular status: acceptable  Respiratory status: acceptable  Hydration status: acceptable

## 2018-10-23 ENCOUNTER — TELEPHONE (OUTPATIENT)
Dept: VASCULAR SURGERY | Facility: CLINIC | Age: 63
End: 2018-10-23

## 2018-10-23 NOTE — TELEPHONE ENCOUNTER
Spoke with Mr Fonseca reminding him of his appointments for Wednesday, October 24th, 2018. Rem[facility] and follow up afterwards at 1130 am with Shari RADER. Mr Fonseca confirmed he would be here.

## 2018-10-24 ENCOUNTER — OFFICE VISIT (OUTPATIENT)
Dept: VASCULAR SURGERY | Facility: CLINIC | Age: 63
End: 2018-10-24

## 2018-10-24 ENCOUNTER — HOSPITAL ENCOUNTER (OUTPATIENT)
Dept: ULTRASOUND IMAGING | Facility: HOSPITAL | Age: 63
Discharge: HOME | End: 2018-10-24
Attending: SURGERY | Admitting: SURGERY

## 2018-10-24 VITALS
BODY MASS INDEX: 30.2 KG/M2 | HEIGHT: 72 IN | OXYGEN SATURATION: 98 % | HEART RATE: 93 BPM | SYSTOLIC BLOOD PRESSURE: 122 MMHG | DIASTOLIC BLOOD PRESSURE: 74 MMHG | WEIGHT: 223 LBS

## 2018-10-24 DIAGNOSIS — I87.392 CHRONIC VENOUS HYPERTENSION (IDIOPATHIC) WITH OTHER COMPLICATIONS OF LEFT LOWER EXTREMITY: ICD-10-CM

## 2018-10-24 DIAGNOSIS — I87.2 VENOUS (PERIPHERAL) INSUFFICIENCY: ICD-10-CM

## 2018-10-24 DIAGNOSIS — I10 ESSENTIAL HYPERTENSION: ICD-10-CM

## 2018-10-24 DIAGNOSIS — E78.2 MIXED HYPERLIPIDEMIA: ICD-10-CM

## 2018-10-24 DIAGNOSIS — I87.2 VENOUS INSUFFICIENCY: Primary | ICD-10-CM

## 2018-10-24 PROCEDURE — 99213 OFFICE O/P EST LOW 20 MIN: CPT | Performed by: NURSE PRACTITIONER

## 2018-10-24 PROCEDURE — 93971 EXTREMITY STUDY: CPT

## 2018-10-24 PROCEDURE — 93971 EXTREMITY STUDY: CPT | Performed by: SURGERY

## 2018-10-24 NOTE — PROGRESS NOTES
"10/24/2018       Hernandez Dupree, DO  [address] [facility] [address] [address]  [place] [address]    Joao Fonseca  1955    Chief Complaint   Patient presents with   • Follow-up     1 Week Post-OP Follow Up For LEFT SAPHENOUS VEIN RADIO FREQUENCY ABLATION. Test 10/24/18 US pad venous lower ext left. Patient denies any stroke like symptoms.        Dear Hernandez Dupree, DO       HPI  I had the pleasure of seeing your patient Joao Fonseca in the office today.   As you recall, Joao Fonseca is a 63 y.o.  male who you are currently following for routine health maintenance.  He does follow with Dr. Wilson for a 5.2 cm ascending aortic aneurysm, and recommended conservative management until reaches 5.5 cm.  He would require a redo median sternotomy.  He did have previous vein stripping about 4 years ago in [place].  He denies any history of DVT or trauma. His left leg was causing him pain, and he underwent a left lower extremity radiofrequency ablation of the greater saphenous vein. He states his leg feels much better.   He did have noninvasive testing performed today, which I did review in office.      Review of Systems   Constitutional: Negative.    HENT: Negative.    Eyes: Negative.    Respiratory: Negative.    Cardiovascular: Positive for leg swelling.   Gastrointestinal: Negative.    Endocrine: Negative.    Genitourinary: Negative.    Musculoskeletal: Negative.    Skin: Negative.    Allergic/Immunologic: Negative.    Neurological: Negative.    Hematological: Negative.    Psychiatric/Behavioral: Negative.    All other systems reviewed and are negative.      /74 (BP Location: Left arm, Patient Position: Sitting, Cuff Size: Adult)   Pulse 93   Ht 182.9 cm (72\")   Wt 101 kg (223 lb)   SpO2 98%   BMI 30.24 kg/m²   Physical Exam   Constitutional: He is oriented to person, place, and time. He appears well-developed and well-nourished.   HENT:   Head: Normocephalic and atraumatic.   Eyes: Pupils are equal, " round, and reactive to light. Conjunctivae are normal. No scleral icterus.   Neck: Normal range of motion. Neck supple. No thyromegaly present.   Cardiovascular: Normal rate, regular rhythm, normal heart sounds and intact distal pulses.    Varicose veins to lower extremities, left tender, no erythema  Venous stasis   Pulmonary/Chest: Effort normal and breath sounds normal.   Abdominal: Soft. Bowel sounds are normal.   Musculoskeletal: Normal range of motion.   Neurological: He is alert and oriented to person, place, and time.   Skin: Skin is warm and dry.   Psychiatric: He has a normal mood and affect. His behavior is normal. Judgment and thought content normal.   Nursing note and vitals reviewed.      Xr Chest 2 Vw    Result Date: 10/8/2018  Narrative: EXAMINATION: XR CHEST 2 VW- 10/8/2018 4:04 PM CDT  HISTORY: preop-LEFT SAPHENOUS VEIN RADIO FREQUENCY ABLATION; I87.2-Venous insufficiency (chronic) (peripheral); Z01.818-Encounter for other preprocedural examination.  REPORT: Comparison is made with the study from 5/4/2018.  Lungs are free of infiltrates and there is no consolidation. There are central vascular distention with mild cardiomegaly. Previous median sternotomy is noted. The left subclavian AICD appears in good position as before. No pneumothorax or pleural effusion is identified. The osseous structures and upper abdomen are within normal limits pre-      Impression: Mild CHF. This report was finalized on 10/08/2018 16:06 by Dr. Zander Greene MD.    Us Guided Vascular Access    Result Date: 10/17/2018  Narrative: Performed by Dr. De La Cruz. Please see procedure note.  This report was finalized on 10/17/2018 13:20 by Dr. Froylan eD La Cruz MD.    Us Venous Doppler Lower Extremity Left (duplex)    Result Date: 10/24/2018  Narrative: History: Status post radiofrequency ablation of the left lower extremity greater saphenous vein on 10/15.  Comment: Left lower extremity venous duplex was performed using  grayscale imaging color flow Doppler.  FINDINGS: The left lower extremity greater saphenous vein was successfully closed from 0.6 cm distal to the saphenofemoral junction to the proximal calf. There is also varicosity noted in the proximal calf that was also thrombosed. There is no evidence of DVT in the left lower extremity.      Impression: Successful closure of the left lower extremity greater saphenous vein. No evidence of DVT in the left lower extremity.  This report was finalized on 10/24/2018 14:23 by Dr. Jose Raul Anglin MD.    Us Venous Doppler Lower Extremity Bilateral (duplex)    Result Date: 10/4/2018  Narrative: History: Bilateral lower extremity swelling  Comment: Venous valvular insufficiency testing was performed in both legs using duplex ultrasound with rapid cuff deflation technique.  The common femoral veins, popliteal veins, posterior tibial veins, greater saphenous veins, and lesser saphenous veins were interrogated bilaterally.   On the right, the greater saphenous vein at the junction measures 3.8 mm. Greater saphenous vein distal to this cannot be visualized as the patient had prior vein stripping.. Lesser saphenous vein is 2.4 mm at the knee, 1.4 mm at the mid calf, and 1.4 mm at the ankle. There is no significant reflux of the lesser saphenous vein. There is no evidence of DVT.  On the left, the greater saphenous vein at the junction measures 16 mm. In the mid thigh measures 7.2 mm. Above the knee measured 6.5 mm. The greater saphenous vein is noted to break into thrombosed varicose veins just distal to the knee. Patient also has a history of vein stripping of the greater saphenous vein below the knee. There is greater than 0.5 seconds of reflux from the saphenofemoral junction to the above-knee level. Lesser saphenous vein is 2 mm. It is not visualized distal to this. There is no significant reflux in the visualized portion of the lesser saphenous vein. There is no evidence of DVT.       Impression:  1. On the right, there is significant reflux noted in the greater saphenous vein at the saphenofemoral junction. The greater saphenous vein distal to this point has been previously stripped. The lesser saphenous vein appears within normal limits with no significant reflux. 2. On the left there is significant reflux noted in the greater saphenous vein from the saphenofemoral junction to the above knee level. Distal to this the greater saphenous vein is not visualized due to previous vein stripping. The lesser saphenous vein at the knee appears within normal limits with no significant reflux. The lesser saphenous vein distal to this was nonvisualized. There is no evidence of DVT in the bilateral lower extremities.   This report was finalized on 10/04/2018 14:58 by Dr. Jose Raul Anglin MD.      Patient Active Problem List   Diagnosis   • CHF (congestive heart failure), NYHA class II, chronic, systolic (CMS/HCC)   • Coronary artery disease involving native coronary artery of native heart without angina pectoris   • Intraventricular conduction delay   • Herpes labialis   • Disorder of liver   • Reflux involving intestinal tract   • Abdominal hernia   • Cardiomyopathy (CMS/HCC)   • Hx of colonic polyps   • Family hx of colon cancer   • Type 2 diabetes mellitus without complication, without long-term current use of insulin (CMS/HCC)   • Essential hypertension   • Eczema   • AICD (automatic cardioverter/defibrillator) present   • Varicosities of leg   • Right elbow pain   • Mixed hyperlipidemia   • Chronic laryngitis   • Wheezing   • Herpes zoster without complication   • Class 1 obesity due to excess calories with serious comorbidity and body mass index (BMI) of 31.0 to 31.9 in adult   • History of colonic polyps   • Chronic sinusitis   • Chronic rhinitis   • Sleep apnea   • S/P CABG x 3   • PAF (paroxysmal atrial fibrillation) (CMS/HCC)   • Chest pain   • Non compliance w medication regimen   • Thoracic  aortic aneurysm without rupture (CMS/[facility])   • Venous (peripheral) insufficiency         ICD-10-CM ICD-9-CM   1. Venous insufficiency I87.2 459.81   2. Essential hypertension I10 401.9   3. Mixed hyperlipidemia E78.2 272.2       Plan: After thoroughly evaluating Joao Fonseca, I am pleased to report he is doing well status radiofrequency ablation of the left lower extremity greater saphenous vein.  His testing is as expected post RFA with no evidence of DVT.  His leg feels much better per hi report.  We will see him back in 6 months for continued follow up.  I did discuss vascular risk factors as they pertain to the progression of vascular disease including controlling hypertension and hyperlipidemia.  The patient can continue taking their current medication regimen as previously planned.  This was all discussed in full with complete understanding.    Thank you for allowing me to participate in the care of your patient.  Please do not hesitate with any questions or concerns.  I will keep you aware of any further encounters with Joao Fonseca.        Sincerely yours,         Shari Cool, DIOR Dupree, Hernandez Fermin, DO

## 2018-10-29 PROBLEM — Z01.818 PREOP TESTING: Status: RESOLVED | Noted: 2018-10-04 | Resolved: 2018-10-29

## 2018-11-19 ENCOUNTER — HOSPITAL ENCOUNTER (OUTPATIENT)
Dept: CT IMAGING | Facility: HOSPITAL | Age: 63
Discharge: HOME | End: 2018-11-19
Attending: THORACIC SURGERY (CARDIOTHORACIC VASCULAR SURGERY) | Admitting: THORACIC SURGERY (CARDIOTHORACIC VASCULAR SURGERY)

## 2018-11-19 DIAGNOSIS — I71.20 THORACIC AORTIC ANEURYSM WITHOUT RUPTURE (HCC): ICD-10-CM

## 2018-11-19 LAB — CREAT BLDA-MCNC: 1.2 MG/DL (ref 0.6–1.3)

## 2018-11-19 PROCEDURE — 82565 ASSAY OF CREATININE: CPT

## 2018-11-19 PROCEDURE — 0 IOPAMIDOL PER 1 ML: Performed by: THORACIC SURGERY (CARDIOTHORACIC VASCULAR SURGERY)

## 2018-11-19 PROCEDURE — 71275 CT ANGIOGRAPHY CHEST: CPT

## 2018-11-19 RX ADMIN — IOPAMIDOL 150 ML: 755 INJECTION, SOLUTION INTRAVENOUS at 08:04

## 2018-11-20 ENCOUNTER — OFFICE VISIT (OUTPATIENT)
Dept: CARDIAC SURGERY | Facility: CLINIC | Age: 63
End: 2018-11-20

## 2018-11-20 VITALS
DIASTOLIC BLOOD PRESSURE: 72 MMHG | HEIGHT: 72 IN | HEART RATE: 85 BPM | WEIGHT: 223.8 LBS | OXYGEN SATURATION: 98 % | SYSTOLIC BLOOD PRESSURE: 118 MMHG | BODY MASS INDEX: 30.31 KG/M2

## 2018-11-20 DIAGNOSIS — I25.10 CORONARY ARTERY DISEASE INVOLVING NATIVE CORONARY ARTERY OF NATIVE HEART WITHOUT ANGINA PECTORIS: ICD-10-CM

## 2018-11-20 DIAGNOSIS — E66.09 CLASS 1 OBESITY DUE TO EXCESS CALORIES WITH SERIOUS COMORBIDITY AND BODY MASS INDEX (BMI) OF 31.0 TO 31.9 IN ADULT: ICD-10-CM

## 2018-11-20 DIAGNOSIS — I50.22 CHF (CONGESTIVE HEART FAILURE), NYHA CLASS II, CHRONIC, SYSTOLIC (HCC): Primary | ICD-10-CM

## 2018-11-20 DIAGNOSIS — I71.20 THORACIC AORTIC ANEURYSM WITHOUT RUPTURE (HCC): ICD-10-CM

## 2018-11-20 PROCEDURE — 99213 OFFICE O/P EST LOW 20 MIN: CPT | Performed by: THORACIC SURGERY (CARDIOTHORACIC VASCULAR SURGERY)

## 2018-12-04 NOTE — PROGRESS NOTES
"Subjective   Patient ID: Joao Fonseca is a 63 y.o. male who is here for follow-up of a known aneurysm.   Chief Complaint   Patient presents with   • Aortic Aneurysm     patient here for follow up with scan     History of Present Illness    Since his last office visit he is had no additional admissions for heart failure.  He denies any chest pain or shortness of breath.  He has been able to continue working.  He is joined by his wife today.    The following portions of the patient's history were reviewed and updated as appropriate: allergies, current medications, past family history, past medical history, past social history, past surgical history and problem list.       Objective      Visit Vitals  /72 (BP Location: Right arm, Patient Position: Sitting, Cuff Size: Adult)   Pulse 85   Ht 182.9 cm (72\")   Wt 102 kg (223 lb 12.8 oz)   SpO2 98%   BMI 30.35 kg/m²       Physical Exam   Constitutional: He is oriented to person, place, and time. He appears well-developed.   HENT:   Head: Normocephalic and atraumatic.   Mouth/Throat: Oropharynx is clear and moist.   Eyes: EOM are normal. Pupils are equal, round, and reactive to light.   Neck: Normal range of motion. Neck supple. No JVD present. No tracheal deviation present. No thyromegaly present.   Cardiovascular: Normal rate, regular rhythm, normal heart sounds and intact distal pulses. Exam reveals no gallop and no friction rub.   No murmur heard.  Pulmonary/Chest: Effort normal and breath sounds normal. No respiratory distress. He has no wheezes. He has no rales. He exhibits no tenderness.   Abdominal: Soft. He exhibits no distension. There is no tenderness.   Musculoskeletal: Normal range of motion. He exhibits no edema.   Lymphadenopathy:     He has no cervical adenopathy.   Neurological: He is alert and oriented to person, place, and time. No cranial nerve deficit.   Skin: Skin is warm and dry.   Psychiatric: He has a normal mood and affect. "       EXAMINATION: CT ANGIOGRAM CHEST W WO CONTRAST-  5/4/2018 3:31 PM CDT     HISTORY: Dyspnea     COMPARISON: 12/9/2015 chest CT     TECHNIQUE:     CT evaluation of the chest was performed with intravenous contrast. 2 mm  transaxial images were obtained.. Coronal reconstruction maximum  intensity projection images of the pulmonary arteries and aorta were  also generated.     Radiation dose equals  mGy-cm.  Automated exposure control dose  reduction technique was implemented.        FINDINGS:     [The pulmonary arteries are opacified with iodinated contrast without  intraluminal filling defects or CT angiographic evidence of pulmonary  embolus.     The aorta is minimally opacified with iodinated contrast. There are  changes from median sternotomy. There is aneurysmal dilatation of the  ascending thoracic aorta measuring approximate 5.2 cm, just superior to  the valve. The] arch tapers appropriately in the arch and descending  thoracic aorta with atherosclerotic calcifications. No secondary signs  of dissection with again poor enhancement.     There are coronary artery calcifications.     There is cardiomegaly with panchamber enlargement.     There are multiple middle mediastinal lymph nodes. Calcified right  paratracheal and right hilar nodes observed. No mediastinal or hilar  lymphadenopathy.     There are small bilateral pleural effusions layering posteriorly. There  are groundglass opacities appreciated in the lower lobes bilaterally  which may be related to the level of inspiration and patient position.  Mild interstitial infiltration from an acute infectious or inflammatory  process or mild interstitial pulmonary edema also considered. The  pulmonary arteries aren't prominent suggesting pulmonary hypertension.  There are no consolidating parenchymal infiltrates.     Limited imaging of the upper abdomen is unremarkable.     Degenerative changes noted in the thoracic spine with osteophyte  formation. No  acute osseous abnormality observed.     IMPRESSION:  1. No CT angiographic evidence of pulmonary embolus or acute aortic  pathology.   2. Aneurysm dilatation of the ascending thoracic aorta measuring 5.2 cm  in greatest AP projection.  3. Small bilateral pleural effusions. Cardiomegaly with pulmonary  vasculature prominence. Mild pulmonary venous hypertension considered.  No parenchymal infiltrates.  This report was finalized on 05/04/2018 15:38 by Dr. Leo Haddad MD.  CT scan of the chest obtained May of 2018 is reviewed by me.  It measures 5.1 cm in the short axis in greatest dimension.    No evidence of acute aortic pathology.      EXAMINATION:  CT ANGIOGRAM CHEST W CONTRAST-  11/19/2018 8:01 AM CST     HISTORY: I71.2-Thoracic aortic aneurysm, without rupture.     COMPARISON : 5/4/2018.     DLP: 434 mGy-cm. Automated dosage control was utilized.     TECHNIQUE: CT angio was performed of the chest with contrast. Coronal,  sagittal and 3-D reconstruction were performed.     MEDIASTINUM, HEART AND VASCULAR STRUCTURES: The ascending thoracic aorta  measures 5.2 cm in transverse diameter. The aortic arch and descending  thoracic aorta are normal in caliber. There is mild atheromatous disease  of the thoracic aorta. There is fairly dense atheromatous disease of the  coronary arteries. There has been prior heart bypass surgery. There is  cardiomegaly. The pulmonary arteries are dilated. The main pulmonary  artery segment measures 3.5 cm. The left main pulmonary artery measures  3.2 cm on the right main pulmonary artery measures 3.2 cm. There are  calcified and noncalcified mediastinal lymph nodes. Mediastinal lymph  nodes are stable compared to the prior study.     LUNGS: There is no focal infiltrate or effusion. Patchy groundglass  opacity in both lungs is likely related to relatively poor inspiration.  There is no pleural effusion. The airways are patent.      UPPER ABDOMEN: There is diverticulosis of the visualized  colon. The  visualized upper abdomen is otherwise within normal limits.     BONES: There are degenerative changes of the spine.     IMPRESSION:  1. Aneurysmal dilatation of the ascending thoracic aorta measuring 5.2  cm, stable.  2. Cardiomegaly. Atheromatous disease of the thoracic aorta and coronary  arteries. Prior heart bypass surgery.  3. No significant lung infiltrate.  4. Diverticulosis of the colon.    This report was finalized on 11/19/2018 08:32 by Dr. Jerry Reza MD.    Joao was seen today for aortic aneurysm.    Diagnoses and all orders for this visit:    CHF (congestive heart failure), NYHA class II, chronic, systolic (CMS/HCC)    Coronary artery disease involving native coronary artery of native heart without angina pectoris    Thoracic aortic aneurysm without rupture (CMS/HCC)  -     CT Angiogram Chest With Contrast; Future    Class 1 obesity due to excess calories with serious comorbidity and body mass index (BMI) of 31.0 to 31.9 in adult          Assessment/Plan      We discussed his results of his CT scan of the chest demonstrating a 5.2 cm aortic aneurysm.  The previous CT scan of his chest demonstrated a in the short axis in greatest dimension a measurement of 5.1 cm in size.  The difference in these 2 numbers is likely consistent with stability.  We discussed signs and symptoms of an acute aortic event.  He verbalizes understanding.  We discussed that consideration for aneurysm resection can be considered as early as 5cm if size criteria is utilized.  Given current data and the fact that he would require redo median sternotomy, I have recommended we use 5.5 cm as the point to that the benefit of ascending aortic resection would outweigh the risk of surgery.  All questions have been answered.  He is agreeable to the plan of care.    He is a non smoker.    Patient's Body mass index is 30.35 kg/m². BMI is above normal parameters. Recommendations include: referral to primary care.    RTC 6 month  with CT scan of the chest.

## 2018-12-20 ENCOUNTER — OFFICE VISIT (OUTPATIENT)
Dept: CARDIOLOGY | Facility: CLINIC | Age: 63
End: 2018-12-20

## 2018-12-20 VITALS
SYSTOLIC BLOOD PRESSURE: 118 MMHG | DIASTOLIC BLOOD PRESSURE: 82 MMHG | HEIGHT: 72 IN | WEIGHT: 233 LBS | BODY MASS INDEX: 31.56 KG/M2 | OXYGEN SATURATION: 98 % | HEART RATE: 85 BPM

## 2018-12-20 DIAGNOSIS — I10 ESSENTIAL HYPERTENSION: ICD-10-CM

## 2018-12-20 DIAGNOSIS — I71.20 THORACIC AORTIC ANEURYSM WITHOUT RUPTURE (HCC): ICD-10-CM

## 2018-12-20 DIAGNOSIS — I25.10 CORONARY ARTERY DISEASE INVOLVING NATIVE CORONARY ARTERY OF NATIVE HEART WITHOUT ANGINA PECTORIS: Primary | ICD-10-CM

## 2018-12-20 DIAGNOSIS — I48.0 PAF (PAROXYSMAL ATRIAL FIBRILLATION) (HCC): ICD-10-CM

## 2018-12-20 DIAGNOSIS — I50.22 CHF (CONGESTIVE HEART FAILURE), NYHA CLASS II, CHRONIC, SYSTOLIC (HCC): ICD-10-CM

## 2018-12-20 PROCEDURE — 93000 ELECTROCARDIOGRAM COMPLETE: CPT | Performed by: INTERNAL MEDICINE

## 2018-12-20 PROCEDURE — 99214 OFFICE O/P EST MOD 30 MIN: CPT | Performed by: INTERNAL MEDICINE

## 2018-12-20 NOTE — PROGRESS NOTES
Subjective:     Encounter Date: 12/20/18      Patient ID: Joao Fonseca is a 63 y.o. male with coronary artery disease, chronic systolic congestive heart failure, ICD implant place, hypertension, thoracic aortic aneurysm (followed closely by Dr. Wilson) who presents today for follow-up.    Chief Complaint: Follow-up    Congestive Heart Failure   Presents for follow-up visit. Associated symptoms include fatigue and shortness of breath (minimal at this time but waxes and wanes). Pertinent negatives include no abdominal pain, chest pain, chest pressure, claudication, edema, muscle weakness, near-syncope, orthopnea, palpitations, paroxysmal nocturnal dyspnea or unexpected weight change. The symptoms have been improving. His past medical history is significant for CAD. Compliance with total regimen is %. Compliance with diet is %. Compliance with exercise is %. Compliance with medications is %.   Coronary Artery Disease   Presents for follow-up visit. Symptoms include shortness of breath (minimal at this time but waxes and wanes). Pertinent negatives include no chest pain, chest pressure, chest tightness, dizziness, leg swelling, muscle weakness or palpitations. His past medical history is significant for CHF. The symptoms have been stable. Compliance with diet is variable. Compliance with exercise is variable. Compliance with medications is good.     This patient presents today for follow-up of coronary artery disease and chronic systolic heart failure.  He says that he has been doing reasonably well.  He will have some occasional episodes of shortness of breath and feels overall generally mildly fatigued.  His symptoms are described to me as stable, however.  No significant chest pain.  No palpitations, lightheadedness, dizziness, syncope.  He says that he has had a few episodes where he has just felt generally weak to the point that he felt like he needed to go home and lay down.  This  "seems to occur very rarely.  He continues to follow with Dr. Wilson regarding his aortic aneurysm.  Six-month imaging will be performed in the future.  Otherwise, no problems with current medications.  No further complaints today.      Current Outpatient Medications:   •  acyclovir (ZOVIRAX) 400 MG tablet, Take 1 tablet by mouth Daily. Take no more than 5 doses a day., Disp: 90 tablet, Rfl: 2  •  albuterol (PROVENTIL HFA;VENTOLIN HFA) 108 (90 Base) MCG/ACT inhaler, Inhale 2 puffs Every 4 (Four) Hours As Needed for Wheezing or Shortness of Air., Disp: , Rfl:   •  aspirin 81 MG chewable tablet, Chew 1 tablet Daily., Disp: 90 tablet, Rfl: 3  •  Blood Glucose Monitoring Suppl (ONE TOUCH ULTRA 2) w/Device kit, USE AS DIRECTED PER PACKAGE INSTRUCTIONS, Disp: , Rfl: 0  •  furosemide (LASIX) 40 MG tablet, Take 1 tablet by mouth Daily As Needed (edema, SOA)., Disp: 90 tablet, Rfl: 3  •  lisinopril (PRINIVIL,ZESTRIL) 5 MG tablet, Take 1 tablet by mouth Daily., Disp: 90 tablet, Rfl: 3  •  metFORMIN (GLUCOPHAGE) 1000 MG tablet, Take 1 tablet by mouth 2 (Two) Times a Day With Meals., Disp: 180 tablet, Rfl: 3  •  Metoprolol Tartrate 75 MG tablet, Take 75 mg by mouth Every 12 (Twelve) Hours., Disp: 180 tablet, Rfl: 3    Allergies   Allergen Reactions   • Cephalexin Anaphylaxis     Causes swelling of tongue, eyes, throat   • Entresto [Sacubitril-Valsartan] Other (See Comments)     \"put me in a-fib\"   • Eliquis [Apixaban] Swelling     \"swelling in ankles and feet\"   • Atorvastatin Unknown (See Comments)     fatigue     Social History     Tobacco Use   • Smoking status: Former Smoker     Years: 25.00     Types: Cigarettes   • Smokeless tobacco: Never Used   • Tobacco comment: QUIT 20 YEARS AGO   Substance Use Topics   • Alcohol use: No     Review of Systems   Constitution: Positive for fatigue and malaise/fatigue. Negative for chills, fever, weakness, night sweats, unexpected weight change and weight loss.   Cardiovascular: Positive " for dyspnea on exertion. Negative for chest pain, claudication, irregular heartbeat, leg swelling, near-syncope, orthopnea, palpitations, paroxysmal nocturnal dyspnea and syncope.   Respiratory: Positive for shortness of breath (minimal at this time but waxes and wanes). Negative for chest tightness, cough, hemoptysis and wheezing.    Hematologic/Lymphatic: Negative for bleeding problem. Does not bruise/bleed easily.   Musculoskeletal: Negative for muscle weakness.   Gastrointestinal: Negative for abdominal pain, hematemesis, hematochezia, melena, nausea and vomiting.   Genitourinary: Negative for dysuria and hematuria.   Neurological: Negative for dizziness, headaches, loss of balance and numbness.         ECG 12 Lead  Date/Time: 12/20/2018 9:37 AM  Performed by: Phil Henry MD  Authorized by: Phil Henry MD   Comparison: compared with previous ECG   Similar to previous ECG  Rhythm: sinus rhythm  Rate: normal  Conduction: left bundle branch block  QRS axis: left  Clinical impression: abnormal ECG               Objective:     Physical Exam   Constitutional: He is oriented to person, place, and time. He appears well-developed and well-nourished. No distress.   HENT:   Head: Normocephalic and atraumatic.   Mouth/Throat: Oropharynx is clear and moist.   Eyes: EOM are normal. Pupils are equal, round, and reactive to light.   Neck: Normal range of motion. Neck supple. No JVD present. No thyromegaly present.   Cardiovascular: Normal rate, regular rhythm, S1 normal, S2 normal, normal heart sounds and intact distal pulses. Exam reveals no gallop and no friction rub.   No murmur heard.  Pulmonary/Chest: Effort normal and breath sounds normal.   Abdominal: Soft. Bowel sounds are normal. He exhibits no distension. There is no tenderness.   Musculoskeletal: Normal range of motion. He exhibits no edema.   Neurological: He is alert and oriented to person, place, and time. No cranial nerve deficit.   Skin:  "Skin is warm and dry. No rash noted. No cyanosis or erythema. Nails show no clubbing.   Psychiatric: He has a normal mood and affect.   Vitals reviewed.    /82 (BP Location: Right arm, Patient Position: Sitting)   Pulse 85   Ht 182.9 cm (72\")   Wt 106 kg (233 lb)   SpO2 98%   BMI 31.60 kg/m²     Data/Lab Review:     CTA Chest 11/19/18:  1. Aneurysmal dilatation of the ascending thoracic aorta measuring 5.2 cm, stable.  2. Cardiomegaly. Atheromatous disease of the thoracic aorta and coronary arteries. Prior heart bypass surgery.  3. No significant lung infiltrate.  4. Diverticulosis of the colon.      CTA Chest 5/4/18:  1. No CT angiographic evidence of pulmonary embolus or acute aortic pathology.   2. Aneurysm dilatation of the ascending thoracic aorta measuring 5.2 cm in greatest AP projection.  3. Small bilateral pleural effusions. Cardiomegaly with pulmonary vasculature prominence. Mild pulmonary venous hypertension considered.  No parenchymal infiltrates.    Echocardiogram 5/4/18:  · Left ventricular systolic function is severely decreased. Estimated EF appears to be in the range of 21 - 25%.  · Left ventricular diastolic dysfunction.  · The left ventricular cavity is severely dilated.  · Left ventricular wall thickness is consistent with mild concentric hypertrophy.  · Interatrial septal defect present.  · Mild-to-moderate mitral valve regurgitation is present  · Moderate aortic valve regurgitation is present.  · Mild tricuspid valve regurgitation is present. Estimated right ventricular systolic pressure from tricuspid regurgitation is mildly elevated (35-45 mmHg).  · Borderline dilation of the aortic root is present.      Assessment:          Diagnosis Plan   1. Coronary artery disease involving native coronary artery of native heart without angina pectoris  ECG 12 Lead   2. CHF (congestive heart failure), NYHA class II, chronic, systolic (CMS/[facility])     3. Essential hypertension     4. PAF " (paroxysmal atrial fibrillation) (CMS/[facility])     5. Thoracic aortic aneurysm without rupture (CMS/[facility])            Plan:       1.  Coronary artery disease: The patient remains stable at this time.  He is on beta blocker and aspirin therapies.  He has refused statin therapy.  No ischemic symptoms at this time.    2.  Chronic systolic congestive heart failure: The patient continues to have class II symptoms.  He is stable at this time.  No evidence of volume overload.  He did not tolerate Entresto remains on beta blocker and ACE inhibitor therapies.  He takes Lasix as needed.    3.  Essential hypertension: The patient's blood pressure remains under good control.  Continue current medical therapies.    4.  Paroxysmal atrial fibrillation: Stable at this time with no obvious recurrence.  He has refused anticoagulation.    5.  Thoracic aortic aneurysm: The patient continues to follow with Dr. Wilson.  His most recent imaging as referenced above, showing stable aneurysm size.    Follow-up: 6 months unless otherwise needed sooner.

## 2019-03-18 ENCOUNTER — TELEPHONE (OUTPATIENT)
Dept: CARDIAC SURGERY | Facility: CLINIC | Age: 64
End: 2019-03-18

## 2019-04-15 ENCOUNTER — TELEPHONE (OUTPATIENT)
Dept: VASCULAR SURGERY | Facility: CLINIC | Age: 64
End: 2019-04-15

## 2019-04-15 NOTE — TELEPHONE ENCOUNTER
Patient called and stated that he needed to cancel his appointment on 4/26/2019 due to his work schedule.  Patient stated that he did not have a schedule now and would not be able to reschedule until he got that.  He stated that he was going to call back to RS.

## 2019-05-22 ENCOUNTER — HOSPITAL ENCOUNTER (OUTPATIENT)
Dept: CT IMAGING | Facility: HOSPITAL | Age: 64
Discharge: HOME | End: 2019-05-22
Admitting: THORACIC SURGERY (CARDIOTHORACIC VASCULAR SURGERY)

## 2019-05-22 ENCOUNTER — OFFICE VISIT (OUTPATIENT)
Dept: CARDIAC SURGERY | Facility: CLINIC | Age: 64
End: 2019-05-22

## 2019-05-22 VITALS
BODY MASS INDEX: 31.45 KG/M2 | OXYGEN SATURATION: 99 % | WEIGHT: 232.2 LBS | SYSTOLIC BLOOD PRESSURE: 118 MMHG | DIASTOLIC BLOOD PRESSURE: 74 MMHG | HEIGHT: 72 IN | HEART RATE: 80 BPM

## 2019-05-22 DIAGNOSIS — E66.09 CLASS 1 OBESITY DUE TO EXCESS CALORIES WITH SERIOUS COMORBIDITY AND BODY MASS INDEX (BMI) OF 31.0 TO 31.9 IN ADULT: ICD-10-CM

## 2019-05-22 DIAGNOSIS — I50.22 CHF (CONGESTIVE HEART FAILURE), NYHA CLASS II, CHRONIC, SYSTOLIC (HCC): Primary | ICD-10-CM

## 2019-05-22 DIAGNOSIS — I71.20 THORACIC AORTIC ANEURYSM WITHOUT RUPTURE (HCC): ICD-10-CM

## 2019-05-22 DIAGNOSIS — Z95.1 S/P CABG X 3: ICD-10-CM

## 2019-05-22 DIAGNOSIS — I25.10 CORONARY ARTERY DISEASE INVOLVING NATIVE CORONARY ARTERY OF NATIVE HEART WITHOUT ANGINA PECTORIS: ICD-10-CM

## 2019-05-22 DIAGNOSIS — B02.9 HERPES ZOSTER WITHOUT COMPLICATION: ICD-10-CM

## 2019-05-22 LAB — CREAT BLDA-MCNC: 1.2 MG/DL (ref 0.6–1.3)

## 2019-05-22 PROCEDURE — 99213 OFFICE O/P EST LOW 20 MIN: CPT | Performed by: THORACIC SURGERY (CARDIOTHORACIC VASCULAR SURGERY)

## 2019-05-22 PROCEDURE — 71275 CT ANGIOGRAPHY CHEST: CPT

## 2019-05-22 PROCEDURE — 82565 ASSAY OF CREATININE: CPT

## 2019-05-22 PROCEDURE — 0 IOPAMIDOL PER 1 ML: Performed by: THORACIC SURGERY (CARDIOTHORACIC VASCULAR SURGERY)

## 2019-05-22 RX ADMIN — IOPAMIDOL 100 ML: 755 INJECTION, SOLUTION INTRAVENOUS at 11:58

## 2019-05-23 RX ORDER — ACYCLOVIR 400 MG/1
TABLET ORAL
Qty: 90 TABLET | Refills: 2 | Status: SHIPPED | OUTPATIENT
Start: 2019-05-23 | End: 2019-08-26 | Stop reason: SDUPTHER

## 2019-06-05 NOTE — PROGRESS NOTES
"Subjective   Patient ID: Joao Fonseca is a 63 y.o. male who is here for follow-up of a known aneurysm.   Chief Complaint   Patient presents with   • Thoracic Aneurysm     Patient is here for a 6 mo follow up w/ ct.      No new events.  No additional bouts of heart failure.  He denies any chest pain or shortness of breath.  He is joined by his wife today.    The following portions of the patient's history were reviewed and updated as appropriate: allergies, current medications, past family history, past medical history, past social history, past surgical history and problem list.       Objective      Visit Vitals  /74 (BP Location: Right arm, Patient Position: Sitting, Cuff Size: Adult)   Pulse 80   Ht 182.9 cm (72\")   Wt 105 kg (232 lb 3.2 oz)   SpO2 99%   BMI 31.49 kg/m²       Physical Exam   Constitutional: He is oriented to person, place, and time. He appears well-developed.   HENT:   Head: Normocephalic and atraumatic.   Mouth/Throat: Oropharynx is clear and moist.   Eyes: EOM are normal. Pupils are equal, round, and reactive to light.   Neck: Normal range of motion. Neck supple. No JVD present. No tracheal deviation present. No thyromegaly present.   Cardiovascular: Normal rate, regular rhythm, normal heart sounds and intact distal pulses. Exam reveals no gallop and no friction rub.   No murmur heard.  Pulmonary/Chest: Effort normal and breath sounds normal. No respiratory distress. He has no wheezes. He has no rales. He exhibits no tenderness.   Abdominal: Soft. He exhibits no distension. There is no tenderness.   Musculoskeletal: Normal range of motion. He exhibits no edema.   Lymphadenopathy:     He has no cervical adenopathy.   Neurological: He is alert and oriented to person, place, and time. No cranial nerve deficit.   Skin: Skin is warm and dry.   Psychiatric: He has a normal mood and affect.   Sternum is stable. Incision is well healed.      EXAMINATION: CT ANGIOGRAM CHEST W WO CONTRAST-  " 5/4/2018 3:31 PM CDT     HISTORY: Dyspnea     COMPARISON: 12/9/2015 chest CT     TECHNIQUE:     CT evaluation of the chest was performed with intravenous contrast. 2 mm  transaxial images were obtained.. Coronal reconstruction maximum  intensity projection images of the pulmonary arteries and aorta were  also generated.     Radiation dose equals  mGy-cm.  Automated exposure control dose  reduction technique was implemented.        FINDINGS:     [The pulmonary arteries are opacified with iodinated contrast without  intraluminal filling defects or CT angiographic evidence of pulmonary  embolus.     The aorta is minimally opacified with iodinated contrast. There are  changes from median sternotomy. There is aneurysmal dilatation of the  ascending thoracic aorta measuring approximate 5.2 cm, just superior to  the valve. The] arch tapers appropriately in the arch and descending  thoracic aorta with atherosclerotic calcifications. No secondary signs  of dissection with again poor enhancement.     There are coronary artery calcifications.     There is cardiomegaly with panchamber enlargement.     There are multiple middle mediastinal lymph nodes. Calcified right  paratracheal and right hilar nodes observed. No mediastinal or hilar  lymphadenopathy.     There are small bilateral pleural effusions layering posteriorly. There  are groundglass opacities appreciated in the lower lobes bilaterally  which may be related to the level of inspiration and patient position.  Mild interstitial infiltration from an acute infectious or inflammatory  process or mild interstitial pulmonary edema also considered. The  pulmonary arteries aren't prominent suggesting pulmonary hypertension.  There are no consolidating parenchymal infiltrates.     Limited imaging of the upper abdomen is unremarkable.     Degenerative changes noted in the thoracic spine with osteophyte  formation. No acute osseous abnormality observed.     IMPRESSION:  1.  No CT angiographic evidence of pulmonary embolus or acute aortic  pathology.   2. Aneurysm dilatation of the ascending thoracic aorta measuring 5.2 cm  in greatest AP projection.  3. Small bilateral pleural effusions. Cardiomegaly with pulmonary  vasculature prominence. Mild pulmonary venous hypertension considered.  No parenchymal infiltrates.  This report was finalized on 05/04/2018 15:38 by Dr. Leo Haddad MD.  CT scan of the chest obtained May of 2018 is reviewed by me.  It measures 5.1 cm in the short axis in greatest dimension.    No evidence of acute aortic pathology.      EXAMINATION:  CT ANGIOGRAM CHEST W CONTRAST-  11/19/2018 8:01 AM CST     HISTORY: I71.2-Thoracic aortic aneurysm, without rupture.     COMPARISON : 5/4/2018.     DLP: 434 mGy-cm. Automated dosage control was utilized.     TECHNIQUE: CT angio was performed of the chest with contrast. Coronal,  sagittal and 3-D reconstruction were performed.     MEDIASTINUM, HEART AND VASCULAR STRUCTURES: The ascending thoracic aorta  measures 5.2 cm in transverse diameter. The aortic arch and descending  thoracic aorta are normal in caliber. There is mild atheromatous disease  of the thoracic aorta. There is fairly dense atheromatous disease of the  coronary arteries. There has been prior heart bypass surgery. There is  cardiomegaly. The pulmonary arteries are dilated. The main pulmonary  artery segment measures 3.5 cm. The left main pulmonary artery measures  3.2 cm on the right main pulmonary artery measures 3.2 cm. There are  calcified and noncalcified mediastinal lymph nodes. Mediastinal lymph  nodes are stable compared to the prior study.     LUNGS: There is no focal infiltrate or effusion. Patchy groundglass  opacity in both lungs is likely related to relatively poor inspiration.  There is no pleural effusion. The airways are patent.      UPPER ABDOMEN: There is diverticulosis of the visualized colon. The  visualized upper abdomen is otherwise within  normal limits.     BONES: There are degenerative changes of the spine.     IMPRESSION:  1. Aneurysmal dilatation of the ascending thoracic aorta measuring 5.2  cm, stable.  2. Cardiomegaly. Atheromatous disease of the thoracic aorta and coronary  arteries. Prior heart bypass surgery.  3. No significant lung infiltrate.  4. Diverticulosis of the colon.    This report was finalized on 11/19/2018 08:32 by Dr. Jerry Reza MD.    Study Result     EXAMINATION: CT ANGIOGRAM CHEST W CONTRAST- 5/22/2019 1:13 PM CDT     HISTORY: 3 month checkup for thoracic aortic aneurysm, nontraumatic  aortic disease     COMPARISON: CTA chest 11/19/2018, 05/04/2018, and 12/09/2015     DOSE: 1376 mGy-cm     TECHNIQUE: Sequential imaging was performed from the thoracic inlet  through the upper abdomen following the administration of IV contrast.   Sagittal and coronal reformations were made from the original source  data and reviewed. Additionally, 3-D MIPS reconstructions of the vessels  were made per CTA protocol. Automated exposure control was also utilized  to decrease patient radiation dose.     FINDINGS:   A left subclavian approach cardiac pacing device is in place with the  tip adjacent to the right ventricle.     The visualized thyroid gland is unremarkable. Trachea and main bronchi  appear widely patent and in normal anatomic position. The esophagus is  grossly normal in appearance.     The heart is markedly enlarged with dilation of the left ventricle.  Coronary artery calcifications are present. No pericardial effusion is  appreciated. The ascending thoracic aorta measures up to 5.5 cm in  diameter near the aortic root measuring up to 5.2 cm on the most recent  comparison exam. The descending thoracic aorta measures up to 3.6 cm in  diameter. Contrast bolus timing is suboptimal for evaluation of the  aorta for dissection. No hyperdense material is seen to suggest acute  dissection. The main pulmonary artery is dilated,  suggesting pulmonary  arterial hypertension. No filling defects are seen to suggest PE.     There is no appreciable axillary lymphadenopathy. A prevascular lymph  node is seen which measures 11 mm in short axis, stable compared to the  previous exam. Multiple calcified mediastinal lymph nodes indicate prior  granulomatous exposure. A subcarinal lymph node measures 14 mm in short  axis, previously measuring 17 mm in short axis. There are numerous  collateral vessels through the soft tissues of the left chest.     There is mild interlobular septal thickening at the lung bases. The  lungs otherwise appear clear.     Review of the visualized portion of the upper abdomen demonstrates mild  reflux of contrast into the IVC and hepatic veins.     Review of the visualized osseous structures demonstrates no acute or  aggressive lesions. Degenerative changes are noted in the spine. There  has been prior median sternotomy.     IMPRESSION:  1. Interval increase in the size of an ascending thoracic aortic  aneurysm, now measuring 5.5 cm, most recently measuring 5.2 cm.  2. Marked cardiomegaly. Atherosclerosis of the aorta and coronary  arteries.  3. Findings suggestive of pulmonary arterial hypertension.  4. Nonspecific mildly enlarged mediastinal lymph nodes. Some appear  partially calcified, and enlargement may be all related to granulomatous  disease.           This report was finalized on 05/22/2019 13:21 by Dr. Jaime Carreno MD.           Joao was seen today for thoracic aneurysm.    Diagnoses and all orders for this visit:    CHF (congestive heart failure), NYHA class II, chronic, systolic (CMS/HCC)    Thoracic aortic aneurysm without rupture (CMS/HCC)  -     CT Angiogram Chest With Contrast; Future    S/P CABG x 3    Coronary artery disease involving native coronary artery of native heart without angina pectoris    Class 1 obesity due to excess calories with serious comorbidity and body mass index (BMI) of 31.0 to 31.9 in  adult          Assessment/Plan      We discussed his results of his CT scan of the chest demonstrating a 5.3 cm aortic aneurysm.  The previous CT scan of his chest demonstrated a in the short axis in greatest dimension a measurement of 5.2 cm in size and prior to that 5.1 cm in size.  His trend suggests interval growth.  We discussed signs and symptoms of an acute aortic event.  He verbalizes understanding.  We discussed that consideration for aneurysm resection can be considered as early as 5cm if size criteria is utilized.  Given current data and the fact that he would require redo median sternotomy, I have recommended we use 5.5 cm as the point to that the benefit of ascending aortic resection would outweigh the risk of surgery.  All questions have been answered.  He is agreeable to the plan of care.  BP is well controlled.    RTC 6 months with CTA chest.      He is a non smoker.    Patient's Body mass index is 31.49 kg/m². BMI is above normal parameters. Recommendations include: referral to primary care.    RTC 6 month with CT scan of the chest.

## 2019-06-20 ENCOUNTER — CLINICAL SUPPORT NO REQUIREMENTS (OUTPATIENT)
Dept: CARDIOLOGY | Facility: CLINIC | Age: 64
End: 2019-06-20

## 2019-06-20 ENCOUNTER — OFFICE VISIT (OUTPATIENT)
Dept: CARDIOLOGY | Facility: CLINIC | Age: 64
End: 2019-06-20

## 2019-06-20 VITALS
SYSTOLIC BLOOD PRESSURE: 110 MMHG | HEIGHT: 72 IN | WEIGHT: 226 LBS | BODY MASS INDEX: 30.61 KG/M2 | HEART RATE: 78 BPM | DIASTOLIC BLOOD PRESSURE: 78 MMHG | OXYGEN SATURATION: 99 %

## 2019-06-20 DIAGNOSIS — Z95.810 AICD (AUTOMATIC CARDIOVERTER/DEFIBRILLATOR) PRESENT: Primary | ICD-10-CM

## 2019-06-20 DIAGNOSIS — I50.22 CHF (CONGESTIVE HEART FAILURE), NYHA CLASS II, CHRONIC, SYSTOLIC (HCC): ICD-10-CM

## 2019-06-20 DIAGNOSIS — I48.0 PAF (PAROXYSMAL ATRIAL FIBRILLATION) (HCC): ICD-10-CM

## 2019-06-20 DIAGNOSIS — I10 ESSENTIAL HYPERTENSION: ICD-10-CM

## 2019-06-20 DIAGNOSIS — I71.20 THORACIC AORTIC ANEURYSM WITHOUT RUPTURE (HCC): ICD-10-CM

## 2019-06-20 DIAGNOSIS — I42.9 CARDIOMYOPATHY, UNSPECIFIED TYPE (HCC): ICD-10-CM

## 2019-06-20 DIAGNOSIS — I25.10 CORONARY ARTERY DISEASE INVOLVING NATIVE CORONARY ARTERY OF NATIVE HEART WITHOUT ANGINA PECTORIS: Primary | ICD-10-CM

## 2019-06-20 PROCEDURE — 93000 ELECTROCARDIOGRAM COMPLETE: CPT | Performed by: INTERNAL MEDICINE

## 2019-06-20 PROCEDURE — 93289 INTERROG DEVICE EVAL HEART: CPT | Performed by: INTERNAL MEDICINE

## 2019-06-20 PROCEDURE — 93290 INTERROG DEV EVAL ICPMS IP: CPT | Performed by: INTERNAL MEDICINE

## 2019-06-20 PROCEDURE — 99214 OFFICE O/P EST MOD 30 MIN: CPT | Performed by: INTERNAL MEDICINE

## 2019-06-20 RX ORDER — METOPROLOL TARTRATE 50 MG/1
50 TABLET, FILM COATED ORAL EVERY 12 HOURS
Refills: 1 | COMMUNITY
Start: 2019-05-22 | End: 2019-08-23 | Stop reason: SDUPTHER

## 2019-06-20 NOTE — PROGRESS NOTES
Subjective:     Encounter Date: 06/20/19      Patient ID: Joao Fonseca is a 64 y.o. male with coronary artery disease, chronic systolic congestive heart failure, ICD implant place, hypertension, thoracic aortic aneurysm (followed closely by Dr. Wilson) who presents today for follow-up.    Chief Complaint: Follow-up    Congestive Heart Failure   Presents for follow-up visit. Pertinent negatives include no abdominal pain, chest pain, claudication, edema, orthopnea, palpitations, paroxysmal nocturnal dyspnea or shortness of breath. The symptoms have been stable. His past medical history is significant for CAD. Compliance with total regimen is %. Compliance with diet is %. Compliance with exercise is %. Compliance with medications is %.   Coronary Artery Disease   Presents for follow-up visit. Pertinent negatives include no chest pain, dizziness, leg swelling, palpitations, shortness of breath or weight gain. His past medical history is significant for CHF. The symptoms have been stable. Compliance with diet is variable. Compliance with exercise is variable. Compliance with medications is good.     This patient presents today for routine follow-up.  No significant chest pain since last visit.  No significant shortness of breath or dyspnea on exertion.  His exercise tolerance has declined somewhat over the past few years but he is still very active with no significant limitations to activities.  He denies palpitations, lightheadedness, dizziness, syncope.  He did have a presyncopal episode earlier this year, in January and did receive a shock from his ICD at that time for what appeared to be ventricular tachycardia.  Since that time, no palpitations or presyncopal episodes.  The patient has not had any trouble with his medications.  His blood pressure remains well controlled.  His weight has been stable.  He continues to follow with Dr. Wilson regarding a thoracic aortic aneurysm.      Current  "Outpatient Medications:   •  acyclovir (ZOVIRAX) 400 MG tablet, TAKE 1 TABLET BY MOUTH EVERY DAY TAKE NO MORE THAN 5 DOSES PER DAY, Disp: 90 tablet, Rfl: 2  •  albuterol (PROVENTIL HFA;VENTOLIN HFA) 108 (90 Base) MCG/ACT inhaler, Inhale 2 puffs Every 4 (Four) Hours As Needed for Wheezing or Shortness of Air., Disp: , Rfl:   •  aspirin 81 MG chewable tablet, Chew 1 tablet Daily., Disp: 90 tablet, Rfl: 3  •  Blood Glucose Monitoring Suppl (ONE TOUCH ULTRA 2) w/Device kit, USE AS DIRECTED PER PACKAGE INSTRUCTIONS, Disp: , Rfl: 0  •  furosemide (LASIX) 40 MG tablet, Take 1 tablet by mouth Daily As Needed (edema, SOA)., Disp: 90 tablet, Rfl: 3  •  lisinopril (PRINIVIL,ZESTRIL) 5 MG tablet, Take 1 tablet by mouth Daily., Disp: 90 tablet, Rfl: 3  •  metFORMIN (GLUCOPHAGE) 1000 MG tablet, Take 1 tablet by mouth 2 (Two) Times a Day With Meals., Disp: 180 tablet, Rfl: 3  •  metoprolol tartrate (LOPRESSOR) 50 MG tablet, Take 50 mg by mouth Every 12 (Twelve) Hours., Disp: , Rfl: 1    Allergies   Allergen Reactions   • Cephalexin Anaphylaxis     Causes swelling of tongue, eyes, throat   • Entresto [Sacubitril-Valsartan] Other (See Comments)     \"put me in a-fib\"   • Eliquis [Apixaban] Swelling     \"swelling in ankles and feet\"   • Atorvastatin Unknown (See Comments)     fatigue     Social History     Tobacco Use   • Smoking status: Former Smoker     Years: 25.00     Types: Cigarettes   • Smokeless tobacco: Never Used   • Tobacco comment: QUIT 20 YEARS AGO   Substance Use Topics   • Alcohol use: No     Review of Systems   Constitution: Negative for weakness and weight gain.   Cardiovascular: Negative for chest pain, claudication, dyspnea on exertion, irregular heartbeat, leg swelling, orthopnea, palpitations, paroxysmal nocturnal dyspnea and syncope.   Respiratory: Negative for cough, hemoptysis, shortness of breath and wheezing.    Endocrine: Negative for cold intolerance and heat intolerance.   Hematologic/Lymphatic: Negative " "for bleeding problem. Does not bruise/bleed easily.   Gastrointestinal: Negative for abdominal pain, hematemesis, hematochezia, melena, nausea and vomiting.   Neurological: Negative for dizziness, headaches and loss of balance.         ECG 12 Lead  Date/Time: 6/20/2019 9:34 AM  Performed by: Phil Henry MD  Authorized by: Phil Henry MD   Comparison: compared with previous ECG from 12/20/2018  Similar to previous ECG  Rhythm: sinus rhythm  Rate: normal  BPM: 75  Conduction: left bundle branch block  QRS axis: left  Other findings: left atrial abnormality    Clinical impression: abnormal EKG               Objective:     Physical Exam   Constitutional: He is oriented to person, place, and time. He appears well-developed and well-nourished. No distress.   HENT:   Head: Normocephalic and atraumatic.   Mouth/Throat: Oropharynx is clear and moist.   Eyes: EOM are normal. Pupils are equal, round, and reactive to light.   Neck: Normal range of motion. Neck supple. No JVD present. No thyromegaly present.   Cardiovascular: Normal rate, regular rhythm, S1 normal, S2 normal, normal heart sounds and intact distal pulses. Exam reveals no gallop and no friction rub.   No murmur heard.  Pulmonary/Chest: Effort normal and breath sounds normal.   Abdominal: Soft. Bowel sounds are normal. He exhibits no distension. There is no tenderness.   Musculoskeletal: Normal range of motion. He exhibits no edema.   Neurological: He is alert and oriented to person, place, and time. No cranial nerve deficit.   Skin: Skin is warm and dry. No rash noted. No cyanosis or erythema. Nails show no clubbing.   Psychiatric: He has a normal mood and affect.   Vitals reviewed.    /78 (BP Location: Left arm, Patient Position: Sitting)   Pulse 78   Ht 182.9 cm (72\")   Wt 103 kg (226 lb)   SpO2 99%   BMI 30.65 kg/m²     Data/Lab Review:     CTA Chest 5/22/19:  1. Interval increase in the size of an ascending thoracic aortic " aneurysm, now measuring 5.5 cm, most recently measuring 5.2 cm.  2. Marked cardiomegaly. Atherosclerosis of the aorta and coronary arteries.  3. Findings suggestive of pulmonary arterial hypertension.  4. Nonspecific mildly enlarged mediastinal lymph nodes. Some appear partially calcified, and enlargement may be all related to granulomatous disease.    CTA Chest 11/19/18:  1. Aneurysmal dilatation of the ascending thoracic aorta measuring 5.2 cm, stable.  2. Cardiomegaly. Atheromatous disease of the thoracic aorta and coronary arteries. Prior heart bypass surgery.  3. No significant lung infiltrate.  4. Diverticulosis of the colon.      CTA Chest 5/4/18:  1. No CT angiographic evidence of pulmonary embolus or acute aortic pathology.   2. Aneurysm dilatation of the ascending thoracic aorta measuring 5.2 cm in greatest AP projection.  3. Small bilateral pleural effusions. Cardiomegaly with pulmonary vasculature prominence. Mild pulmonary venous hypertension considered.  No parenchymal infiltrates.    Echocardiogram 5/4/18:  · Left ventricular systolic function is severely decreased. Estimated EF appears to be in the range of 21 - 25%.  · Left ventricular diastolic dysfunction.  · The left ventricular cavity is severely dilated.  · Left ventricular wall thickness is consistent with mild concentric hypertrophy.  · Interatrial septal defect present.  · Mild-to-moderate mitral valve regurgitation is present  · Moderate aortic valve regurgitation is present.  · Mild tricuspid valve regurgitation is present. Estimated right ventricular systolic pressure from tricuspid regurgitation is mildly elevated (35-45 mmHg).  · Borderline dilation of the aortic root is present.      Assessment:          Diagnosis Plan   1. Coronary artery disease involving native coronary artery of native heart without angina pectoris  ECG 12 Lead   2. CHF (congestive heart failure), NYHA class II, chronic, systolic (CMS/[facility])     3. Essential  hypertension     4. PAF (paroxysmal atrial fibrillation) (CMS/[facility])     5. Thoracic aortic aneurysm without rupture (CMS/[facility])            Plan:       1.  Coronary artery disease: This patient remains clinically stable with no ischemic symptoms.  Continue aspirin, beta-blocker therapies.    2.  Chronic systolic congestive heart failure: Euvolemic on examination today.  No worse with a New York Heart Association class II symptoms.  Continue current medications.    3.  Essential hypertension: Blood pressure remains under excellent control.  Continue current medications.    4.  Paroxysmal atrial fibrillation: No obvious recurrence of atrial fibrillation clinically.  The patient remains in sinus rhythm today.  He has refused anticoagulation in the past.    5.  Thoracic aortic aneurysm: The patient continues to follow closely with Dr. Wilson.    Patient's Body mass index is 30.65 kg/m². BMI is above normal parameters. Recommendations include: exercise counseling and nutrition counseling.    Follow-up: 6 months unless otherwise needed sooner.

## 2019-06-20 NOTE — PROGRESS NOTES
Single Chamber AICD Evaluation Report  IN OFFICE INTERROGATION    June 20, 2019    Primary Cardiologist: Elaine  : Medtronic Model: Evera MRI XT VR [id number]  Implant date: 10/6/2016    Reason for evaluation: routine  Indication for AICD: chronic systolic congestive heart failure and cardiomyopathy    Measurements  Ventricular sensing - R wave: >20 mV  Ventricular threshold: 0.625 V @ 0.4 ms  Ventricular lead impedance: 475 ohms  Shock Impedance: RV 48 ohms  SVC 59 ohms      Diagnostic Data  Paced: <0.1 %    Episodes recorded since 6/20/2018:  NS-VT x 17:  Longest duration 8 seconds, rates 186-267 bpm.  VT at 214 bpm treated with 1 sequence of ATP on 1/3/2019.  Discussed with Dr. Henry prior to MD OV with patient.  Optivol at 0.    Battery status: satisfactory, estimated 9.7 years remaining     Final Parameters  Mode: VVI  Lower rate: 40 bpm   Ventricular - Amplitude: 2 V   Pulse width: 0.4 ms   Sensitivity: 0.3 mV   Changes made: No changes made.  Conclusions: normal AICD function, stable pacing and sensing thresholds, adequate battery reserve and AICD shock for VT in January 2019    Follow up: 3 months in office (patient preference)

## 2019-08-19 RX ORDER — LISINOPRIL 5 MG/1
TABLET ORAL
Qty: 90 TABLET | Refills: 3 | OUTPATIENT
Start: 2019-08-19

## 2019-08-23 RX ORDER — METOPROLOL TARTRATE 50 MG/1
TABLET, FILM COATED ORAL
Qty: 270 TABLET | Refills: 3 | Status: SHIPPED | OUTPATIENT
Start: 2019-08-23 | End: 2020-01-22 | Stop reason: SDUPTHER

## 2019-08-26 DIAGNOSIS — B02.9 HERPES ZOSTER WITHOUT COMPLICATION: ICD-10-CM

## 2019-08-26 DIAGNOSIS — I10 ESSENTIAL HYPERTENSION: ICD-10-CM

## 2019-08-26 RX ORDER — ACYCLOVIR 400 MG/1
400 TABLET ORAL
Qty: 90 TABLET | Refills: 6 | Status: SHIPPED | OUTPATIENT
Start: 2019-08-26 | End: 2020-09-01 | Stop reason: SDUPTHER

## 2019-08-26 RX ORDER — FUROSEMIDE 40 MG/1
40 TABLET ORAL DAILY PRN
Qty: 90 TABLET | Refills: 3 | Status: SHIPPED | OUTPATIENT
Start: 2019-08-26 | End: 2019-11-27 | Stop reason: SDUPTHER

## 2019-08-26 RX ORDER — LISINOPRIL 5 MG/1
5 TABLET ORAL DAILY
Qty: 90 TABLET | Refills: 3 | Status: SHIPPED | OUTPATIENT
Start: 2019-08-26 | End: 2020-04-24 | Stop reason: SDUPTHER

## 2019-09-26 ENCOUNTER — CLINICAL SUPPORT NO REQUIREMENTS (OUTPATIENT)
Dept: CARDIOLOGY | Facility: CLINIC | Age: 64
End: 2019-09-26

## 2019-09-26 DIAGNOSIS — Z95.810 AICD (AUTOMATIC CARDIOVERTER/DEFIBRILLATOR) PRESENT: Primary | ICD-10-CM

## 2019-09-26 DIAGNOSIS — I50.22 CHF (CONGESTIVE HEART FAILURE), NYHA CLASS II, CHRONIC, SYSTOLIC (HCC): ICD-10-CM

## 2019-09-26 DIAGNOSIS — I42.9 CARDIOMYOPATHY, UNSPECIFIED TYPE (HCC): ICD-10-CM

## 2019-09-26 PROCEDURE — 93289 INTERROG DEVICE EVAL HEART: CPT | Performed by: INTERNAL MEDICINE

## 2019-09-26 PROCEDURE — 93290 INTERROG DEV EVAL ICPMS IP: CPT | Performed by: INTERNAL MEDICINE

## 2019-09-27 NOTE — PROGRESS NOTES
Single Chamber AICD Evaluation Report  IN OFFICE INTERROGATION    September 26, 2019    Primary Cardiologist: Elaine  : Medtronic Model: Evera MRI XT VR [id number]  Implant date: 10/6/2016    Reason for evaluation: routine  Indication for AICD: chronic systolic congestive heart failure and cardiomyopathy    Measurements  Ventricular sensing - R wave: >20 mV  Ventricular threshold: 0.625 V @ 0.4 ms  Ventricular lead impedance: 456 ohms  Shock Impedance: RV 46 ohms  SVC 55 ohms      Diagnostic Data  Paced: 0.2 %    Episodes recorded since 6/20/2019:  NS-VT x 1, duration 3 seconds, rate 207 bpm.  No ATP or shocks.  Optivol is at 0.     Battery status: satisfactory, estimated 9.2 years remaining     Final Parameters  Mode: VVI  Lower rate: 40 bpm   Ventricular - Amplitude: 2 V   Pulse width: 0.4 ms   Sensitivity: 0.3 mV   Changes made: No changes made.  Conclusions: normal AICD function, no therapy delivered, stable pacing and sensing thresholds and adequate battery reserve    Follow up: 3 months in office

## 2019-10-22 ENCOUNTER — OFFICE VISIT (OUTPATIENT)
Dept: INTERNAL MEDICINE | Facility: CLINIC | Age: 64
End: 2019-10-22

## 2019-10-22 VITALS
RESPIRATION RATE: 16 BRPM | BODY MASS INDEX: 30.95 KG/M2 | HEIGHT: 72 IN | DIASTOLIC BLOOD PRESSURE: 84 MMHG | SYSTOLIC BLOOD PRESSURE: 122 MMHG | HEART RATE: 70 BPM | WEIGHT: 228.5 LBS | OXYGEN SATURATION: 99 %

## 2019-10-22 DIAGNOSIS — E11.9 TYPE 2 DIABETES MELLITUS WITH HEMOGLOBIN A1C GOAL OF LESS THAN 7.5% (HCC): ICD-10-CM

## 2019-10-22 DIAGNOSIS — E11.65 TYPE 2 DIABETES MELLITUS WITH HYPERGLYCEMIA, WITHOUT LONG-TERM CURRENT USE OF INSULIN (HCC): Primary | ICD-10-CM

## 2019-10-22 DIAGNOSIS — E78.2 MIXED HYPERLIPIDEMIA: ICD-10-CM

## 2019-10-22 DIAGNOSIS — I50.22 CHF (CONGESTIVE HEART FAILURE), NYHA CLASS II, CHRONIC, SYSTOLIC (HCC): ICD-10-CM

## 2019-10-22 DIAGNOSIS — I71.20 THORACIC AORTIC ANEURYSM WITHOUT RUPTURE (HCC): ICD-10-CM

## 2019-10-22 DIAGNOSIS — E66.09 CLASS 1 OBESITY DUE TO EXCESS CALORIES WITH SERIOUS COMORBIDITY AND BODY MASS INDEX (BMI) OF 31.0 TO 31.9 IN ADULT: ICD-10-CM

## 2019-10-22 LAB — HBA1C MFR BLD: 7.1 %

## 2019-10-22 PROCEDURE — 83036 HEMOGLOBIN GLYCOSYLATED A1C: CPT | Performed by: INTERNAL MEDICINE

## 2019-10-22 PROCEDURE — 99214 OFFICE O/P EST MOD 30 MIN: CPT | Performed by: INTERNAL MEDICINE

## 2019-10-22 PROCEDURE — G0438 PPPS, INITIAL VISIT: HCPCS | Performed by: INTERNAL MEDICINE

## 2019-10-22 NOTE — PROGRESS NOTES
CC: f/u diabetes    History:  Joao Fonseca is a 64 y.o. male   He reports he has been doing reasonably well without any acute illness.  He had a short spell of dysthymia for about 3 weeks, during which time he was eating more junk food, but he has returned toward dietary modification and maintenance.  He has lost weight over the course of the year with these modifications and is pleased with this.  He denies any anginal symptoms and does continue on aspirin and beta-blockade.  He has had adverse reaction to statin and is not open to cholesterol therapy at this time.  He denies symptoms of tachycardia or palpitations given A. fib and is anticoagulated only on aspirin given swelling when he was placed on Eliquis.  He is not open to further anticoagulation at this time.    ROS:  Review of Systems   Constitutional: Negative for chills and fever.   Respiratory: Negative for cough and shortness of breath.    Cardiovascular: Negative for chest pain and palpitations.   Gastrointestinal: Negative for abdominal pain and constipation.   Genitourinary: Negative for difficulty urinating and dysuria.        reports that he quit smoking about 24 years ago. His smoking use included cigarettes. He quit after 25.00 years of use. He has never used smokeless tobacco. He reports that he does not drink alcohol or use drugs.      Current Outpatient Medications:   •  acyclovir (ZOVIRAX) 400 MG tablet, Take 1 tablet by mouth 5 (Five) Times a Day. Take no more than 5 doses a day., Disp: 90 tablet, Rfl: 6  •  albuterol (PROVENTIL HFA;VENTOLIN HFA) 108 (90 Base) MCG/ACT inhaler, Inhale 2 puffs Every 4 (Four) Hours As Needed for Wheezing or Shortness of Air., Disp: , Rfl:   •  aspirin 81 MG chewable tablet, Chew 1 tablet Daily., Disp: 90 tablet, Rfl: 3  •  Blood Glucose Monitoring Suppl (ONE TOUCH ULTRA 2) w/Device kit, USE AS DIRECTED PER PACKAGE INSTRUCTIONS, Disp: , Rfl: 0  •  furosemide (LASIX) 40 MG tablet, Take 1 tablet by mouth Daily  "As Needed (edema, SOA)., Disp: 90 tablet, Rfl: 3  •  lisinopril (PRINIVIL,ZESTRIL) 5 MG tablet, Take 1 tablet by mouth Daily., Disp: 90 tablet, Rfl: 3  •  metFORMIN (GLUCOPHAGE) 1000 MG tablet, Take 1 tablet by mouth 2 (Two) Times a Day With Meals., Disp: 180 tablet, Rfl: 3  •  metoprolol tartrate (LOPRESSOR) 50 MG tablet, TAKE 1& 1/2 TABLETS BY MOUTH EVERY 12 HOURS, Disp: 270 tablet, Rfl: 3    OBJECTIVE:  /84 (BP Location: Left arm, Patient Position: Sitting, Cuff Size: Adult)   Pulse 70   Resp 16   Ht 182.9 cm (72\")   Wt 104 kg (228 lb 8 oz)   SpO2 99%   BMI 30.99 kg/m²    Physical Exam   Constitutional: He is oriented to person, place, and time. He appears well-nourished. No distress.   Cardiovascular: Normal rate, regular rhythm and normal heart sounds.   No murmur heard.  Pulmonary/Chest: Effort normal and breath sounds normal. He has no wheezes.   Neurological: He is alert and oriented to person, place, and time.   Psychiatric: He has a normal mood and affect.       Assessment/Plan    Diagnoses and all orders for this visit:    Type 2 diabetes mellitus with hyperglycemia, without long-term current use of insulin (CMS/[facility])  -     POC Glycosylated Hemoglobin (Hb A1C)  -     metFORMIN (GLUCOPHAGE) 1000 MG tablet; Take 1 tablet by mouth 2 (Two) Times a Day With Meals.  A1c is 7.1% today in the office, which represents improvement.  We will continue his metformin at the current dose and he is encouraged to continue with dietary modification.  He is on an ACE inhibitor, but is averse to statin therapy.  We will seek records from Dr. Zee regarding his most recent eye exam.    CHF (congestive heart failure), NYHA class II, chronic, systolic (CMS/[facility])  He remains on beta-blocker therapy and ACE inhibitor as well as loop diuretic therapy with euvolemia and no change in symptoms.  He follows with cardiology.    Thoracic aortic aneurysm without rupture (CMS/[facility])  He is followed by Dr. Wilson and has had " interval growth, but they are not planning any intervention unless he had 5.5 cm.  He has no new symptoms.    Mixed hyperlipidemia  He is averse to statin therapy, but given the new pricing structure for PCSK9 inhibitors, we could consider this as an option.  Will defer at this time to cardiology.    Class 1 obesity due to excess calories with serious comorbidity and body mass index (BMI) of 31.0 to 31.9 in adult  Recommended attention to portion control and being careful about the types and timing of meals for the purpose of weight management.    An After Visit Summary was printed and given to the patient at discharge.  Return in about 6 months (around 4/22/2020) for Recheck.         Hernandez Dupree D.O. 10/22/2019   Electronically signed.

## 2019-10-22 NOTE — PROGRESS NOTES
The ABCs of the Annual Wellness Visit  Initial Medicare Wellness Visit    No chief complaint on file.  See  note    Subjective   History of Present Illness:  Joao Fonseca is a 64 y.o. male who presents for an Initial Medicare Wellness Visit.    HEALTH RISK ASSESSMENT    Recent Hospitalizations:  No hospitalization(s) within the last year.    Current Medical Providers:  Patient Care Team:  Hernandez Dupree DO as PCP - General (Internal Medicine)  Phil Henry MD as PCP - Claims Attributed  Corina Haddad RN (Inactive) as Registered Nurse  Phil Henry MD as Cardiologist (Cardiology)    Smoking Status:  Social History     Tobacco Use   Smoking Status Former Smoker   • Years: 25.00   • Types: Cigarettes   • Last attempt to quit:    • Years since quittin.8   Smokeless Tobacco Never Used   Tobacco Comment    QUIT 20 YEARS AGO       Alcohol Consumption:  Social History     Substance and Sexual Activity   Alcohol Use No       Depression Screen:   PHQ-2/PHQ-9 Depression Screening 10/22/2019   Little interest or pleasure in doing things 0   Feeling down, depressed, or hopeless 0   Total Score 0       Fall Risk Screen:  STEADI Fall Risk Assessment has not been completed.    Health Habits and Functional and Cognitive Screening:  Functional & Cognitive Status 10/22/2019   Do you have difficulty preparing food and eating? No   Do you have difficulty bathing yourself, getting dressed or grooming yourself? No   Do you have difficulty using the toilet? No   Do you have difficulty moving around from place to place? No   Do you have trouble with steps or getting out of a bed or a chair? No   Current Diet Unhealthy Diet   Dental Exam Not up to date   Eye Exam Up to date   Exercise (times per week) 2 times per week   Current Exercise Activities Include Walking   Do you need help using the phone?  No   Are you deaf or do you have serious difficulty hearing?  No   Do you need help with  transportation? No   Do you need help shopping? No   Do you need help preparing meals?  No   Do you need help with housework?  No   Do you need help with laundry? No   Do you need help taking your medications? No   Do you need help managing money? No   Do you ever drive or ride in a car without wearing a seat belt? No   Have you felt unusual stress, anger or loneliness in the last month? No   Who do you live with? Child   If you need help, do you have trouble finding someone available to you? No   Have you been bothered in the last four weeks by sexual problems? No   Do you have difficulty concentrating, remembering or making decisions? No         Does the patient have evidence of cognitive impairment? No    Asprin use counseling:Taking ASA appropriately as indicated    Age-appropriate Screening Schedule:  Refer to the list below for future screening recommendations based on patient's age, sex and/or medical conditions. Orders for these recommended tests are listed in the plan section. The patient has been provided with a written plan.    Health Maintenance   Topic Date Due   • ZOSTER VACCINE (2 of 2) 01/02/2017   • DIABETIC FOOT EXAM  11/08/2018   • DIABETIC EYE EXAM  02/19/2019   • LIPID PANEL  04/13/2019   • URINE MICROALBUMIN  10/01/2020 (Originally 11/8/2018)   • HEMOGLOBIN A1C  04/22/2020   • COLONOSCOPY  06/28/2020   • TDAP/TD VACCINES (2 - Td) 11/07/2026   • PNEUMOCOCCAL VACCINE (19-64 MEDIUM RISK)  Addressed   • INFLUENZA VACCINE  Addressed          The following portions of the patient's history were reviewed and updated as appropriate: allergies, current medications, past family history, past medical history, past social history, past surgical history and problem list.    Outpatient Medications Prior to Visit   Medication Sig Dispense Refill   • acyclovir (ZOVIRAX) 400 MG tablet Take 1 tablet by mouth 5 (Five) Times a Day. Take no more than 5 doses a day. 90 tablet 6   • albuterol (PROVENTIL HFA;VENTOLIN  HFA) 108 (90 Base) MCG/ACT inhaler Inhale 2 puffs Every 4 (Four) Hours As Needed for Wheezing or Shortness of Air.     • aspirin 81 MG chewable tablet Chew 1 tablet Daily. 90 tablet 3   • Blood Glucose Monitoring Suppl (ONE TOUCH ULTRA 2) w/Device kit USE AS DIRECTED PER PACKAGE INSTRUCTIONS  0   • furosemide (LASIX) 40 MG tablet Take 1 tablet by mouth Daily As Needed (edema, SOA). 90 tablet 3   • lisinopril (PRINIVIL,ZESTRIL) 5 MG tablet Take 1 tablet by mouth Daily. 90 tablet 3   • metFORMIN (GLUCOPHAGE) 1000 MG tablet Take 1 tablet by mouth 2 (Two) Times a Day With Meals. 180 tablet 3   • metoprolol tartrate (LOPRESSOR) 50 MG tablet TAKE 1& 1/2 TABLETS BY MOUTH EVERY 12 HOURS 270 tablet 3     No facility-administered medications prior to visit.        Patient Active Problem List   Diagnosis   • CHF (congestive heart failure), NYHA class II, chronic, systolic (CMS/HCC)   • Coronary artery disease involving native coronary artery of native heart without angina pectoris   • Intraventricular conduction delay   • Herpes labialis   • Disorder of liver   • Reflux involving intestinal tract   • Abdominal hernia   • Cardiomyopathy (CMS/HCC)   • Hx of colonic polyps   • Family hx of colon cancer   • Type 2 diabetes mellitus without complication, without long-term current use of insulin (CMS/HCC)   • Essential hypertension   • Eczema   • AICD (automatic cardioverter/defibrillator) present   • Varicosities of leg   • Right elbow pain   • Mixed hyperlipidemia   • Chronic laryngitis   • Wheezing   • Herpes zoster without complication   • Class 1 obesity due to excess calories with serious comorbidity and body mass index (BMI) of 31.0 to 31.9 in adult   • History of colonic polyps   • Chronic sinusitis   • Chronic rhinitis   • Sleep apnea   • S/P CABG x 3   • PAF (paroxysmal atrial fibrillation) (CMS/HCC)   • Chest pain   • Non compliance w medication regimen   • Thoracic aortic aneurysm without rupture (CMS/HCC)   • Venous  "(peripheral) insufficiency       Advanced Care Planning:  Patient does not have an advance directive - information provided to the patient today    Review of Systems See  note    Compared to one year ago, the patient feels his physical health is the same.  Compared to one year ago, the patient feels his mental health is the same.    Reviewed chart for potential of high risk medication in the elderly: yes  Reviewed chart for potential of harmful drug interactions in the elderly:yes    Objective         Vitals:    10/22/19 0810   BP: 122/84   BP Location: Left arm   Patient Position: Sitting   Cuff Size: Adult   Pulse: 70   Resp: 16   SpO2: 99%   Weight: 104 kg (228 lb 8 oz)   Height: 182.9 cm (72\")       Body mass index is 30.99 kg/m².  Discussed the patient's BMI with him. The BMI is above average; BMI management plan is completed.    Physical Exam See  note    Lab Results   Component Value Date    HGBA1C 7.1 10/22/2019   EKG reviewed from June 2019.  This showed sinus rhythm with a left bundle branch, but in the lack of new symptoms, a new EKG was not performed.    Assessment/Plan   Medicare Risks and Personalized Health Plan  CMS Preventative Services Quick Reference  Cardiovascular risk  Fall Risk  Immunizations Discussed/Encouraged (specific immunizations; Influenza and Shingrix )  Obesity/Overweight     The above risks/problems have been discussed with the patient.  Pertinent information has been shared with the patient in the After Visit Summary.  Follow up plans and orders are seen below in the Assessment/Plan Section.    Diagnoses and all orders for this visit:    1. Type 2 diabetes mellitus with hyperglycemia, without long-term current use of insulin (CMS/[facility]) (Primary)  -     POC Glycosylated Hemoglobin (Hb A1C)    2. CHF (congestive heart failure), NYHA class II, chronic, systolic (CMS/[facility])    3. Thoracic aortic aneurysm without rupture (CMS/[facility])    4. Mixed hyperlipidemia    5. Class 1 " obesity due to excess calories with serious comorbidity and body mass index (BMI) of 31.0 to 31.9 in adult    Other orders  -     Cancel: metFORMIN (GLUCOPHAGE) 1000 MG tablet; Take 1 tablet by mouth 2 (Two) Times a Day With Meals.  Dispense: 180 tablet; Refill: 3  -     Cancel: Comprehensive Metabolic Panel; Future  -     Cancel: Hemoglobin A1c; Future  -     Cancel: Lipid Panel; Future  -     Cancel: CBC (No Diff); Future      Follow Up:  Return in about 6 months (around 4/22/2020) for Recheck.     An After Visit Summary and PPPS were given to the patient.

## 2019-10-22 NOTE — PATIENT INSTRUCTIONS
Medicare Wellness  Personal Prevention Plan of Service     Date of Office Visit:  10/22/2019  Encounter Provider:  Hernandez Dupree DO  Place of Service:  [facility]  Patient Name: Joao Fonseca  :  1955    As part of the Medicare Wellness portion of your visit today, we are providing you with this personalized preventive plan of services (PPPS). This plan is based upon recommendations of the [place] Preventive Services Task Force (USPSTF) and the Advisory Committee on Immunization Practices (ACIP).    This lists the preventive care services that should be considered, and provides dates of when you are due. Items listed as completed are up-to-date and do not require any further intervention.    Health Maintenance   Topic Date Due   • MEDICARE ANNUAL WELLNESS  2016   • ZOSTER VACCINE (2 of 2) 2017   • DIABETIC FOOT EXAM  2018   • DIABETIC EYE EXAM  2019   • LIPID PANEL  2019   • URINE MICROALBUMIN  10/01/2020 (Originally 2018)   • HEMOGLOBIN A1C  2020   • COLONOSCOPY  2020   • TDAP/TD VACCINES (2 - Td) 2026   • HEPATITIS C SCREENING  Completed   • PNEUMOCOCCAL VACCINE (19-64 MEDIUM RISK)  Addressed   • INFLUENZA VACCINE  Addressed       Orders Placed This Encounter   Procedures   • POC Glycosylated Hemoglobin (Hb A1C)       Return in about 6 months (around 2020) for Recheck.

## 2019-11-20 ENCOUNTER — OFFICE VISIT (OUTPATIENT)
Dept: CARDIAC SURGERY | Facility: CLINIC | Age: 64
End: 2019-11-20

## 2019-11-20 ENCOUNTER — HOSPITAL ENCOUNTER (OUTPATIENT)
Dept: CT IMAGING | Facility: HOSPITAL | Age: 64
Discharge: HOME | End: 2019-11-20
Admitting: THORACIC SURGERY (CARDIOTHORACIC VASCULAR SURGERY)

## 2019-11-20 VITALS
HEART RATE: 69 BPM | HEIGHT: 72 IN | SYSTOLIC BLOOD PRESSURE: 114 MMHG | BODY MASS INDEX: 31.51 KG/M2 | WEIGHT: 232.6 LBS | OXYGEN SATURATION: 99 % | DIASTOLIC BLOOD PRESSURE: 70 MMHG

## 2019-11-20 DIAGNOSIS — I50.22 CHF (CONGESTIVE HEART FAILURE), NYHA CLASS II, CHRONIC, SYSTOLIC (HCC): Primary | ICD-10-CM

## 2019-11-20 DIAGNOSIS — I71.20 THORACIC AORTIC ANEURYSM WITHOUT RUPTURE (HCC): ICD-10-CM

## 2019-11-20 DIAGNOSIS — I25.10 CORONARY ARTERY DISEASE INVOLVING NATIVE CORONARY ARTERY OF NATIVE HEART WITHOUT ANGINA PECTORIS: ICD-10-CM

## 2019-11-20 DIAGNOSIS — Z95.1 S/P CABG X 3: ICD-10-CM

## 2019-11-20 PROCEDURE — 82565 ASSAY OF CREATININE: CPT

## 2019-11-20 PROCEDURE — 99213 OFFICE O/P EST LOW 20 MIN: CPT | Performed by: THORACIC SURGERY (CARDIOTHORACIC VASCULAR SURGERY)

## 2019-11-20 PROCEDURE — 0 IOPAMIDOL PER 1 ML: Performed by: THORACIC SURGERY (CARDIOTHORACIC VASCULAR SURGERY)

## 2019-11-20 PROCEDURE — 71275 CT ANGIOGRAPHY CHEST: CPT

## 2019-11-20 RX ADMIN — IOPAMIDOL 100 ML: 755 INJECTION, SOLUTION INTRAVENOUS at 08:08

## 2019-11-21 LAB — CREAT BLDA-MCNC: 1.4 MG/DL (ref 0.6–1.3)

## 2019-11-26 DIAGNOSIS — E11.9 TYPE 2 DIABETES MELLITUS WITH HEMOGLOBIN A1C GOAL OF LESS THAN 7.5% (HCC): ICD-10-CM

## 2019-11-26 DIAGNOSIS — I10 ESSENTIAL HYPERTENSION: ICD-10-CM

## 2019-11-27 DIAGNOSIS — I10 ESSENTIAL HYPERTENSION: ICD-10-CM

## 2019-11-27 RX ORDER — FUROSEMIDE 40 MG/1
40 TABLET ORAL DAILY PRN
Qty: 90 TABLET | Refills: 3 | Status: SHIPPED | OUTPATIENT
Start: 2019-11-27 | End: 2021-02-12 | Stop reason: SDUPTHER

## 2019-11-27 RX ORDER — FUROSEMIDE 40 MG/1
TABLET ORAL
Qty: 90 TABLET | Refills: 0 | OUTPATIENT
Start: 2019-11-27

## 2019-12-16 NOTE — PROGRESS NOTES
"Subjective   Patient ID: Joao Fonseca is a 64 y.o. male who is here for follow-up of a known aneurysm.   Chief Complaint   Patient presents with   • Thoracic Aneurysm     Patient is here for a follow up w/ ct.      No new events.  No additional bouts of heart failure.  He reports some LE swelling symmetric here and there. Diet changes and extra diuretic has been sufficient.  He denies any chest pain or shortness of breath.  He is joined by his wife today.    The following portions of the patient's history were reviewed and updated as appropriate: allergies, current medications, past family history, past medical history, past social history, past surgical history and problem list.       Objective      Visit Vitals  /70 (BP Location: Left arm, Patient Position: Standing, Cuff Size: Adult)   Pulse 69   Ht 182.9 cm (72\")   Wt 106 kg (232 lb 9.6 oz)   SpO2 99%   BMI 31.55 kg/m²       Physical Exam   Constitutional: He is oriented to person, place, and time. He appears well-developed.   HENT:   Head: Normocephalic and atraumatic.   Mouth/Throat: Oropharynx is clear and moist.   Eyes: Pupils are equal, round, and reactive to light. EOM are normal.   Neck: Normal range of motion. Neck supple. No JVD present. No tracheal deviation present. No thyromegaly present.   Cardiovascular: Normal rate, regular rhythm, normal heart sounds and intact distal pulses. Exam reveals no gallop and no friction rub.   No murmur heard.  Pulmonary/Chest: Effort normal and breath sounds normal. No respiratory distress. He has no wheezes. He has no rales. He exhibits no tenderness.   Abdominal: Soft. He exhibits no distension. There is no tenderness.   Musculoskeletal: Normal range of motion. He exhibits no edema.   Lymphadenopathy:     He has no cervical adenopathy.   Neurological: He is alert and oriented to person, place, and time. No cranial nerve deficit.   Skin: Skin is warm and dry.   Psychiatric: He has a normal mood and affect. "   Sternum is stable. Incision is well healed.      EXAMINATION: CT ANGIOGRAM CHEST W WO CONTRAST-  5/4/2018 3:31 PM CDT     HISTORY: Dyspnea     COMPARISON: 12/9/2015 chest CT     TECHNIQUE:     CT evaluation of the chest was performed with intravenous contrast. 2 mm  transaxial images were obtained.. Coronal reconstruction maximum  intensity projection images of the pulmonary arteries and aorta were  also generated.     Radiation dose equals  mGy-cm.  Automated exposure control dose  reduction technique was implemented.        FINDINGS:     [The pulmonary arteries are opacified with iodinated contrast without  intraluminal filling defects or CT angiographic evidence of pulmonary  embolus.     The aorta is minimally opacified with iodinated contrast. There are  changes from median sternotomy. There is aneurysmal dilatation of the  ascending thoracic aorta measuring approximate 5.2 cm, just superior to  the valve. The] arch tapers appropriately in the arch and descending  thoracic aorta with atherosclerotic calcifications. No secondary signs  of dissection with again poor enhancement.     There are coronary artery calcifications.     There is cardiomegaly with panchamber enlargement.     There are multiple middle mediastinal lymph nodes. Calcified right  paratracheal and right hilar nodes observed. No mediastinal or hilar  lymphadenopathy.     There are small bilateral pleural effusions layering posteriorly. There  are groundglass opacities appreciated in the lower lobes bilaterally  which may be related to the level of inspiration and patient position.  Mild interstitial infiltration from an acute infectious or inflammatory  process or mild interstitial pulmonary edema also considered. The  pulmonary arteries aren't prominent suggesting pulmonary hypertension.  There are no consolidating parenchymal infiltrates.     Limited imaging of the upper abdomen is unremarkable.     Degenerative changes noted in the  thoracic spine with osteophyte  formation. No acute osseous abnormality observed.     IMPRESSION:  1. No CT angiographic evidence of pulmonary embolus or acute aortic  pathology.   2. Aneurysm dilatation of the ascending thoracic aorta measuring 5.2 cm  in greatest AP projection.  3. Small bilateral pleural effusions. Cardiomegaly with pulmonary  vasculature prominence. Mild pulmonary venous hypertension considered.  No parenchymal infiltrates.  This report was finalized on 05/04/2018 15:38 by Dr. Leo Haddad MD.  CT scan of the chest obtained May of 2018 is reviewed by me.  It measures 5.1 cm in the short axis in greatest dimension.    No evidence of acute aortic pathology.      EXAMINATION:  CT ANGIOGRAM CHEST W CONTRAST-  11/19/2018 8:01 AM CST     HISTORY: I71.2-Thoracic aortic aneurysm, without rupture.     COMPARISON : 5/4/2018.     DLP: 434 mGy-cm. Automated dosage control was utilized.     TECHNIQUE: CT angio was performed of the chest with contrast. Coronal,  sagittal and 3-D reconstruction were performed.     MEDIASTINUM, HEART AND VASCULAR STRUCTURES: The ascending thoracic aorta  measures 5.2 cm in transverse diameter. The aortic arch and descending  thoracic aorta are normal in caliber. There is mild atheromatous disease  of the thoracic aorta. There is fairly dense atheromatous disease of the  coronary arteries. There has been prior heart bypass surgery. There is  cardiomegaly. The pulmonary arteries are dilated. The main pulmonary  artery segment measures 3.5 cm. The left main pulmonary artery measures  3.2 cm on the right main pulmonary artery measures 3.2 cm. There are  calcified and noncalcified mediastinal lymph nodes. Mediastinal lymph  nodes are stable compared to the prior study.     LUNGS: There is no focal infiltrate or effusion. Patchy groundglass  opacity in both lungs is likely related to relatively poor inspiration.  There is no pleural effusion. The airways are patent.      UPPER  ABDOMEN: There is diverticulosis of the visualized colon. The  visualized upper abdomen is otherwise within normal limits.     BONES: There are degenerative changes of the spine.     IMPRESSION:  1. Aneurysmal dilatation of the ascending thoracic aorta measuring 5.2  cm, stable.  2. Cardiomegaly. Atheromatous disease of the thoracic aorta and coronary  arteries. Prior heart bypass surgery.  3. No significant lung infiltrate.  4. Diverticulosis of the colon.    This report was finalized on 11/19/2018 08:32 by Dr. Jerry Reza MD.    Study Result     EXAMINATION: CT ANGIOGRAM CHEST W CONTRAST- 5/22/2019 1:13 PM CDT     HISTORY: 3 month checkup for thoracic aortic aneurysm, nontraumatic  aortic disease     COMPARISON: CTA chest 11/19/2018, 05/04/2018, and 12/09/2015     DOSE: 1376 mGy-cm     TECHNIQUE: Sequential imaging was performed from the thoracic inlet  through the upper abdomen following the administration of IV contrast.   Sagittal and coronal reformations were made from the original source  data and reviewed. Additionally, 3-D MIPS reconstructions of the vessels  were made per CTA protocol. Automated exposure control was also utilized  to decrease patient radiation dose.     FINDINGS:   A left subclavian approach cardiac pacing device is in place with the  tip adjacent to the right ventricle.     The visualized thyroid gland is unremarkable. Trachea and main bronchi  appear widely patent and in normal anatomic position. The esophagus is  grossly normal in appearance.     The heart is markedly enlarged with dilation of the left ventricle.  Coronary artery calcifications are present. No pericardial effusion is  appreciated. The ascending thoracic aorta measures up to 5.5 cm in  diameter near the aortic root measuring up to 5.2 cm on the most recent  comparison exam. The descending thoracic aorta measures up to 3.6 cm in  diameter. Contrast bolus timing is suboptimal for evaluation of the  aorta for dissection.  No hyperdense material is seen to suggest acute  dissection. The main pulmonary artery is dilated, suggesting pulmonary  arterial hypertension. No filling defects are seen to suggest PE.     There is no appreciable axillary lymphadenopathy. A prevascular lymph  node is seen which measures 11 mm in short axis, stable compared to the  previous exam. Multiple calcified mediastinal lymph nodes indicate prior  granulomatous exposure. A subcarinal lymph node measures 14 mm in short  axis, previously measuring 17 mm in short axis. There are numerous  collateral vessels through the soft tissues of the left chest.     There is mild interlobular septal thickening at the lung bases. The  lungs otherwise appear clear.     Review of the visualized portion of the upper abdomen demonstrates mild  reflux of contrast into the IVC and hepatic veins.     Review of the visualized osseous structures demonstrates no acute or  aggressive lesions. Degenerative changes are noted in the spine. There  has been prior median sternotomy.     IMPRESSION:  1. Interval increase in the size of an ascending thoracic aortic  aneurysm, now measuring 5.5 cm, most recently measuring 5.2 cm.  2. Marked cardiomegaly. Atherosclerosis of the aorta and coronary  arteries.  3. Findings suggestive of pulmonary arterial hypertension.  4. Nonspecific mildly enlarged mediastinal lymph nodes. Some appear  partially calcified, and enlargement may be all related to granulomatous  disease.           This report was finalized on 05/22/2019 13:21 by Dr. Jaime Carreno MD.     Study Result     EXAMINATION:  CT ANGIOGRAM CHEST-  11/20/2019 8:06 AM CST     HISTORY: Aortic dz, non-traumatic, known or suspect; I71.2-Thoracic  aortic aneurysm, without rupture     COMPARISON : 05/22/2019.     DLP: 320 mGy-cm. Automated dosage control was utilized.     TECHNIQUE: CT angio was performed of the chest with contrast. Coronal,  sagittal and 3-D reconstruction were performed.      MEDIASTINUM, HEART AND VASCULAR STRUCTURES: The ascending thoracic aorta  measures 5.4 cm transversely and appears stable compared to the prior  study. The aortic arch and descending thoracic aorta are normal in  caliber. The main pulmonary artery segment is dilated. It measures 3.4  cm. There is moderate to severe cardiomegaly. There is dense coronary  artery calcification. There has been prior bypass surgery. There are  some borderline size mediastinal lymph nodes that appear stable. Some of  the lymph nodes are calcified in the right paratracheal region.     LUNGS: There is minimal pleural effusion on the right. There is no focal  airspace consolidation. There is very minimal intralobular septal  thickening in the lung bases.     UPPER ABDOMEN: No significant abnormality is seen in the upper abdomen.     BONES: There are degenerative changes of the spine. No acute appearing  bony abnormality is appreciated.     IMPRESSION:  1. The ascending thoracic aorta appears stable and measures up to 5.4  cm. The aortic arch and descending thoracic aorta are normal caliber.  2. Cardiomegaly. Dense coronary artery calcification. Prior heart bypass  surgery.  3. Minimal right pleural effusion. Minimal intralobular septal  thickening in the lung bases suggesting very mild edema.  4. Borderline size mediastinal lymph nodes appear stable. Some of the  lymph nodes are calcified.    This report was finalized on 11/20/2019 08:39 by Dr. Jerry Reza MD.           Joao was seen today for thoracic aneurysm.    Diagnoses and all orders for this visit:    CHF (congestive heart failure), NYHA class II, chronic, systolic (CMS/HCC)    Coronary artery disease involving native coronary artery of native heart without angina pectoris  -     CT Angiogram Chest; Future    Thoracic aortic aneurysm without rupture (CMS/HCC)  -     CT Angiogram Chest; Future    S/P CABG x 3  -     CT Angiogram Chest; Future          Assessment/Plan      We  discussed his results of his CT scan of the chest demonstrating a 5.4 cm aortic aneurysm.  The previous CT scan of his chest demonstrated a in the short axis in greatest dimension a measurement of 5.3 cm in size and in reverse chronological order 5.2 and 5.1  cm in size.  His trend suggests interval growth.  We discussed signs and symptoms of an acute aortic event.  He verbalizes understanding.  We discussed that consideration for aneurysm resection can be considered as early as 5cm if size criteria is utilized.  Given current data and the fact that he would require redo median sternotomy, I have previously recommended we use 5.5 cm as the point to that the benefit of ascending aortic resection would outweigh the risk of surgery but with EF measuring 21-25% risk analysis may suggest 6 cm is more likely the inflection point of considering resection.  Will assess his aorta to BSA  As well as repeat an ECHO when he reaches 5.5 cm to better characterize the risk /benefit ratio.  All questions have been answered.  He is agreeable to the plan of care.  BP is well controlled.    RTC 6 months with CTA chest.      He is a non smoker.    Patient's Body mass index is 31.55 kg/m². BMI is above normal parameters. Recommendations include: referral to primary care.    RTC 6 month with CT scan of the chest.

## 2019-12-26 ENCOUNTER — CLINICAL SUPPORT NO REQUIREMENTS (OUTPATIENT)
Dept: CARDIOLOGY | Facility: CLINIC | Age: 64
End: 2019-12-26

## 2019-12-26 ENCOUNTER — OFFICE VISIT (OUTPATIENT)
Dept: CARDIOLOGY | Facility: CLINIC | Age: 64
End: 2019-12-26

## 2019-12-26 VITALS
OXYGEN SATURATION: 99 % | BODY MASS INDEX: 31.56 KG/M2 | HEART RATE: 72 BPM | DIASTOLIC BLOOD PRESSURE: 72 MMHG | SYSTOLIC BLOOD PRESSURE: 120 MMHG | WEIGHT: 233 LBS | HEIGHT: 72 IN

## 2019-12-26 DIAGNOSIS — I42.9 CARDIOMYOPATHY, UNSPECIFIED TYPE (HCC): ICD-10-CM

## 2019-12-26 DIAGNOSIS — I25.10 CORONARY ARTERY DISEASE INVOLVING NATIVE CORONARY ARTERY OF NATIVE HEART WITHOUT ANGINA PECTORIS: Primary | ICD-10-CM

## 2019-12-26 DIAGNOSIS — I71.20 THORACIC AORTIC ANEURYSM WITHOUT RUPTURE (HCC): ICD-10-CM

## 2019-12-26 DIAGNOSIS — I50.22 CHF (CONGESTIVE HEART FAILURE), NYHA CLASS II, CHRONIC, SYSTOLIC (HCC): ICD-10-CM

## 2019-12-26 DIAGNOSIS — I10 ESSENTIAL HYPERTENSION: ICD-10-CM

## 2019-12-26 DIAGNOSIS — I48.0 PAF (PAROXYSMAL ATRIAL FIBRILLATION) (HCC): ICD-10-CM

## 2019-12-26 DIAGNOSIS — Z95.810 AICD (AUTOMATIC CARDIOVERTER/DEFIBRILLATOR) PRESENT: Primary | ICD-10-CM

## 2019-12-26 PROCEDURE — 93000 ELECTROCARDIOGRAM COMPLETE: CPT | Performed by: INTERNAL MEDICINE

## 2019-12-26 PROCEDURE — 99214 OFFICE O/P EST MOD 30 MIN: CPT | Performed by: INTERNAL MEDICINE

## 2019-12-26 PROCEDURE — 93289 INTERROG DEVICE EVAL HEART: CPT | Performed by: INTERNAL MEDICINE

## 2019-12-26 NOTE — PROGRESS NOTES
Subjective:     Encounter Date:12/26/2019      Patient ID: Joao Fonseca is a 64 y.o. male with coronary artery disease, chronic systolic congestive heart failure, ICD implant place, hypertension, thoracic aortic aneurysm (followed closely by Dr. Wilson) who presents today for follow-up.    Chief Complaint: Routine follow-up    Coronary Artery Disease   Presents for follow-up visit. Symptoms include shortness of breath. Pertinent negatives include no chest pain, dizziness, leg swelling, palpitations or weight gain. His past medical history is significant for CHF. The symptoms have been stable. Compliance with diet is variable. Compliance with exercise is variable. Compliance with medications is good.   Congestive Heart Failure   Presents for follow-up visit. Associated symptoms include shortness of breath. Pertinent negatives include no abdominal pain, chest pain, edema, orthopnea, palpitations, paroxysmal nocturnal dyspnea or unexpected weight change. The symptoms have been stable. His past medical history is significant for CAD. Compliance with total regimen is %. Compliance with diet is %. Compliance with exercise is %. Compliance with medications is %.     This patient presents today for routine follow-up.  He has a history of coronary artery disease.  He denies any chest pain.  No significant shortness of breath or dyspnea on exertion other than what he describes as mild and chronic and unchanged.  Patient has a history of chronic systolic heart failure.  His weight tends to wax and wane but he has not had any appreciable weight gain of any significance.  He takes Lasix as needed for any edema or weight gain.  He says that occasionally will have to do this, especially when he has not been compliant with his diet.  He denies orthopnea, PND.  No lightheadedness, dizziness, syncope.  No palpitations.  No therapies delivered from his ICD.  No problems with his current medications.  He  "reports his blood pressure is generally well controlled.  Overall, he says that he is doing well and feeling well at this time.      Current Outpatient Medications:   •  acyclovir (ZOVIRAX) 400 MG tablet, Take 1 tablet by mouth 5 (Five) Times a Day. Take no more than 5 doses a day., Disp: 90 tablet, Rfl: 6  •  albuterol (PROVENTIL HFA;VENTOLIN HFA) 108 (90 Base) MCG/ACT inhaler, Inhale 2 puffs Every 4 (Four) Hours As Needed for Wheezing or Shortness of Air., Disp: , Rfl:   •  aspirin 81 MG chewable tablet, Chew 1 tablet Daily., Disp: 90 tablet, Rfl: 3  •  Blood Glucose Monitoring Suppl (ONE TOUCH ULTRA 2) w/Device kit, USE AS DIRECTED PER PACKAGE INSTRUCTIONS, Disp: , Rfl: 0  •  furosemide (LASIX) 40 MG tablet, Take 1 tablet by mouth Daily As Needed (edema, SOA)., Disp: 90 tablet, Rfl: 3  •  lisinopril (PRINIVIL,ZESTRIL) 5 MG tablet, Take 1 tablet by mouth Daily., Disp: 90 tablet, Rfl: 3  •  metFORMIN (GLUCOPHAGE) 1000 MG tablet, Take 1 tablet by mouth 2 (Two) Times a Day With Meals., Disp: 180 tablet, Rfl: 3  •  metoprolol tartrate (LOPRESSOR) 50 MG tablet, TAKE 1& 1/2 TABLETS BY MOUTH EVERY 12 HOURS, Disp: 270 tablet, Rfl: 3    Allergies   Allergen Reactions   • Cephalexin Anaphylaxis     Causes swelling of tongue, eyes, throat   • Entresto [Sacubitril-Valsartan] Other (See Comments)     \"put me in a-fib\"   • Eliquis [Apixaban] Swelling     \"swelling in ankles and feet\"   • Atorvastatin Unknown (See Comments)     fatigue     Social History     Tobacco Use   • Smoking status: Former Smoker     Years: 25.00     Types: Cigarettes     Last attempt to quit:      Years since quittin.0   • Smokeless tobacco: Never Used   • Tobacco comment: QUIT 20 YEARS AGO   Substance Use Topics   • Alcohol use: No     Review of Systems   Constitution: Negative for fever, unexpected weight change, weight gain and weight loss.   Cardiovascular: Positive for dyspnea on exertion. Negative for chest pain, leg swelling, orthopnea, " palpitations, paroxysmal nocturnal dyspnea and syncope.   Respiratory: Positive for shortness of breath. Negative for cough and wheezing.    Endocrine: Negative for cold intolerance and heat intolerance.   Hematologic/Lymphatic: Negative for bleeding problem. Does not bruise/bleed easily.   Gastrointestinal: Negative for abdominal pain, hematemesis, hematochezia, melena, nausea and vomiting.   Neurological: Negative for dizziness, headaches, loss of balance and weakness.       ECG 12 Lead  Date/Time: 12/26/2019 8:51 AM  Performed by: Phil Henry MD  Authorized by: Phil Henry MD   Comparison: compared with previous ECG from 6/20/2019  Similar to previous ECG  Rhythm: sinus rhythm  Rate: normal  BPM: 71  Conduction: left bundle branch block  QRS axis: left    Clinical impression: abnormal EKG               Objective:     Physical Exam   Constitutional: He is oriented to person, place, and time. He appears well-developed and well-nourished. No distress.   HENT:   Head: Normocephalic and atraumatic.   Mouth/Throat: Oropharynx is clear and moist.   Eyes: Pupils are equal, round, and reactive to light. EOM are normal.   Neck: Normal range of motion. Neck supple. No JVD present. No thyromegaly present.   Cardiovascular: Normal rate, regular rhythm, S1 normal, S2 normal, normal heart sounds and intact distal pulses. Exam reveals no gallop and no friction rub.   No murmur heard.  Pulmonary/Chest: Effort normal and breath sounds normal.   Abdominal: Soft. Bowel sounds are normal. He exhibits no distension. There is no tenderness.   Musculoskeletal: Normal range of motion. He exhibits no edema.   Neurological: He is alert and oriented to person, place, and time. No cranial nerve deficit.   Skin: Skin is warm and dry. No rash noted. No cyanosis or erythema. Nails show no clubbing.   Psychiatric: He has a normal mood and affect.   Vitals reviewed.    /72 (BP Location: Left arm, Patient Position:  "Sitting)   Pulse 72   Ht 182.9 cm (72\")   Wt 106 kg (233 lb)   SpO2 99%   BMI 31.60 kg/m²     Data/lab Review:     I did review today's ICD interrogation.  No therapies have been delivered however the patient was noted to have a few episodes of ventricular tachycardia.  These did require ATP.    I also reviewed the patient's most recent echocardiogram from May 2018 showing ejection fraction of 21-25% with diastolic dysfunction, severely dilated left ventricular cavity, mild to moderate mitral valve regurgitation, moderate aortic valve regurgitation, mild tricuspid valve regurgitation.    Most recent CT scan of the chest on 11/20/2019 shows an ascending aorta measuring up to 5.4 cm.      Assessment:          Diagnosis Plan   1. Coronary artery disease involving native coronary artery of native heart without angina pectoris  ECG 12 Lead   2. CHF (congestive heart failure), NYHA class II, chronic, systolic (CMS/[facility])     3. Essential hypertension     4. PAF (paroxysmal atrial fibrillation) (CMS/[facility])     5. Thoracic aortic aneurysm without rupture (CMS/[facility])            Plan:       1.  Coronary artery disease: The patient remains clinically stable at this time.  He is not experiencing any ischemic symptoms.  Continue current medical therapies.     2.  Chronic systolic congestive heart failure: Stable at this time with New York Heart Association class II symptoms.  The patient is on beta-blocker and ACE inhibitor therapies and takes Lasix as needed.  He also weighs himself regularly and watches his sodium intake.     3.  Essential hypertension: Continue metoprolol and lisinopril.  Blood pressure remains well controlled.     4.  Paroxysmal atrial fibrillation: There has been no obvious recurrence of atrial fibrillation.  The patient has refused anticoagulation in the past.  He is in sinus rhythm today.     5.  Thoracic aortic aneurysm: Recently evaluated by Dr. Wilson.  He continues to follow closely with Dr." Steve.    Patient's Body mass index is 31.6 kg/m². BMI is above normal parameters. Recommendations include: exercise counseling and nutrition counseling.     Follow-up: 6 months unless otherwise needed sooner.

## 2020-01-10 ENCOUNTER — TELEPHONE (OUTPATIENT)
Dept: INTERNAL MEDICINE | Facility: CLINIC | Age: 65
End: 2020-01-10

## 2020-01-10 DIAGNOSIS — L60.0 INGROWN TOENAIL: Primary | ICD-10-CM

## 2020-01-10 NOTE — TELEPHONE ENCOUNTER
Patient has a foot fungus and an ingrown toenail. He called Podiatry to set up an appointment and was informed that he must have a referral. He is requesting that a referral to Dr. Simon be placed so that he can get this appointment set up.

## 2020-01-13 ENCOUNTER — TELEPHONE (OUTPATIENT)
Dept: PODIATRY | Facility: CLINIC | Age: 65
End: 2020-01-13

## 2020-01-13 NOTE — TELEPHONE ENCOUNTER
Left message advising patient of upcoming appointment with Dr. Simon. Advised of location and time.  Mailed reminder to address on file.

## 2020-01-22 ENCOUNTER — TELEPHONE (OUTPATIENT)
Dept: CARDIOLOGY | Facility: CLINIC | Age: 65
End: 2020-01-22

## 2020-01-22 ENCOUNTER — CLINICAL SUPPORT NO REQUIREMENTS (OUTPATIENT)
Dept: CARDIOLOGY | Facility: CLINIC | Age: 65
End: 2020-01-22

## 2020-01-22 DIAGNOSIS — I42.9 CARDIOMYOPATHY, UNSPECIFIED TYPE (HCC): ICD-10-CM

## 2020-01-22 DIAGNOSIS — Z95.810 AICD (AUTOMATIC CARDIOVERTER/DEFIBRILLATOR) PRESENT: Primary | ICD-10-CM

## 2020-01-22 DIAGNOSIS — I50.22 CHF (CONGESTIVE HEART FAILURE), NYHA CLASS II, CHRONIC, SYSTOLIC (HCC): ICD-10-CM

## 2020-01-22 DIAGNOSIS — I47.29 VENTRICULAR TACHYCARDIA (PAROXYSMAL) (HCC): Primary | ICD-10-CM

## 2020-01-22 RX ORDER — METOPROLOL TARTRATE 50 MG/1
75 TABLET, FILM COATED ORAL 2 TIMES DAILY
Qty: 270 TABLET | Refills: 3 | Status: SHIPPED | OUTPATIENT
Start: 2020-01-22 | End: 2021-02-12 | Stop reason: SDUPTHER

## 2020-01-22 NOTE — TELEPHONE ENCOUNTER
Patient called and said at 11 am this morning he was carrying a small box from vehicle into his house and felt lightheaded. Got into his house and felt dizzy then weak. Went to his knees and laid his head onto the box he was carrying. He said about that time his defibrillator shocked him and knocked him over onto the floor. He had some chest pressure for a few hours afterwards but he said it has gone away. Patient said he does not want to go to ER because it will cost a lot of money.  Spoke with Nadia our device clinic nurse about this and she said patient needs to come into the office now to get device checked. Patient verbalized understanding.

## 2020-01-22 NOTE — PROGRESS NOTES
Single Chamber AICD Evaluation Report  IN OFFICE INTERROGATION    January 22, 2020    Primary Cardiologist: Elaine  : [employer] Model: Evera MRI XT VR [id number]  Implant date: 10/6/2016    Reason for evaluation: patient reported symptoms, reports AICD shock this morning around 11:00 AM  Indication for AICD: chronic systolic congestive heart failure and cardiomyopathy    Measurements  Ventricular sensing - R wave: >20 mV  Ventricular threshold: 0.625 V @ 0.4 ms  Ventricular lead impedance: 456 ohms  Shock Impedance: RV 45 ohms  SVC 54 ohms      Diagnostic Data  Paced: 0.3 %    Episodes recorded since 12/26/2019:  1/22/2020:  4 VT episodes all within 2 minutes, longest duration 1m:33s.  2 treated unsuccessfully with 1 sequence of ATP each.  1 treated with 2 sequences of ATP plus 35J shock.  Rate 240 bpm.     Discussed with Dr. Henry who ordered patient to increase metoprolol to 75 mg twice daily (patient reports taking 50 mg twice daily).  Patient instructed to take his BP twice daily and keep a log to ensure it doesn't get too low.   Patient also instructed to seek care at ED if he is shocked again within the next 24 hours.  Understanding verbalized.       Battery status: satisfactory, estimated 8.8 years remaining     Final Parameters  Mode: VVI  Lower rate: 40 bpm   Ventricular - Amplitude: 2 V   Pulse width: 0.4 ms   Sensitivity: 0.3 mV   Changes made: No changes made.  Conclusions: normal AICD function, stable pacing and sensing thresholds, adequate battery reserve and AICD shock x 1 for VT    Follow up: 3 months in office

## 2020-01-24 ENCOUNTER — TELEPHONE (OUTPATIENT)
Dept: CARDIOLOGY | Facility: CLINIC | Age: 65
End: 2020-01-24

## 2020-01-24 NOTE — TELEPHONE ENCOUNTER
RN called patient to check on him after AICD shock on 1/22/2020.  Patient reports that he is doing well.  Denies lightheadedness or other episodes.  Reports that his BP has been between 113-120/79-80 mmHg since increasing his metoprolol.  Patient instructed to contact our office with any questions or concerns.  Understanding verbalized.

## 2020-01-27 ENCOUNTER — TELEPHONE (OUTPATIENT)
Dept: CARDIOLOGY | Facility: CLINIC | Age: 65
End: 2020-01-27

## 2020-01-27 ENCOUNTER — TELEPHONE (OUTPATIENT)
Dept: CARDIAC SURGERY | Facility: CLINIC | Age: 65
End: 2020-01-27

## 2020-01-27 ENCOUNTER — CLINICAL SUPPORT NO REQUIREMENTS (OUTPATIENT)
Dept: CARDIOLOGY | Facility: CLINIC | Age: 65
End: 2020-01-27

## 2020-01-27 DIAGNOSIS — I50.22 CHF (CONGESTIVE HEART FAILURE), NYHA CLASS II, CHRONIC, SYSTOLIC (HCC): ICD-10-CM

## 2020-01-27 DIAGNOSIS — I48.0 PAF (PAROXYSMAL ATRIAL FIBRILLATION) (HCC): Primary | ICD-10-CM

## 2020-01-27 DIAGNOSIS — Z95.810 AICD (AUTOMATIC CARDIOVERTER/DEFIBRILLATOR) PRESENT: Primary | ICD-10-CM

## 2020-01-27 DIAGNOSIS — I42.9 CARDIOMYOPATHY, UNSPECIFIED TYPE (HCC): ICD-10-CM

## 2020-01-27 NOTE — TELEPHONE ENCOUNTER
Yes.  Please bring the patient in for another echo of his ICD.  Given that he is currently on what is likely maxed tolerated dose of beta-blocker and amiodarone, if this was a recurrence of ventricular tachycardia, we should get him into see Dr. Andino as soon as possible as he has previously seen him and placed his defibrillator.  Certainly, if another rhythm is identified, we may be able to adjust medications further.

## 2020-01-27 NOTE — TELEPHONE ENCOUNTER
Per Dr. Henry, patient is to discontinue aspirin 81 mg once daily while on Xarelto.  Patient is having toenails removed on Thursday.  Per Dr. Henry, patient can wait to start Xarelto on Friday.  Patient notified.  Understanding verbalized.

## 2020-01-27 NOTE — TELEPHONE ENCOUNTER
Pt left vm message stating he is a pt of Dr Wilson and he just saw Dr Henry.  States his device has gone off a couple of times and his heart has been racing.  Calling to see if he needs to see Dr Wilson in case this affects his aneurysm.  Requests call back.  Can reach him at #[phone]/[email or web address]

## 2020-01-27 NOTE — TELEPHONE ENCOUNTER
Patient called reporting an episode of rapid heart rate last night that was associated with lightheadedness, dyspnea, and chest tightness.  Reports the episode lasted 5-10 minutes.  Reports BP was 209/110 mmHg and HR was in the 140s when he measured his BP.  Patient states that his AICD did not shock him.  Patient has no residual symptoms this morning.  Patient states that he has been compliant with his medications and has not missed any doses.  RN will discuss with Dr. Henry and will contact patient with his recommendations.

## 2020-01-27 NOTE — TELEPHONE ENCOUNTER
Please let the patient know that I do not feel that his defibrillator firing should have affected his aortic aneurysm in any way.   SHIRLEY

## 2020-01-27 NOTE — TELEPHONE ENCOUNTER
Patient needs to speak to Dr. Henry and his staff regarding this. From review of epic, appears he has spoken to Nadia PERSAUD. He needs to keep scheduled FU with Dr. Wilson in May.

## 2020-01-27 NOTE — PROGRESS NOTES
Single Chamber AICD Evaluation Report  IN OFFICE INTERROGATION    January 27, 2020    Primary Cardiologist: Elaine  : [employer] Model: Evera MRI XT VR [id number]  Implant date: 10/6/2016    Reason for evaluation: patient reported symptoms  Indication for AICD: chronic systolic congestive heart failure and cardiomyopathy    Measurements  Ventricular sensing - R wave: >20 mV  Ventricular threshold: 0.625 V @ 0.4 ms  Ventricular lead impedance: 475 ohms  Shock Impedance: RV 49 ohms  SVC 58 ohms    Diagnostic Data  Paced: 0.2 %    Episodes recorded since 1/22/2020:  No episodes, ATP, or shocks.    EKG:  Shows AF.  Dr. Henry notified.  Recommends that patient see Dr. Andino ASAP and that patient start on an anticoagulant.  Patient refuses to start Eliquis as historically it caused significant swelling, per patient report.  He is willing to start another anticoagulant.  Discussed with Dr. Henry who ordered Xarelto.  Patient instructed to take it with dinner/food.  Understanding verbalized.  Patient called clinic and has an appointment with Dr. Andino on 1/30/2020.      Battery status: satisfactory, estimated 8.6 years remaining     Final Parameters  Mode: VVI  Lower rate: 40 bpm   Ventricular - Amplitude: 2 V   Pulse width: 0.4 ms   Sensitivity: 0.3 mV   Changes made: No changes made.  Conclusions: normal AICD function, no therapy delivered, stable pacing and sensing thresholds and adequate battery reserve    Follow up: 3 months in office or PRN if episode occurs.

## 2020-01-28 ENCOUNTER — TELEPHONE (OUTPATIENT)
Dept: CARDIOLOGY | Facility: CLINIC | Age: 65
End: 2020-01-28

## 2020-01-28 NOTE — TELEPHONE ENCOUNTER
Patient states that he had another short episode of lightheadedness this morning.  Reports that blood pressure and heart rate are stable.  Does not wish to come in to have device interrogated at this time.  Patient also states that he does not want to start Xarelto because of the class action lawsuit.  He is also reluctant to start any blood thinner at this time and plans to discuss it with Dr. Andino at his appointment later this week.  RN will notify Dr. Henry.

## 2020-01-28 NOTE — TELEPHONE ENCOUNTER
Spoke with patient and he verbalized understanding. He said he has an appointment with Dr Andino this Thursday. 1/30/2020

## 2020-01-29 ENCOUNTER — TELEPHONE (OUTPATIENT)
Dept: VASCULAR SURGERY | Facility: CLINIC | Age: 65
End: 2020-01-29

## 2020-01-29 NOTE — PROGRESS NOTES
[facility] - PODIATRY    Today's Date: 01/30/20    Patient Name: Joao Fonseca  MRN: [id number]  CSN: [id number]  PCP: Hernandez Dupree DO  Referring Provider: Hernandez Dupree DO    SUBJECTIVE     Chief Complaint   Patient presents with   • Ingrown Toenail     Ingrown nail of the right great toe.  Pt states that this has been going on for about 2-3 weeks.  Dr. Dupree referrd the patient. Last saw Dr. Dupree on 10/22/19   • Nail Problem     Onychomycosis of the left great toe. Pt states that he hasn't had this medically treated.    • Diabetes     Diabetic foot care.  Pt states that he is type II and doesn't take his blood sugar readings.      HPI: Joao Fonseca, a 64 y.o.male, comes to clinic as a(n) new patient presenting for diabetic foot exam and complaining of painful toenails. Patient has h/o Arrhythmia, arthritis, Cancer, Cardiomyopathy, CHF, CAD, DM, GERD, HLD, HTN, Obesity, AICD. Patient is NIDDM and unsure of last BG level.  Admits minimal numbness and tingling in feet.  Denies open wounds or sores.  Relates that both of his great toenails are thickened, discolored, crumbly, and mildly ingrown.  Denies redness or drainage. Admits pain at 2/10 level and described as shooting and pressure. Denies previous treatment. Denies any constitutional symptoms. No other pedal complaints at this time.    Past Medical History:   Diagnosis Date   • Acute pansinusitis 1/4/2018   • Arrhythmia    • Arthritis    • Bronchitis    • Cancer (CMS/HCC)     malignant chest tumor   • Cardiomyopathy (CMS/HCC) 12/27/2016   • CHF (congestive heart failure) (CMS/HCC)    • Chronic laryngitis 1/4/2018   • Coronary artery disease    • Diabetes mellitus (CMS/HCC)    • Disorder of liver 8/12/2013   • GERD (gastroesophageal reflux disease)    • History of adenomatous polyp of colon 06/28/2010    TUBULAR ADENOMA AT 40CM   • Hyperlipidemia    • Hypertension    • Obesity (BMI 30-39.9) 1/5/2018   • Presence of  biventricular AICD     MEDTRONIC   • Shingles    • Spider bite     RIGHT LOWER LEG     Past Surgical History:   Procedure Laterality Date   • ANKLE SURGERY Right    • CARDIAC CATHETERIZATION     • CARDIAC ELECTROPHYSIOLOGY PROCEDURE Left 10/6/2016    Procedure: ICD DC new;  Surgeon: Zander Andino MD;  Location: [facility] CATH INVASIVE LOCATION;  Service:    • COLONOSCOPY  2010    biopsy at 41, 2 mm polyp supboptimal prep right clon    • COLONOSCOPY W/ POLYPECTOMY  2010    POLYP AT 40CM TUBULAR ADENOMA, SUBOPTIMAL PREP   • CORONARY ARTERY BYPASS GRAFT     • HEMORRHOIDECTOMY     • HERNIA REPAIR     • INTERNAL CARDIAC DEFIBRILLATOR INSERTION     • TONSILLECTOMY     • VARICOSE VEIN SURGERY Left 10/15/2018    Procedure: LEFT SAPHENOUS VEIN RADIO FREQUENCY ABLATION;  Surgeon: Froylan De La Cruz DO;  Location: [facility] HYBRID OR 12;  Service: Vascular     Family History   Problem Relation Age of Onset   • Diabetes Mother    • Colon cancer Mother    • Heart disease Mother    • Hypertension Mother    • Stroke Mother    • Osteoporosis Mother    • Cancer Mother    • Heart disease Father    • Hypertension Father    • Cancer Father    • Bipolar disorder Sister    • Hypertension Brother    • Colon polyps Brother    • No Known Problems Son    • Heart disease Maternal Grandmother    • Diabetes Maternal Grandfather    • Heart disease Maternal Grandfather    • Heart disease Paternal Grandmother    • Diabetes Paternal Grandfather    • Heart disease Paternal Grandfather      Social History     Socioeconomic History   • Marital status:      Spouse name: Not on file   • Number of children: Not on file   • Years of education: Not on file   • Highest education level: Not on file   Tobacco Use   • Smoking status: Former Smoker     Years: 25.00     Types: Cigarettes     Last attempt to quit:      Years since quittin.0   • Smokeless tobacco: Never Used   • Tobacco comment: QUIT 20 YEARS AGO   Substance and  "Sexual Activity   • Alcohol use: No   • Drug use: No   • Sexual activity: Defer     Allergies   Allergen Reactions   • Cephalexin Anaphylaxis     Causes swelling of tongue, eyes, throat   • Entresto [Sacubitril-Valsartan] Other (See Comments)     \"put me in a-fib\"   • Eliquis [Apixaban] Swelling     \"swelling in ankles and feet\"   • Atorvastatin Unknown (See Comments)     fatigue     Current Outpatient Medications   Medication Sig Dispense Refill   • acyclovir (ZOVIRAX) 400 MG tablet Take 1 tablet by mouth 5 (Five) Times a Day. Take no more than 5 doses a day. 90 tablet 6   • albuterol (PROVENTIL HFA;VENTOLIN HFA) 108 (90 Base) MCG/ACT inhaler Inhale 2 puffs Every 4 (Four) Hours As Needed for Wheezing or Shortness of Air.     • aspirin 81 MG tablet Aspir-81   take 1 tablet daily     • Blood Glucose Monitoring Suppl (ONE TOUCH ULTRA 2) w/Device kit USE AS DIRECTED PER PACKAGE INSTRUCTIONS  0   • furosemide (LASIX) 40 MG tablet Take 1 tablet by mouth Daily As Needed (edema, SOA). 90 tablet 3   • lisinopril (PRINIVIL,ZESTRIL) 5 MG tablet Take 1 tablet by mouth Daily. 90 tablet 3   • metFORMIN (GLUCOPHAGE) 1000 MG tablet Take 1 tablet by mouth 2 (Two) Times a Day With Meals. 180 tablet 3   • metoprolol tartrate (LOPRESSOR) 50 MG tablet Take 1.5 tablets by mouth 2 (Two) Times a Day. 270 tablet 3   • terbinafine (lamiSIL) 250 MG tablet Take 1 tablet by mouth Daily for 90 days. 30 tablet 2     No current facility-administered medications for this visit.      Review of Systems   Constitutional: Negative for chills and fever.   HENT: Negative for congestion.    Respiratory: Negative for shortness of breath.    Cardiovascular: Positive for leg swelling. Negative for chest pain.   Gastrointestinal: Negative for constipation, diarrhea, nausea and vomiting.   Musculoskeletal: Positive for arthralgias.   Skin: Negative for wound.   Neurological: Negative for numbness.       OBJECTIVE     Vitals:    01/30/20 1336   BP: 124/72 "   Pulse: 72   SpO2: 99%       PHYSICAL EXAM  GEN:   Accompanied by wife.     Foot/Ankle Exam:       General:   Appearance: appears stated age and healthy    Orientation: AAOx3    Affect: appropriate    Gait: unimpaired    Assistance: independent    Shoe Gear:  Casual shoes    VASCULAR      Right Foot Vascularity   Dorsalis pedis:  2+  Posterior tibial:  2+  Skin Temperature: warm    Edema Grading:  Trace  CFT:  3  Pedal Hair Growth:  Present  Varicosities: mild varicosities       Left Foot Vascularity   Dorsalis pedis:  2+  Posterior tibial:  2+  Skin Temperature: warm    Edema Grading:  Trace  CFT:  3  Pedal Hair Growth:  Present  Varicosities: mild varicosities        NEUROLOGIC     Right Foot Neurologic   Light touch sensation:  Diminished  Vibratory sensation:  Normal  Hot/Cold sensation: normal    Protective Sensation using [place] Monofilament:  10     Left Foot Neurologic   Light touch sensation:  Diminished  Vibratory sensation:  Normal  Hot/cold sensation: normal    Protective Sensation using [place] Monofilament:  10     MUSCULOSKELETAL      Right Foot Musculoskeletal   Ecchymosis:  None  Tenderness: toenails    Arch:  Normal     Left Foot Musculoskeletal   Ecchymosis:  None  Tenderness: toenails    Arch:  Normal     MUSCLE STRENGTH     Right Foot Muscle Strength   Foot dorsiflexion:  5  Foot plantar flexion:  5  Foot inversion:  5  Foot eversion:  5     Left Foot Muscle Strength   Foot dorsiflexion:  5  Foot plantar flexion:  5  Foot inversion:  5  Foot eversion:  5     RANGE OF MOTION      Right Foot Range of Motion   Foot and ankle ROM within normal limits       Left Foot Range of Motion   Foot and ankle ROM within normal limits       DERMATOLOGIC     Right Foot Dermatologic   Skin: skin intact    Nails: onychomycosis, abnormally thick and ingrown toenail    Nails comment:  Hallux     Left Foot Dermatologic   Skin: skin intact    Nails: onychomycosis, abnormally thick and ingrown  toenail    Nails comment:  Hallux      RADIOLOGY/NUCLEAR:  No results found.    LABORATORY/CULTURE RESULTS:      PATHOLOGY RESULTS:       ASSESSMENT/PLAN     Joao was seen today for ingrown toenail, nail problem and diabetes.    Diagnoses and all orders for this visit:    Onychomycosis  -     Hepatic Function Panel; Future    Ingrown toenail    Controlled type 2 diabetes mellitus without complication, without long-term current use of insulin (CMS/HCC)    Foot pain, bilateral    Other orders  -     terbinafine (lamiSIL) 250 MG tablet; Take 1 tablet by mouth Daily for 90 days.      Comprehensive lower extremity examination and evaluation was performed.  Discussed findings and treatment plan including risks, benefits, and treatment options with patient in detail. Patient agreed with treatment plan.  After verbal consent obtained, nail(s) x2 debrided of offending borders with nail nipper without incidence  Patient may maintain nails and calluses at home utilizing emery board or pumice stone between visits as needed  Reviewed at home diabetic foot care including daily foot checks    Start on PO Terbinafine. Check liver function in 1 month. D/C use if elevated.   An After Visit Summary was printed and given to the patient at discharge, including (if requested) any available informative/educational handouts regarding diagnosis, treatment, or medications. All questions were answered to patient/family satisfaction. Should symptoms fail to improve or worsen they agree to call or return to clinic or to go to the Emergency Department. Discussed the importance of following up with any needed screening tests/labs/specialist appointments and any requested follow-up recommended by me today. Importance of maintaining follow-up discussed and patient accepts that missed appointments can delay diagnosis and potentially lead to worsening of conditions.  Return in about 6 months (around 7/30/2020)., or sooner if acute issues  arise.    Lab Frequency Next Occurrence   Follow Anesthesia Guidelines / Standing Orders Once 10/04/2018   CT Angiogram Chest Once 05/20/2020       This document has been electronically signed by Rajiv Simon DPM on January 30, 2020 2:02 PM

## 2020-01-30 ENCOUNTER — OFFICE VISIT (OUTPATIENT)
Dept: PODIATRY | Facility: CLINIC | Age: 65
End: 2020-01-30

## 2020-01-30 VITALS
BODY MASS INDEX: 31.15 KG/M2 | DIASTOLIC BLOOD PRESSURE: 72 MMHG | WEIGHT: 230 LBS | OXYGEN SATURATION: 99 % | HEIGHT: 72 IN | SYSTOLIC BLOOD PRESSURE: 124 MMHG | HEART RATE: 72 BPM

## 2020-01-30 DIAGNOSIS — M79.671 FOOT PAIN, BILATERAL: ICD-10-CM

## 2020-01-30 DIAGNOSIS — L60.0 INGROWN TOENAIL: ICD-10-CM

## 2020-01-30 DIAGNOSIS — B35.1 ONYCHOMYCOSIS: Primary | ICD-10-CM

## 2020-01-30 DIAGNOSIS — M79.672 FOOT PAIN, BILATERAL: ICD-10-CM

## 2020-01-30 DIAGNOSIS — E11.9 CONTROLLED TYPE 2 DIABETES MELLITUS WITHOUT COMPLICATION, WITHOUT LONG-TERM CURRENT USE OF INSULIN (HCC): ICD-10-CM

## 2020-01-30 PROCEDURE — 99203 OFFICE O/P NEW LOW 30 MIN: CPT | Performed by: PODIATRIST

## 2020-01-30 PROCEDURE — 11720 DEBRIDE NAIL 1-5: CPT | Performed by: PODIATRIST

## 2020-01-30 RX ORDER — TERBINAFINE HYDROCHLORIDE 250 MG/1
250 TABLET ORAL DAILY
Qty: 30 TABLET | Refills: 2 | Status: SHIPPED | OUTPATIENT
Start: 2020-01-30 | End: 2020-03-04 | Stop reason: SINTOL

## 2020-01-31 DIAGNOSIS — I48.0 PAF (PAROXYSMAL ATRIAL FIBRILLATION) (HCC): Primary | ICD-10-CM

## 2020-02-03 ENCOUNTER — ANESTHESIA EVENT (OUTPATIENT)
Dept: CARDIOLOGY | Facility: HOSPITAL | Age: 65
End: 2020-02-03

## 2020-02-03 ENCOUNTER — HOSPITAL ENCOUNTER (OUTPATIENT)
Dept: CARDIOLOGY | Facility: HOSPITAL | Age: 65
Discharge: HOME | End: 2020-02-03
Admitting: INTERNAL MEDICINE

## 2020-02-03 ENCOUNTER — ANESTHESIA (OUTPATIENT)
Dept: CARDIOLOGY | Facility: HOSPITAL | Age: 65
End: 2020-02-03

## 2020-02-03 ENCOUNTER — HOSPITAL ENCOUNTER (OUTPATIENT)
Dept: CARDIOLOGY | Facility: HOSPITAL | Age: 65
Discharge: HOME | End: 2020-02-03

## 2020-02-03 VITALS
OXYGEN SATURATION: 97 % | HEIGHT: 72 IN | WEIGHT: 230 LBS | TEMPERATURE: 98 F | DIASTOLIC BLOOD PRESSURE: 73 MMHG | BODY MASS INDEX: 31.15 KG/M2 | HEART RATE: 64 BPM | SYSTOLIC BLOOD PRESSURE: 105 MMHG | RESPIRATION RATE: 15 BRPM

## 2020-02-03 DIAGNOSIS — I48.0 PAF (PAROXYSMAL ATRIAL FIBRILLATION) (HCC): ICD-10-CM

## 2020-02-03 PROCEDURE — 93312 ECHO TRANSESOPHAGEAL: CPT

## 2020-02-03 PROCEDURE — 93320 DOPPLER ECHO COMPLETE: CPT | Performed by: INTERNAL MEDICINE

## 2020-02-03 PROCEDURE — 93325 DOPPLER ECHO COLOR FLOW MAPG: CPT

## 2020-02-03 PROCEDURE — 93325 DOPPLER ECHO COLOR FLOW MAPG: CPT | Performed by: INTERNAL MEDICINE

## 2020-02-03 PROCEDURE — 92960 CARDIOVERSION ELECTRIC EXT: CPT

## 2020-02-03 PROCEDURE — 92960 CARDIOVERSION ELECTRIC EXT: CPT | Performed by: INTERNAL MEDICINE

## 2020-02-03 PROCEDURE — 93312 ECHO TRANSESOPHAGEAL: CPT | Performed by: INTERNAL MEDICINE

## 2020-02-03 PROCEDURE — 25010000002 PROPOFOL 10 MG/ML EMULSION: Performed by: NURSE ANESTHETIST, CERTIFIED REGISTERED

## 2020-02-03 PROCEDURE — 93320 DOPPLER ECHO COMPLETE: CPT

## 2020-02-03 RX ORDER — SODIUM CHLORIDE 0.9 % (FLUSH) 0.9 %
10 SYRINGE (ML) INJECTION EVERY 12 HOURS SCHEDULED
Status: DISCONTINUED | OUTPATIENT
Start: 2020-02-03 | End: 2020-02-04 | Stop reason: HOSPADM

## 2020-02-03 RX ORDER — SODIUM CHLORIDE 9 MG/ML
50 INJECTION, SOLUTION INTRAVENOUS CONTINUOUS
Status: DISCONTINUED | OUTPATIENT
Start: 2020-02-03 | End: 2020-02-04 | Stop reason: HOSPADM

## 2020-02-03 RX ORDER — DABIGATRAN ETEXILATE 150 MG/1
150 CAPSULE ORAL 2 TIMES DAILY
Qty: 180 CAPSULE | Refills: 3 | Status: SHIPPED | OUTPATIENT
Start: 2020-02-03 | End: 2020-03-24

## 2020-02-03 RX ORDER — PROPOFOL 10 MG/ML
VIAL (ML) INTRAVENOUS AS NEEDED
Status: DISCONTINUED | OUTPATIENT
Start: 2020-02-03 | End: 2020-02-03 | Stop reason: SURG

## 2020-02-03 RX ORDER — SODIUM CHLORIDE 0.9 % (FLUSH) 0.9 %
10 SYRINGE (ML) INJECTION AS NEEDED
Status: DISCONTINUED | OUTPATIENT
Start: 2020-02-03 | End: 2020-02-04 | Stop reason: HOSPADM

## 2020-02-03 RX ADMIN — PROPOFOL 100 MG: 10 INJECTION, EMULSION INTRAVENOUS at 09:04

## 2020-02-03 RX ADMIN — LIDOCAINE HYDROCHLORIDE 100 MG: 20 INJECTION, SOLUTION INTRAVENOUS at 09:04

## 2020-02-03 RX ADMIN — SODIUM CHLORIDE: 9 INJECTION, SOLUTION INTRAVENOUS at 08:55

## 2020-02-03 NOTE — ANESTHESIA POSTPROCEDURE EVALUATION
"Patient: Joao Fonseca    Procedure Summary     Date:  02/03/20 Room / Location:  [facility] CATH LAB    Anesthesia Start:  0851 Anesthesia Stop:  0925    Procedures:       ADULT TRANSESOPHAGEAL ECHO (JULIO CESAR) W/ CONT IF NECESSARY PER PROTOCOL      CARDIOVERSION EXTERNAL IN CARDIOLOGY DEPARTMENT Diagnosis:       PAF (paroxysmal atrial fibrillation) (CMS/HCC)      (Arrhythmia)      (Pre-cardioversion)      (PAF)    Scheduled Providers:  Phil Henry MD Provider:  Darwin Zee CRNA    Anesthesia Type:  MAC ASA Status:  3          Anesthesia Type: MAC    Vitals  Vitals Value Taken Time   BP 89/68 2/3/2020 10:00 AM   Temp     Pulse 64 2/3/2020 10:05 AM   Resp 15 2/3/2020 10:00 AM   SpO2 98 % 2/3/2020 10:05 AM   Vitals shown include unvalidated device data.        Post Anesthesia Care and Evaluation    Patient location during evaluation: PACU  Patient participation: complete - patient participated  Level of consciousness: awake and alert  Pain management: adequate  Airway patency: patent  Anesthetic complications: No anesthetic complications    Cardiovascular status: acceptable  Respiratory status: acceptable  Hydration status: acceptable    Comments: Blood pressure (!) 89/58, pulse 59, temperature 98 °F (36.7 °C), temperature source Tympanic, resp. rate 15, height 182.9 cm (72\"), weight 104 kg (230 lb), SpO2 97 %.    Pt discharged from PACU based on missy score >8      "

## 2020-02-03 NOTE — ANESTHESIA PREPROCEDURE EVALUATION
Anesthesia Evaluation     Patient summary reviewed and Nursing notes reviewed   no history of anesthetic complications:  NPO Solid Status: > 8 hours  NPO Liquid Status: > 8 hours           Airway   Mallampati: II  TM distance: >3 FB  Neck ROM: full  No difficulty expected  Dental - normal exam     Pulmonary    (+) sleep apnea,     ROS comment: S/P thymoma resection  Cardiovascular     ECG reviewed  Patient on routine beta blocker and Beta blocker given within 24 hours of surgery    (+) pacemaker (medtronic) ICD, hypertension, valvular problems/murmurs AI and MR, CAD, CABG, dysrhythmias Atrial Fib, CHF (EF 21-25%) , PVD (5.2 cm ascending aortic aneurysm), hyperlipidemia,     ROS comment: Echo:  ·Left ventricular systolic function is severely decreased. Estimated EF appears to be in the range of 21 - 25%.  ·Left ventricular diastolic dysfunction.  ·The left ventricular cavity is severely dilated.  ·Left ventricular wall thickness is consistent with mild concentric hypertrophy.  ·Interatrial septal defect present.  ·Mild-to-moderate mitral valve regurgitation is present  ·Moderate aortic valve regurgitation is present.  ·Mild tricuspid valve regurgitation is present. Estimated right ventricular systolic pressure from tricuspid regurgitation is mildly elevated (35-45 mmHg).  ·Borderline dilation of the aortic root is present.    Neuro/Psych  (-) seizures, TIA, CVA  GI/Hepatic/Renal/Endo    (+) obesity,   diabetes mellitus,     Musculoskeletal     Abdominal    Substance History      OB/GYN          Other      history of cancer (thymoma s/p resection)             AICD interrogation:  June 20, 2018     Primary Cardiologist: Elaine, previously followed by Dr. Andino  : Medtronic Model: Evera MRI  Implant date: 10/06/2016     Reason for evaluation: provider requested  Indication for AICD: chronic systolic congestive heart failure and cardiomyopathy     Measurements  Ventricular sensing - R wave: 15.5  mV  Ventricular threshold: 0.5V @ 0.4 ms  Ventricular lead impedance: 475 ohms  Shock Impedance: RV 46 ohms  SVC 55 ohms        Diagnostic Data  Paced: <0.1 %     Episodes recorded since 5/04/2018  1 High Rate NS episode (duration approximately 3 seconds, average ventricular rate 276 bpm) followed by 1 VF episode:  Appears to be VT in VF rate zone, duration approximately 18 seconds.  No ATP administered s/t rate 286 bpm.  Successfully treated with 35J shock.  No episodes have occurred since shock on June 17, 2018.  7 NS-VT episodes:  Longest duration approximately 5 seconds, average ventricular rate 219 bpm.     Battery status: satisfactory, estimated 10.4 years remaining      Final Parameters  Mode: VVI  Lower rate: 40 bpm   Ventricular - Amplitude: 2 V   Pulse width: 0.4 ms   Sensitivity: 0.3 mV   Changes made: No changes made.  Conclusions: normal AICD function, no therapy delivered, stable pacing and sensing thresholds and adequate battery reserve         Anesthesia Plan    ASA 3     MAC     intravenous induction     Anesthetic plan, all risks, benefits, and alternatives have been provided, discussed and informed consent has been obtained with: patient.

## 2020-02-04 LAB — BH CV ECHO MEAS - BZI_METRIC_HEIGHT: 182.9 CM

## 2020-02-12 ENCOUNTER — TELEPHONE (OUTPATIENT)
Dept: CARDIOLOGY | Facility: CLINIC | Age: 65
End: 2020-02-12

## 2020-02-12 NOTE — TELEPHONE ENCOUNTER
Patient called stating that Pradaxa was going to cost him $830 per month and that he could not afford that.  Per patient, he has approximately 1 week of samples remaining.  RN will notify Dr. Henry's MA to start working on patient assistance for patient and to notify patient of what paperwork is needed.  Patient should expect a call back from Maggie, Dr. Henry's MA, regarding this.  Patient verbalized understanding.

## 2020-02-13 NOTE — TELEPHONE ENCOUNTER
Patient called me back and wants me to email him the paper work for patient assistance for Pradaxa.

## 2020-02-17 NOTE — TELEPHONE ENCOUNTER
Patient is willing to tolerate symptoms for the four week time period.  He only has 5 days of samples left and will be needing to  more this week, as he can't afford the medication.

## 2020-02-17 NOTE — TELEPHONE ENCOUNTER
Unfortunately, after cardioversion, the recommendation, to minimize the risk of stroke, is 4 weeks of uninterrupted anticoagulation.  I understand that the patient is having difficulty with side effects.  My recollection is that he also had problems with either Eliquis or Xarelto.  The patient would like to try 1 of those agents, we could certainly change anticoagulation, however, to minimize the risk of stroke, it would be recommended for him to stay on at least 4 weeks of uninterrupted anticoagulation.  Certainly, Coumadin is always an option as well.  Aspirin, and this particular setting, does not reduce his stroke risk, which is highest in the 4 weeks after cardioversion.

## 2020-02-17 NOTE — TELEPHONE ENCOUNTER
Patient called stating that he is having almost constant diarrhea, fatigue, and intermittent confusion that has started since he started taking Pradaxa.  Patient would like to stop taking the medication and go back on ASA but wants to know Dr. Henry's thoughts on that.  RN will send message to Dr. Henry and will contact patient with his response.

## 2020-02-18 NOTE — TELEPHONE ENCOUNTER
Left patient message on his voicemail that I left Pradaxa samples at  to get him through the next 4 weeks. Just FYI if you happen to talk to him.

## 2020-02-19 RX ORDER — DABIGATRAN ETEXILATE 150 MG/1
150 CAPSULE ORAL 2 TIMES DAILY
Qty: 72 CAPSULE | Refills: 0 | COMMUNITY
Start: 2020-02-19 | End: 2020-03-04

## 2020-03-04 ENCOUNTER — OFFICE VISIT (OUTPATIENT)
Dept: CARDIOLOGY | Facility: CLINIC | Age: 65
End: 2020-03-04

## 2020-03-04 VITALS
OXYGEN SATURATION: 99 % | DIASTOLIC BLOOD PRESSURE: 68 MMHG | BODY MASS INDEX: 31.02 KG/M2 | HEART RATE: 59 BPM | WEIGHT: 229 LBS | HEIGHT: 72 IN | SYSTOLIC BLOOD PRESSURE: 120 MMHG

## 2020-03-04 DIAGNOSIS — I71.20 THORACIC AORTIC ANEURYSM WITHOUT RUPTURE (HCC): ICD-10-CM

## 2020-03-04 DIAGNOSIS — I50.22 CHF (CONGESTIVE HEART FAILURE), NYHA CLASS II, CHRONIC, SYSTOLIC (HCC): ICD-10-CM

## 2020-03-04 DIAGNOSIS — I25.10 CORONARY ARTERY DISEASE INVOLVING NATIVE CORONARY ARTERY OF NATIVE HEART WITHOUT ANGINA PECTORIS: ICD-10-CM

## 2020-03-04 DIAGNOSIS — I48.0 PAF (PAROXYSMAL ATRIAL FIBRILLATION) (HCC): Primary | ICD-10-CM

## 2020-03-04 PROCEDURE — 99214 OFFICE O/P EST MOD 30 MIN: CPT | Performed by: INTERNAL MEDICINE

## 2020-03-04 PROCEDURE — 93000 ELECTROCARDIOGRAM COMPLETE: CPT | Performed by: INTERNAL MEDICINE

## 2020-03-04 NOTE — PROGRESS NOTES
Subjective:     Encounter Date:03/04/2020      Patient ID: Joao Fonseca is a 64 y.o. male with coronary artery disease, chronic systolic congestive heart failure, ICD implant place, hypertension, thoracic aortic aneurysm (followed closely by Dr. Wilson), paroxysmal atrial fibrillation and atrial flutter (cardioversion on 2/3/20), who presents today for follow-up.    Chief Complaint: Follow-up    Coronary Artery Disease   Presents for follow-up visit. Pertinent negatives include no chest pain, dizziness, leg swelling, palpitations or shortness of breath. His past medical history is significant for CHF. The symptoms have been stable. Compliance with diet is variable. Compliance with exercise is variable. Compliance with medications is good.   Congestive Heart Failure   Presents for follow-up visit. Associated symptoms include fatigue. Pertinent negatives include no abdominal pain, chest pain, edema, orthopnea, palpitations, paroxysmal nocturnal dyspnea or shortness of breath. The symptoms have been stable. His past medical history is significant for CAD. Compliance with total regimen is %. Compliance with diet is %. Compliance with exercise is %. Compliance with medications is %.   Atrial Fibrillation   Presents for follow-up visit. Symptoms are negative for an AICD problem, chest pain, dizziness, hemodynamic instability, palpitations, shortness of breath, syncope and weakness. The symptoms have been stable. Past medical history includes atrial fibrillation, CAD and CHF. Medication compliance problems include medication side effects.      This patient presents after recent cardioversion for follow-up.  He has been in sinus rhythm to his knowledge since the cardioversion.  He says that overall he feels better however the Pradaxa that he is taking is caused significant diarrhea.  Patient would like to be off this medication if at all possible.  He has seen Dr. Andino.  An ablation was discussed  "however the patient has been intolerant to Eliquis, and now Pradaxa.  He says that he might consider Coumadin in the future.  He has not had any therapies delivered from his ICD since we last saw him.  His shortness of breath, which was present at the time of his atrial fibrillation, has now resolved.  He denies orthopnea, PND, edema.  He says that his energy level waxes and wanes to some degree.  He denies any chest pain.  No lightheadedness, dizziness or syncope.      Current Outpatient Medications:   •  acyclovir (ZOVIRAX) 400 MG tablet, Take 1 tablet by mouth 5 (Five) Times a Day. Take no more than 5 doses a day., Disp: 90 tablet, Rfl: 6  •  albuterol (PROVENTIL HFA;VENTOLIN HFA) 108 (90 Base) MCG/ACT inhaler, Inhale 2 puffs Every 4 (Four) Hours As Needed for Wheezing or Shortness of Air., Disp: , Rfl:   •  Blood Glucose Monitoring Suppl (ONE TOUCH ULTRA 2) w/Device kit, USE AS DIRECTED PER PACKAGE INSTRUCTIONS, Disp: , Rfl: 0  •  dabigatran etexilate (PRADAXA) 150 MG capsu, Take 1 capsule by mouth 2 (Two) Times a Day., Disp: 180 capsule, Rfl: 3  •  furosemide (LASIX) 40 MG tablet, Take 1 tablet by mouth Daily As Needed (edema, SOA)., Disp: 90 tablet, Rfl: 3  •  lisinopril (PRINIVIL,ZESTRIL) 5 MG tablet, Take 1 tablet by mouth Daily., Disp: 90 tablet, Rfl: 3  •  metFORMIN (GLUCOPHAGE) 1000 MG tablet, Take 1 tablet by mouth 2 (Two) Times a Day With Meals., Disp: 180 tablet, Rfl: 3  •  metoprolol tartrate (LOPRESSOR) 50 MG tablet, Take 1.5 tablets by mouth 2 (Two) Times a Day., Disp: 270 tablet, Rfl: 3  •  aspirin 81 MG tablet, Aspir-81  take 1 tablet daily, Disp: , Rfl:     Allergies   Allergen Reactions   • Cephalexin Anaphylaxis     Causes swelling of tongue, eyes, throat   • Entresto [Sacubitril-Valsartan] Other (See Comments)     \"put me in a-fib\"   • Eliquis [Apixaban] Swelling     \"swelling in ankles and feet\"   • Atorvastatin Unknown (See Comments)     fatigue     Social History     Tobacco Use   • Smoking " status: Former Smoker     Years: 25.00     Types: Cigarettes     Last attempt to quit:      Years since quittin.1   • Smokeless tobacco: Never Used   • Tobacco comment: QUIT 20 YEARS AGO   Substance Use Topics   • Alcohol use: No     Review of Systems   Constitution: Positive for fatigue and malaise/fatigue. Negative for fever and weight loss.   Cardiovascular: Negative for chest pain, dyspnea on exertion, leg swelling, orthopnea, palpitations, paroxysmal nocturnal dyspnea and syncope.   Respiratory: Negative for cough, shortness of breath and wheezing.    Hematologic/Lymphatic: Negative for bleeding problem. Does not bruise/bleed easily.   Gastrointestinal: Positive for diarrhea and nausea. Negative for abdominal pain, melena and vomiting.   Neurological: Negative for dizziness, headaches, loss of balance and weakness.       ECG 12 Lead  Date/Time: 3/4/2020 10:34 AM  Performed by: Phil Henry MD  Authorized by: Phil Henry MD   Rhythm: sinus rhythm  Ectopy: atrial premature contractions  Rate: normal  BPM: 63  Conduction: left bundle branch block  QRS axis: left    Clinical impression: abnormal EKG             Objective:     Physical Exam   Constitutional: He is oriented to person, place, and time. He appears well-developed and well-nourished. No distress.   HENT:   Head: Normocephalic and atraumatic.   Mouth/Throat: Oropharynx is clear and moist.   Eyes: Pupils are equal, round, and reactive to light. EOM are normal.   Neck: Normal range of motion. Neck supple. No JVD present. No thyromegaly present.   Cardiovascular: Normal rate, regular rhythm, S1 normal, S2 normal, normal heart sounds and intact distal pulses. Exam reveals no gallop and no friction rub.   No murmur heard.  Pulmonary/Chest: Effort normal and breath sounds normal.   Abdominal: Soft. Bowel sounds are normal. He exhibits no distension. There is no tenderness.   Musculoskeletal: Normal range of motion. He exhibits no  "edema.   Neurological: He is alert and oriented to person, place, and time. No cranial nerve deficit.   Skin: Skin is warm and dry. No rash noted. No cyanosis or erythema. Nails show no clubbing.   Psychiatric: He has a normal mood and affect.   Vitals reviewed.    /68 (BP Location: Left arm, Patient Position: Sitting)   Pulse 59   Ht 182.9 cm (72\")   Wt 104 kg (229 lb)   SpO2 99%   BMI 31.06 kg/m²     Data/Lab Review:     JULIO CESAR 2/3/20:  · Left ventricular systolic function is severely decreased.  · The left ventricular cavity is severely dilated.  · Left atrial cavity size is dilated.  · No evidence of a left atrial appendage.  · Mild mitral valve regurgitation is present  · Mild aortic valve regurgitation is present.  · Moderate dilation of the aortic root is present. Mild dilation of the sinuses of Valsalva is present.        Assessment:          Diagnosis Plan   1. PAF (paroxysmal atrial fibrillation) (CMS/[facility])  ECG 12 Lead   2. CHF (congestive heart failure), NYHA class II, chronic, systolic (CMS/[facility])     3. Coronary artery disease involving native coronary artery of native heart without angina pectoris     4. Thoracic aortic aneurysm without rupture (CMS/[facility])            Plan:       1.  Paroxysmal atrial fibrillation: Patient is in sinus rhythm today.  At this time, he remains on Pradaxa but he is not tolerating this well.  He did state that he would rather be on aspirin alone.  We did discuss that this would not lower his stroke risk in the setting of paroxysmal atrial fibrillation.  Certainly, Coumadin is an option.  The patient and his wife will discuss this and will let me know whether he wishes to go on Coumadin.    2.  Chronic systolic heart failure: The patient is euvolemic on examination.  His symptoms wax and wane but at the present time he describes New York Heart Association class II symptoms.  He had previously been on Entresto but did not tolerate this medication.  He is on lisinopril as " well as metoprolol and he is tolerating these well.    3.  Coronary artery disease: No ischemic symptoms at this time.  Continue current medications.    4.  Thoracic aortic aneurysm: The patient continues to follow closely with Dr. Wilson.    Patient's Body mass index is 31.06 kg/m². BMI is above normal parameters. Recommendations include: exercise counseling and nutrition counseling.    Follow-up: The patient has follow-up already scheduled on 7/2/2020 and he will keep this appointment.

## 2020-03-18 ENCOUNTER — TELEPHONE (OUTPATIENT)
Dept: CARDIOLOGY | Facility: CLINIC | Age: 65
End: 2020-03-18

## 2020-03-18 DIAGNOSIS — I48.0 PAF (PAROXYSMAL ATRIAL FIBRILLATION) (HCC): Primary | ICD-10-CM

## 2020-03-18 NOTE — TELEPHONE ENCOUNTER
Patient called stating that he has approximately 12 days of Pradaxa samples remaining.  As discussed with Dr. Henry at most recent office visit, patient states that he is willing to start coumadin when he runs out of Pradaxa.  RN will notify Dr. Henry who can enter order for enrollment in coumadin clinic.  Patient instructed that he should hear from Ary, our coumadin nurse, in regard to when to start coumadin and times for labs.  Understanding verbalized.

## 2020-03-24 ENCOUNTER — ANTICOAGULATION VISIT (OUTPATIENT)
Dept: CARDIOLOGY | Facility: CLINIC | Age: 65
End: 2020-03-24

## 2020-03-24 DIAGNOSIS — I48.0 PAF (PAROXYSMAL ATRIAL FIBRILLATION) (HCC): Primary | ICD-10-CM

## 2020-03-24 RX ORDER — WARFARIN SODIUM 5 MG/1
5 TABLET ORAL NIGHTLY
Qty: 60 TABLET | Refills: 11 | Status: SHIPPED | OUTPATIENT
Start: 2020-03-24 | End: 2021-07-28 | Stop reason: HOSPADM

## 2020-03-24 NOTE — TELEPHONE ENCOUNTER
Initial message from last Wednesday states the patient has 12 days of medication left.  He has already been enrolled in Coumadin Clinic with a start date of 3/30.  Today's note states he only has 2 days.  I placed a call to the patient for f/u, he did not answer.  I left a message requesting a return call for f/u.  An Rx for Coumadin was sent to [facility] in [place] and his Pradaxa was DC'd.  Orders for [place] [facility] [place].  He can start taking the Coumadin 5mg PO on Friday night with his evening meal and he will need to have his initial INR drawn on 4/1/20.  I will f/u with him when he returns my call.    MOHAMUD

## 2020-03-24 NOTE — TELEPHONE ENCOUNTER
I am pretty sure that I have placed orders for Coumadin clinic.  I will forward this to Ary as well to assure that he gets started.

## 2020-04-01 ENCOUNTER — LAB (OUTPATIENT)
Dept: LAB | Facility: HOSPITAL | Age: 65
End: 2020-04-01

## 2020-04-01 DIAGNOSIS — I48.0 PAF (PAROXYSMAL ATRIAL FIBRILLATION) (HCC): ICD-10-CM

## 2020-04-01 PROCEDURE — 85610 PROTHROMBIN TIME: CPT | Performed by: INTERNAL MEDICINE

## 2020-04-02 ENCOUNTER — ANTICOAGULATION VISIT (OUTPATIENT)
Dept: CARDIOLOGY | Facility: CLINIC | Age: 65
End: 2020-04-02

## 2020-04-02 LAB
INR PPP: 1.1 (ref 0.91–1.09)
PROTHROMBIN TIME: 12.8 SECONDS (ref 10–13.8)

## 2020-04-13 ENCOUNTER — LAB (OUTPATIENT)
Dept: LAB | Facility: HOSPITAL | Age: 65
End: 2020-04-13

## 2020-04-13 ENCOUNTER — ANTICOAGULATION VISIT (OUTPATIENT)
Dept: CARDIOLOGY | Facility: CLINIC | Age: 65
End: 2020-04-13

## 2020-04-13 DIAGNOSIS — I48.0 PAF (PAROXYSMAL ATRIAL FIBRILLATION) (HCC): ICD-10-CM

## 2020-04-13 LAB
INR PPP: 1.7 (ref 0.91–1.09)
PROTHROMBIN TIME: 20.2 SECONDS (ref 10–13.8)

## 2020-04-13 PROCEDURE — 85610 PROTHROMBIN TIME: CPT | Performed by: INTERNAL MEDICINE

## 2020-04-20 ENCOUNTER — LAB (OUTPATIENT)
Dept: LAB | Facility: HOSPITAL | Age: 65
End: 2020-04-20

## 2020-04-20 ENCOUNTER — ANTICOAGULATION VISIT (OUTPATIENT)
Dept: CARDIOLOGY | Facility: CLINIC | Age: 65
End: 2020-04-20

## 2020-04-20 DIAGNOSIS — I48.0 PAF (PAROXYSMAL ATRIAL FIBRILLATION) (HCC): ICD-10-CM

## 2020-04-20 LAB
INR PPP: 2.2 (ref 0.91–1.09)
PROTHROMBIN TIME: 26.5 SECONDS (ref 10–13.8)

## 2020-04-20 PROCEDURE — 85610 PROTHROMBIN TIME: CPT | Performed by: INTERNAL MEDICINE

## 2020-04-24 ENCOUNTER — TELEMEDICINE (OUTPATIENT)
Dept: INTERNAL MEDICINE | Facility: CLINIC | Age: 65
End: 2020-04-24

## 2020-04-24 VITALS — WEIGHT: 222 LBS | BODY MASS INDEX: 30.07 KG/M2 | HEIGHT: 72 IN

## 2020-04-24 DIAGNOSIS — I10 ESSENTIAL HYPERTENSION: ICD-10-CM

## 2020-04-24 DIAGNOSIS — Z95.1 S/P CABG X 3: ICD-10-CM

## 2020-04-24 DIAGNOSIS — I50.22 CHF (CONGESTIVE HEART FAILURE), NYHA CLASS II, CHRONIC, SYSTOLIC (HCC): ICD-10-CM

## 2020-04-24 DIAGNOSIS — I25.10 CORONARY ARTERY DISEASE INVOLVING NATIVE CORONARY ARTERY OF NATIVE HEART WITHOUT ANGINA PECTORIS: ICD-10-CM

## 2020-04-24 DIAGNOSIS — E11.9 TYPE 2 DIABETES MELLITUS WITHOUT COMPLICATION, WITHOUT LONG-TERM CURRENT USE OF INSULIN (HCC): Primary | ICD-10-CM

## 2020-04-24 DIAGNOSIS — I48.0 PAF (PAROXYSMAL ATRIAL FIBRILLATION) (HCC): ICD-10-CM

## 2020-04-24 PROBLEM — C37 MALIGNANT THYMOMA (HCC): Status: ACTIVE | Noted: 2020-04-24

## 2020-04-24 PROBLEM — R06.00 PAROXYSMAL NOCTURNAL DYSPNEA: Status: ACTIVE | Noted: 2020-04-24

## 2020-04-24 PROBLEM — R00.0 TACHYCARDIA: Status: ACTIVE | Noted: 2020-04-24

## 2020-04-24 PROBLEM — R00.2 PALPITATIONS: Status: ACTIVE | Noted: 2020-04-24

## 2020-04-24 PROBLEM — R06.00 DYSPNEA: Status: ACTIVE | Noted: 2020-04-24

## 2020-04-24 PROCEDURE — 99214 OFFICE O/P EST MOD 30 MIN: CPT | Performed by: INTERNAL MEDICINE

## 2020-04-24 RX ORDER — LISINOPRIL 5 MG/1
5 TABLET ORAL DAILY
Qty: 90 TABLET | Refills: 3 | Status: SHIPPED | OUTPATIENT
Start: 2020-04-24 | End: 2021-04-29 | Stop reason: SDUPTHER

## 2020-04-24 NOTE — PROGRESS NOTES
Patient presents for video appointment via the [email or web address] platform.   You have chosen to receive care through a telehealth visit.  Do you consent to use a video/audio connection for your medical care today? Yes   7:57 spent on this call.    CC: diarrhea    History:  Joao Fonseca is a 64 y.o. male   He reports he has been doing reasonably well, but has had diarrhea since starting his warfarin last month per cardiology for AF.  He had complications with Pradaxa and was therefore switched.  Fortunately, he has noticed a move toward normal stools today, with which he is encouraged.  He has been having 3-7 BMs daily.     He notes his BP has been doing well to his knowledge without symptoms, though he does have some dizziness that he relates to his warfarin.     He denies worsening of shortness of breath, weight gain, nor edema with regard to his HF and continues on meds as managed by Cardiology. Furthermore, he has no chest pains related to CAD and he does continue on warfarin and metoprolol. He is not on ASA nor statin at this time.        ROS:  Review of Systems   Constitutional: Negative for chills, fever and unexpected weight change.   Respiratory: Positive for shortness of breath. Negative for cough.    Cardiovascular: Negative for chest pain, palpitations and leg swelling.   Gastrointestinal: Positive for diarrhea. Negative for abdominal pain and blood in stool.        reports that he quit smoking about 25 years ago. His smoking use included cigarettes. He quit after 25.00 years of use. He has never used smokeless tobacco. He reports that he does not drink alcohol or use drugs.      Current Outpatient Medications:   •  acyclovir (ZOVIRAX) 400 MG tablet, Take 1 tablet by mouth 5 (Five) Times a Day. Take no more than 5 doses a day., Disp: 90 tablet, Rfl: 6  •  albuterol (PROVENTIL HFA;VENTOLIN HFA) 108 (90 Base) MCG/ACT inhaler, Inhale 2 puffs Every 4 (Four) Hours As Needed for Wheezing or Shortness of Air., Disp: ,  "Rfl:   •  Blood Glucose Monitoring Suppl (ONE TOUCH ULTRA 2) w/Device kit, USE AS DIRECTED PER PACKAGE INSTRUCTIONS, Disp: , Rfl: 0  •  furosemide (LASIX) 40 MG tablet, Take 1 tablet by mouth Daily As Needed (edema, SOA)., Disp: 90 tablet, Rfl: 3  •  lisinopril (PRINIVIL,ZESTRIL) 5 MG tablet, Take 1 tablet by mouth Daily., Disp: 90 tablet, Rfl: 3  •  metFORMIN (GLUCOPHAGE) 1000 MG tablet, Take 1 tablet by mouth 2 (Two) Times a Day With Meals., Disp: 180 tablet, Rfl: 3  •  metoprolol tartrate (LOPRESSOR) 50 MG tablet, Take 1.5 tablets by mouth 2 (Two) Times a Day., Disp: 270 tablet, Rfl: 3  •  warfarin (COUMADIN) 5 MG tablet, Take 1 tablet by mouth Every Night. Extra tablet to be taken PRN upon instruction from Coumadin Clinic r/t sub-therapeutic INR result., Disp: 60 tablet, Rfl: 11    OBJECTIVE:  Ht 182.9 cm (72\")   Wt 101 kg (222 lb)   BMI 30.11 kg/m²    Physical Exam   Constitutional: He is oriented to person, place, and time. He appears well-nourished. No distress.   Pulmonary/Chest: Effort normal. No respiratory distress.   Neurological: He is alert and oriented to person, place, and time.       Assessment/Plan     Diagnoses and all orders for this visit:    Type 2 diabetes mellitus without complication, without long-term current use of insulin (CMS/[facility])  -     Comprehensive Metabolic Panel; Future  -     Hemoglobin A1c; Future  -     Lipid Panel; Future  Check labs to monitor. No symptoms of hyperglycemia nor hypoglycemia at this time.     Coronary artery disease involving native coronary artery of native heart without angina pectoris  S/P CABG x 3  -     Lipid Panel; Future  Continue warfarin, BB and he is resistant to statin given previous fatigue with it. Will defer to cardiology. He is not currently on ASA.     PAF (paroxysmal atrial fibrillation) (CMS/[facility])  -     CBC (No Diff); Future  Rate controlled on metoprolol and is anticoagulated on warfarin with therapeutic INR on last check.     Essential " hypertension  -     lisinopril (PRINIVIL,ZESTRIL) 5 MG tablet; Take 1 tablet by mouth Daily.  -     CBC (No Diff); Future  BP goal for age is <140/90 per JNC 8 guidelines and continue current medications    CHF (congestive heart failure), NYHA class II, chronic, systolic (CMS/[facility])  No symptoms of decompensation and he is on metoprolol, lisinopril and lasix. He has not tolerated Entresto previously.       An After Visit Summary was printed and given to the patient at discharge.  Return in about 6 months (around 10/24/2020) for Medicare Wellness.  {Review (Popup)  Instructions  Quality  Charge Capture  LOS  Follow-up  Communications :23}        Hernandez Dupree D.O. 4/24/2020   Electronically signed.

## 2020-05-04 ENCOUNTER — LAB (OUTPATIENT)
Dept: LAB | Facility: HOSPITAL | Age: 65
End: 2020-05-04

## 2020-05-04 ENCOUNTER — ANTICOAGULATION VISIT (OUTPATIENT)
Dept: CARDIOLOGY | Facility: CLINIC | Age: 65
End: 2020-05-04

## 2020-05-04 DIAGNOSIS — I10 ESSENTIAL HYPERTENSION: ICD-10-CM

## 2020-05-04 DIAGNOSIS — I25.10 CORONARY ARTERY DISEASE INVOLVING NATIVE CORONARY ARTERY OF NATIVE HEART WITHOUT ANGINA PECTORIS: ICD-10-CM

## 2020-05-04 DIAGNOSIS — I48.0 PAF (PAROXYSMAL ATRIAL FIBRILLATION) (HCC): ICD-10-CM

## 2020-05-04 DIAGNOSIS — E11.9 TYPE 2 DIABETES MELLITUS WITHOUT COMPLICATION, WITHOUT LONG-TERM CURRENT USE OF INSULIN (HCC): ICD-10-CM

## 2020-05-04 LAB
ALBUMIN SERPL-MCNC: 4.3 G/DL (ref 3.5–5.2)
ALBUMIN/GLOB SERPL: 1.7 G/DL
ALP SERPL-CCNC: 50 U/L (ref 39–117)
ALT SERPL W P-5'-P-CCNC: 17 U/L (ref 1–41)
ANION GAP SERPL CALCULATED.3IONS-SCNC: 13.1 MMOL/L (ref 5–15)
AST SERPL-CCNC: 21 U/L (ref 1–40)
BILIRUB SERPL-MCNC: 0.9 MG/DL (ref 0.2–1.2)
BUN BLD-MCNC: 21 MG/DL (ref 8–23)
BUN/CREAT SERPL: 14.2 (ref 7–25)
CALCIUM SPEC-SCNC: 9 MG/DL (ref 8.6–10.5)
CHLORIDE SERPL-SCNC: 102 MMOL/L (ref 98–107)
CHOLEST SERPL-MCNC: 156 MG/DL (ref 0–200)
CO2 SERPL-SCNC: 22.9 MMOL/L (ref 22–29)
CREAT BLD-MCNC: 1.48 MG/DL (ref 0.76–1.27)
DEPRECATED RDW RBC AUTO: 45.5 FL (ref 37–54)
ERYTHROCYTE [DISTWIDTH] IN BLOOD BY AUTOMATED COUNT: 13.2 % (ref 12.3–15.4)
GFR SERPL CREATININE-BSD FRML MDRD: 48 ML/MIN/1.73
GLOBULIN UR ELPH-MCNC: 2.5 GM/DL
GLUCOSE BLD-MCNC: 156 MG/DL (ref 65–99)
HBA1C MFR BLD: 7.49 % (ref 4.8–5.6)
HCT VFR BLD AUTO: 49.2 % (ref 37.5–51)
HDLC SERPL-MCNC: 37 MG/DL (ref 40–60)
HGB BLD-MCNC: 16.6 G/DL (ref 13–17.7)
INR PPP: 2.08 (ref 0.91–1.09)
LDLC SERPL CALC-MCNC: 95 MG/DL (ref 0–100)
LDLC/HDLC SERPL: 2.57 {RATIO}
MCH RBC QN AUTO: 31.4 PG (ref 26.6–33)
MCHC RBC AUTO-ENTMCNC: 33.7 G/DL (ref 31.5–35.7)
MCV RBC AUTO: 93 FL (ref 79–97)
PLATELET # BLD AUTO: 131 10*3/MM3 (ref 140–450)
PMV BLD AUTO: 11.8 FL (ref 6–12)
POTASSIUM BLD-SCNC: 4.4 MMOL/L (ref 3.5–5.2)
PROT SERPL-MCNC: 6.8 G/DL (ref 6–8.5)
PROTHROMBIN TIME: 23.2 SECONDS (ref 11.9–14.6)
RBC # BLD AUTO: 5.29 10*6/MM3 (ref 4.14–5.8)
SODIUM BLD-SCNC: 138 MMOL/L (ref 136–145)
TRIGL SERPL-MCNC: 119 MG/DL (ref 0–150)
VLDLC SERPL-MCNC: 23.8 MG/DL (ref 5–40)
WBC NRBC COR # BLD: 4.97 10*3/MM3 (ref 3.4–10.8)

## 2020-05-04 PROCEDURE — 85027 COMPLETE CBC AUTOMATED: CPT | Performed by: INTERNAL MEDICINE

## 2020-05-04 PROCEDURE — 36415 COLL VENOUS BLD VENIPUNCTURE: CPT

## 2020-05-04 PROCEDURE — 80053 COMPREHEN METABOLIC PANEL: CPT | Performed by: INTERNAL MEDICINE

## 2020-05-04 PROCEDURE — 80061 LIPID PANEL: CPT | Performed by: INTERNAL MEDICINE

## 2020-05-04 PROCEDURE — 85610 PROTHROMBIN TIME: CPT | Performed by: INTERNAL MEDICINE

## 2020-05-04 PROCEDURE — 83036 HEMOGLOBIN GLYCOSYLATED A1C: CPT | Performed by: INTERNAL MEDICINE

## 2020-05-05 PROBLEM — E11.22 TYPE 2 DIABETES MELLITUS WITH STAGE 3 CHRONIC KIDNEY DISEASE, WITHOUT LONG-TERM CURRENT USE OF INSULIN (HCC): Status: ACTIVE | Noted: 2017-01-09

## 2020-05-05 PROBLEM — N18.30 TYPE 2 DIABETES MELLITUS WITH STAGE 3 CHRONIC KIDNEY DISEASE, WITHOUT LONG-TERM CURRENT USE OF INSULIN (HCC): Status: ACTIVE | Noted: 2017-01-09

## 2020-05-20 ENCOUNTER — CLINICAL SUPPORT NO REQUIREMENTS (OUTPATIENT)
Dept: CARDIOLOGY | Facility: CLINIC | Age: 65
End: 2020-05-20

## 2020-05-20 ENCOUNTER — HOSPITAL ENCOUNTER (OUTPATIENT)
Dept: CT IMAGING | Facility: HOSPITAL | Age: 65
Discharge: HOME | End: 2020-05-20
Admitting: THORACIC SURGERY (CARDIOTHORACIC VASCULAR SURGERY)

## 2020-05-20 ENCOUNTER — OFFICE VISIT (OUTPATIENT)
Dept: CARDIAC SURGERY | Facility: CLINIC | Age: 65
End: 2020-05-20

## 2020-05-20 VITALS
HEIGHT: 72 IN | SYSTOLIC BLOOD PRESSURE: 124 MMHG | WEIGHT: 231.4 LBS | OXYGEN SATURATION: 98 % | BODY MASS INDEX: 31.34 KG/M2 | DIASTOLIC BLOOD PRESSURE: 74 MMHG | HEART RATE: 61 BPM

## 2020-05-20 DIAGNOSIS — I50.22 CHF (CONGESTIVE HEART FAILURE), NYHA CLASS II, CHRONIC, SYSTOLIC (HCC): Primary | ICD-10-CM

## 2020-05-20 DIAGNOSIS — I71.20 THORACIC AORTIC ANEURYSM WITHOUT RUPTURE (HCC): ICD-10-CM

## 2020-05-20 DIAGNOSIS — Z95.810 AICD (AUTOMATIC CARDIOVERTER/DEFIBRILLATOR) PRESENT: ICD-10-CM

## 2020-05-20 DIAGNOSIS — Z95.1 S/P CABG X 3: ICD-10-CM

## 2020-05-20 DIAGNOSIS — I25.10 CORONARY ARTERY DISEASE INVOLVING NATIVE CORONARY ARTERY OF NATIVE HEART WITHOUT ANGINA PECTORIS: ICD-10-CM

## 2020-05-20 PROCEDURE — 0 IOPAMIDOL PER 1 ML: Performed by: THORACIC SURGERY (CARDIOTHORACIC VASCULAR SURGERY)

## 2020-05-20 PROCEDURE — 71275 CT ANGIOGRAPHY CHEST: CPT

## 2020-05-20 PROCEDURE — 93289 INTERROG DEVICE EVAL HEART: CPT | Performed by: PHYSICIAN ASSISTANT

## 2020-05-20 PROCEDURE — 82565 ASSAY OF CREATININE: CPT

## 2020-05-20 PROCEDURE — 99213 OFFICE O/P EST LOW 20 MIN: CPT | Performed by: THORACIC SURGERY (CARDIOTHORACIC VASCULAR SURGERY)

## 2020-05-20 RX ADMIN — IOPAMIDOL 100 ML: 755 INJECTION, SOLUTION INTRAVENOUS at 07:44

## 2020-05-22 LAB — CREAT BLDA-MCNC: 1.4 MG/DL (ref 0.6–1.3)

## 2020-06-01 ENCOUNTER — LAB (OUTPATIENT)
Dept: LAB | Facility: HOSPITAL | Age: 65
End: 2020-06-01

## 2020-06-01 ENCOUNTER — ANTICOAGULATION VISIT (OUTPATIENT)
Dept: CARDIOLOGY | Facility: CLINIC | Age: 65
End: 2020-06-01

## 2020-06-01 DIAGNOSIS — I48.0 PAF (PAROXYSMAL ATRIAL FIBRILLATION) (HCC): ICD-10-CM

## 2020-06-01 LAB
INR PPP: 2.5 (ref 0.91–1.09)
PROTHROMBIN TIME: 30.3 SECONDS (ref 10–13.8)

## 2020-06-01 PROCEDURE — 85610 PROTHROMBIN TIME: CPT | Performed by: INTERNAL MEDICINE

## 2020-06-10 NOTE — PROGRESS NOTES
Single Chamber AICD Evaluation Report  Clinic Interrogation    Poonam 10, 2020    Primary Cardiologist: Elaine  : [employer] Model: Evera MRI  Implant date: 10/6/16    Reason for evaluation: routine  Indication for AICD: chronic systolic heart failure and cardiomyopathy    Measurements  Ventricular sensing - R wave: >20 mV  Ventricular threshold: 0.625 V @ 0.4 ms  Ventricular lead impedance: 418 ohms  Shock Impedance: RV 44 ohms  SVC 53 ohms      Diagnostic Data  Paced: 1.6 %  Other: Several 2-sec runs of NSVT.  Battery status: satisfactory     Final Parameters  Mode: VVI  Lower rate: 40 bpm   Ventricular - Amplitude: 2.0 V   Pulse width: 0.4 ms  Sensitivity: 0.3 mV   Changes made: n/a  Conclusions: normal AICD function    Follow up: 3 months

## 2020-06-30 ENCOUNTER — LAB (OUTPATIENT)
Dept: LAB | Facility: HOSPITAL | Age: 65
End: 2020-06-30

## 2020-06-30 ENCOUNTER — ANTICOAGULATION VISIT (OUTPATIENT)
Dept: CARDIOLOGY | Facility: CLINIC | Age: 65
End: 2020-06-30

## 2020-06-30 DIAGNOSIS — I48.0 PAF (PAROXYSMAL ATRIAL FIBRILLATION) (HCC): ICD-10-CM

## 2020-06-30 LAB
INR PPP: 2.3 (ref 0.91–1.09)
PROTHROMBIN TIME: 27.4 SECONDS (ref 10–13.8)

## 2020-06-30 PROCEDURE — 85610 PROTHROMBIN TIME: CPT | Performed by: INTERNAL MEDICINE

## 2020-07-02 ENCOUNTER — OFFICE VISIT (OUTPATIENT)
Dept: CARDIOLOGY | Facility: CLINIC | Age: 65
End: 2020-07-02

## 2020-07-02 ENCOUNTER — CLINICAL SUPPORT NO REQUIREMENTS (OUTPATIENT)
Dept: CARDIOLOGY | Facility: CLINIC | Age: 65
End: 2020-07-02

## 2020-07-02 VITALS
DIASTOLIC BLOOD PRESSURE: 64 MMHG | SYSTOLIC BLOOD PRESSURE: 104 MMHG | BODY MASS INDEX: 30.07 KG/M2 | HEIGHT: 72 IN | WEIGHT: 222 LBS | OXYGEN SATURATION: 99 % | HEART RATE: 50 BPM

## 2020-07-02 DIAGNOSIS — I50.22 CHF (CONGESTIVE HEART FAILURE), NYHA CLASS II, CHRONIC, SYSTOLIC (HCC): Primary | ICD-10-CM

## 2020-07-02 DIAGNOSIS — I10 ESSENTIAL HYPERTENSION: ICD-10-CM

## 2020-07-02 DIAGNOSIS — Z95.810 AICD (AUTOMATIC CARDIOVERTER/DEFIBRILLATOR) PRESENT: Primary | ICD-10-CM

## 2020-07-02 DIAGNOSIS — I48.0 PAF (PAROXYSMAL ATRIAL FIBRILLATION) (HCC): ICD-10-CM

## 2020-07-02 DIAGNOSIS — I25.10 CORONARY ARTERY DISEASE INVOLVING NATIVE CORONARY ARTERY OF NATIVE HEART WITHOUT ANGINA PECTORIS: ICD-10-CM

## 2020-07-02 DIAGNOSIS — I71.20 THORACIC AORTIC ANEURYSM WITHOUT RUPTURE (HCC): ICD-10-CM

## 2020-07-02 DIAGNOSIS — I50.22 CHF (CONGESTIVE HEART FAILURE), NYHA CLASS II, CHRONIC, SYSTOLIC (HCC): ICD-10-CM

## 2020-07-02 PROBLEM — R00.2 PALPITATIONS: Status: RESOLVED | Noted: 2020-04-24 | Resolved: 2020-07-02

## 2020-07-02 PROBLEM — R00.0 TACHYCARDIA: Status: RESOLVED | Noted: 2020-04-24 | Resolved: 2020-07-02

## 2020-07-02 PROCEDURE — 93000 ELECTROCARDIOGRAM COMPLETE: CPT | Performed by: INTERNAL MEDICINE

## 2020-07-02 PROCEDURE — 93289 INTERROG DEVICE EVAL HEART: CPT | Performed by: INTERNAL MEDICINE

## 2020-07-02 PROCEDURE — 99214 OFFICE O/P EST MOD 30 MIN: CPT | Performed by: INTERNAL MEDICINE

## 2020-07-02 NOTE — PROGRESS NOTES
Subjective:     Encounter Date:07/02/2020      Patient ID: Joao Fonseca is a 65 y.o. male with coronary artery disease, chronic systolic congestive heart failure, ICD implant place, hypertension, thoracic aortic aneurysm (followed closely by Dr. Wilson) who presents today for follow-up.    Chief Complaint: Follow-up    Congestive Heart Failure   Presents for follow-up visit. Pertinent negatives include no abdominal pain, chest pain, edema, palpitations, paroxysmal nocturnal dyspnea, shortness of breath (Only with excessive exertion) or unexpected weight change. The symptoms have been stable. His past medical history is significant for CAD. Compliance with total regimen is %. Compliance with diet is %. Compliance with exercise is %. Compliance with medications is %.   Coronary Artery Disease   Presents for follow-up visit. Pertinent negatives include no chest pain, dizziness, leg swelling, palpitations or shortness of breath (Only with excessive exertion). His past medical history is significant for CHF. The symptoms have been stable. Compliance with diet is variable. Compliance with exercise is variable. Compliance with medications is good.     This patient presents today for routine follow-up and says that he has been doing well.  He remains physically active.  Occasionally, he will notice some shortness of breath but generally this is short-lived.  He has not had any exercise limiting symptoms at this time.  He says that occasionally he will eat more salt than he should and the next morning he will notice some swelling in his hands.  Generally, this is easily taken care of with a as needed dose of Lasix.  He denies any orthopnea, PND, edema.  His weight has been stable.  No palpitations, lightheadedness, dizziness or syncope.  No problems with his ICD or therapies from his ICD.  Blood pressure is generally well controlled.  No chest pain.  The patient has not had any problems with  "Coumadin.      Current Outpatient Medications:   •  acyclovir (ZOVIRAX) 400 MG tablet, Take 1 tablet by mouth 5 (Five) Times a Day. Take no more than 5 doses a day., Disp: 90 tablet, Rfl: 6  •  albuterol (PROVENTIL HFA;VENTOLIN HFA) 108 (90 Base) MCG/ACT inhaler, Inhale 2 puffs Every 4 (Four) Hours As Needed for Wheezing or Shortness of Air., Disp: , Rfl:   •  Blood Glucose Monitoring Suppl (ONE TOUCH ULTRA 2) w/Device kit, USE AS DIRECTED PER PACKAGE INSTRUCTIONS, Disp: , Rfl: 0  •  furosemide (LASIX) 40 MG tablet, Take 1 tablet by mouth Daily As Needed (edema, SOA)., Disp: 90 tablet, Rfl: 3  •  lisinopril (PRINIVIL,ZESTRIL) 5 MG tablet, Take 1 tablet by mouth Daily., Disp: 90 tablet, Rfl: 3  •  metFORMIN (GLUCOPHAGE) 1000 MG tablet, Take 1 tablet by mouth 2 (Two) Times a Day With Meals., Disp: 180 tablet, Rfl: 3  •  metoprolol tartrate (LOPRESSOR) 50 MG tablet, Take 1.5 tablets by mouth 2 (Two) Times a Day., Disp: 270 tablet, Rfl: 3  •  warfarin (COUMADIN) 5 MG tablet, Take 1 tablet by mouth Every Night. Extra tablet to be taken PRN upon instruction from [facility] r/t sub-therapeutic INR result. (Patient taking differently: Take 7.5 mg by mouth Every Night. Extra tablet to be taken PRN upon instruction from [facility] r/t sub-therapeutic INR result.), Disp: 60 tablet, Rfl: 11    Allergies   Allergen Reactions   • Cephalexin Anaphylaxis     Causes swelling of tongue, eyes, throat   • Entresto [Sacubitril-Valsartan] Other (See Comments)     \"put me in a-fib\"   • Eliquis [Apixaban] Swelling     \"swelling in ankles and feet\"   • Atorvastatin Unknown (See Comments)     fatigue     Social History     Tobacco Use   • Smoking status: Former Smoker     Years: 25.00     Types: Cigarettes     Last attempt to quit:      Years since quittin.5   • Smokeless tobacco: Never Used   • Tobacco comment: QUIT 20 YEARS AGO   Substance Use Topics   • Alcohol use: No     Review of Systems   Constitution: Negative for " fever, unexpected weight change and weight loss.   Cardiovascular: Negative for chest pain, leg swelling, orthopnea, palpitations, paroxysmal nocturnal dyspnea and syncope.   Respiratory: Negative for cough, shortness of breath (Only with excessive exertion) and wheezing.    Hematologic/Lymphatic: Negative for bleeding problem. Does not bruise/bleed easily.   Gastrointestinal: Negative for abdominal pain, hematemesis, hematochezia, melena, nausea and vomiting.   Neurological: Negative for dizziness, headaches, loss of balance and weakness.         ECG 12 Lead  Date/Time: 7/2/2020 12:20 PM  Performed by: Phil Henry MD  Authorized by: Phli Henry MD   Comparison: compared with previous ECG from 3/4/2020  Similar to previous ECG  Rhythm: sinus bradycardia and paced  Ectopy: unifocal PVCs  Rate: bradycardic  BPM: 52  Conduction: left bundle branch block  QRS axis: left  Pacing: ventricular pacing  Clinical impression: abnormal EKG               Objective:     Physical Exam   Constitutional: He is oriented to person, place, and time. He appears well-developed and well-nourished. No distress.   HENT:   Head: Normocephalic and atraumatic.   Mouth/Throat: Oropharynx is clear and moist.   Eyes: Pupils are equal, round, and reactive to light. EOM are normal.   Neck: Normal range of motion. Neck supple. No JVD present. No thyromegaly present.   Cardiovascular: Normal rate, regular rhythm, S1 normal, S2 normal, normal heart sounds and intact distal pulses. Exam reveals no gallop and no friction rub.   No murmur heard.  Pulmonary/Chest: Effort normal and breath sounds normal.   Abdominal: Soft. Bowel sounds are normal. He exhibits no distension. There is no tenderness.   Musculoskeletal: Normal range of motion. He exhibits no edema.   Neurological: He is alert and oriented to person, place, and time. No cranial nerve deficit.   Skin: Skin is warm and dry. No rash noted. No cyanosis or erythema. Nails  "show no clubbing.   Psychiatric: He has a normal mood and affect.   Vitals reviewed.    /64 (BP Location: Left arm)   Pulse 50   Ht 182.9 cm (72\")   Wt 101 kg (222 lb)   SpO2 99%   BMI 30.11 kg/m²     Data/Lab Review:     Lab Results   Component Value Date    INR 2.3 (H) 06/30/2020    INR 2.5 (H) 06/01/2020    INR 2.08 (H) 05/04/2020    PROTIME 27.4 (H) 06/30/2020    PROTIME 30.3 (H) 06/01/2020    PROTIME 23.2 (H) 05/04/2020     =============================================================    JULIO CESAR 2/20:  · Left ventricular systolic function is severely decreased.  · The left ventricular cavity is severely dilated.  · Left atrial cavity size is dilated.  · No evidence of a left atrial appendage.  · Mild mitral valve regurgitation is present  · Mild aortic valve regurgitation is present.  · Moderate dilation of the aortic root is present. Mild dilation of the sinuses of Valsalva is present.    =============================================================    CT Chest 5/20/20:  1. Stable aneurysmal dilatation of the ascending thoracic aorta with an  anterior to posterior dimension of 5.3 cm again demonstrated. The  proximal great vessels are tortuous. No evidence of dissection. The  aortic arch is also mildly ectatic. The descending thoracic aorta is  normal in caliber.  2. No evidence of pulmonary thromboembolic disease.  3. Stable mediastinal adenopathy from previous exams. No new adenopathy  is present. There is evidence of remote granulomatous disease.  4. Extensive coronary calcifications.  5. Suspected cholelithiasis. This could be confirmed with gallbladder ultrasound.    =============================================================    Echocardiogram from May 2018 showing ejection fraction of 21-25% with diastolic dysfunction, severely dilated left ventricular cavity, mild to moderate mitral valve regurgitation, moderate aortic valve regurgitation, mild tricuspid valve regurgitation.         Assessment: "          Diagnosis Plan   1. CHF (congestive heart failure), NYHA class II, chronic, systolic (CMS/[facility])  ECG 12 Lead   2. Coronary artery disease involving native coronary artery of native heart without angina pectoris     3. Essential hypertension     4. PAF (paroxysmal atrial fibrillation) (CMS/[facility])     5. Thoracic aortic aneurysm without rupture (CMS/[facility])            Plan:       1.  Chronic systolic heart failure, currently with New [place] Heart Association class II symptoms: Stable at this time.  The patient continues to watch his sodium and fluid intake and weigh himself daily.  He takes Lasix as needed.  He remains on beta-blocker and ACE inhibitor therapies.  He previously had an intolerance to Entresto therefore remains on lisinopril.  He is thought to be stable at this time.  We continue to follow his ICD as well.    2.  Coronary artery disease: The patient does not describe any ischemic symptoms.  He is chronically anticoagulated therefore is not on aspirin but does remain on beta-blocker therapy.  Previously, the patient had an intolerance to statins and does not wish to pursue these again in the future.     3.  Essential hypertension:  The patient's blood pressure is well controlled.  Continue current medications.     4.  Paroxysmal atrial fibrillation:  Clinically stable.  The patient is anticoagulated with Coumadin and he is tolerating this well.     The patient does have a thoracic aortic aneurysm and he continues to follow closely with Dr. Wilson.  The last assessment as referenced above, showing stable findings.     Patient's Body mass index is 30.11 kg/m². BMI is above normal parameters. Recommendations include: exercise counseling and nutrition counseling.     Follow-up: 6 months unless otherwise needed sooner.

## 2020-07-06 NOTE — PROGRESS NOTES
"Subjective   Patient ID: Joao Fonseca is a 65 y.o. male who is here for follow-up of a known aneurysm.   Chief Complaint   Patient presents with   • Thoracic Aneurysm     Patient is here for a follow up w/ ct.      No new events.  No additional bouts of heart failure.  He denies any chest pain but does have some underlying shortness of breath consistent with [place] heart association class II symptoms.  Intermittent lower extremity swelling that is symmetric.  Diet changes and extra diuretic has been sufficient.  He is joined by his wife today.    The following portions of the patient's history were reviewed and updated as appropriate: allergies, current medications, past family history, past medical history, past social history, past surgical history and problem list.       Objective      Visit Vitals  /74 (BP Location: Left arm, Patient Position: Sitting, Cuff Size: Large Adult)   Pulse 61   Ht 182.9 cm (72\")   Wt 105 kg (231 lb 6.4 oz)   SpO2 98%   BMI 31.38 kg/m²       Physical Exam   Constitutional: He is oriented to person, place, and time. He appears well-developed.   HENT:   Head: Normocephalic and atraumatic.   Mouth/Throat: Oropharynx is clear and moist.   Eyes: Pupils are equal, round, and reactive to light. EOM are normal.   Neck: Normal range of motion. Neck supple. No JVD present. No tracheal deviation present. No thyromegaly present.   Cardiovascular: Normal rate, regular rhythm, normal heart sounds and intact distal pulses. Exam reveals no gallop and no friction rub.   No murmur heard.  Pulmonary/Chest: Effort normal and breath sounds normal. No respiratory distress. He has no wheezes. He has no rales. He exhibits no tenderness.   Abdominal: Soft. He exhibits no distension. There is no tenderness.   Musculoskeletal: Normal range of motion. He exhibits no edema.   Lymphadenopathy:     He has no cervical adenopathy.   Neurological: He is alert and oriented to person, place, and time. No " cranial nerve deficit.   Skin: Skin is warm and dry.   Psychiatric: He has a normal mood and affect.   Sternum is stable. Incision is well healed.      EXAMINATION: CT ANGIOGRAM CHEST W WO CONTRAST-  5/4/2018 3:31 PM CDT     HISTORY: Dyspnea     COMPARISON: 12/9/2015 chest CT     TECHNIQUE:     CT evaluation of the chest was performed with intravenous contrast. 2 mm  transaxial images were obtained.. Coronal reconstruction maximum  intensity projection images of the pulmonary arteries and aorta were  also generated.     Radiation dose equals  mGy-cm.  Automated exposure control dose  reduction technique was implemented.        FINDINGS:     [The pulmonary arteries are opacified with iodinated contrast without  intraluminal filling defects or CT angiographic evidence of pulmonary  embolus.     The aorta is minimally opacified with iodinated contrast. There are  changes from median sternotomy. There is aneurysmal dilatation of the  ascending thoracic aorta measuring approximate 5.2 cm, just superior to  the valve. The] arch tapers appropriately in the arch and descending  thoracic aorta with atherosclerotic calcifications. No secondary signs  of dissection with again poor enhancement.     There are coronary artery calcifications.     There is cardiomegaly with panchamber enlargement.     There are multiple middle mediastinal lymph nodes. Calcified right  paratracheal and right hilar nodes observed. No mediastinal or hilar  lymphadenopathy.     There are small bilateral pleural effusions layering posteriorly. There  are groundglass opacities appreciated in the lower lobes bilaterally  which may be related to the level of inspiration and patient position.  Mild interstitial infiltration from an acute infectious or inflammatory  process or mild interstitial pulmonary edema also considered. The  pulmonary arteries aren't prominent suggesting pulmonary hypertension.  There are no consolidating parenchymal  infiltrates.     Limited imaging of the upper abdomen is unremarkable.     Degenerative changes noted in the thoracic spine with osteophyte  formation. No acute osseous abnormality observed.     IMPRESSION:  1. No CT angiographic evidence of pulmonary embolus or acute aortic  pathology.   2. Aneurysm dilatation of the ascending thoracic aorta measuring 5.2 cm  in greatest AP projection.  3. Small bilateral pleural effusions. Cardiomegaly with pulmonary  vasculature prominence. Mild pulmonary venous hypertension considered.  No parenchymal infiltrates.  This report was finalized on 05/04/2018 15:38 by Dr. Leo Haddad MD.  CT scan of the chest obtained May of 2018 is reviewed by me.  It measures 5.1 cm in the short axis in greatest dimension.    No evidence of acute aortic pathology.      EXAMINATION:  CT ANGIOGRAM CHEST W CONTRAST-  11/19/2018 8:01 AM CST     HISTORY: I71.2-Thoracic aortic aneurysm, without rupture.     COMPARISON : 5/4/2018.     DLP: 434 mGy-cm. Automated dosage control was utilized.     TECHNIQUE: CT angio was performed of the chest with contrast. Coronal,  sagittal and 3-D reconstruction were performed.     MEDIASTINUM, HEART AND VASCULAR STRUCTURES: The ascending thoracic aorta  measures 5.2 cm in transverse diameter. The aortic arch and descending  thoracic aorta are normal in caliber. There is mild atheromatous disease  of the thoracic aorta. There is fairly dense atheromatous disease of the  coronary arteries. There has been prior heart bypass surgery. There is  cardiomegaly. The pulmonary arteries are dilated. The main pulmonary  artery segment measures 3.5 cm. The left main pulmonary artery measures  3.2 cm on the right main pulmonary artery measures 3.2 cm. There are  calcified and noncalcified mediastinal lymph nodes. Mediastinal lymph  nodes are stable compared to the prior study.     LUNGS: There is no focal infiltrate or effusion. Patchy groundglass  opacity in both lungs is likely  related to relatively poor inspiration.  There is no pleural effusion. The airways are patent.      UPPER ABDOMEN: There is diverticulosis of the visualized colon. The  visualized upper abdomen is otherwise within normal limits.     BONES: There are degenerative changes of the spine.     IMPRESSION:  1. Aneurysmal dilatation of the ascending thoracic aorta measuring 5.2  cm, stable.  2. Cardiomegaly. Atheromatous disease of the thoracic aorta and coronary  arteries. Prior heart bypass surgery.  3. No significant lung infiltrate.  4. Diverticulosis of the colon.    This report was finalized on 11/19/2018 08:32 by Dr. Jerry Reza MD.    Study Result     EXAMINATION: CT ANGIOGRAM CHEST W CONTRAST- 5/22/2019 1:13 PM CDT     HISTORY: 3 month checkup for thoracic aortic aneurysm, nontraumatic  aortic disease     COMPARISON: CTA chest 11/19/2018, 05/04/2018, and 12/09/2015     DOSE: 1376 mGy-cm     TECHNIQUE: Sequential imaging was performed from the thoracic inlet  through the upper abdomen following the administration of IV contrast.   Sagittal and coronal reformations were made from the original source  data and reviewed. Additionally, 3-D MIPS reconstructions of the vessels  were made per CTA protocol. Automated exposure control was also utilized  to decrease patient radiation dose.     FINDINGS:   A left subclavian approach cardiac pacing device is in place with the  tip adjacent to the right ventricle.     The visualized thyroid gland is unremarkable. Trachea and main bronchi  appear widely patent and in normal anatomic position. The esophagus is  grossly normal in appearance.     The heart is markedly enlarged with dilation of the left ventricle.  Coronary artery calcifications are present. No pericardial effusion is  appreciated. The ascending thoracic aorta measures up to 5.5 cm in  diameter near the aortic root measuring up to 5.2 cm on the most recent  comparison exam. The descending thoracic aorta measures  up to 3.6 cm in  diameter. Contrast bolus timing is suboptimal for evaluation of the  aorta for dissection. No hyperdense material is seen to suggest acute  dissection. The main pulmonary artery is dilated, suggesting pulmonary  arterial hypertension. No filling defects are seen to suggest PE.     There is no appreciable axillary lymphadenopathy. A prevascular lymph  node is seen which measures 11 mm in short axis, stable compared to the  previous exam. Multiple calcified mediastinal lymph nodes indicate prior  granulomatous exposure. A subcarinal lymph node measures 14 mm in short  axis, previously measuring 17 mm in short axis. There are numerous  collateral vessels through the soft tissues of the left chest.     There is mild interlobular septal thickening at the lung bases. The  lungs otherwise appear clear.     Review of the visualized portion of the upper abdomen demonstrates mild  reflux of contrast into the IVC and hepatic veins.     Review of the visualized osseous structures demonstrates no acute or  aggressive lesions. Degenerative changes are noted in the spine. There  has been prior median sternotomy.     IMPRESSION:  1. Interval increase in the size of an ascending thoracic aortic  aneurysm, now measuring 5.5 cm, most recently measuring 5.2 cm.  2. Marked cardiomegaly. Atherosclerosis of the aorta and coronary  arteries.  3. Findings suggestive of pulmonary arterial hypertension.  4. Nonspecific mildly enlarged mediastinal lymph nodes. Some appear  partially calcified, and enlargement may be all related to granulomatous  disease.           This report was finalized on 05/22/2019 13:21 by Dr. Jaime Carreno MD.     Study Result     EXAMINATION:  CT ANGIOGRAM CHEST-  11/20/2019 8:06 AM CST     HISTORY: Aortic dz, non-traumatic, known or suspect; I71.2-Thoracic  aortic aneurysm, without rupture     COMPARISON : 05/22/2019.     DLP: 320 mGy-cm. Automated dosage control was utilized.     TECHNIQUE: CT angio  was performed of the chest with contrast. Coronal,  sagittal and 3-D reconstruction were performed.     MEDIASTINUM, HEART AND VASCULAR STRUCTURES: The ascending thoracic aorta  measures 5.4 cm transversely and appears stable compared to the prior  study. The aortic arch and descending thoracic aorta are normal in  caliber. The main pulmonary artery segment is dilated. It measures 3.4  cm. There is moderate to severe cardiomegaly. There is dense coronary  artery calcification. There has been prior bypass surgery. There are  some borderline size mediastinal lymph nodes that appear stable. Some of  the lymph nodes are calcified in the right paratracheal region.     LUNGS: There is minimal pleural effusion on the right. There is no focal  airspace consolidation. There is very minimal intralobular septal  thickening in the lung bases.     UPPER ABDOMEN: No significant abnormality is seen in the upper abdomen.     BONES: There are degenerative changes of the spine. No acute appearing  bony abnormality is appreciated.     IMPRESSION:  1. The ascending thoracic aorta appears stable and measures up to 5.4  cm. The aortic arch and descending thoracic aorta are normal caliber.  2. Cardiomegaly. Dense coronary artery calcification. Prior heart bypass  surgery.  3. Minimal right pleural effusion. Minimal intralobular septal  thickening in the lung bases suggesting very mild edema.  4. Borderline size mediastinal lymph nodes appear stable. Some of the  lymph nodes are calcified.    This report was finalized on 11/20/2019 08:39 by Dr. Jerry Reza MD.     Study Result     Exam: CT angiogram of the of the chest with intravenous contrast.     CLINICAL HISTORY:  Aortic disease. Follow-up aneurysm.     DOSE:  45 mGycm. Automated exposure control was utilized to diminish  patient radiation dose.     TECHNIQUE: Contiguous axial images were obtained through the thorax  following intravenous contrast administration with reformatted  images  obtained in the sagittal and coronal projections from the original data  set. Three-dimensional reconstructions are also obtained.     COMPARISON:  11/20/2019     FINDINGS:     Pulmonary arteries:  There is normal enhancement of the pulmonary  arteries with no evidence of pulmonary thromboembolic disease.     Aorta:  There is aneurysmal dilatation of the ascending thoracic aorta  with an anterior to posterior dimension of 5.3 cm just above the aortic  root. The aortic arch is ectatic. The descending thoracic aorta is  normal in caliber. There is no evidence of dissection. The proximal  great vessels including the innominate are significant for atheromatous  calcification and tortuosity of the innominate artery. This is stable  from the previous study.     LUNGS:  The lungs are clear. No evidence of lobar consolidation.     Pleural spaces: There is mild pleural thickening within the right  posterior costophrenic angle stable from the previous study. No evidence  of pleural effusion.     HEART: There is moderate cardiomegaly. No evidence of right ventricular  dysfunction. No evidence of pericardial effusion. Extensive coronary  artery calcifications are present.     Bones: No acute osseous abnormalities are demonstrated.     CHEST WALL: No chest wall abnormalities are demonstrated.     Lymph nodes: Multiple prevascular nodes are present some of which are  borderline enlarged by CT criteria are again present and stable from the  previous study. Calcified right paratracheal nodes are present related  to remote granulomatous disease. No new enlarged mediastinal or axillary  nodes are present.     Upper abdomen: There is dependent increased density within the  gallbladder likely representing gallstones. No biliary dilatation is  present. The adrenals are unremarkable.     IMPRESSION:  1. Stable aneurysmal dilatation of the ascending thoracic aorta with an  anterior to posterior dimension of 5.3 cm again  demonstrated. The  proximal great vessels are tortuous. No evidence of dissection. The  aortic arch is also mildly ectatic. The descending thoracic aorta is  normal in caliber.  2. No evidence of pulmonary thromboembolic disease.  3. Stable mediastinal adenopathy from previous exams. No new adenopathy  is present. There is evidence of remote granulomatous disease.  4. Extensive coronary calcifications.  5. Suspected cholelithiasis. This could be confirmed with gallbladder  ultrasound.  This report was finalized on 05/20/2020 08:25 by Dr. Calvin Fregoso MD.           Joao was seen today for thoracic aneurysm.    Diagnoses and all orders for this visit:    CHF (congestive heart failure), NYHA class II, chronic, systolic (CMS/HCC)  -     CT Angiogram Chest; Future    Coronary artery disease involving native coronary artery of native heart without angina pectoris  -     CT Angiogram Chest; Future    Thoracic aortic aneurysm without rupture (CMS/HCC)  -     CT Angiogram Chest; Future          Assessment/Plan      We discussed his results of his CT scan of the chest demonstrating a 5.3 cm aortic aneurysm.  The previous CT scan of his chest demonstrated in the short axis in greatest dimension a measurement of 5.4, 5.3, 5.2, and 5.1  cm in size and reverse chronological order.  His trend suggests interval growth.  We discussed signs and symptoms of an acute aortic event.  He verbalizes understanding.  We discussed that consideration for aneurysm resection can be considered as early as 5cm if size criteria is utilized.  Given current data and the fact that he would require redo median sternotomy, I have previously recommended we use 5.5 cm as the point to that the benefit of ascending aortic resection would outweigh the risk of surgery but with EF measuring 21-25% risk analysis may suggest 6 cm is more likely the inflection point of considering resection.  Will assess his aorta to BSA once we approach 5.5 cm as well as  obtain an echocardiogram to ascertain his risk-benefit ratio. All questions have been answered.  He is agreeable to the plan of care.  BP is well controlled.    RTC 6 months with CTA chest.      He is a non smoker.    Patient's Body mass index is 31.38 kg/m². BMI is above normal parameters. Recommendations include: referral to primary care.    RTC 6 month with CT scan of the chest.

## 2020-08-06 ENCOUNTER — ANTICOAGULATION VISIT (OUTPATIENT)
Dept: CARDIOLOGY | Facility: CLINIC | Age: 65
End: 2020-08-06

## 2020-08-12 ENCOUNTER — TELEPHONE (OUTPATIENT)
Dept: VASCULAR SURGERY | Facility: CLINIC | Age: 65
End: 2020-08-12

## 2020-08-12 NOTE — TELEPHONE ENCOUNTER
Left message reminding Mr Fonseca of his appointment for Thursday, August 13th, 2020 at 115 pm with Dr De La Cruz. Also advised Mr Fonseca if he had any questions or needed to reschedule to please call the office at [phone].

## 2020-08-13 ENCOUNTER — OFFICE VISIT (OUTPATIENT)
Dept: VASCULAR SURGERY | Facility: CLINIC | Age: 65
End: 2020-08-13

## 2020-08-13 ENCOUNTER — LAB (OUTPATIENT)
Dept: LAB | Facility: HOSPITAL | Age: 65
End: 2020-08-13

## 2020-08-13 ENCOUNTER — ANTICOAGULATION VISIT (OUTPATIENT)
Dept: CARDIOLOGY | Facility: CLINIC | Age: 65
End: 2020-08-13

## 2020-08-13 VITALS
DIASTOLIC BLOOD PRESSURE: 84 MMHG | HEART RATE: 62 BPM | BODY MASS INDEX: 31.29 KG/M2 | WEIGHT: 231 LBS | SYSTOLIC BLOOD PRESSURE: 124 MMHG | HEIGHT: 72 IN | OXYGEN SATURATION: 99 %

## 2020-08-13 DIAGNOSIS — E78.2 MIXED HYPERLIPIDEMIA: ICD-10-CM

## 2020-08-13 DIAGNOSIS — I10 ESSENTIAL HYPERTENSION: ICD-10-CM

## 2020-08-13 DIAGNOSIS — I87.322 CHRONIC VENOUS HYPERTENSION WITH INFLAMMATION INVOLVING LEFT SIDE: Primary | ICD-10-CM

## 2020-08-13 DIAGNOSIS — I48.0 PAF (PAROXYSMAL ATRIAL FIBRILLATION) (HCC): ICD-10-CM

## 2020-08-13 LAB
INR PPP: 2.2 (ref 0.91–1.09)
PROTHROMBIN TIME: 26.1 SECONDS (ref 10–13.8)

## 2020-08-13 PROCEDURE — 99214 OFFICE O/P EST MOD 30 MIN: CPT | Performed by: SURGERY

## 2020-08-13 PROCEDURE — 85610 PROTHROMBIN TIME: CPT | Performed by: INTERNAL MEDICINE

## 2020-08-13 NOTE — PROGRESS NOTES
"8/13/2020       Hernandez Dupree,   [address] [facility] [address]  [facility] [address]    Joao Fonseca  1955    Chief Complaint   Patient presents with   • Follow-up     Follow Up For New Varicose Vein. Pt states that he has a painful varicose vein of the left thigh.  He states that it has been there about two months.  Pt wears knee high compression socks.  Last seen 10/24/18.  Patient denies any stroke like symptoms.        Dear Hernandez Dupree,        HPI  I had the pleasure of seeing your patient Joao Fonseca in the office today.   As you recall, Joao Fonseca is a 65 y.o.  male who you are currently following for routine health maintenance.  He does follow with Dr. Wilson for a 5.2 cm ascending aortic aneurysm, and recommended conservative management until reaches 5.5 cm.  He would require a redo median sternotomy.  He did have previous vein stripping about 6 years ago in [place].  He denies any history of DVT or trauma.  He has previously undergone left lower extremity radiofrequency ablation in 2018.  He has done well over the past 2 years however has not followed up in our office.  About a month ago he states he has developed a large varicosity to his left leg in the upper thigh and down his leg as well.      Review of Systems   Constitutional: Negative.    HENT: Negative.    Eyes: Negative.    Respiratory: Negative.    Cardiovascular: Positive for leg swelling (Painful varicose veins of the left).   Gastrointestinal: Negative.    Endocrine: Negative.    Genitourinary: Negative.    Musculoskeletal: Negative.    Skin: Negative.    Allergic/Immunologic: Negative.    Neurological: Negative.    Hematological: Negative.    Psychiatric/Behavioral: Negative.    All other systems reviewed and are negative.      /84   Pulse 62   Ht 182.9 cm (72\")   Wt 105 kg (231 lb)   SpO2 99%   BMI 31.33 kg/m²   Physical Exam   Constitutional: He is oriented to person, place, and time. He appears " well-developed and well-nourished.   HENT:   Head: Normocephalic and atraumatic.   Eyes: Pupils are equal, round, and reactive to light. Conjunctivae are normal. No scleral icterus.   Neck: Normal range of motion. Neck supple. No thyromegaly present.   Cardiovascular: Normal rate, regular rhythm, normal heart sounds and intact distal pulses.   Varicose veins to lower extremities, left thigh varicosity with pain, no evidence of phlebitis at this time venous stasis   Pulmonary/Chest: Effort normal and breath sounds normal.   Abdominal: Soft. Bowel sounds are normal.   Musculoskeletal: Normal range of motion.   Neurological: He is alert and oriented to person, place, and time.   Skin: Skin is warm and dry.   Psychiatric: He has a normal mood and affect. His behavior is normal. Judgment and thought content normal.   Nursing note and vitals reviewed.      No results found.    Patient Active Problem List   Diagnosis   • CHF (congestive heart failure), NYHA class II, chronic, systolic (CMS/[facility])   • Coronary artery disease involving native coronary artery of native heart without angina pectoris   • Herpes labialis   • Disorder of liver   • Reflux involving intestinal tract   • Abdominal hernia   • Hx of colonic polyps   • Family hx of colon cancer   • Type 2 diabetes mellitus with stage 3 chronic kidney disease, without long-term current use of insulin (CMS/[facility])   • Essential hypertension   • Eczema   • AICD (automatic cardioverter/defibrillator) present   • Varicosities of leg   • Right elbow pain   • Mixed hyperlipidemia   • Chronic laryngitis   • Wheezing   • Herpes zoster without complication   • Class 1 obesity due to excess calories with serious comorbidity and body mass index (BMI) of 31.0 to 31.9 in adult   • History of colonic polyps   • Chronic sinusitis   • Chronic rhinitis   • Sleep apnea   • S/P CABG x 3   • PAF (paroxysmal atrial fibrillation) (CMS/[facility])   • Chest pain   • Thoracic aortic aneurysm without  rupture (CMS/HCC)   • Venous (peripheral) insufficiency   • Dyspnea   • Malignant thymoma (CMS/HCC)   • Paroxysmal nocturnal dyspnea         ICD-10-CM ICD-9-CM   1. Chronic venous hypertension with inflammation involving left side I87.322 459.32   2. Essential hypertension I10 401.9   3. Mixed hyperlipidemia E78.2 272.2       Plan: After thoroughly evaluating Joao Fonseca, I believe the best course of action is to proceed with a repeat venous valvular insufficiency study.  Initially he had a very large greater saphenous vein and this may likely be a branch from this.  We will see him back in 2 weeks to review his testing and see if anything further can be done.  He can continue to wear compression stockings on a daily basis.  He follows with Dr. Wilson every 6 months for a sending aortic aneurysm measuring 5.3 cm and did recently see him in May.  I did discuss vascular risk factors as they pertain to the progression of vascular disease including controlling hypertension and hyperlipidemia, which are currently stable.  The patient can continue taking their current medication regimen as previously planned.  This was all discussed in full with complete understanding.    Thank you for allowing me to participate in the care of your patient.  Please do not hesitate with any questions or concerns.  I will keep you aware of any further encounters with Joao Fonseca.        Sincerely yours,         Shari Cool, DIOR Dupree, Hernandez Fermin, DO

## 2020-08-28 DIAGNOSIS — B02.9 HERPES ZOSTER WITHOUT COMPLICATION: ICD-10-CM

## 2020-08-31 ENCOUNTER — TELEPHONE (OUTPATIENT)
Dept: VASCULAR SURGERY | Facility: CLINIC | Age: 65
End: 2020-08-31

## 2020-08-31 RX ORDER — ACYCLOVIR 400 MG/1
TABLET ORAL
Qty: 90 TABLET | Refills: 6 | OUTPATIENT
Start: 2020-08-31

## 2020-08-31 NOTE — TELEPHONE ENCOUNTER
Spoke with Mr Fonseca reminding him of his appointment for Tuesday, September 1st, 2020. Reminded [facility] and follow up afterwards at 11 am with Dr De La Cruz. Mr Fonseca confirmed he would be here.

## 2020-09-01 ENCOUNTER — ANTICOAGULATION VISIT (OUTPATIENT)
Dept: CARDIOLOGY | Facility: CLINIC | Age: 65
End: 2020-09-01

## 2020-09-01 ENCOUNTER — LAB (OUTPATIENT)
Dept: LAB | Facility: HOSPITAL | Age: 65
End: 2020-09-01

## 2020-09-01 ENCOUNTER — OFFICE VISIT (OUTPATIENT)
Dept: VASCULAR SURGERY | Facility: CLINIC | Age: 65
End: 2020-09-01

## 2020-09-01 ENCOUNTER — PREP FOR SURGERY (OUTPATIENT)
Dept: OTHER | Facility: HOSPITAL | Age: 65
End: 2020-09-01

## 2020-09-01 ENCOUNTER — HOSPITAL ENCOUNTER (OUTPATIENT)
Dept: ULTRASOUND IMAGING | Facility: HOSPITAL | Age: 65
Discharge: HOME | End: 2020-09-01
Admitting: NURSE PRACTITIONER

## 2020-09-01 VITALS
SYSTOLIC BLOOD PRESSURE: 110 MMHG | WEIGHT: 226 LBS | OXYGEN SATURATION: 99 % | HEART RATE: 63 BPM | DIASTOLIC BLOOD PRESSURE: 70 MMHG | HEIGHT: 72 IN | BODY MASS INDEX: 30.61 KG/M2

## 2020-09-01 DIAGNOSIS — Z51.81 ENCOUNTER FOR MONITORING ANTIPLATELET THERAPY: ICD-10-CM

## 2020-09-01 DIAGNOSIS — E78.2 MIXED HYPERLIPIDEMIA: ICD-10-CM

## 2020-09-01 DIAGNOSIS — I87.322 CHRONIC VENOUS HYPERTENSION WITH INFLAMMATION INVOLVING LEFT SIDE: ICD-10-CM

## 2020-09-01 DIAGNOSIS — I48.0 PAF (PAROXYSMAL ATRIAL FIBRILLATION) (HCC): ICD-10-CM

## 2020-09-01 DIAGNOSIS — Z13.6 SCREENING FOR AAA (ABDOMINAL AORTIC ANEURYSM): ICD-10-CM

## 2020-09-01 DIAGNOSIS — B02.9 HERPES ZOSTER WITHOUT COMPLICATION: ICD-10-CM

## 2020-09-01 DIAGNOSIS — I87.322 CHRONIC VENOUS HYPERTENSION WITH INFLAMMATION INVOLVING LEFT SIDE: Primary | ICD-10-CM

## 2020-09-01 DIAGNOSIS — Z79.02 ENCOUNTER FOR MONITORING ANTIPLATELET THERAPY: ICD-10-CM

## 2020-09-01 DIAGNOSIS — Z01.818 PREOP TESTING: ICD-10-CM

## 2020-09-01 DIAGNOSIS — I83.812 VARICOSE VEINS OF LEFT LOWER EXTREMITY WITH PAIN: ICD-10-CM

## 2020-09-01 LAB
INR PPP: 3 (ref 0.91–1.09)
PROTHROMBIN TIME: 36.5 SECONDS (ref 10–13.8)

## 2020-09-01 PROCEDURE — 99214 OFFICE O/P EST MOD 30 MIN: CPT | Performed by: SURGERY

## 2020-09-01 PROCEDURE — 85610 PROTHROMBIN TIME: CPT | Performed by: INTERNAL MEDICINE

## 2020-09-01 PROCEDURE — 93970 EXTREMITY STUDY: CPT | Performed by: SURGERY

## 2020-09-01 PROCEDURE — 93970 EXTREMITY STUDY: CPT

## 2020-09-01 RX ORDER — CLINDAMYCIN PHOSPHATE 900 MG/50ML
900 INJECTION INTRAVENOUS ONCE
Status: CANCELLED | OUTPATIENT
Start: 2020-09-01 | End: 2020-09-01

## 2020-09-01 RX ORDER — ACYCLOVIR 400 MG/1
400 TABLET ORAL
Qty: 90 TABLET | Refills: 6 | Status: SHIPPED | OUTPATIENT
Start: 2020-09-01 | End: 2021-07-28 | Stop reason: HOSPADM

## 2020-09-01 NOTE — PROGRESS NOTES
9/1/2020       Hernandez Dupree,   [address] [facility] [address] [address]  [place] [address]    Joao Fonseca  1955    Chief Complaint   Patient presents with   • Follow-up     2 Week Follow Up For Chronic venous hypertension with inflammation involving left side. Test [id number] US pad venous lower ext bilat. Patient denies any stroke like symptoms.    • Smoker/Non-Smoker     Patient is Former Smoker-Quit in 1995   • Med Management     Verbally verified medications with patient. Mr Fonseca verbalized all medications are correct and up to date.        Dear Hernandez Dupree,        HPI  I had the pleasure of seeing your patient Joao Fonseca in the office today.   As you recall, Joao Fonseca is a 65 y.o.  male who you are currently following for routine health maintenance.  He does follow with Dr. Wilson for a 5.2 cm ascending aortic aneurysm, and recommended conservative management until reaches 5.5 cm.  He would require a redo median sternotomy.  He did have previous vein stripping of the right lower extremity about 6 years ago in [place].  He denies any history of DVT or trauma.  He has previously undergone left lower extremity radiofrequency ablation in 2018.  He has done well over the past 2 years however has not followed up in our office.  About a month ago he states he has developed a large varicosity to his left leg in the upper thigh and down his leg as well.  He had noninvasive testing performed today which I personally reviewed.    Review of Systems   Constitutional: Negative.    HENT: Negative.    Eyes: Negative.    Respiratory: Negative.    Cardiovascular: Positive for leg swelling (Painful varicose veins of the left).   Gastrointestinal: Negative.    Endocrine: Negative.    Genitourinary: Negative.    Musculoskeletal: Negative.    Skin: Negative.    Allergic/Immunologic: Negative.    Neurological: Negative.    Hematological: Negative.    Psychiatric/Behavioral: Negative.    All other systems  "reviewed and are negative.      /70 (BP Location: Right arm, Patient Position: Sitting, Cuff Size: Adult)   Pulse 63   Ht 182.9 cm (72\")   Wt 103 kg (226 lb)   SpO2 99%   BMI 30.65 kg/m²   Physical Exam   Constitutional: He is oriented to person, place, and time. He appears well-developed and well-nourished.   HENT:   Head: Normocephalic and atraumatic.   Eyes: Pupils are equal, round, and reactive to light. Conjunctivae are normal. No scleral icterus.   Neck: Normal range of motion. Neck supple. No thyromegaly present.   Cardiovascular: Normal rate, regular rhythm, normal heart sounds and intact distal pulses.   Varicose veins to lower extremities, left thigh varicosity with pain, no evidence of phlebitis at this time venous stasis   Pulmonary/Chest: Effort normal and breath sounds normal.   Abdominal: Soft. Bowel sounds are normal.   Musculoskeletal: Normal range of motion.   Neurological: He is alert and oriented to person, place, and time.   Skin: Skin is warm and dry.   Psychiatric: He has a normal mood and affect. His behavior is normal. Judgment and thought content normal.   Nursing note and vitals reviewed.          Patient Active Problem List   Diagnosis   • CHF (congestive heart failure), NYHA class II, chronic, systolic (CMS/HCC)   • Coronary artery disease involving native coronary artery of native heart without angina pectoris   • Herpes labialis   • Disorder of liver   • Reflux involving intestinal tract   • Abdominal hernia   • Hx of colonic polyps   • Family hx of colon cancer   • Type 2 diabetes mellitus with stage 3 chronic kidney disease, without long-term current use of insulin (CMS/HCC)   • Essential hypertension   • Eczema   • AICD (automatic cardioverter/defibrillator) present   • Varicosities of leg   • Right elbow pain   • Mixed hyperlipidemia   • Chronic laryngitis   • Wheezing   • Herpes zoster without complication   • Class 1 obesity due to excess calories with serious " comorbidity and body mass index (BMI) of 31.0 to 31.9 in adult   • History of colonic polyps   • Chronic sinusitis   • Chronic rhinitis   • Sleep apnea   • S/P CABG x 3   • PAF (paroxysmal atrial fibrillation) (CMS/HCC)   • Chest pain   • Thoracic aortic aneurysm without rupture (CMS/HCC)   • Venous (peripheral) insufficiency   • Dyspnea   • Malignant thymoma (CMS/HCC)   • Paroxysmal nocturnal dyspnea   • Chronic venous hypertension with inflammation involving left side         ICD-10-CM ICD-9-CM   1. Chronic venous hypertension with inflammation involving left side I87.322 459.32   2. Screening for AAA (abdominal aortic aneurysm) Z13.6 V81.2   3. Varicose veins of left lower extremity with pain I83.812 454.8   4. Mixed hyperlipidemia E78.2 272.2       Plan: After thoroughly evaluating Joao Fonseca, I believe the best course of action is to proceed with venaseal closure of the upper thigh segment and distal greater saphenous vein. Risks of saphenous vein closure include, but are not limited to, bleeding, infection, vessel damage, sensitivity reaction, DVT, phlebitis, and pulmonary embolus.  The patient understands these risks and wishes to proceed with procedure. Hold Coumadin for 4 days prior to procedure.  Initially he had a very large greater saphenous vein and this may likely be a branch from this.  He can continue to wear compression stockings on a daily basis.  He follows with Dr. Wilson every 6 months for a sending aortic aneurysm measuring 5.3 cm and did recently see him in May.  During preop testing I would like for him to undergo an abdominal ultrasound to screen for an infrarenal abdominal aortic aneurysm.  I did discuss vascular risk factors as they pertain to the progression of vascular disease including controlling hypertension and hyperlipidemia, which are currently stable.  The patient can continue taking their current medication regimen as previously planned.  This was all discussed in full with  complete understanding.    Thank you for allowing me to participate in the care of your patient.  Please do not hesitate with any questions or concerns.  I will keep you aware of any further encounters with Joao Fonseca.        Sincerely yours,         Froylan De La Cruz, [facility], DO

## 2020-09-08 ENCOUNTER — TELEPHONE (OUTPATIENT)
Dept: VASCULAR SURGERY | Facility: CLINIC | Age: 65
End: 2020-09-08

## 2020-09-08 PROBLEM — Z01.818 PREOP TESTING: Status: ACTIVE | Noted: 2020-09-08

## 2020-09-08 NOTE — TELEPHONE ENCOUNTER
Spoke with patient and advised of upcoming procedure.  Patient pre work is scheduled for 9/15/2020 at 1145 am.  Patient procedure is scheduled for 9/18/2020 at 500 am.  Patient to hold his Coumadin for 4 days with the last dose being on 9/13/2020 and his Metformin for 2 days with the last dose being on 9/15/2020.  Patient will have an aortic US with his pre work. Patient advised no Caffeine, nicotine and NPR 8 hours prior to.  Patient advised of Covid tesitng, visitation and masking.    Patient advised of location time and prep.  Patient expressed understanding for all that was discussed.

## 2020-09-09 ENCOUNTER — TRANSCRIBE ORDERS (OUTPATIENT)
Dept: ADMINISTRATIVE | Facility: HOSPITAL | Age: 65
End: 2020-09-09

## 2020-09-09 DIAGNOSIS — Z01.818 PREOPERATIVE TESTING: Primary | ICD-10-CM

## 2020-09-11 ENCOUNTER — APPOINTMENT (OUTPATIENT)
Dept: ULTRASOUND IMAGING | Facility: HOSPITAL | Age: 65
End: 2020-09-11

## 2020-09-15 ENCOUNTER — HOSPITAL ENCOUNTER (OUTPATIENT)
Dept: ULTRASOUND IMAGING | Facility: HOSPITAL | Age: 65
Discharge: HOME | End: 2020-09-15

## 2020-09-15 ENCOUNTER — LAB (OUTPATIENT)
Dept: LAB | Facility: HOSPITAL | Age: 65
End: 2020-09-15

## 2020-09-15 ENCOUNTER — APPOINTMENT (OUTPATIENT)
Dept: PREADMISSION TESTING | Facility: HOSPITAL | Age: 65
End: 2020-09-15

## 2020-09-15 ENCOUNTER — ANTICOAGULATION VISIT (OUTPATIENT)
Dept: CARDIOLOGY | Facility: CLINIC | Age: 65
End: 2020-09-15

## 2020-09-15 VITALS
RESPIRATION RATE: 16 BRPM | HEART RATE: 64 BPM | BODY MASS INDEX: 31.18 KG/M2 | WEIGHT: 230.2 LBS | HEIGHT: 72 IN | DIASTOLIC BLOOD PRESSURE: 83 MMHG | OXYGEN SATURATION: 98 % | SYSTOLIC BLOOD PRESSURE: 131 MMHG

## 2020-09-15 DIAGNOSIS — I48.0 PAF (PAROXYSMAL ATRIAL FIBRILLATION) (HCC): ICD-10-CM

## 2020-09-15 DIAGNOSIS — Z13.6 SCREENING FOR AAA (ABDOMINAL AORTIC ANEURYSM): ICD-10-CM

## 2020-09-15 DIAGNOSIS — Z51.81 ENCOUNTER FOR MONITORING ANTIPLATELET THERAPY: ICD-10-CM

## 2020-09-15 DIAGNOSIS — I87.322 CHRONIC VENOUS HYPERTENSION WITH INFLAMMATION INVOLVING LEFT SIDE: ICD-10-CM

## 2020-09-15 DIAGNOSIS — Z01.818 PREOPERATIVE TESTING: ICD-10-CM

## 2020-09-15 DIAGNOSIS — Z01.818 PREOP TESTING: ICD-10-CM

## 2020-09-15 DIAGNOSIS — Z79.02 ENCOUNTER FOR MONITORING ANTIPLATELET THERAPY: ICD-10-CM

## 2020-09-15 LAB
ANION GAP SERPL CALCULATED.3IONS-SCNC: 10 MMOL/L (ref 5–15)
APTT PPP: 35.4 SECONDS (ref 24.1–35)
BUN SERPL-MCNC: 28 MG/DL (ref 8–23)
BUN/CREAT SERPL: 25.2 (ref 7–25)
CALCIUM SPEC-SCNC: 9.4 MG/DL (ref 8.6–10.5)
CHLORIDE SERPL-SCNC: 104 MMOL/L (ref 98–107)
CO2 SERPL-SCNC: 27 MMOL/L (ref 22–29)
CREAT SERPL-MCNC: 1.11 MG/DL (ref 0.76–1.27)
DEPRECATED RDW RBC AUTO: 44.1 FL (ref 37–54)
ERYTHROCYTE [DISTWIDTH] IN BLOOD BY AUTOMATED COUNT: 12.9 % (ref 12.3–15.4)
GFR SERPL CREATININE-BSD FRML MDRD: 67 ML/MIN/1.73
GLUCOSE SERPL-MCNC: 166 MG/DL (ref 65–99)
HCT VFR BLD AUTO: 51.9 % (ref 37.5–51)
HGB BLD-MCNC: 17 G/DL (ref 13–17.7)
INR PPP: 1.43 (ref 0.91–1.09)
MCH RBC QN AUTO: 30.6 PG (ref 26.6–33)
MCHC RBC AUTO-ENTMCNC: 32.8 G/DL (ref 31.5–35.7)
MCV RBC AUTO: 93.5 FL (ref 79–97)
PLATELET # BLD AUTO: 120 10*3/MM3 (ref 140–450)
PMV BLD AUTO: 11.4 FL (ref 6–12)
POTASSIUM SERPL-SCNC: 4.4 MMOL/L (ref 3.5–5.2)
PROTHROMBIN TIME: 17.1 SECONDS (ref 11.9–14.6)
RBC # BLD AUTO: 5.55 10*6/MM3 (ref 4.14–5.8)
SODIUM SERPL-SCNC: 141 MMOL/L (ref 136–145)
WBC # BLD AUTO: 5.16 10*3/MM3 (ref 3.4–10.8)

## 2020-09-15 PROCEDURE — 36415 COLL VENOUS BLD VENIPUNCTURE: CPT

## 2020-09-15 PROCEDURE — C9803 HOPD COVID-19 SPEC COLLECT: HCPCS

## 2020-09-15 PROCEDURE — 80048 BASIC METABOLIC PNL TOTAL CA: CPT | Performed by: NURSE PRACTITIONER

## 2020-09-15 PROCEDURE — U0003 INFECTIOUS AGENT DETECTION BY NUCLEIC ACID (DNA OR RNA); SEVERE ACUTE RESPIRATORY SYNDROME CORONAVIRUS 2 (SARS-COV-2) (CORONAVIRUS DISEASE [COVID-19]), AMPLIFIED PROBE TECHNIQUE, MAKING USE OF HIGH THROUGHPUT TECHNOLOGIES AS DESCRIBED BY CMS-2020-01-R: HCPCS

## 2020-09-15 PROCEDURE — 85730 THROMBOPLASTIN TIME PARTIAL: CPT | Performed by: NURSE PRACTITIONER

## 2020-09-15 PROCEDURE — 76775 US EXAM ABDO BACK WALL LIM: CPT

## 2020-09-15 PROCEDURE — 85027 COMPLETE CBC AUTOMATED: CPT | Performed by: NURSE PRACTITIONER

## 2020-09-15 PROCEDURE — 85610 PROTHROMBIN TIME: CPT | Performed by: NURSE PRACTITIONER

## 2020-09-15 NOTE — DISCHARGE INSTRUCTIONS
DAY OF SURGERY INSTRUCTIONS        YOUR SURGEON: EMEKA VAZQUZE    PROCEDURE: LEFT SAPHENOUS VEIN CLOSURE OF UPPER THIGH AND DISTAL GREATER SAPHENOUS VEIN USING VENASEAL - LEFT    DATE OF SURGERY: SEPTEMBER 18, 2020    ARRIVAL TIME: AS DIRECTED BY OFFICE    YOU MAY TAKE THE FOLLOWING MEDICATION(S) THE MORNING OF SURGERY WITH A SIP OF WATER: METOPROLOL    DO NOT TAKE LISINOPRIL FOR 24 HOURS PRIOR TO SURGERY      ALL OTHER HOME MEDICATION CHECK WITH YOUR PHYSICIAN      DO NOT TAKE ANY ERECTILE DYSFUNCTION MEDICATIONS (EX: CIALIS, VIAGRA) 24 HOURS PRIOR TO SURGERY                      MANAGING PAIN AFTER SURGERY    We know you are probably wondering what your pain will be like after surgery.  Following surgery it is unrealistic to expect you will not have pain.   Pain is how our bodies let us know that something is wrong or cautions us to be careful.  That said, our goal is to make your pain tolerable.    Methods we may use to treat your pain include (oral or IV medications, PCAs, epidurals, nerve blocks, etc.)   While some procedures require IV pain medications for a short time after surgery, transitioning to pain medications by mouth allows for better management of pain.   Your nurse will encourage you to take oral pain medications whenever possible.  IV medications work almost immediately, but only last a short while.  Taking medications by mouth allows for a more constant level of medication in your blood stream for a longer period of time.      Once your pain is out of control it is harder to get back under control.  It is important you are aware when your next dose of pain medication is due.  If you are admitted, your nurse may write the time of your next dose on the white board in your room to help you remember.      We are interested in your pain and encourage you to inform us about aggravating factors during your visit.   Many times a simple repositioning every few hours can make a big difference.    If  your physician says it is okay, do not let your pain prevent you from getting out of bed. Be sure to call your nurse for assistance prior to getting up so you do not fall.      Before surgery, please decide your tolerable pain goal.  These faces help describe the pain ratings we use on a 0-10 scale.   Be prepared to tell us your goal and whether or not you take pain or anxiety medications at home.          BEFORE YOU COME TO THE HOSPITAL  (Pre-op instructions)  • Do not eat, drink, smoke or chew gum after midnight the night before surgery.  This also includes no mints.  • Morning of surgery take only the medicines you have been instructed with a sip of water unless otherwise instructed  by your physician.  • Do not shave, wear makeup or dark nail polish.  • Remove all jewelry including rings.  • Leave anything you consider valuable at home.  • Leave your suitcase in the car until after your surgery.  • Bring the following with you if applicable:  o Picture ID and insurance, Medicare or Medicaid cards  o Co-pay/deductible required by insurance (cash, check, credit card)  o Copy of advance directive, living will or power-of- documents if not brought to PAT  o CPAP or BIPAP mask and tubing  o Relaxation aids ( book, magazine), etc.  o Hearing aids                        ON THE DAY OF SURGERY  · On the day of surgery check in at registration located at the main entrance of the hospital.   ? You will be registered and given a beeper with instructions where to wait in the main lobby.  ? When your beeper lights up and vibrates a member of the Outpatient Surgery staff will meet you at the double doors under the stair steps and escort you to your preoperative room.   · You may have cloth compression devices placed on your legs. These help to prevent blood clots and reduce swelling in your legs.  · An IV may be inserted into one of your veins.  · In the operating room, you may be given one or more of the  "following:  ? A medicine to help you relax (sedative).  ? A medicine to numb the area (local anesthetic).  ? A medicine to make you fall asleep (general anesthetic).  ? A medicine that is injected into an area of your body to numb everything below the injection site (regional anesthetic).  · Your surgical site will be marked or identified.  · You may be given an antibiotic through your IV to help prevent infection.  Contact a health care provider if you:  · Develop a fever of more than 100.4°F (38°C) or other feelings of illness during the 48 hours before your surgery.  · Have symptoms that get worse.  Have questions or concerns about your surgery    General Anesthesia/Surgery, Adult  General anesthesia is the use of medicines to make a person \"go to sleep\" (unconscious) for a medical procedure. General anesthesia must be used for certain procedures, and is often recommended for procedures that:  · Last a long time.  · Require you to be still or in an unusual position.  · Are major and can cause blood loss.  The medicines used for general anesthesia are called general anesthetics. As well as making you unconscious for a certain amount of time, these medicines:  · Prevent pain.  · Control your blood pressure.  · Relax your muscles.  Tell a health care provider about:  · Any allergies you have.  · All medicines you are taking, including vitamins, herbs, eye drops, creams, and over-the-counter medicines.  · Any problems you or family members have had with anesthetic medicines.  · Types of anesthetics you have had in the past.  · Any blood disorders you have.  · Any surgeries you have had.  · Any medical conditions you have.  · Any recent upper respiratory, chest, or ear infections.  · Any history of:  ? Heart or lung conditions, such as heart failure, sleep apnea, asthma, or chronic obstructive pulmonary disease (COPD).  ?  service.  ? Depression or anxiety.  · Any tobacco or drug use, including marijuana or " alcohol use.  · Whether you are pregnant or may be pregnant.  What are the risks?  Generally, this is a safe procedure. However, problems may occur, including:  · Allergic reaction.  · Lung and heart problems.  · Inhaling food or liquid from the stomach into the lungs (aspiration).  · Nerve injury.  · Air in the bloodstream, which can lead to stroke.  · Extreme agitation or confusion (delirium) when you wake up from the anesthetic.  · Waking up during your procedure and being unable to move. This is rare.  These problems are more likely to develop if you are having a major surgery or if you have an advanced or serious medical condition. You can prevent some of these complications by answering all of your health care provider's questions thoroughly and by following all instructions before your procedure.  General anesthesia can cause side effects, including:  · Nausea or vomiting.  · A sore throat from the breathing tube.  · Hoarseness.  · Wheezing or coughing.  · Shaking chills.  · Tiredness.  · Body aches.  · Anxiety.  · Sleepiness or drowsiness.  · Confusion or agitation.  RISKS AND COMPLICATIONS OF SURGERY  Your health care provider will discuss possible risks and complications with you before surgery. Common risks and complications include:    · Problems due to the use of anesthetics.  · Blood loss and replacement (does not apply to minor surgical procedures).  · Temporary increase in pain due to surgery.  · Uncorrected pain or problems that the surgery was meant to correct.  · Infection.  · New damage.    What happens before the procedure?    Medicines  Ask your health care provider about:  · Changing or stopping your regular medicines. This is especially important if you are taking diabetes medicines or blood thinners.  · Taking medicines such as aspirin and ibuprofen. These medicines can thin your blood. Do not take these medicines unless your health care provider tells you to take them.  · Taking  over-the-counter medicines, vitamins, herbs, and supplements. Do not take these during the week before your procedure unless your health care provider approves them.  General instructions  · Starting 3-6 weeks before the procedure, do not use any products that contain nicotine or tobacco, such as cigarettes and e-cigarettes. If you need help quitting, ask your health care provider.  · If you brush your teeth on the morning of the procedure, make sure to spit out all of the toothpaste.  · Tell your health care provider if you become ill or develop a cold, cough, or fever.  · If instructed by your health care provider, bring your sleep apnea device with you on the day of your surgery (if applicable).  · Ask your health care provider if you will be going home the same day, the following day, or after a longer hospital stay.  ? Plan to have someone take you home from the hospital or clinic.  ? Plan to have a responsible adult care for you for at least 24 hours after you leave the hospital or clinic. This is important.  What happens during the procedure?  · You will be given anesthetics through both of the following:  ? A mask placed over your nose and mouth.  ? An IV in one of your veins.  · You may receive a medicine to help you relax (sedative).  · After you are unconscious, a breathing tube may be inserted down your throat to help you breathe. This will be removed before you wake up.  · An anesthesia specialist will stay with you throughout your procedure. He or she will:  ? Keep you comfortable and safe by continuing to give you medicines and adjusting the amount of medicine that you get.  ? Monitor your blood pressure, pulse, and oxygen levels to make sure that the anesthetics do not cause any problems.  The procedure may vary among health care providers and hospitals.  What happens after the procedure?  · Your blood pressure, temperature, heart rate, breathing rate, and blood oxygen level will be monitored until  the medicines you were given have worn off.  · You will wake up in a recovery area. You may wake up slowly.  · If you feel anxious or agitated, you may be given medicine to help you calm down.  · If you will be going home the same day, your health care provider may check to make sure you can walk, drink, and urinate.  · Your health care provider will treat any pain or side effects you have before you go home.  · Do not drive for 24 hours if you were given a sedative.  Summary  · General anesthesia is used to keep you still and prevent pain during a procedure.  · It is important to tell your healthcare provider about your medical history and any surgeries you have had, and previous experience with anesthesia.  · Follow your healthcare provider’s instructions about when to stop eating, drinking, or taking certain medicines before your procedure.  · Plan to have someone take you home from the hospital or clinic.  This information is not intended to replace advice given to you by your health care provider. Make sure you discuss any questions you have with your health care provider.  Document Released: 03/26/2009 Document Revised: 08/03/2018 Document Reviewed: 08/03/2018  [email or web address] Interactive Patient Education © 2019 [employer]       Fall Prevention in Hospitals, Adult  As a hospital patient, your condition and the treatments you receive can increase your risk for falls. Some additional risk factors for falls in a hospital include:  · Being in an unfamiliar environment.  · Being on bed rest.  · Your surgery.  · Taking certain medicines.  · Your tubing requirements, such as intravenous (IV) therapy or catheters.  It is important that you learn how to decrease fall risks while at the hospital. Below are important tips that can help prevent falls.  SAFETY TIPS FOR PREVENTING FALLS  Talk about your risk of falling.  · Ask your health care provider why you are at risk for falling. Is it your medicine, illness, tubing  placement, or something else?  · Make a plan with your health care provider to keep you safe from falls.  · Ask your health care provider or pharmacist about side effects of your medicines. Some medicines can make you dizzy or affect your coordination.  Ask for help.  · Ask for help before getting out of bed. You may need to press your call button.  · Ask for assistance in getting safely to the toilet.  · Ask for a walker or cane to be put at your bedside. Ask that most of the side rails on your bed be placed up before your health care provider leaves the room.  · Ask family or friends to sit with you.  · Ask for things that are out of your reach, such as your glasses, hearing aids, telephone, bedside table, or call button.  Follow these tips to avoid falling:  · Stay lying or seated, rather than standing, while waiting for help.  · Wear rubber-soled slippers or shoes whenever you walk in the hospital.  · Avoid quick, sudden movements.  ¨ Change positions slowly.  ¨ Sit on the side of your bed before standing.  ¨ Stand up slowly and wait before you start to walk.  · Let your health care provider know if there is a spill on the floor.  · Pay careful attention to the medical equipment, electrical cords, and tubes around you.  · When you need help, use your call button by your bed or in the bathroom. Wait for one of your health care providers to help you.  · If you feel dizzy or unsure of your footing, return to bed and wait for assistance.  · Avoid being distracted by the TV, telephone, or another person in your room.  · Do not lean or support yourself on rolling objects, such as IV poles or bedside tables.     This information is not intended to replace advice given to you by your health care provider. Make sure you discuss any questions you have with your health care provider.     Document Released: 12/15/2001 Document Revised: 01/08/2016 Document Reviewed: 08/25/2013  [employer]vier Interactive Patient Education ©2016  [employer]       [facility]  CHG 4% Patient Instruction Sheet    Chlorhexidine Before Surgery  Chlorhexidine gluconate (CHG) is a germ-killing (antiseptic) solution that is used to clean the skin. It gets rid of the bacteria that normally live on the skin. Cleaning your skin with CHG before surgery helps lower the risk for infection after surgery.    How to use CHG solution  · You will take 2 showers, one shower the night before surgery, the second shower the morning of surgery before coming to the hospital.  · Use CHG only as told by your health care provider, and follow the instructions on the label.  · Use CHG solution while taking a shower. Follow these steps when using CHG solution (unless your health care provider gives you different instructions):  1. Start the shower.  2. Use your normal soap and shampoo to wash your face and hair.  3. Turn off the shower or move out of the shower stream.  4. Pour the CHG onto a clean washcloth. Do not use any type of brush or rough-edged sponge.  5. Starting at your neck, lather your body down to your toes. Make sure you:  6. Pay special attention to the part of your body where you will be having surgery. Scrub this area for at least 1 minute.  7. Use the full amount of CHG as directed. Usually, this is one half bottle for each shower.  8. Do not use CHG on your head or face. If the solution gets into your ears or eyes, rinse them well with water.  9. Avoid your genital area.  10. Avoid any areas of skin that have broken skin, cuts, or scrapes.  11. Scrub your back and under your arms. Make sure to wash skin folds.  12. Let the lather sit on your skin for 1-2 minutes or as long as told by your health care  provider.  13. Thoroughly rinse your entire body in the shower. Make sure that all body creases and crevices are rinsed well.  14. Dry off with a clean towel. Do not put any substances on your body afterward, such as powder, lotion, or perfume.  15. Put on  clean clothes or pajamas.  16. If it is the night before your surgery, sleep in clean sheets.    What are the risks?  Risks of using CHG include:  · A skin reaction.  · Hearing loss, if CHG gets in your ears.  · Eye injury, if CHG gets in your eyes and is not rinsed out.  · The CHG product catching fire.  Make sure that you avoid smoking and flames after applying CHG to your skin.  Do not use CHG:  · If you have a chlorhexidine allergy or have previously reacted to chlorhexidine.  · On babies younger than 2 months of age.      On the day of surgery, when you are taken to your room in Outpatient Surgery you will be given a CHG prepackaged cloth to wipe the site for your surgery.  How to use CHG prepackaged cloths  · Follow the instructions on the label.  · Use the CHG cloth on clean, dry skin. Follow these steps when using a CHG cloth (unless your health care provider gives you different instructions):  1. Using the CHG cloth, vigorously scrub the part of your body where you will be having surgery. Scrub using a back-and-forth motion for 3 minutes. The area on your body should be completely wet with CHG when you are finished scrubbing.  2. Do not rinse. Discard the cloth and let the area air-dry for 1 minute. Do not put any substances on your body afterward, such as powder, lotion, or perfume.  Contact a health care provider if:  · Your skin gets irritated after scrubbing.  · You have questions about using your solution or cloth.  Get help right away if:  · Your eyes become very red or swollen.  · Your eyes itch badly.  · Your skin itches badly and is red or swollen.  · Your hearing changes.  · You have trouble seeing.  · You have swelling or tingling in your mouth or throat.  · You have trouble breathing.  · You swallow any chlorhexidine.  Summary  · Chlorhexidine gluconate (CHG) is a germ-killing (antiseptic) solution that is used to clean the skin. Cleaning your skin with CHG before surgery helps lower the risk  for infection after surgery.  · You may be given CHG to use at home. It may be in a bottle or in a prepackaged cloth to use on your skin. Carefully follow your health care provider's instructions and the instructions on the product label.  · Do not use CHG if you have a chlorhexidine allergy.  · Contact your health care provider if your skin gets irritated after scrubbing.  This information is not intended to replace advice given to you by your health care provider. Make sure you discuss any questions you have with your health care provider.  Document Released: 09/11/2013 Document Revised: 11/15/2018 Document Reviewed: 11/15/2018  [employer] Interactive Patient Education © 2019 [employer]          PATIENT/FAMILY/RESPONSIBLE PARTY VERBALIZES UNDERSTANDING OF ABOVE EDUCATION.  COPY OF PAIN SCALE GIVEN AND REVIEWED WITH VERBALIZED UNDERSTANDING.

## 2020-09-16 LAB
COVID LABCORP PRIORITY: NORMAL
SARS-COV-2 RNA RESP QL NAA+PROBE: NOT DETECTED

## 2020-09-17 ENCOUNTER — TELEPHONE (OUTPATIENT)
Dept: VASCULAR SURGERY | Facility: CLINIC | Age: 65
End: 2020-09-17

## 2020-09-17 NOTE — TELEPHONE ENCOUNTER
Pt called because he needed to know where he needed to go in the morning for his procedure.  I spoke to Lisa and she said for him to go through Dr. Reshma Perez to the Main Registration.    I gave the patient this information and he understood where this was located.  Pt advised to call if he needs anything else.

## 2020-09-18 ENCOUNTER — ANESTHESIA (OUTPATIENT)
Dept: PERIOP | Facility: HOSPITAL | Age: 65
End: 2020-09-18

## 2020-09-18 ENCOUNTER — ANESTHESIA EVENT (OUTPATIENT)
Dept: PERIOP | Facility: HOSPITAL | Age: 65
End: 2020-09-18

## 2020-09-18 ENCOUNTER — APPOINTMENT (OUTPATIENT)
Dept: ULTRASOUND IMAGING | Facility: HOSPITAL | Age: 65
End: 2020-09-18

## 2020-09-18 ENCOUNTER — HOSPITAL ENCOUNTER (OUTPATIENT)
Facility: HOSPITAL | Age: 65
Setting detail: HOSPITAL OUTPATIENT SURGERY
Discharge: HOME | End: 2020-09-18
Attending: SURGERY | Admitting: SURGERY

## 2020-09-18 VITALS
RESPIRATION RATE: 14 BRPM | HEART RATE: 55 BPM | DIASTOLIC BLOOD PRESSURE: 93 MMHG | OXYGEN SATURATION: 97 % | SYSTOLIC BLOOD PRESSURE: 124 MMHG | TEMPERATURE: 97 F

## 2020-09-18 DIAGNOSIS — Z01.818 PREOP TESTING: ICD-10-CM

## 2020-09-18 DIAGNOSIS — I87.322 CHRONIC VENOUS HYPERTENSION WITH INFLAMMATION INVOLVING LEFT SIDE: ICD-10-CM

## 2020-09-18 LAB
GLUCOSE BLDC GLUCOMTR-MCNC: 209 MG/DL (ref 70–130)
INR PPP: 1.12 (ref 0.91–1.09)
PROTHROMBIN TIME: 14 SECONDS (ref 11.9–14.6)

## 2020-09-18 PROCEDURE — 36482 ENDOVEN THER CHEM ADHES 1ST: CPT | Performed by: SURGERY

## 2020-09-18 PROCEDURE — 25010000002 MIDAZOLAM PER 1 MG: Performed by: ANESTHESIOLOGY

## 2020-09-18 PROCEDURE — 85610 PROTHROMBIN TIME: CPT | Performed by: SURGERY

## 2020-09-18 PROCEDURE — C1894 INTRO/SHEATH, NON-LASER: HCPCS | Performed by: SURGERY

## 2020-09-18 PROCEDURE — 76937 US GUIDE VASCULAR ACCESS: CPT

## 2020-09-18 PROCEDURE — 25010000002 PROPOFOL 10 MG/ML EMULSION: Performed by: NURSE ANESTHETIST, CERTIFIED REGISTERED

## 2020-09-18 PROCEDURE — 82962 GLUCOSE BLOOD TEST: CPT

## 2020-09-18 DEVICE — SYS CLS VENASEAL TISS ADHS: Type: IMPLANTABLE DEVICE | Site: LEG | Status: FUNCTIONAL

## 2020-09-18 RX ORDER — OXYCODONE AND ACETAMINOPHEN 7.5; 325 MG/1; MG/1
2 TABLET ORAL EVERY 4 HOURS PRN
Status: DISCONTINUED | OUTPATIENT
Start: 2020-09-18 | End: 2020-09-18 | Stop reason: HOSPADM

## 2020-09-18 RX ORDER — ONDANSETRON 2 MG/ML
4 INJECTION INTRAMUSCULAR; INTRAVENOUS ONCE AS NEEDED
Status: DISCONTINUED | OUTPATIENT
Start: 2020-09-18 | End: 2020-09-18 | Stop reason: HOSPADM

## 2020-09-18 RX ORDER — SODIUM CHLORIDE, SODIUM LACTATE, POTASSIUM CHLORIDE, CALCIUM CHLORIDE 600; 310; 30; 20 MG/100ML; MG/100ML; MG/100ML; MG/100ML
100 INJECTION, SOLUTION INTRAVENOUS CONTINUOUS
Status: DISCONTINUED | OUTPATIENT
Start: 2020-09-18 | End: 2020-09-18 | Stop reason: HOSPADM

## 2020-09-18 RX ORDER — SODIUM CHLORIDE 0.9 % (FLUSH) 0.9 %
3 SYRINGE (ML) INJECTION AS NEEDED
Status: DISCONTINUED | OUTPATIENT
Start: 2020-09-18 | End: 2020-09-18 | Stop reason: HOSPADM

## 2020-09-18 RX ORDER — LIDOCAINE HYDROCHLORIDE 10 MG/ML
0.5 INJECTION, SOLUTION EPIDURAL; INFILTRATION; INTRACAUDAL; PERINEURAL ONCE AS NEEDED
Status: DISCONTINUED | OUTPATIENT
Start: 2020-09-18 | End: 2020-09-18 | Stop reason: HOSPADM

## 2020-09-18 RX ORDER — FLUMAZENIL 0.1 MG/ML
0.2 INJECTION INTRAVENOUS AS NEEDED
Status: DISCONTINUED | OUTPATIENT
Start: 2020-09-18 | End: 2020-09-18 | Stop reason: HOSPADM

## 2020-09-18 RX ORDER — HYDROCODONE BITARTRATE AND ACETAMINOPHEN 5; 325 MG/1; MG/1
1-2 TABLET ORAL EVERY 6 HOURS PRN
Qty: 20 TABLET | Refills: 0 | Status: SHIPPED | OUTPATIENT
Start: 2020-09-18 | End: 2020-09-25

## 2020-09-18 RX ORDER — LABETALOL HYDROCHLORIDE 5 MG/ML
5 INJECTION, SOLUTION INTRAVENOUS
Status: DISCONTINUED | OUTPATIENT
Start: 2020-09-18 | End: 2020-09-18 | Stop reason: HOSPADM

## 2020-09-18 RX ORDER — NALOXONE HCL 0.4 MG/ML
0.4 VIAL (ML) INJECTION AS NEEDED
Status: DISCONTINUED | OUTPATIENT
Start: 2020-09-18 | End: 2020-09-18 | Stop reason: HOSPADM

## 2020-09-18 RX ORDER — FENTANYL CITRATE 50 UG/ML
25 INJECTION, SOLUTION INTRAMUSCULAR; INTRAVENOUS
Status: DISCONTINUED | OUTPATIENT
Start: 2020-09-18 | End: 2020-09-18 | Stop reason: HOSPADM

## 2020-09-18 RX ORDER — MIDAZOLAM HYDROCHLORIDE 1 MG/ML
2 INJECTION INTRAMUSCULAR; INTRAVENOUS
Status: DISCONTINUED | OUTPATIENT
Start: 2020-09-18 | End: 2020-09-18 | Stop reason: HOSPADM

## 2020-09-18 RX ORDER — OXYCODONE AND ACETAMINOPHEN 10; 325 MG/1; MG/1
1 TABLET ORAL ONCE AS NEEDED
Status: DISCONTINUED | OUTPATIENT
Start: 2020-09-18 | End: 2020-09-18 | Stop reason: HOSPADM

## 2020-09-18 RX ORDER — SODIUM CHLORIDE 0.9 % (FLUSH) 0.9 %
3-10 SYRINGE (ML) INJECTION AS NEEDED
Status: DISCONTINUED | OUTPATIENT
Start: 2020-09-18 | End: 2020-09-18 | Stop reason: HOSPADM

## 2020-09-18 RX ORDER — HYDROCODONE BITARTRATE AND ACETAMINOPHEN 5; 325 MG/1; MG/1
1 TABLET ORAL ONCE AS NEEDED
Status: DISCONTINUED | OUTPATIENT
Start: 2020-09-18 | End: 2020-09-18 | Stop reason: HOSPADM

## 2020-09-18 RX ORDER — SODIUM CHLORIDE, SODIUM LACTATE, POTASSIUM CHLORIDE, CALCIUM CHLORIDE 600; 310; 30; 20 MG/100ML; MG/100ML; MG/100ML; MG/100ML
1000 INJECTION, SOLUTION INTRAVENOUS CONTINUOUS
Status: DISCONTINUED | OUTPATIENT
Start: 2020-09-18 | End: 2020-09-18 | Stop reason: HOSPADM

## 2020-09-18 RX ORDER — SODIUM CHLORIDE 0.9 % (FLUSH) 0.9 %
3 SYRINGE (ML) INJECTION EVERY 12 HOURS SCHEDULED
Status: DISCONTINUED | OUTPATIENT
Start: 2020-09-18 | End: 2020-09-18 | Stop reason: HOSPADM

## 2020-09-18 RX ORDER — CLINDAMYCIN PHOSPHATE 900 MG/50ML
900 INJECTION INTRAVENOUS ONCE
Status: COMPLETED | OUTPATIENT
Start: 2020-09-18 | End: 2020-09-18

## 2020-09-18 RX ORDER — SODIUM CHLORIDE 9 MG/ML
INJECTION, SOLUTION INTRAVENOUS AS NEEDED
Status: DISCONTINUED | OUTPATIENT
Start: 2020-09-18 | End: 2020-09-18 | Stop reason: HOSPADM

## 2020-09-18 RX ADMIN — SODIUM CHLORIDE, POTASSIUM CHLORIDE, SODIUM LACTATE AND CALCIUM CHLORIDE: 600; 310; 30; 20 INJECTION, SOLUTION INTRAVENOUS at 08:08

## 2020-09-18 RX ADMIN — CLINDAMYCIN IN 5 PERCENT DEXTROSE 900 MG: 18 INJECTION, SOLUTION INTRAVENOUS at 08:18

## 2020-09-18 RX ADMIN — SODIUM CHLORIDE, POTASSIUM CHLORIDE, SODIUM LACTATE AND CALCIUM CHLORIDE 1000 ML: 600; 310; 30; 20 INJECTION, SOLUTION INTRAVENOUS at 06:11

## 2020-09-18 RX ADMIN — MIDAZOLAM HYDROCHLORIDE 2 MG: 1 INJECTION, SOLUTION INTRAMUSCULAR; INTRAVENOUS at 07:50

## 2020-09-18 RX ADMIN — PROPOFOL 100 MCG/KG/MIN: 10 INJECTION, EMULSION INTRAVENOUS at 08:15

## 2020-09-18 NOTE — ANESTHESIA PREPROCEDURE EVALUATION
Anesthesia Evaluation     Patient summary reviewed and Nursing notes reviewed   no history of anesthetic complications:  NPO Solid Status: > 8 hours  NPO Liquid Status: > 2 hours           Airway   Mallampati: II  TM distance: >3 FB  Neck ROM: full  No difficulty expected  Dental - normal exam     Pulmonary    (+) sleep apnea (reported by work for neck circumference),   (-) COPD, asthma    ROS comment: S/P thymoma resection  Cardiovascular     ECG reviewed  PT is on anticoagulation therapy  Patient on routine beta blocker and Beta blocker given within 24 hours of surgery    (+) pacemaker (medtronic) ICD, hypertension, valvular problems/murmurs AI and MR, CAD, CABG (12/28/20, EV), dysrhythmias Atrial Fib, CHF (EF 21-25%) , PVD (5.2 cm ascending aortic aneurysm), hyperlipidemia,     ROS comment: Off coumadin 9/13/20    Ascending Aortic Aneurysm 5.2 cm- followed with Dr. Wilson    Echo:  ·Left ventricular systolic function is severely decreased. Estimated EF appears to be in the range of 21 - 25%.  ·Left ventricular diastolic dysfunction.  ·The left ventricular cavity is severely dilated.  ·Left ventricular wall thickness is consistent with mild concentric hypertrophy.  ·Interatrial septal defect present.  ·Mild-to-moderate mitral valve regurgitation is present  ·Moderate aortic valve regurgitation is present.  ·Mild tricuspid valve regurgitation is present. Estimated right ventricular systolic pressure from tricuspid regurgitation is mildly elevated (35-45 mmHg).  ·Borderline dilation of the aortic root is present.    Neuro/Psych  (-) seizures, TIA, CVA  GI/Hepatic/Renal/Endo    (+) obesity,   renal disease CRI, diabetes mellitus,     Musculoskeletal     Abdominal    Substance History      OB/GYN          Other      history of cancer (thymoma s/p resection)             Poonam 10, 2020     Primary Cardiologist: Elaine  : Medtronic Model: Evera MRI  Implant date: 10/6/16     Reason for evaluation:  routine  Indication for AICD: chronic systolic heart failure and cardiomyopathy     Measurements  Ventricular sensing - R wave: >20 mV  Ventricular threshold: 0.625 V @ 0.4 ms  Ventricular lead impedance: 418 ohms  Shock Impedance: RV 44 ohms  SVC 53 ohms        Diagnostic Data  Paced: 1.6 %  Other: Several 2-sec runs of NSVT.  Battery status: satisfactory      Final Parameters  Mode: VVI  Lower rate: 40 bpm   Ventricular - Amplitude: 2.0 V   Pulse width: 0.4 ms  Sensitivity: 0.3 mV   Changes made: n/a  Conclusions: normal AICD function     Follow up: 3 month  Anesthesia Plan    ASA 3     MAC   (Place magnet intraop, plan for interrogation post op)  intravenous induction     Anesthetic plan, all risks, benefits, and alternatives have been provided, discussed and informed consent has been obtained with: patient.

## 2020-09-18 NOTE — ANESTHESIA POSTPROCEDURE EVALUATION
Patient: Joao Fonseca    Procedure Summary     Date: 09/18/20 Room / Location: [facility] OR  /  PAD HYBRID OR 12    Anesthesia Start: 0808 Anesthesia Stop: 0916    Procedure: LEFT SAPHENOUS VEIN CLOSURE of upper thigh and distal greater saphenous vein using Venaseal (Left Leg Lower) Diagnosis:       Chronic venous hypertension with inflammation involving left side      Preop testing      (Chronic venous hypertension with inflammation involving left side [I87.322])      (Preop testing [Z01.818])    Surgeon: Froylan De La Cruz DO Provider: Jaime Durán CRNA    Anesthesia Type: MAC ASA Status: 3          Anesthesia Type: MAC    Vitals  Vitals Value Taken Time   /72 09/18/20 1000   Temp 97 °F (36.1 °C) 09/18/20 0916   Pulse 52 09/18/20 1003   Resp 14 09/18/20 0930   SpO2 93 % 09/18/20 1003   Vitals shown include unvalidated device data.        Post Anesthesia Care and Evaluation    Patient location during evaluation: PACU  Patient participation: complete - patient participated  Level of consciousness: awake and alert  Pain management: adequate  Airway patency: patent  Anesthetic complications: No anesthetic complications    Cardiovascular status: acceptable  Respiratory status: acceptable  Hydration status: acceptable    Comments: Blood pressure 113/81, pulse 53, temperature 97 °F (36.1 °C), temperature source Temporal, resp. rate 14, SpO2 96 %.    Pt discharged from PACU based on missy score >8

## 2020-09-21 ENCOUNTER — ANTICOAGULATION VISIT (OUTPATIENT)
Dept: CARDIOLOGY | Facility: CLINIC | Age: 65
End: 2020-09-21

## 2020-09-25 ENCOUNTER — ANTICOAGULATION VISIT (OUTPATIENT)
Dept: CARDIOLOGY | Facility: CLINIC | Age: 65
End: 2020-09-25

## 2020-09-25 ENCOUNTER — LAB (OUTPATIENT)
Dept: LAB | Facility: HOSPITAL | Age: 65
End: 2020-09-25

## 2020-09-25 ENCOUNTER — OFFICE VISIT (OUTPATIENT)
Dept: VASCULAR SURGERY | Facility: CLINIC | Age: 65
End: 2020-09-25

## 2020-09-25 ENCOUNTER — HOSPITAL ENCOUNTER (OUTPATIENT)
Dept: ULTRASOUND IMAGING | Facility: HOSPITAL | Age: 65
Discharge: HOME | End: 2020-09-25

## 2020-09-25 VITALS
BODY MASS INDEX: 31.15 KG/M2 | WEIGHT: 230 LBS | DIASTOLIC BLOOD PRESSURE: 84 MMHG | HEIGHT: 72 IN | SYSTOLIC BLOOD PRESSURE: 130 MMHG | OXYGEN SATURATION: 98 % | HEART RATE: 71 BPM

## 2020-09-25 DIAGNOSIS — I10 ESSENTIAL HYPERTENSION: ICD-10-CM

## 2020-09-25 DIAGNOSIS — I48.0 PAF (PAROXYSMAL ATRIAL FIBRILLATION) (HCC): ICD-10-CM

## 2020-09-25 DIAGNOSIS — I87.322 CHRONIC VENOUS HYPERTENSION WITH INFLAMMATION INVOLVING LEFT SIDE: ICD-10-CM

## 2020-09-25 DIAGNOSIS — I87.322 CHRONIC VENOUS HYPERTENSION WITH INFLAMMATION INVOLVING LEFT SIDE: Primary | ICD-10-CM

## 2020-09-25 DIAGNOSIS — E78.2 MIXED HYPERLIPIDEMIA: ICD-10-CM

## 2020-09-25 PROBLEM — Z01.818 PREOP TESTING: Status: RESOLVED | Noted: 2020-09-08 | Resolved: 2020-09-25

## 2020-09-25 LAB
INR PPP: 1.6 (ref 0.91–1.09)
PROTHROMBIN TIME: 19.5 SECONDS (ref 10–13.8)

## 2020-09-25 PROCEDURE — 85610 PROTHROMBIN TIME: CPT | Performed by: INTERNAL MEDICINE

## 2020-09-25 PROCEDURE — 99214 OFFICE O/P EST MOD 30 MIN: CPT | Performed by: NURSE PRACTITIONER

## 2020-09-25 PROCEDURE — 93971 EXTREMITY STUDY: CPT

## 2020-09-25 PROCEDURE — 93971 EXTREMITY STUDY: CPT | Performed by: SURGERY

## 2020-09-25 NOTE — PROGRESS NOTES
9/25/2020       Hernandez Dupree, DO  [address] [facility] [address] [address]  [place] [address]    Joao Fonseca  1955    Chief Complaint   Patient presents with   • Follow-up     1 Week Post-Op Follow Up For LEFT SAPHENOUS VEIN CLOSURE of upper thigh and distal greater saphenous vein using Venaseal. Test [id number] US pad venous lower ext left. Patient denies any stroke like symptoms.    • Smoker/Non-Smoker     Patient is Former Smoker - Quit 1995   • Med Management     Verbally verified medications with patient. Mr Fonseca verbalized all medications are correct and up to date.        Dear Hernandez Dupree, DO       HPI  I had the pleasure of seeing your patient Joao Fonseca in the office today.   As you recall, Joao Fonseca is a 65 y.o.  male who you are currently following for routine health maintenance.  He does follow with Dr. Wilson for a 5.2 cm ascending aortic aneurysm, and recommended conservative management until reaches 5.5 cm.  He would require a redo median sternotomy.  He did have previous vein stripping of the right lower extremity about 6 years ago in [place].   He has previously undergone left lower extremity radiofrequency ablation in 2018.   About 2 months ago he states he has developed a large varicosity to his left leg in the upper thigh and down his leg as well.  He did undergo endovenous closure of the left lower extremity with Venaseal.  He is doing well overall but does report some tenderness along closure.  He had noninvasive testing performed today which I personally reviewed.    Review of Systems   Constitutional: Negative.    HENT: Negative.    Eyes: Negative.    Respiratory: Negative.    Cardiovascular: Positive for leg swelling.   Gastrointestinal: Negative.    Endocrine: Negative.    Genitourinary: Negative.    Musculoskeletal: Negative.    Skin: Negative.    Allergic/Immunologic: Negative.    Neurological: Negative.    Hematological: Negative.    Psychiatric/Behavioral:  "Negative.    All other systems reviewed and are negative.      /84 (BP Location: Right arm, Patient Position: Sitting, Cuff Size: Adult)   Pulse 71   Ht 182.9 cm (72\")   Wt 104 kg (230 lb)   SpO2 98%   BMI 31.19 kg/m²   Physical Exam  Vitals signs and nursing note reviewed.   Constitutional:       General: He is not in acute distress.     Appearance: Normal appearance. He is well-developed. He is not diaphoretic.   HENT:      Head: Normocephalic and atraumatic.   Eyes:      General: No scleral icterus.     Conjunctiva/sclera: Conjunctivae normal.      Pupils: Pupils are equal, round, and reactive to light.   Neck:      Musculoskeletal: Normal range of motion and neck supple.      Thyroid: No thyromegaly.      Vascular: No carotid bruit or JVD.   Cardiovascular:      Rate and Rhythm: Normal rate and regular rhythm.      Pulses: Normal pulses.           Femoral pulses are 2+ on the right side and 2+ on the left side.       Popliteal pulses are 2+ on the right side and 2+ on the left side.        Dorsalis pedis pulses are 2+ on the right side and 2+ on the left side.        Posterior tibial pulses are 2+ on the right side and 2+ on the left side.      Heart sounds: Normal heart sounds, S1 normal and S2 normal. No murmur. No friction rub. No gallop.       Comments: Varicose veins to lower extremities, tenderness to the left thigh along closure and just below the knee, no evidence of phlebitis  Pulmonary:      Effort: Pulmonary effort is normal.      Breath sounds: Normal breath sounds.   Abdominal:      General: Bowel sounds are normal. There is no abdominal bruit.      Palpations: Abdomen is soft.      Tenderness: There is no abdominal tenderness.   Musculoskeletal: Normal range of motion.   Skin:     General: Skin is warm and dry.   Neurological:      General: No focal deficit present.      Mental Status: He is alert and oriented to person, place, and time.      Cranial Nerves: No cranial nerve deficit. "   Psychiatric:         Mood and Affect: Mood normal.         Behavior: Behavior normal.         Thought Content: Thought content normal.         Judgment: Judgment normal.       Diagnostic data:  Us Guided Vascular Access    Result Date: 9/18/2020  Narrative: History: Swelling  Comments: Grayscale imaging as well as color flow duplex were used to evaluate the left lower extremity greater saphenous vein during venous closure.  The greater saphenous vein was successfully cannulated just above the ankle. The catheter was placed up to the junction and pulled back 5 cm and marked.      Impression: Successful endovenous closure of the left lower extremity greater saphenous vein. This report was finalized on 09/18/2020 14:04 by Dr. Froylan De La Cruz MD.    Us Aorta Limited    Result Date: 9/15/2020  Narrative: Examination: US AORTA LIMITED-  Date:  9/15/2020  Indication: 65 years-year-old Male with AAA; Z13.6-Encounter for screening for cardiovascular disorders  Comparison: None.  Findings: Grayscale sonographic imaging of the abdominal aorta was performed. Please note that measurements reflect inner luminal diameters as the true outer aspect of the abdominal aorta is difficult to appreciate by sonography. Color and pulse Doppler imaging was also performed.  The cephalad abdominal aorta measures 2.9 x 3.4 cm. The mid abdominal aorta measures 2.3 x 2.4 cm. The caudal abdominal aorta is obscured.        Impression: Impression: Borderline ectasia of the proximal abdominal aorta. This report was finalized on 09/15/2020 10:16 by Dr. Tasia Cameron MD.    Us Venous Doppler Lower Extremity Left (duplex)    Result Date: 9/25/2020  Narrative: History: Swelling      Impression: Impression: 1. There is no evidence of deep venous thrombosis of the left lower extremity. 2. The greater saphenous vein is closed from zones 2 through 8.  Comments: Left lower extremity venous duplex exam was performed using color Doppler flow, Doppler wave  form analysis, and grayscale imaging, with and without compression. There is no evidence of deep venous thrombosis of the common femoral, superficial femoral, popliteal, posterior tibial, and peroneal veins. There is no thrombus identified in the saphenofemoral junction. There is no evidence of clot in the right common femoral vein.  This report was finalized on 09/25/2020 16:20 by Dr. Froylan De La Cruz MD.    Us Venous Doppler Lower Extremity Bilateral (duplex)    Result Date: 9/1/2020  Narrative: History: Swelling   Comments: Venous valvular insufficiency testing was performed in the bilateral lower extremities using duplex ultrasound with compression techniques.  The common femoral vein, superficial femoral, popliteal vein, posterior tibial vein, peroneal vein, greater saphenous vein, and lesser saphenous veins were interrogated.  On the right, the greater saphenous vein from the junction to the knee has previously been stripped. In the mid calf measured 2.3mm. At the ankle measured 2.5mm. There is greater than 0.5 seconds of reflux from the mid calf to the ankle. The lesser saphenous vein is within normal limits and no evidence of reflux. There is no evidence of DVT.  On the left, the greater saphenous vein at the junction measured 10.9mm. From the mid thigh to the distal thigh the greater saphenous vein is closed from previous RFA. Above the knee measured 5mm. In the mid calf measured 2.8mm. At the ankle measured 2.6mm. There is greater than 0.5 seconds of reflux at the junction and from above the knee to the ankle.The lesser saphenous vein there is greater than 0.5 seconds of reflux at the ankle only.. There is no evidence of DVT. There is a large cluster varicose veins in the mid thigh and the lateral and posterior calf      Impression: Impression: There is evidence of venous insufficiency in both lower extremity greater saphenous veins and the left lower extremity lesser saphenous vein. There are clusters of  varicose veins in the left lower extremity as noted above.   This report was finalized on 09/01/2020 15:22 by Dr. Froylan De La Cruz MD.         Patient Active Problem List   Diagnosis   • CHF (congestive heart failure), NYHA class II, chronic, systolic (CMS/HCC)   • Coronary artery disease involving native coronary artery of native heart without angina pectoris   • Herpes labialis   • Disorder of liver   • Reflux involving intestinal tract   • Abdominal hernia   • Hx of colonic polyps   • Family hx of colon cancer   • Type 2 diabetes mellitus with stage 3 chronic kidney disease, without long-term current use of insulin (CMS/HCC)   • Essential hypertension   • Eczema   • AICD (automatic cardioverter/defibrillator) present   • Varicosities of leg   • Right elbow pain   • Mixed hyperlipidemia   • Chronic laryngitis   • Wheezing   • Herpes zoster without complication   • Class 1 obesity due to excess calories with serious comorbidity and body mass index (BMI) of 31.0 to 31.9 in adult   • History of colonic polyps   • Chronic sinusitis   • Chronic rhinitis   • Sleep apnea   • S/P CABG x 3   • PAF (paroxysmal atrial fibrillation) (CMS/HCC)   • Chest pain   • Thoracic aortic aneurysm without rupture (CMS/HCC)   • Venous (peripheral) insufficiency   • Dyspnea   • Malignant thymoma (CMS/HCC)   • Paroxysmal nocturnal dyspnea   • Chronic venous hypertension with inflammation involving left side         ICD-10-CM ICD-9-CM   1. Chronic venous hypertension with inflammation involving left side  I87.322 459.32   2. Essential hypertension  I10 401.9   3. Mixed hyperlipidemia  E78.2 272.2       Plan: After thoroughly evaluating Joao Fonseca, I am pleased to report overall he is doing well status post endovenous closure of the left lower extremity.  His leg does feel better however there is tenderness along the thigh which is not unexpected.  I do not see any phlebitis at this time.  I did instruct he could use warm compresses and  take ibuprofen to help with inflammation.  He can also try compression above the knee as well.  I did review his testing which shows a greater saphenous vein to be closed as expected with no evidence of DVT.    He can continue to wear compression stockings on a daily basis.  He follows with Dr. Wilson every 6 months for a sending aortic aneurysm measuring 5.3 cm and did recently see him in May.  His ultrasound of the aorta shows a cephalad aorta to measure 2.9 x 3.4 cm.  We can follow this on an annual basis.  I did discuss vascular risk factors as they pertain to the progression of vascular disease including controlling hypertension and hyperlipidemia, which are currently stable.  The patient can continue taking their current medication regimen as previously planned.  This was all discussed in full with complete understanding.    Thank you for allowing me to participate in the care of your patient.  Please do not hesitate with any questions or concerns.  I will keep you aware of any further encounters with Joao Fonseca.        Sincerely yours,         Shari Cool, DIOR Dupree, Hernandez Fermin, DO

## 2020-10-01 NOTE — TELEPHONE ENCOUNTER
Patient called and said he was put on Eliquis when he was in the hospital and since going home his feet are swelling. He called the nurse line over the weekend and spoke with Henrique who put him in touch with Dr Meza. Dr Meza told him to stop taking the Eliquis and to contact our office today. Patient said he is not going to take Eliquis and wants to just go back on his aspirin 81 mg regimen and also start back on lisinopril. I did tell patient if he is not going to take the anticoagulant that he has been prescribed he should take the aspirin. He has appointment to see NP Wednesday so he can discuss  medication changes at that time. He said he has been having a small amount of shortness of breath but not a lot and the swelling in his feet have gone done since stopping the Eliquis and he has been elevating them as much as possible.    [FreeTextEntry6] : Lives with family, works as

## 2020-10-05 ENCOUNTER — ANTICOAGULATION VISIT (OUTPATIENT)
Dept: CARDIOLOGY | Facility: CLINIC | Age: 65
End: 2020-10-05

## 2020-10-05 ENCOUNTER — LAB (OUTPATIENT)
Dept: LAB | Facility: HOSPITAL | Age: 65
End: 2020-10-05

## 2020-10-05 DIAGNOSIS — I48.0 PAF (PAROXYSMAL ATRIAL FIBRILLATION) (HCC): ICD-10-CM

## 2020-10-05 LAB
INR PPP: 2.2 (ref 0.91–1.09)
PROTHROMBIN TIME: 26.6 SECONDS (ref 10–13.8)

## 2020-10-05 PROCEDURE — 85610 PROTHROMBIN TIME: CPT | Performed by: INTERNAL MEDICINE

## 2020-10-15 ENCOUNTER — LAB (OUTPATIENT)
Dept: LAB | Facility: HOSPITAL | Age: 65
End: 2020-10-15

## 2020-10-15 ENCOUNTER — CLINICAL SUPPORT NO REQUIREMENTS (OUTPATIENT)
Dept: CARDIOLOGY | Facility: CLINIC | Age: 65
End: 2020-10-15

## 2020-10-15 DIAGNOSIS — I50.22 CHF (CONGESTIVE HEART FAILURE), NYHA CLASS II, CHRONIC, SYSTOLIC (HCC): ICD-10-CM

## 2020-10-15 DIAGNOSIS — Z95.810 AICD (AUTOMATIC CARDIOVERTER/DEFIBRILLATOR) PRESENT: Primary | ICD-10-CM

## 2020-10-15 DIAGNOSIS — I48.0 PAF (PAROXYSMAL ATRIAL FIBRILLATION) (HCC): ICD-10-CM

## 2020-10-15 LAB
INR PPP: 1.7 (ref 0.91–1.09)
PROTHROMBIN TIME: 19.9 SECONDS (ref 10–13.8)

## 2020-10-15 PROCEDURE — 93289 INTERROG DEVICE EVAL HEART: CPT | Performed by: INTERNAL MEDICINE

## 2020-10-15 PROCEDURE — 85610 PROTHROMBIN TIME: CPT | Performed by: INTERNAL MEDICINE

## 2020-10-16 ENCOUNTER — ANTICOAGULATION VISIT (OUTPATIENT)
Dept: CARDIOLOGY | Facility: CLINIC | Age: 65
End: 2020-10-16

## 2020-10-29 ENCOUNTER — ANTICOAGULATION VISIT (OUTPATIENT)
Dept: CARDIOLOGY | Facility: CLINIC | Age: 65
End: 2020-10-29

## 2020-10-29 ENCOUNTER — HOSPITAL ENCOUNTER (OUTPATIENT)
Dept: GENERAL RADIOLOGY | Facility: HOSPITAL | Age: 65
Discharge: HOME | End: 2020-10-29

## 2020-10-29 ENCOUNTER — LAB (OUTPATIENT)
Dept: LAB | Facility: HOSPITAL | Age: 65
End: 2020-10-29

## 2020-10-29 ENCOUNTER — OFFICE VISIT (OUTPATIENT)
Dept: INTERNAL MEDICINE | Facility: CLINIC | Age: 65
End: 2020-10-29

## 2020-10-29 VITALS
TEMPERATURE: 97.4 F | BODY MASS INDEX: 31.08 KG/M2 | SYSTOLIC BLOOD PRESSURE: 122 MMHG | OXYGEN SATURATION: 99 % | WEIGHT: 229.5 LBS | HEIGHT: 72 IN | DIASTOLIC BLOOD PRESSURE: 64 MMHG | RESPIRATION RATE: 16 BRPM | HEART RATE: 63 BPM

## 2020-10-29 DIAGNOSIS — I10 ESSENTIAL HYPERTENSION: ICD-10-CM

## 2020-10-29 DIAGNOSIS — I48.0 PAF (PAROXYSMAL ATRIAL FIBRILLATION) (HCC): ICD-10-CM

## 2020-10-29 DIAGNOSIS — E66.09 CLASS 1 OBESITY DUE TO EXCESS CALORIES WITH SERIOUS COMORBIDITY AND BODY MASS INDEX (BMI) OF 31.0 TO 31.9 IN ADULT: ICD-10-CM

## 2020-10-29 DIAGNOSIS — S99.922A INJURY OF TOE ON LEFT FOOT, INITIAL ENCOUNTER: ICD-10-CM

## 2020-10-29 DIAGNOSIS — Z80.0 FAMILY HISTORY OF COLON CANCER IN MOTHER: ICD-10-CM

## 2020-10-29 DIAGNOSIS — Z86.010 PERSONAL HISTORY OF COLONIC POLYPS: ICD-10-CM

## 2020-10-29 DIAGNOSIS — N18.31 TYPE 2 DIABETES MELLITUS WITH STAGE 3A CHRONIC KIDNEY DISEASE, WITHOUT LONG-TERM CURRENT USE OF INSULIN (HCC): Primary | ICD-10-CM

## 2020-10-29 DIAGNOSIS — E11.22 TYPE 2 DIABETES MELLITUS WITH STAGE 3A CHRONIC KIDNEY DISEASE, WITHOUT LONG-TERM CURRENT USE OF INSULIN (HCC): Primary | ICD-10-CM

## 2020-10-29 PROBLEM — J32.9 CHRONIC SINUSITIS: Status: RESOLVED | Noted: 2018-02-15 | Resolved: 2020-10-29

## 2020-10-29 PROBLEM — L30.9 ECZEMA: Status: RESOLVED | Noted: 2017-02-07 | Resolved: 2020-10-29

## 2020-10-29 PROBLEM — J37.0 CHRONIC LARYNGITIS: Status: RESOLVED | Noted: 2018-01-04 | Resolved: 2020-10-29

## 2020-10-29 PROBLEM — R07.9 CHEST PAIN: Status: RESOLVED | Noted: 2018-05-07 | Resolved: 2020-10-29

## 2020-10-29 PROBLEM — J31.0 CHRONIC RHINITIS: Status: RESOLVED | Noted: 2018-02-15 | Resolved: 2020-10-29

## 2020-10-29 PROBLEM — R06.00 DYSPNEA: Status: RESOLVED | Noted: 2020-04-24 | Resolved: 2020-10-29

## 2020-10-29 LAB
HBA1C MFR BLD: 6.7 %
INR PPP: 2.1
INR PPP: 2.1 (ref 0.91–1.09)
PROTHROMBIN TIME: 25.4 SECONDS (ref 10–13.8)

## 2020-10-29 PROCEDURE — 99214 OFFICE O/P EST MOD 30 MIN: CPT | Performed by: INTERNAL MEDICINE

## 2020-10-29 PROCEDURE — 85610 PROTHROMBIN TIME: CPT | Performed by: INTERNAL MEDICINE

## 2020-10-29 PROCEDURE — 73660 X-RAY EXAM OF TOE(S): CPT

## 2020-10-29 PROCEDURE — G0439 PPPS, SUBSEQ VISIT: HCPCS | Performed by: INTERNAL MEDICINE

## 2020-10-29 PROCEDURE — 83036 HEMOGLOBIN GLYCOSYLATED A1C: CPT | Performed by: INTERNAL MEDICINE

## 2020-10-29 NOTE — PROGRESS NOTES
"CC: Follow-up diabetes    History:  Joao Fonseca is a 65 y.o. male   He notes he has been doing reasonably well without any acute illness, though he dropped a wheelbarrow on his left great toe and has had pain in the distal metatarsal area that began yesterday.  He notes he has no pain with flexion and extension of the toe but lateral movement does cause him discomfort.      He notes he had a bout with depression over the loss of loved ones and went on a \"sweets binge\" not long ago, though he has stopped eating so many sweets more recently.  He notes no symptoms of hyperglycemia or hypoglycemia, but continues on Metformin without side effects.      His blood pressure has been well controlled on current medication without side effects and he is anticoagulated for atrial fibrillation on warfarin.  Rate control is adequate with metoprolol.        ROS:  Review of Systems   Constitutional: Negative for chills and fever.   Respiratory: Negative for cough and shortness of breath.    Cardiovascular: Negative for chest pain and palpitations.   Musculoskeletal: Positive for arthralgias and gait problem.        reports that he quit smoking about 25 years ago. His smoking use included cigarettes. He quit after 25.00 years of use. He has never used smokeless tobacco. He reports that he does not drink alcohol or use drugs.      Current Outpatient Medications:   •  acyclovir (ZOVIRAX) 400 MG tablet, Take 1 tablet by mouth 5 (Five) Times a Day. Take no more than 5 doses a day. (Patient taking differently: Take 400 mg by mouth 5 (Five) Times a Day. Take no more than 5 doses a day. For shingles.), Disp: 90 tablet, Rfl: 6  •  albuterol (PROVENTIL HFA;VENTOLIN HFA) 108 (90 Base) MCG/ACT inhaler, Inhale 2 puffs Every 4 (Four) Hours As Needed for Wheezing or Shortness of Air., Disp: , Rfl:   •  Blood Glucose Monitoring Suppl (ONE TOUCH ULTRA 2) w/Device kit, USE AS DIRECTED PER PACKAGE INSTRUCTIONS, Disp: , Rfl: 0  •  furosemide " "(LASIX) 40 MG tablet, Take 1 tablet by mouth Daily As Needed (edema, SOA)., Disp: 90 tablet, Rfl: 3  •  lisinopril (PRINIVIL,ZESTRIL) 5 MG tablet, Take 1 tablet by mouth Daily., Disp: 90 tablet, Rfl: 3  •  metFORMIN (GLUCOPHAGE) 1000 MG tablet, Take 1 tablet by mouth 2 (Two) Times a Day With Meals., Disp: 180 tablet, Rfl: 3  •  metoprolol tartrate (LOPRESSOR) 50 MG tablet, Take 1.5 tablets by mouth 2 (Two) Times a Day., Disp: 270 tablet, Rfl: 3  •  warfarin (COUMADIN) 5 MG tablet, Take 1 tablet by mouth Every Night. Extra tablet to be taken PRN upon instruction from [facility] r/t sub-therapeutic INR result. (Patient taking differently: Take 7.5 mg by mouth Every Night. Extra tablet to be taken PRN upon instruction from [facility] r/t sub-therapeutic INR result.), Disp: 60 tablet, Rfl: 11    OBJECTIVE:  /64 (BP Location: Left arm, Patient Position: Sitting, Cuff Size: Adult)   Pulse 63   Temp 97.4 °F (36.3 °C)   Resp 16   Ht 182.9 cm (72\")   Wt 104 kg (229 lb 8 oz)   SpO2 99%   BMI 31.13 kg/m²    Physical Exam  Constitutional:       General: He is not in acute distress.  Cardiovascular:      Rate and Rhythm: Normal rate and regular rhythm.      Heart sounds: Normal heart sounds. No murmur.   Pulmonary:      Effort: Pulmonary effort is normal. No respiratory distress.      Breath sounds: Normal breath sounds. No wheezing.   Skin:     Comments: Mild ecchymosis over the MTP of the left great toe.  Range of motion is within normal limits and there is no bony crepitus appreciated.   Neurological:      Mental Status: He is alert and oriented to person, place, and time.      Gait: Gait normal.   Psychiatric:         Mood and Affect: Mood normal.         Behavior: Behavior normal.         Assessment/Plan     Diagnoses and all orders for this visit:    1. Type 2 diabetes mellitus with stage 3a chronic kidney disease, without long-term current use of insulin (CMS/[facility]) (Primary)  -     metFORMIN " (GLUCOPHAGE) 1000 MG tablet; Take 1 tablet by mouth 2 (Two) Times a Day With Meals.  Dispense: 180 tablet; Refill: 3  -     POC Glycosylated Hemoglobin (Hb A1C)  A1c is 6.7% in the office today.  This represents excellent control and we will continue metformin.  If this excellent control continues, we would consider decreasing dose.    2. Essential hypertension  Well controlled, BP goal for age is <140/90 per JNC 8 guidelines and continue current medications    3. Injury of toe on left foot, initial encounter  -     XR toe 2+ vw left; Future  Roentgenogram for further evaluation of possible fracture of the metatarsal.    4. PAF (paroxysmal atrial fibrillation) (CMS/HCC)  Rate controlled on metoprolol and anticoagulated on warfarin as managed by cardiology.    5. Class 1 obesity due to excess calories with serious comorbidity and body mass index (BMI) of 31.0 to 31.9 in adult  Recommended attention to portion control and being careful about the types and timing of meals for the purpose of weight management.    6. Family history of colon cancer in mother  7. Personal history of colonic polyps  -     Ambulatory Referral to Gastroenterology  He prefers an alternative prep, but we will refer him back to Dr. Simmons office for further discussion given his history of polyps and history of cancer in his mother.      An After Visit Summary was printed and given to the patient at discharge.  Return in about 6 months (around 4/29/2021) for Recheck.          Hernandez Dupree D.O. 10/29/2020   Electronically signed.

## 2020-10-29 NOTE — PROGRESS NOTES
The ABCs of the Annual Wellness Visit  Subsequent Medicare Wellness Visit    No chief complaint on file.  See  note    Subjective   History of Present Illness:  Joao Fonseca is a 65 y.o. male who presents for a Subsequent Medicare Wellness Visit.    HEALTH RISK ASSESSMENT    Recent Hospitalizations:  No hospitalization(s) within the last year.    Current Medical Providers:  Patient Care Team:  Hernandez Dupree DO as PCP - General (Internal Medicine)  Corina Haddad RN (Inactive) as Registered Nurse  Phil Henry MD as Cardiologist (Cardiology)  Basil Zee OD (Optometry)    Smoking Status:  Social History     Tobacco Use   Smoking Status Former Smoker   • Years: 25.00   • Types: Cigarettes   • Quit date:    • Years since quittin.8   Smokeless Tobacco Never Used   Tobacco Comment    QUIT 20 YEARS AGO       Alcohol Consumption:  Social History     Substance and Sexual Activity   Alcohol Use No       Depression Screen:   PHQ-2/PHQ-9 Depression Screening 10/29/2020   Little interest or pleasure in doing things 0   Feeling down, depressed, or hopeless 0   Total Score 0       Fall Risk Screen:  STEADI Fall Risk Assessment has not been completed.    Health Habits and Functional and Cognitive Screening:  Functional & Cognitive Status 10/29/2020   Do you have difficulty preparing food and eating? No   Do you have difficulty bathing yourself, getting dressed or grooming yourself? No   Do you have difficulty using the toilet? No   Do you have difficulty moving around from place to place? No   Do you have trouble with steps or getting out of a bed or a chair? No   Current Diet Unhealthy Diet   Dental Exam Up to date   Eye Exam Up to date   Exercise (times per week) 0 times per week   Current Exercises Include No Regular Exercise   Current Exercise Activities Include -   Do you need help using the phone?  No   Are you deaf or do you have serious difficulty hearing?  No   Do you need help  with transportation? No   Do you need help shopping? No   Do you need help preparing meals?  No   Do you need help with housework?  No   Do you need help with laundry? No   Do you need help taking your medications? No   Do you need help managing money? No   Do you ever drive or ride in a car without wearing a seat belt? No   Have you felt unusual stress, anger or loneliness in the last month? No   Who do you live with? Spouse   If you need help, do you have trouble finding someone available to you? No   Have you been bothered in the last four weeks by sexual problems? No   Do you have difficulty concentrating, remembering or making decisions? No         Does the patient have evidence of cognitive impairment? No    Asprin use counseling:On warfarin alone per Cardiology.    Age-appropriate Screening Schedule:  Refer to the list below for future screening recommendations based on patient's age, sex and/or medical conditions. Orders for these recommended tests are listed in the plan section. The patient has been provided with a written plan.    Health Maintenance   Topic Date Due   • ZOSTER VACCINE (2 of 2) 01/02/2017   • DIABETIC FOOT EXAM  11/08/2018   • DIABETIC EYE EXAM  02/19/2019   • COLONOSCOPY  06/28/2020   • INFLUENZA VACCINE  03/31/2021 (Originally 8/1/2020)   • URINE MICROALBUMIN  11/01/2021 (Originally 11/8/2018)   • HEMOGLOBIN A1C  11/04/2020   • LIPID PANEL  05/04/2021   • TDAP/TD VACCINES (2 - Td) 11/07/2026          The following portions of the patient's history were reviewed and updated as appropriate: allergies, current medications, past family history, past medical history, past social history, past surgical history and problem list.    Outpatient Medications Prior to Visit   Medication Sig Dispense Refill   • acyclovir (ZOVIRAX) 400 MG tablet Take 1 tablet by mouth 5 (Five) Times a Day. Take no more than 5 doses a day. (Patient taking differently: Take 400 mg by mouth 5 (Five) Times a Day. Take no  more than 5 doses a day. For shingles.) 90 tablet 6   • albuterol (PROVENTIL HFA;VENTOLIN HFA) 108 (90 Base) MCG/ACT inhaler Inhale 2 puffs Every 4 (Four) Hours As Needed for Wheezing or Shortness of Air.     • Blood Glucose Monitoring Suppl (ONE TOUCH ULTRA 2) w/Device kit USE AS DIRECTED PER PACKAGE INSTRUCTIONS  0   • furosemide (LASIX) 40 MG tablet Take 1 tablet by mouth Daily As Needed (edema, SOA). 90 tablet 3   • lisinopril (PRINIVIL,ZESTRIL) 5 MG tablet Take 1 tablet by mouth Daily. 90 tablet 3   • metoprolol tartrate (LOPRESSOR) 50 MG tablet Take 1.5 tablets by mouth 2 (Two) Times a Day. 270 tablet 3   • warfarin (COUMADIN) 5 MG tablet Take 1 tablet by mouth Every Night. Extra tablet to be taken PRN upon instruction from [facility] r/t sub-therapeutic INR result. (Patient taking differently: Take 7.5 mg by mouth Every Night. Extra tablet to be taken PRN upon instruction from [facility] r/t sub-therapeutic INR result.) 60 tablet 11   • metFORMIN (GLUCOPHAGE) 1000 MG tablet Take 1 tablet by mouth 2 (Two) Times a Day With Meals. 180 tablet 3     No facility-administered medications prior to visit.        Patient Active Problem List   Diagnosis   • CHF (congestive heart failure), NYHA class II, chronic, systolic (CMS/HCC)   • Coronary artery disease involving native coronary artery of native heart without angina pectoris   • Herpes labialis   • Reflux involving intestinal tract   • Abdominal hernia   • Hx of colonic polyps   • Family hx of colon cancer   • Type 2 diabetes mellitus with stage 3 chronic kidney disease, without long-term current use of insulin (CMS/HCC)   • Essential hypertension   • AICD (automatic cardioverter/defibrillator) present   • Varicosities of leg   • Right elbow pain   • Mixed hyperlipidemia   • Wheezing   • Herpes zoster without complication   • Class 1 obesity due to excess calories with serious comorbidity and body mass index (BMI) of 31.0 to 31.9 in adult   • Sleep apnea  "  • S/P CABG x 3   • PAF (paroxysmal atrial fibrillation) (CMS/[facility])   • Thoracic aortic aneurysm without rupture (CMS/[facility])   • Venous (peripheral) insufficiency   • Malignant thymoma (CMS/[facility])   • Paroxysmal nocturnal dyspnea   • Chronic venous hypertension with inflammation involving left side       Advanced Care Planning:  ACP discussion was held with the patient during this visit. Patient does not have an advance directive, declines further assistance.    Review of Systems See  note    Compared to one year ago, the patient feels his physical health is worse.  Compared to one year ago, the patient feels his mental health is the same.    Reviewed chart for potential of high risk medication in the elderly: yes  Reviewed chart for potential of harmful drug interactions in the elderly:yes    Objective         Vitals:    10/29/20 0803   BP: 122/64   BP Location: Left arm   Patient Position: Sitting   Cuff Size: Adult   Pulse: 63   Resp: 16   Temp: 97.4 °F (36.3 °C)   SpO2: 99%   Weight: 104 kg (229 lb 8 oz)   Height: 182.9 cm (72\")       Body mass index is 31.13 kg/m².  Discussed the patient's BMI with him. The BMI is above average; BMI management plan is completed.    Physical Exam See  note    Lab Results   Component Value Date    HGBA1C 6.7 10/29/2020        Assessment/Plan   Medicare Risks and Personalized Health Plan  CMS Preventative Services Quick Reference  Advance Directive Discussion  Cardiovascular risk  Depression/Dysphoria  Fall Risk  Immunizations Discussed/Encouraged (specific immunizations; adacel Tdap, Influenza, Pneumococcal 23 and Shingrix )  Obesity/Overweight     The above risks/problems have been discussed with the patient.  Pertinent information has been shared with the patient in the After Visit Summary.  Follow up plans and orders are seen below in the Assessment/Plan Section.    Diagnoses and all orders for this visit:    1. Type 2 diabetes mellitus with stage 3a chronic " kidney disease, without long-term current use of insulin (CMS/HCC) (Primary)  -     metFORMIN (GLUCOPHAGE) 1000 MG tablet; Take 1 tablet by mouth 2 (Two) Times a Day With Meals.  Dispense: 180 tablet; Refill: 3  -     POC Glycosylated Hemoglobin (Hb A1C)    2. Essential hypertension    3. Injury of toe on left foot, initial encounter  -     XR toe 2+ vw left; Future    4. PAF (paroxysmal atrial fibrillation) (CMS/HCC)    5. Class 1 obesity due to excess calories with serious comorbidity and body mass index (BMI) of 31.0 to 31.9 in adult    6. Family history of colon cancer in mother  -     Ambulatory Referral to Gastroenterology    7. Personal history of colonic polyps  -     Ambulatory Referral to Gastroenterology      Follow Up:  Return in about 6 months (around 4/29/2021) for Recheck.     An After Visit Summary and PPPS were given to the patient.

## 2020-10-29 NOTE — PATIENT INSTRUCTIONS
Medicare Wellness  Personal Prevention Plan of Service     Date of Office Visit:  10/29/2020  Encounter Provider:  Hernandez Dupree DO  Place of Service:  [facility]  Patient Name: Joao Fonseca  :  1955    As part of the Medicare Wellness portion of your visit today, we are providing you with this personalized preventive plan of services (PPPS). This plan is based upon recommendations of the [place] Preventive Services Task Force (USPSTF) and the Advisory Committee on Immunization Practices (ACIP).    This lists the preventive care services that should be considered, and provides dates of when you are due. Items listed as completed are up-to-date and do not require any further intervention.    Health Maintenance   Topic Date Due   • ZOSTER VACCINE (2 of 2) 2017   • DIABETIC FOOT EXAM  2018   • DIABETIC EYE EXAM  2019   • COLONOSCOPY  2020   • ANNUAL WELLNESS VISIT  10/22/2020   • INFLUENZA VACCINE  2021 (Originally 2020)   • Pneumococcal Vaccine 0-64 (1 of 1 - PPSV23) 10/29/2021 (Originally 1961)   • URINE MICROALBUMIN  2021 (Originally 2018)   • HEMOGLOBIN A1C  2020   • LIPID PANEL  2021   • TDAP/TD VACCINES (2 - Td) 2026   • HEPATITIS C SCREENING  Completed       Orders Placed This Encounter   Procedures   • XR toe 2+ vw left     Standing Status:   Future     Standing Expiration Date:   10/29/2021     Order Specific Question:   Reason for Exam:     Answer:   trauma to distal great MTP   • Ambulatory Referral to Gastroenterology     Referral Priority:   Routine     Referral Type:   Consultation     Referral Reason:   Specialty Services Required     Referred to Provider:   Nils Simmons MD     Requested Specialty:   Gastroenterology     Number of Visits Requested:   1   • POC Glycosylated Hemoglobin (Hb A1C)       Return in about 6 months (around 2021).

## 2020-11-18 ENCOUNTER — OFFICE VISIT (OUTPATIENT)
Dept: CARDIAC SURGERY | Facility: CLINIC | Age: 65
End: 2020-11-18

## 2020-11-18 ENCOUNTER — HOSPITAL ENCOUNTER (OUTPATIENT)
Dept: CT IMAGING | Facility: HOSPITAL | Age: 65
Discharge: HOME | End: 2020-11-18

## 2020-11-18 ENCOUNTER — LAB (OUTPATIENT)
Dept: LAB | Facility: HOSPITAL | Age: 65
End: 2020-11-18

## 2020-11-18 VITALS
WEIGHT: 229.6 LBS | BODY MASS INDEX: 31.1 KG/M2 | DIASTOLIC BLOOD PRESSURE: 64 MMHG | OXYGEN SATURATION: 99 % | SYSTOLIC BLOOD PRESSURE: 118 MMHG | HEIGHT: 72 IN | HEART RATE: 62 BPM

## 2020-11-18 DIAGNOSIS — I71.20 THORACIC AORTIC ANEURYSM WITHOUT RUPTURE (HCC): ICD-10-CM

## 2020-11-18 DIAGNOSIS — I50.22 CHF (CONGESTIVE HEART FAILURE), NYHA CLASS II, CHRONIC, SYSTOLIC (HCC): ICD-10-CM

## 2020-11-18 DIAGNOSIS — I25.10 CORONARY ARTERY DISEASE INVOLVING NATIVE CORONARY ARTERY OF NATIVE HEART WITHOUT ANGINA PECTORIS: ICD-10-CM

## 2020-11-18 DIAGNOSIS — N18.31 TYPE 2 DIABETES MELLITUS WITH STAGE 3A CHRONIC KIDNEY DISEASE, WITHOUT LONG-TERM CURRENT USE OF INSULIN (HCC): ICD-10-CM

## 2020-11-18 DIAGNOSIS — I48.0 PAF (PAROXYSMAL ATRIAL FIBRILLATION) (HCC): ICD-10-CM

## 2020-11-18 DIAGNOSIS — E11.22 TYPE 2 DIABETES MELLITUS WITH STAGE 3A CHRONIC KIDNEY DISEASE, WITHOUT LONG-TERM CURRENT USE OF INSULIN (HCC): ICD-10-CM

## 2020-11-18 DIAGNOSIS — C37 MALIGNANT THYMOMA (HCC): ICD-10-CM

## 2020-11-18 DIAGNOSIS — I71.20 THORACIC AORTIC ANEURYSM WITHOUT RUPTURE (HCC): Primary | ICD-10-CM

## 2020-11-18 DIAGNOSIS — E66.09 CLASS 1 OBESITY DUE TO EXCESS CALORIES WITH SERIOUS COMORBIDITY AND BODY MASS INDEX (BMI) OF 31.0 TO 31.9 IN ADULT: ICD-10-CM

## 2020-11-18 DIAGNOSIS — Z95.1 S/P CABG X 3: ICD-10-CM

## 2020-11-18 LAB
CREAT BLDA-MCNC: 1.6 MG/DL (ref 0.6–1.3)
INR PPP: 2.2 (ref 0.91–1.09)
PROTHROMBIN TIME: 26.3 SECONDS (ref 10–13.8)

## 2020-11-18 PROCEDURE — 85610 PROTHROMBIN TIME: CPT | Performed by: INTERNAL MEDICINE

## 2020-11-18 PROCEDURE — 71275 CT ANGIOGRAPHY CHEST: CPT

## 2020-11-18 PROCEDURE — 82565 ASSAY OF CREATININE: CPT

## 2020-11-18 PROCEDURE — 0 IOPAMIDOL PER 1 ML: Performed by: THORACIC SURGERY (CARDIOTHORACIC VASCULAR SURGERY)

## 2020-11-18 PROCEDURE — 99213 OFFICE O/P EST LOW 20 MIN: CPT | Performed by: THORACIC SURGERY (CARDIOTHORACIC VASCULAR SURGERY)

## 2020-11-18 RX ADMIN — IOPAMIDOL 100 ML: 755 INJECTION, SOLUTION INTRAVENOUS at 07:43

## 2020-11-23 ENCOUNTER — ANTICOAGULATION VISIT (OUTPATIENT)
Dept: CARDIOLOGY | Facility: CLINIC | Age: 65
End: 2020-11-23

## 2020-11-24 NOTE — PROGRESS NOTES
Single Chamber AICD Evaluation Report  IN OFFICE INTERROGATION    October 15, 2020    Primary Cardiologist: Elaine  : Medtronic Model: Evera MRI XT VR [id number]  Implant date: 10/6/2016    Reason for evaluation: routine  Indication for AICD: chronic systolic heart failure and cardiomyopathy    Measurements  Ventricular sensing - R wave: >20 mV  Ventricular threshold: 0.625 V @ 0.4 ms  Ventricular lead impedance: 418 ohms  Shock Impedance: RV 47 ohms  SVC 54 ohms      Diagnostic Data  Paced: 1.6 %    Episodes recorded since 7/2/2020:  NS-VT x 2, longest duration 2 seconds, rates 197-200 bpm.  No ATP or shocks.    Battery status: satisfactory, estimated 7.6 years remaining     Final Parameters  Mode: VVI  Lower rate: 40 bpm   Ventricular - Amplitude: 2 V   Pulse width: 0.4 ms   Sensitivity: 0.3 mV   Changes made: No changes made.  Conclusions: normal AICD function, no therapy delivered, stable pacing and sensing thresholds and adequate battery reserve    Follow up: 3 months in office

## 2020-12-11 NOTE — PROGRESS NOTES
Single Chamber AICD Evaluation Report  IN OFFICE INTERROGATION    July 2, 2020    Primary Cardiologist: Elaine  : Medtronic Model: Evera MRI  Implant date: 10/6/2016    Reason for evaluation: routine  Indication for AICD: congestive heart failure    Measurements  Ventricular sensing - R wave: >20 mV  Ventricular threshold: 0.625 V @ 0.4 ms  Ventricular lead impedance: 456 ohms  Shock Impedance: RV 47 ohms  SVC 54 ohms      Diagnostic Data  Paced: 1.9 %  Other: No episodes, ATP, or shocks.  Battery status: satisfactory, estimated 8.4 years remaining    Final Parameters  Mode: VVI  Lower rate: 40 bpm   Ventricular - Amplitude: 2 V   Pulse width: 0.4 ms   Sensitivity: 0.3 mV   Changes made: No changes made.  Conclusions: normal AICD function, no therapy delivered, stable pacing and sensing thresholds and adequate battery reserve    Follow up: 3 months in office

## 2020-12-16 ENCOUNTER — TELEPHONE (OUTPATIENT)
Dept: INTERNAL MEDICINE | Facility: CLINIC | Age: 65
End: 2020-12-16

## 2020-12-16 DIAGNOSIS — Z12.11 SCREENING FOR COLORECTAL CANCER: Primary | ICD-10-CM

## 2020-12-16 DIAGNOSIS — Z12.12 SCREENING FOR COLORECTAL CANCER: Primary | ICD-10-CM

## 2020-12-16 NOTE — TELEPHONE ENCOUNTER
PATIENT IS ASKING FOR ANOTHER REFERRAL TO BE PUT IN FOR DR MILLER IN [place].  PATIENT DOES NOT WANT TO GO TO DR HEALY.

## 2020-12-16 NOTE — TELEPHONE ENCOUNTER
PATIENT'S WIFE CALLED TO SEE IF  CAN SEND IN ANOTHER REFERRAL FORM. PATIENT IS NEEDING A REFERRAL FOR A COLONOSCOPY.     PATIENT IS NEEDING THE APPOINTMENT BEFORE THE END OF THE YEAR FOR HIS INSURANCE.     PLEASE CALL AND ADVISE     CALL BACK: [phone]

## 2020-12-31 ENCOUNTER — ANTICOAGULATION VISIT (OUTPATIENT)
Dept: CARDIOLOGY | Facility: CLINIC | Age: 65
End: 2020-12-31

## 2021-01-07 ENCOUNTER — ANTICOAGULATION VISIT (OUTPATIENT)
Dept: CARDIOLOGY | Facility: CLINIC | Age: 66
End: 2021-01-07

## 2021-01-07 ENCOUNTER — LAB (OUTPATIENT)
Dept: LAB | Facility: HOSPITAL | Age: 66
End: 2021-01-07

## 2021-01-07 DIAGNOSIS — I48.0 PAF (PAROXYSMAL ATRIAL FIBRILLATION) (HCC): ICD-10-CM

## 2021-01-07 LAB
INR PPP: 1.9 (ref 0.91–1.09)
PROTHROMBIN TIME: 23 SECONDS (ref 10–13.8)

## 2021-01-07 PROCEDURE — 85610 PROTHROMBIN TIME: CPT | Performed by: INTERNAL MEDICINE

## 2021-01-21 ENCOUNTER — TELEPHONE (OUTPATIENT)
Dept: CARDIOLOGY | Facility: CLINIC | Age: 66
End: 2021-01-21

## 2021-01-21 ENCOUNTER — ANTICOAGULATION VISIT (OUTPATIENT)
Dept: CARDIOLOGY | Facility: CLINIC | Age: 66
End: 2021-01-21

## 2021-01-21 NOTE — TELEPHONE ENCOUNTER
Patient is scheduled for colonoscopy on 2/2/2021 and is needing clearance to stop Coumadin 5 days before procedure. Will he need Lovenox? Please advise.

## 2021-01-21 NOTE — TELEPHONE ENCOUNTER
It would be okay for him to discontinue Coumadin for the time requested.  He would not need Lovenox in the setting.

## 2021-02-03 DIAGNOSIS — N18.31 TYPE 2 DIABETES MELLITUS WITH STAGE 3A CHRONIC KIDNEY DISEASE, WITHOUT LONG-TERM CURRENT USE OF INSULIN (HCC): ICD-10-CM

## 2021-02-03 DIAGNOSIS — E11.22 TYPE 2 DIABETES MELLITUS WITH STAGE 3A CHRONIC KIDNEY DISEASE, WITHOUT LONG-TERM CURRENT USE OF INSULIN (HCC): ICD-10-CM

## 2021-02-08 ENCOUNTER — OFFICE VISIT (OUTPATIENT)
Dept: CARDIOLOGY | Facility: CLINIC | Age: 66
End: 2021-02-08

## 2021-02-08 ENCOUNTER — CLINICAL SUPPORT NO REQUIREMENTS (OUTPATIENT)
Dept: CARDIOLOGY | Facility: CLINIC | Age: 66
End: 2021-02-08

## 2021-02-08 VITALS
DIASTOLIC BLOOD PRESSURE: 60 MMHG | WEIGHT: 224 LBS | OXYGEN SATURATION: 99 % | HEIGHT: 72 IN | HEART RATE: 68 BPM | SYSTOLIC BLOOD PRESSURE: 102 MMHG | BODY MASS INDEX: 30.34 KG/M2

## 2021-02-08 DIAGNOSIS — I48.0 PAF (PAROXYSMAL ATRIAL FIBRILLATION) (HCC): ICD-10-CM

## 2021-02-08 DIAGNOSIS — I25.10 CORONARY ARTERY DISEASE INVOLVING NATIVE CORONARY ARTERY OF NATIVE HEART WITHOUT ANGINA PECTORIS: ICD-10-CM

## 2021-02-08 DIAGNOSIS — I50.22 CHF (CONGESTIVE HEART FAILURE), NYHA CLASS II, CHRONIC, SYSTOLIC (HCC): Primary | ICD-10-CM

## 2021-02-08 DIAGNOSIS — I50.22 CHF (CONGESTIVE HEART FAILURE), NYHA CLASS II, CHRONIC, SYSTOLIC (HCC): ICD-10-CM

## 2021-02-08 PROCEDURE — 93000 ELECTROCARDIOGRAM COMPLETE: CPT | Performed by: INTERNAL MEDICINE

## 2021-02-08 PROCEDURE — 93289 INTERROG DEVICE EVAL HEART: CPT | Performed by: PHYSICIAN ASSISTANT

## 2021-02-08 PROCEDURE — 99214 OFFICE O/P EST MOD 30 MIN: CPT | Performed by: INTERNAL MEDICINE

## 2021-02-08 NOTE — PROGRESS NOTES
Subjective:     Encounter Date:02/08/2021      Patient ID: Joao Fonseca is a 65 y.o. male with coronary artery disease, status post coronary artery bypass grafting in 2015 (left internal mammary  artery/left anterior descending, reverse saphenous vein graft/first obtuse  marginal and reverse saphenous vein graft/diagonal artery), chronic systolic congestive heart failure, ICD implant place, hypertension, thoracic aortic aneurysm (followed closely by Dr. Wilson), paroxysmal atrial fibrillation, chronically anticoagulated with Coumadin, who presents today for follow-up.    Chief Complaint: Routine follow-up    This patient presents today for routine follow-up.  He has been doing reasonably well since we last saw him.  He has a history of coronary artery disease.  He has not had any significant chest discomfort.  He also has a history of chronic systolic heart failure.  Shortness of breath tends to wax and wane but nothing significant lately.  His weight also tends to have some ups and downs but in general has been well maintained.  Occasionally, he will require an extra Lasix to maintain his weight.  He denies orthopnea, PND or any significant edema.  Occasionally he will be lightheaded when standing quickly from a seated position.  No syncopal episodes.  He has a history of paroxysmal atrial fibrillation and is chronically anticoagulated with Coumadin.  No significant bleeding issues.  He recently had a colonoscopy with polyp removal.  He is awaiting the pathology from these.  Otherwise, no significant complaints today.    Atrial Fibrillation  Presents for follow-up visit. Symptoms are negative for chest pain, dizziness, hemodynamic instability, palpitations, shortness of breath and syncope. The symptoms have been stable. Past medical history includes atrial fibrillation, CAD and CHF. There are no medication compliance problems.   Coronary Artery Disease  Presents for follow-up visit. Pertinent negatives include  no chest pain, dizziness, leg swelling, palpitations, shortness of breath or weight gain. His past medical history is significant for CHF. The symptoms have been stable. Compliance with diet is variable. Compliance with exercise is variable. Compliance with medications is good.   Congestive Heart Failure  Presents for follow-up visit. Pertinent negatives include no abdominal pain, chest pain, edema, orthopnea, palpitations, paroxysmal nocturnal dyspnea, shortness of breath or unexpected weight change. The symptoms have been stable. His past medical history is significant for CAD. Compliance with total regimen is %. Compliance with diet is %. Compliance with exercise is %. Compliance with medications is %.       Current Outpatient Medications:   •  acyclovir (ZOVIRAX) 400 MG tablet, Take 1 tablet by mouth 5 (Five) Times a Day. Take no more than 5 doses a day. (Patient taking differently: Take 400 mg by mouth 5 (Five) Times a Day. Take no more than 5 doses a day. For shingles.), Disp: 90 tablet, Rfl: 6  •  albuterol (PROVENTIL HFA;VENTOLIN HFA) 108 (90 Base) MCG/ACT inhaler, Inhale 2 puffs Every 4 (Four) Hours As Needed for Wheezing or Shortness of Air., Disp: , Rfl:   •  Blood Glucose Monitoring Suppl (ONE TOUCH ULTRA 2) w/Device kit, USE AS DIRECTED PER PACKAGE INSTRUCTIONS, Disp: , Rfl: 0  •  furosemide (LASIX) 40 MG tablet, Take 1 tablet by mouth Daily As Needed (edema, SOA)., Disp: 90 tablet, Rfl: 3  •  lisinopril (PRINIVIL,ZESTRIL) 5 MG tablet, Take 1 tablet by mouth Daily., Disp: 90 tablet, Rfl: 3  •  metFORMIN (GLUCOPHAGE) 1000 MG tablet, Take 1 tablet by mouth 2 (Two) Times a Day With Meals., Disp: 180 tablet, Rfl: 3  •  metoprolol tartrate (LOPRESSOR) 50 MG tablet, Take 1.5 tablets by mouth 2 (Two) Times a Day., Disp: 270 tablet, Rfl: 3  •  warfarin (COUMADIN) 5 MG tablet, Take 1 tablet by mouth Every Night. Extra tablet to be taken PRN upon instruction from [facility] r/t  "sub-therapeutic INR result. (Patient taking differently: Take 7.5 mg by mouth Every Night. Extra tablet to be taken PRN upon instruction from [facility] r/t sub-therapeutic INR result.), Disp: 60 tablet, Rfl: 11    Allergies   Allergen Reactions   • Cephalexin Anaphylaxis     Causes swelling of tongue, eyes, throat   • Entresto [Sacubitril-Valsartan] Other (See Comments)     \"put me in a-fib\"   • Eliquis [Apixaban] Swelling     \"swelling in ankles and feet\"   • Atorvastatin Unknown (See Comments)     fatigue     Social History     Tobacco Use   • Smoking status: Former Smoker     Years: 25.00     Types: Cigarettes     Quit date:      Years since quittin.1   • Smokeless tobacco: Never Used   • Tobacco comment: QUIT 20 YEARS AGO   Substance Use Topics   • Alcohol use: No     Review of Systems   Constitution: Negative for fever, unexpected weight change and weight gain.   Cardiovascular: Negative for chest pain, dyspnea on exertion, leg swelling, orthopnea, palpitations, paroxysmal nocturnal dyspnea and syncope.   Respiratory: Negative for cough, shortness of breath and wheezing.    Hematologic/Lymphatic: Negative for bleeding problem. Does not bruise/bleed easily.   Gastrointestinal: Negative for abdominal pain, hematemesis, hematochezia, melena, nausea and vomiting.   Neurological: Negative for dizziness, headaches and loss of balance.       ECG 12 Lead    Date/Time: 2021 12:16 PM  Performed by: Phil Henry MD  Authorized by: Phil Henry MD   Comparison: compared with previous ECG from 2020  Similar to previous ECG  Rhythm: sinus bradycardia and sinus arrhythmia  Rate: bradycardic  BPM: 58  Conduction: left bundle branch block  QRS axis: left    Clinical impression: abnormal EKG             Objective:     Vitals signs reviewed.   Constitutional:       General: Not in acute distress.     Appearance: Healthy appearance. Well-developed.   Eyes:      Extraocular Movements: " "Extraocular movements intact.      Pupils: Pupils are equal, round, and reactive to light.   HENT:      Head: Normocephalic and atraumatic.   Neck:      Musculoskeletal: Normal range of motion and neck supple.      Thyroid: No thyroid mass.      Vascular: No JVD.   Pulmonary:      Effort: Pulmonary effort is normal.      Breath sounds: Normal breath sounds. No wheezing. No rhonchi. No rales.   Cardiovascular:      Normal rate. Regular rhythm.      Murmurs: There is no murmur.      No gallop.   Pulses:     Intact distal pulses.   Edema:     Peripheral edema absent.   Abdominal:      General: Bowel sounds are normal. There is no distension.      Palpations: Abdomen is soft.      Tenderness: There is no abdominal tenderness.   Musculoskeletal: Normal range of motion.      Extremities: No clubbing present.  Skin:     General: Skin is warm and dry. There is no cyanosis.      Findings: No erythema or rash.   Neurological:      Mental Status: Alert and oriented to person, place, and time.      Cranial Nerves: No cranial nerve deficit.       /60   Pulse 68   Ht 182.9 cm (72\")   Wt 102 kg (224 lb)   SpO2 99%   BMI 30.38 kg/m²     Data/Lab Review:     I did review the CTA of the chest from 11/18/2020 showing similar size of the ascending thoracic aortic aneurysm, measuring up to 5.3 cm.    I did review the patient's most recent ICD interrogation from 7/2/2020 showing normal device function.  Estimated battery life of 8.4 years.  No significant arrhythmias noted.    Lab Results   Component Value Date    INR 1.9 (H) 01/07/2021    INR 2.2 (H) 11/18/2020    INR 2.1 (H) 10/29/2020    PROTIME 23.0 (H) 01/07/2021    PROTIME 26.3 (H) 11/18/2020    PROTIME 25.4 (H) 10/29/2020           Assessment:          Diagnosis Plan   1. CHF (congestive heart failure), NYHA class II, chronic, systolic (CMS/[facility])     2. Coronary artery disease involving native coronary artery of native heart without angina pectoris  ECG 12 Lead   3. PAF " (paroxysmal atrial fibrillation) (CMS/[facility])          Plan:       1.  Chronic systolic heart failure, currently with New York Heart Association class II symptoms: The patient remains stable.  He is minimally symptomatic at this time.  He is euvolemic on today's exam.  He continues to monitor his weight and he does take an extra Lasix if needed.  Continue beta-blocker and ACE inhibitor therapies at this time.  He did not tolerate Entresto in the past.  He does have an ICD in place that we continue to follow.     2.  Coronary artery disease: Clinically stable with no ischemic symptoms.  Not on aspirin due to being chronically anticoagulated.  The patient does remain on beta-blocker therapy.  Not on statin therapy due to intolerances in the past.     3.  Paroxysmal atrial fibrillation: The patient remains stable at this time.  He does not describe any symptoms of atrial fibrillation.  The patient does remain chronically anticoagulated.     The patient does continue to follow with Dr. Wilson given history of thoracic aortic aneurysm.  His most recent assessment shows stable findings, as noted above.     Patient's Body mass index is 30.38 kg/m². BMI is above normal parameters. Recommendations include: exercise counseling and nutrition counseling.     We will see the patient back in 6 months unless otherwise needed sooner.

## 2021-02-12 DIAGNOSIS — I10 ESSENTIAL HYPERTENSION: ICD-10-CM

## 2021-02-12 DIAGNOSIS — I47.29 VENTRICULAR TACHYCARDIA (PAROXYSMAL) (HCC): ICD-10-CM

## 2021-02-12 RX ORDER — METOPROLOL TARTRATE 50 MG/1
TABLET, FILM COATED ORAL
Qty: 270 TABLET | Refills: 3 | Status: SHIPPED | OUTPATIENT
Start: 2021-02-12 | End: 2021-07-28 | Stop reason: HOSPADM

## 2021-02-12 RX ORDER — FUROSEMIDE 40 MG/1
TABLET ORAL
Qty: 90 TABLET | Refills: 3 | Status: SHIPPED | OUTPATIENT
Start: 2021-02-12 | End: 2021-07-28 | Stop reason: HOSPADM

## 2021-02-18 NOTE — PROGRESS NOTES
Single Chamber AICD Evaluation Report  Clinic Interrogation    February 18, 2021    Primary Cardiologist: Elaine  : Medtronic Model: Evera MRI  Implant date: 10/6/16    Reason for evaluation: routine  Indication for AICD: congestive heart failure    Measurements  Ventricular sensing - R wave: >20 mV  Ventricular threshold: 0.625 V @ 0.4 ms  Ventricular lead impedance: 475 ohms  Shock Impedance: RV 50 ohms  SVC 61 ohms      Diagnostic Data  Paced: 1.3 %  Other: no new episodes noted  Battery status: satisfactory     Final Parameters  Mode: VVI  Lower rate: 40 bpm   Ventricular - Amplitude: 2.0 V   Pulse width: 0.4 ms   Sensitivity: 0.3 mV   Changes made: none  Conclusions: normal AICD function    Follow up: 3 months in office

## 2021-03-05 ENCOUNTER — ANTICOAGULATION VISIT (OUTPATIENT)
Dept: CARDIOLOGY | Facility: CLINIC | Age: 66
End: 2021-03-05

## 2021-03-05 ENCOUNTER — LAB (OUTPATIENT)
Dept: LAB | Facility: HOSPITAL | Age: 66
End: 2021-03-05

## 2021-03-05 DIAGNOSIS — I48.0 PAF (PAROXYSMAL ATRIAL FIBRILLATION) (HCC): ICD-10-CM

## 2021-03-05 LAB
INR PPP: 1.8 (ref 0.91–1.09)
PROTHROMBIN TIME: 21.4 SECONDS (ref 10–13.8)

## 2021-03-05 PROCEDURE — 85610 PROTHROMBIN TIME: CPT | Performed by: INTERNAL MEDICINE

## 2021-03-19 ENCOUNTER — LAB (OUTPATIENT)
Dept: LAB | Facility: HOSPITAL | Age: 66
End: 2021-03-19

## 2021-03-19 ENCOUNTER — ANTICOAGULATION VISIT (OUTPATIENT)
Dept: CARDIOLOGY | Facility: CLINIC | Age: 66
End: 2021-03-19

## 2021-03-19 DIAGNOSIS — I48.0 PAF (PAROXYSMAL ATRIAL FIBRILLATION) (HCC): ICD-10-CM

## 2021-03-19 LAB
INR PPP: 2.1 (ref 0.91–1.09)
PROTHROMBIN TIME: 25.6 SECONDS (ref 10–13.8)

## 2021-03-19 PROCEDURE — 85610 PROTHROMBIN TIME: CPT | Performed by: INTERNAL MEDICINE

## 2021-04-29 ENCOUNTER — OFFICE VISIT (OUTPATIENT)
Dept: INTERNAL MEDICINE | Facility: CLINIC | Age: 66
End: 2021-04-29

## 2021-04-29 ENCOUNTER — LAB (OUTPATIENT)
Dept: LAB | Facility: HOSPITAL | Age: 66
End: 2021-04-29

## 2021-04-29 ENCOUNTER — ANTICOAGULATION VISIT (OUTPATIENT)
Dept: CARDIOLOGY | Facility: CLINIC | Age: 66
End: 2021-04-29

## 2021-04-29 VITALS
OXYGEN SATURATION: 97 % | BODY MASS INDEX: 31.07 KG/M2 | SYSTOLIC BLOOD PRESSURE: 120 MMHG | TEMPERATURE: 97.1 F | WEIGHT: 229.4 LBS | DIASTOLIC BLOOD PRESSURE: 70 MMHG | HEART RATE: 63 BPM | HEIGHT: 72 IN | RESPIRATION RATE: 16 BRPM

## 2021-04-29 DIAGNOSIS — N18.31 TYPE 2 DIABETES MELLITUS WITH STAGE 3A CHRONIC KIDNEY DISEASE, WITHOUT LONG-TERM CURRENT USE OF INSULIN (HCC): ICD-10-CM

## 2021-04-29 DIAGNOSIS — I48.0 PAF (PAROXYSMAL ATRIAL FIBRILLATION) (HCC): Primary | ICD-10-CM

## 2021-04-29 DIAGNOSIS — E78.2 MIXED HYPERLIPIDEMIA: ICD-10-CM

## 2021-04-29 DIAGNOSIS — I50.22 CHF (CONGESTIVE HEART FAILURE), NYHA CLASS II, CHRONIC, SYSTOLIC (HCC): Primary | ICD-10-CM

## 2021-04-29 DIAGNOSIS — I48.0 PAF (PAROXYSMAL ATRIAL FIBRILLATION) (HCC): ICD-10-CM

## 2021-04-29 DIAGNOSIS — E11.22 TYPE 2 DIABETES MELLITUS WITH STAGE 3A CHRONIC KIDNEY DISEASE, WITHOUT LONG-TERM CURRENT USE OF INSULIN (HCC): ICD-10-CM

## 2021-04-29 DIAGNOSIS — I50.22 CHF (CONGESTIVE HEART FAILURE), NYHA CLASS II, CHRONIC, SYSTOLIC (HCC): ICD-10-CM

## 2021-04-29 DIAGNOSIS — I10 ESSENTIAL HYPERTENSION: ICD-10-CM

## 2021-04-29 DIAGNOSIS — I25.10 CORONARY ARTERY DISEASE INVOLVING NATIVE CORONARY ARTERY OF NATIVE HEART WITHOUT ANGINA PECTORIS: ICD-10-CM

## 2021-04-29 DIAGNOSIS — H10.13 ALLERGIC CONJUNCTIVITIS OF BOTH EYES: ICD-10-CM

## 2021-04-29 LAB
ALBUMIN SERPL-MCNC: 4.4 G/DL (ref 3.5–5.2)
ALBUMIN/GLOB SERPL: 1.9 G/DL
ALP SERPL-CCNC: 54 U/L (ref 39–117)
ALT SERPL W P-5'-P-CCNC: 17 U/L (ref 1–41)
ANION GAP SERPL CALCULATED.3IONS-SCNC: 9.2 MMOL/L (ref 5–15)
AST SERPL-CCNC: 20 U/L (ref 1–40)
BACTERIA UR QL AUTO: NORMAL /HPF
BILIRUB SERPL-MCNC: 0.8 MG/DL (ref 0–1.2)
BILIRUB UR QL STRIP: NEGATIVE
BUN SERPL-MCNC: 26 MG/DL (ref 8–23)
BUN/CREAT SERPL: 19.8 (ref 7–25)
CALCIUM SPEC-SCNC: 8.9 MG/DL (ref 8.6–10.5)
CHLORIDE SERPL-SCNC: 105 MMOL/L (ref 98–107)
CHOLEST SERPL-MCNC: 168 MG/DL (ref 0–200)
CLARITY UR: ABNORMAL
CO2 SERPL-SCNC: 24.8 MMOL/L (ref 22–29)
COLOR UR: ABNORMAL
CREAT SERPL-MCNC: 1.31 MG/DL (ref 0.76–1.27)
CREAT UR-MCNC: 193.3 MG/DL
DEPRECATED RDW RBC AUTO: 42.7 FL (ref 37–54)
ERYTHROCYTE [DISTWIDTH] IN BLOOD BY AUTOMATED COUNT: 12.7 % (ref 12.3–15.4)
GFR SERPL CREATININE-BSD FRML MDRD: 55 ML/MIN/1.73
GLOBULIN UR ELPH-MCNC: 2.3 GM/DL
GLUCOSE SERPL-MCNC: 156 MG/DL (ref 65–99)
GLUCOSE UR STRIP-MCNC: NEGATIVE MG/DL
HBA1C MFR BLD: 7.36 % (ref 4.8–5.6)
HCT VFR BLD AUTO: 50 % (ref 37.5–51)
HDLC SERPL-MCNC: 38 MG/DL (ref 40–60)
HGB BLD-MCNC: 16.7 G/DL (ref 13–17.7)
HGB UR QL STRIP.AUTO: NEGATIVE
HYALINE CASTS UR QL AUTO: NORMAL /LPF
INR PPP: 2.2 (ref 0.91–1.09)
KETONES UR QL STRIP: NEGATIVE
LDLC SERPL CALC-MCNC: 109 MG/DL (ref 0–100)
LDLC/HDLC SERPL: 2.83 {RATIO}
LEUKOCYTE ESTERASE UR QL STRIP.AUTO: NEGATIVE
MCH RBC QN AUTO: 30.9 PG (ref 26.6–33)
MCHC RBC AUTO-ENTMCNC: 33.4 G/DL (ref 31.5–35.7)
MCV RBC AUTO: 92.6 FL (ref 79–97)
NITRITE UR QL STRIP: NEGATIVE
PH UR STRIP.AUTO: 5.5 [PH] (ref 5–8)
PLATELET # BLD AUTO: 129 10*3/MM3 (ref 140–450)
PMV BLD AUTO: 11.8 FL (ref 6–12)
POTASSIUM SERPL-SCNC: 4.8 MMOL/L (ref 3.5–5.2)
PROT SERPL-MCNC: 6.7 G/DL (ref 6–8.5)
PROT UR QL STRIP: ABNORMAL
PROT UR-MCNC: 56 MG/DL
PROT/CREAT UR: 289.7 MG/G CREA (ref 0–200)
PROTHROMBIN TIME: 26.1 SECONDS (ref 10–13.8)
RBC # BLD AUTO: 5.4 10*6/MM3 (ref 4.14–5.8)
RBC # UR: NORMAL /HPF
REF LAB TEST METHOD: NORMAL
SODIUM SERPL-SCNC: 139 MMOL/L (ref 136–145)
SP GR UR STRIP: 1.03 (ref 1–1.03)
SQUAMOUS #/AREA URNS HPF: NORMAL /HPF
TRIGL SERPL-MCNC: 113 MG/DL (ref 0–150)
UROBILINOGEN UR QL STRIP: ABNORMAL
VLDLC SERPL-MCNC: 21 MG/DL (ref 5–40)
WBC # BLD AUTO: 5.46 10*3/MM3 (ref 3.4–10.8)
WBC UR QL AUTO: NORMAL /HPF

## 2021-04-29 PROCEDURE — 36415 COLL VENOUS BLD VENIPUNCTURE: CPT

## 2021-04-29 PROCEDURE — 85610 PROTHROMBIN TIME: CPT | Performed by: INTERNAL MEDICINE

## 2021-04-29 PROCEDURE — 85027 COMPLETE CBC AUTOMATED: CPT

## 2021-04-29 PROCEDURE — 99214 OFFICE O/P EST MOD 30 MIN: CPT | Performed by: INTERNAL MEDICINE

## 2021-04-29 PROCEDURE — 84156 ASSAY OF PROTEIN URINE: CPT

## 2021-04-29 PROCEDURE — 83036 HEMOGLOBIN GLYCOSYLATED A1C: CPT

## 2021-04-29 PROCEDURE — 80061 LIPID PANEL: CPT

## 2021-04-29 PROCEDURE — 82570 ASSAY OF URINE CREATININE: CPT

## 2021-04-29 PROCEDURE — 81001 URINALYSIS AUTO W/SCOPE: CPT

## 2021-04-29 PROCEDURE — 80053 COMPREHEN METABOLIC PANEL: CPT

## 2021-04-29 RX ORDER — OLOPATADINE HYDROCHLORIDE 1 MG/ML
1 SOLUTION/ DROPS OPHTHALMIC 2 TIMES DAILY
Qty: 5 ML | Refills: 1 | Status: SHIPPED | OUTPATIENT
Start: 2021-04-29 | End: 2021-07-28 | Stop reason: HOSPADM

## 2021-04-29 RX ORDER — LISINOPRIL 5 MG/1
5 TABLET ORAL DAILY
Qty: 90 TABLET | Refills: 3 | Status: SHIPPED | OUTPATIENT
Start: 2021-04-29 | End: 2021-07-28 | Stop reason: HOSPADM

## 2021-05-10 ENCOUNTER — CLINICAL SUPPORT NO REQUIREMENTS (OUTPATIENT)
Dept: CARDIOLOGY | Facility: CLINIC | Age: 66
End: 2021-05-10

## 2021-05-10 DIAGNOSIS — I50.22 CHF (CONGESTIVE HEART FAILURE), NYHA CLASS II, CHRONIC, SYSTOLIC (HCC): ICD-10-CM

## 2021-05-10 DIAGNOSIS — Z95.810 AICD (AUTOMATIC CARDIOVERTER/DEFIBRILLATOR) PRESENT: Primary | ICD-10-CM

## 2021-05-10 PROCEDURE — 93289 INTERROG DEVICE EVAL HEART: CPT | Performed by: INTERNAL MEDICINE

## 2021-05-10 NOTE — PROGRESS NOTES
Single Chamber AICD Evaluation Report  CLINIC    May 10, 2021    Primary Cardiologist: Elaine  : Medtronic Model: Evera MRI  Implant date: 10/6/16     Reason for evaluation: routine  Indication for AICD: congestive heart failure    Measurements  Ventricular sensing - R wave: >20 mV  Ventricular threshold: 0.625 V @ 0.4 ms  Ventricular lead impedance: 418 ohms  Shock Impedance: RV 45 ohms  SVC 50 ohms      Diagnostic Data  Paced: 0.3 %  Other: No new episodes  Battery status: satisfactory, 7.3 years     Final Parameters  Mode: VVI  Lower rate: 40 bpm   Ventricular - Amplitude: 2 V   Pulse width: 0.4 ms   Sensitivity: 0.3 mV   Changes made: None  Conclusions: normal AICD function, no therapy delivered, stable pacing and sensing thresholds and adequate battery reserve    Follow up: 3 months in office

## 2021-05-26 ENCOUNTER — ANTICOAGULATION VISIT (OUTPATIENT)
Dept: CARDIOLOGY | Facility: CLINIC | Age: 66
End: 2021-05-26

## 2021-05-26 ENCOUNTER — HOSPITAL ENCOUNTER (OUTPATIENT)
Dept: CT IMAGING | Facility: HOSPITAL | Age: 66
Discharge: HOME | End: 2021-05-26

## 2021-05-26 ENCOUNTER — LAB (OUTPATIENT)
Dept: LAB | Facility: HOSPITAL | Age: 66
End: 2021-05-26

## 2021-05-26 DIAGNOSIS — I71.20 THORACIC AORTIC ANEURYSM WITHOUT RUPTURE (HCC): ICD-10-CM

## 2021-05-26 DIAGNOSIS — I48.0 PAF (PAROXYSMAL ATRIAL FIBRILLATION) (HCC): ICD-10-CM

## 2021-05-26 LAB
CREAT BLDA-MCNC: 1.5 MG/DL (ref 0.6–1.3)
INR PPP: 2.1 (ref 0.91–1.09)
PROTHROMBIN TIME: 25.1 SECONDS (ref 10–13.8)

## 2021-05-26 PROCEDURE — 0 IOPAMIDOL PER 1 ML: Performed by: THORACIC SURGERY (CARDIOTHORACIC VASCULAR SURGERY)

## 2021-05-26 PROCEDURE — 71275 CT ANGIOGRAPHY CHEST: CPT

## 2021-05-26 PROCEDURE — 82565 ASSAY OF CREATININE: CPT

## 2021-05-26 PROCEDURE — 85610 PROTHROMBIN TIME: CPT | Performed by: NURSE PRACTITIONER

## 2021-05-26 RX ADMIN — IOPAMIDOL 100 ML: 755 INJECTION, SOLUTION INTRAVENOUS at 07:50

## 2021-06-01 ENCOUNTER — TELEPHONE (OUTPATIENT)
Dept: CARDIAC SURGERY | Facility: CLINIC | Age: 66
End: 2021-06-01

## 2021-07-02 NOTE — TELEPHONE ENCOUNTER
Attempted to call pt today to offer appt with Dr Wilson on 7-6-21 at 8:30am.  Mess left for pt to call back/jerilyn

## 2021-07-06 ENCOUNTER — OFFICE VISIT (OUTPATIENT)
Dept: CARDIAC SURGERY | Facility: CLINIC | Age: 66
End: 2021-07-06

## 2021-07-06 VITALS
DIASTOLIC BLOOD PRESSURE: 80 MMHG | HEART RATE: 123 BPM | HEIGHT: 72 IN | SYSTOLIC BLOOD PRESSURE: 119 MMHG | BODY MASS INDEX: 31.91 KG/M2 | OXYGEN SATURATION: 97 % | WEIGHT: 235.6 LBS

## 2021-07-06 DIAGNOSIS — N18.31 TYPE 2 DIABETES MELLITUS WITH STAGE 3A CHRONIC KIDNEY DISEASE, WITHOUT LONG-TERM CURRENT USE OF INSULIN (HCC): ICD-10-CM

## 2021-07-06 DIAGNOSIS — I50.22 CHF (CONGESTIVE HEART FAILURE), NYHA CLASS II, CHRONIC, SYSTOLIC (HCC): ICD-10-CM

## 2021-07-06 DIAGNOSIS — I71.20 THORACIC AORTIC ANEURYSM WITHOUT RUPTURE (HCC): Primary | ICD-10-CM

## 2021-07-06 DIAGNOSIS — I25.10 CORONARY ARTERY DISEASE INVOLVING NATIVE CORONARY ARTERY OF NATIVE HEART WITHOUT ANGINA PECTORIS: ICD-10-CM

## 2021-07-06 DIAGNOSIS — E11.22 TYPE 2 DIABETES MELLITUS WITH STAGE 3A CHRONIC KIDNEY DISEASE, WITHOUT LONG-TERM CURRENT USE OF INSULIN (HCC): ICD-10-CM

## 2021-07-06 DIAGNOSIS — C37 MALIGNANT THYMOMA (HCC): ICD-10-CM

## 2021-07-06 PROCEDURE — 99213 OFFICE O/P EST LOW 20 MIN: CPT | Performed by: THORACIC SURGERY (CARDIOTHORACIC VASCULAR SURGERY)

## 2021-07-09 ENCOUNTER — LAB (OUTPATIENT)
Dept: LAB | Facility: HOSPITAL | Age: 66
End: 2021-07-09

## 2021-07-09 ENCOUNTER — ANTICOAGULATION VISIT (OUTPATIENT)
Dept: CARDIOLOGY | Facility: CLINIC | Age: 66
End: 2021-07-09

## 2021-07-09 ENCOUNTER — CLINICAL SUPPORT NO REQUIREMENTS (OUTPATIENT)
Dept: CARDIOLOGY | Facility: CLINIC | Age: 66
End: 2021-07-09

## 2021-07-09 DIAGNOSIS — I50.22 CHF (CONGESTIVE HEART FAILURE), NYHA CLASS II, CHRONIC, SYSTOLIC (HCC): ICD-10-CM

## 2021-07-09 DIAGNOSIS — I50.22 CHF (CONGESTIVE HEART FAILURE), NYHA CLASS II, CHRONIC, SYSTOLIC (HCC): Primary | ICD-10-CM

## 2021-07-09 DIAGNOSIS — Z95.810 AICD (AUTOMATIC CARDIOVERTER/DEFIBRILLATOR) PRESENT: Primary | ICD-10-CM

## 2021-07-09 DIAGNOSIS — I48.0 PAF (PAROXYSMAL ATRIAL FIBRILLATION) (HCC): ICD-10-CM

## 2021-07-09 LAB
ANION GAP SERPL CALCULATED.3IONS-SCNC: 12 MMOL/L (ref 5–15)
BUN SERPL-MCNC: 39 MG/DL (ref 8–23)
BUN/CREAT SERPL: 26.7 (ref 7–25)
CALCIUM SPEC-SCNC: 8.9 MG/DL (ref 8.6–10.5)
CHLORIDE SERPL-SCNC: 98 MMOL/L (ref 98–107)
CO2 SERPL-SCNC: 25 MMOL/L (ref 22–29)
CREAT SERPL-MCNC: 1.46 MG/DL (ref 0.76–1.27)
GFR SERPL CREATININE-BSD FRML MDRD: 48 ML/MIN/1.73
GLUCOSE SERPL-MCNC: 217 MG/DL (ref 65–99)
INR PPP: 2.5 (ref 0.91–1.09)
MAGNESIUM SERPL-MCNC: 1.8 MG/DL (ref 1.6–2.4)
POTASSIUM SERPL-SCNC: 4.5 MMOL/L (ref 3.5–5.2)
PROTHROMBIN TIME: 29.8 SECONDS (ref 10–13.8)
SODIUM SERPL-SCNC: 135 MMOL/L (ref 136–145)

## 2021-07-09 PROCEDURE — 80048 BASIC METABOLIC PNL TOTAL CA: CPT

## 2021-07-09 PROCEDURE — 36415 COLL VENOUS BLD VENIPUNCTURE: CPT

## 2021-07-09 PROCEDURE — 85610 PROTHROMBIN TIME: CPT | Performed by: NURSE PRACTITIONER

## 2021-07-09 PROCEDURE — 93289 INTERROG DEVICE EVAL HEART: CPT | Performed by: INTERNAL MEDICINE

## 2021-07-09 PROCEDURE — 83735 ASSAY OF MAGNESIUM: CPT

## 2021-07-09 RX ORDER — AMIODARONE HYDROCHLORIDE 200 MG/1
200 TABLET ORAL 2 TIMES DAILY
Qty: 60 TABLET | Refills: 5 | Status: SHIPPED | OUTPATIENT
Start: 2021-07-09 | End: 2021-07-28 | Stop reason: HOSPADM

## 2021-07-09 NOTE — PROGRESS NOTES
Single Chamber AICD Evaluation Report  IN OFFICE INTERROGATION    July 9, 2021    Primary Cardiologist: Elaine  : Med[employer]onic Model: Evera MRI XT VR [id number]  Implant date: 10/6/2016    Reason for evaluation: patient reported symptoms, reports AICD shock on 7/7/21  Indication for AICD: congestive heart failure    Measurements  Ventricular sensing - R wave: >20 mV  Ventricular threshold: 0.625 V @ 0.4 ms  Ventricular lead impedance: 456 ohms  Shock Impedance: RV 44 ohms  SVC 50 ohms      Diagnostic Data  Paced: 0.2 %  Episodes recorded since 5/10/21:  15 treated VT episodes..  All treated with 1 sequence of ATP except for one that was treated with 2 sequences of ATP followed by successful shock.  Rates 222-261 bpm; longest duration 23 seconds.  All episodes on 7/6/21 and 7/7/21.  Patient reports at least one episode of near syncope and one syncopal episode with accompanying shortness of breath.   Discussed with Henrique Gregorio, [employer] device rep, who assessed the episodes as VT, not AF with RVR.    Battery status: satisfactory, 6.6 years remaining     Final Parameters  Mode: VVI  Lower rate: 40 bpm   Ventricular - Amplitude: 2 V   Pulse width: 0.4 ms   Sensitivity: 0.3 mV   Changes made: No changes made  Conclusions: normal AICD function and AICD shock    Follow up: 3 months in office

## 2021-07-11 ENCOUNTER — HOSPITAL ENCOUNTER (INPATIENT)
Facility: HOSPITAL | Age: 66
LOS: 17 days | Discharge: ANOTHER HOSPITAL | End: 2021-07-28
Attending: EMERGENCY MEDICINE | Admitting: INTERNAL MEDICINE

## 2021-07-11 ENCOUNTER — APPOINTMENT (OUTPATIENT)
Dept: CARDIOLOGY | Facility: HOSPITAL | Age: 66
End: 2021-07-11

## 2021-07-11 ENCOUNTER — APPOINTMENT (OUTPATIENT)
Dept: CT IMAGING | Facility: HOSPITAL | Age: 66
End: 2021-07-11

## 2021-07-11 ENCOUNTER — APPOINTMENT (OUTPATIENT)
Dept: GENERAL RADIOLOGY | Facility: HOSPITAL | Age: 66
End: 2021-07-11

## 2021-07-11 DIAGNOSIS — I50.9 ACUTE ON CHRONIC CONGESTIVE HEART FAILURE, UNSPECIFIED HEART FAILURE TYPE (HCC): Primary | ICD-10-CM

## 2021-07-11 DIAGNOSIS — Z78.9 DECREASED ACTIVITIES OF DAILY LIVING (ADL): ICD-10-CM

## 2021-07-11 DIAGNOSIS — G89.4 CHRONIC PAIN SYNDROME: ICD-10-CM

## 2021-07-11 DIAGNOSIS — Z74.09 IMPAIRED MOBILITY: ICD-10-CM

## 2021-07-11 DIAGNOSIS — I47.20 V-TACH (HCC): ICD-10-CM

## 2021-07-11 DIAGNOSIS — I47.20 V TACH (HCC): ICD-10-CM

## 2021-07-11 DIAGNOSIS — I48.91 ATRIAL FIBRILLATION, UNSPECIFIED TYPE (HCC): ICD-10-CM

## 2021-07-11 DIAGNOSIS — F41.1 GENERALIZED ANXIETY DISORDER: ICD-10-CM

## 2021-07-11 DIAGNOSIS — N17.0 ACUTE KIDNEY INJURY (AKI) WITH ACUTE TUBULAR NECROSIS (ATN) (HCC): ICD-10-CM

## 2021-07-11 DIAGNOSIS — R07.9 CHEST PAIN SYNDROME: ICD-10-CM

## 2021-07-11 LAB
ABO GROUP BLD: NORMAL
ANION GAP SERPL CALCULATED.3IONS-SCNC: 13 MMOL/L (ref 5–15)
APTT PPP: 45.9 SECONDS (ref 24.1–35)
BASOPHILS # BLD AUTO: 0.08 10*3/MM3 (ref 0–0.2)
BASOPHILS NFR BLD AUTO: 1.1 % (ref 0–1.5)
BH CV ECHO MEAS - AO MAX PG (FULL): 2.8 MMHG
BH CV ECHO MEAS - AO MAX PG: 6.5 MMHG
BH CV ECHO MEAS - AO MEAN PG (FULL): 1 MMHG
BH CV ECHO MEAS - AO MEAN PG: 3 MMHG
BH CV ECHO MEAS - AO ROOT AREA (BSA CORRECTED): 1.8
BH CV ECHO MEAS - AO ROOT AREA: 13.2 CM^2
BH CV ECHO MEAS - AO ROOT DIAM: 4.1 CM
BH CV ECHO MEAS - AO V2 MAX: 127 CM/SEC
BH CV ECHO MEAS - AO V2 MEAN: 79.1 CM/SEC
BH CV ECHO MEAS - AO V2 VTI: 21.4 CM
BH CV ECHO MEAS - AVA(I,A): 2.9 CM^2
BH CV ECHO MEAS - AVA(I,D): 2.9 CM^2
BH CV ECHO MEAS - AVA(V,A): 2.6 CM^2
BH CV ECHO MEAS - AVA(V,D): 2.6 CM^2
BH CV ECHO MEAS - BSA(HAYCOCK): 2.4 M^2
BH CV ECHO MEAS - BSA: 2.3 M^2
BH CV ECHO MEAS - BZI_BMI: 32.3 KILOGRAMS/M^2
BH CV ECHO MEAS - BZI_METRIC_HEIGHT: 182.9 CM
BH CV ECHO MEAS - BZI_METRIC_WEIGHT: 108 KG
BH CV ECHO MEAS - EDV(CUBED): 390.6 ML
BH CV ECHO MEAS - EDV(MOD-SP2): 386 ML
BH CV ECHO MEAS - EDV(MOD-SP4): 290 ML
BH CV ECHO MEAS - EDV(TEICH): 281.6 ML
BH CV ECHO MEAS - EF(CUBED): 44.4 %
BH CV ECHO MEAS - EF(MOD-SP2): 18.4 %
BH CV ECHO MEAS - EF(MOD-SP4): 15.5 %
BH CV ECHO MEAS - EF(TEICH): 35.8 %
BH CV ECHO MEAS - ESV(CUBED): 217.1 ML
BH CV ECHO MEAS - ESV(MOD-SP2): 315 ML
BH CV ECHO MEAS - ESV(MOD-SP4): 245 ML
BH CV ECHO MEAS - ESV(TEICH): 180.7 ML
BH CV ECHO MEAS - FS: 17.8 %
BH CV ECHO MEAS - IVS/LVPW: 1.1
BH CV ECHO MEAS - IVSD: 1.1 CM
BH CV ECHO MEAS - LA DIMENSION: 4 CM
BH CV ECHO MEAS - LA/AO: 0.98
BH CV ECHO MEAS - LAT PEAK E' VEL: 8.4 CM/SEC
BH CV ECHO MEAS - LV DIASTOLIC VOL/BSA (35-75): 126.4 ML/M^2
BH CV ECHO MEAS - LV MASS(C)D: 345.7 GRAMS
BH CV ECHO MEAS - LV MASS(C)DI: 150.7 GRAMS/M^2
BH CV ECHO MEAS - LV MAX PG: 3.6 MMHG
BH CV ECHO MEAS - LV MEAN PG: 2 MMHG
BH CV ECHO MEAS - LV SYSTOLIC VOL/BSA (12-30): 106.8 ML/M^2
BH CV ECHO MEAS - LV V1 MAX: 95.3 CM/SEC
BH CV ECHO MEAS - LV V1 MEAN: 55.8 CM/SEC
BH CV ECHO MEAS - LV V1 VTI: 18.2 CM
BH CV ECHO MEAS - LVIDD: 7.3 CM
BH CV ECHO MEAS - LVIDS: 6 CM
BH CV ECHO MEAS - LVLD AP2: 10.2 CM
BH CV ECHO MEAS - LVLD AP4: 9.6 CM
BH CV ECHO MEAS - LVLS AP2: 9.8 CM
BH CV ECHO MEAS - LVLS AP4: 8.7 CM
BH CV ECHO MEAS - LVOT AREA (M): 3.5 CM^2
BH CV ECHO MEAS - LVOT AREA: 3.5 CM^2
BH CV ECHO MEAS - LVOT DIAM: 2.1 CM
BH CV ECHO MEAS - LVPWD: 0.94 CM
BH CV ECHO MEAS - MED PEAK E' VEL: 3.21 CM/SEC
BH CV ECHO MEAS - MR MAX PG: 41.3 MMHG
BH CV ECHO MEAS - MR MAX VEL: 319.3 CM/SEC
BH CV ECHO MEAS - MR MEAN PG: 24 MMHG
BH CV ECHO MEAS - MR MEAN VEL: 221 CM/SEC
BH CV ECHO MEAS - MR VTI: 113 CM
BH CV ECHO MEAS - MV DEC TIME: 0.15 SEC
BH CV ECHO MEAS - MV E MAX VEL: 124 CM/SEC
BH CV ECHO MEAS - SI(AO): 123.2 ML/M^2
BH CV ECHO MEAS - SI(CUBED): 75.7 ML/M^2
BH CV ECHO MEAS - SI(LVOT): 27.5 ML/M^2
BH CV ECHO MEAS - SI(MOD-SP2): 31 ML/M^2
BH CV ECHO MEAS - SI(MOD-SP4): 19.6 ML/M^2
BH CV ECHO MEAS - SI(TEICH): 44 ML/M^2
BH CV ECHO MEAS - SV(AO): 282.5 ML
BH CV ECHO MEAS - SV(CUBED): 173.5 ML
BH CV ECHO MEAS - SV(LVOT): 63 ML
BH CV ECHO MEAS - SV(MOD-SP2): 71 ML
BH CV ECHO MEAS - SV(MOD-SP4): 45 ML
BH CV ECHO MEAS - SV(TEICH): 100.9 ML
BH CV ECHO MEAS - TR MAX VEL: 230 CM/SEC
BH CV ECHO MEASUREMENTS AVERAGE E/E' RATIO: 21.36
BLD GP AB SCN SERPL QL: NEGATIVE
BUN SERPL-MCNC: 27 MG/DL (ref 8–23)
BUN/CREAT SERPL: 22.5 (ref 7–25)
CALCIUM SPEC-SCNC: 9.5 MG/DL (ref 8.6–10.5)
CHLORIDE SERPL-SCNC: 102 MMOL/L (ref 98–107)
CO2 SERPL-SCNC: 24 MMOL/L (ref 22–29)
CREAT SERPL-MCNC: 1.2 MG/DL (ref 0.76–1.27)
D DIMER PPP FEU-MCNC: 0.5 MG/L (FEU) (ref 0–0.5)
DEPRECATED RDW RBC AUTO: 43.9 FL (ref 37–54)
EOSINOPHIL # BLD AUTO: 0.1 10*3/MM3 (ref 0–0.4)
EOSINOPHIL NFR BLD AUTO: 1.3 % (ref 0.3–6.2)
ERYTHROCYTE [DISTWIDTH] IN BLOOD BY AUTOMATED COUNT: 12.8 % (ref 12.3–15.4)
GFR SERPL CREATININE-BSD FRML MDRD: 61 ML/MIN/1.73
GLUCOSE BLDC GLUCOMTR-MCNC: 191 MG/DL (ref 70–130)
GLUCOSE SERPL-MCNC: 193 MG/DL (ref 65–99)
HBA1C MFR BLD: 7.6 % (ref 4.8–5.6)
HCT VFR BLD AUTO: 51.9 % (ref 37.5–51)
HGB BLD-MCNC: 17 G/DL (ref 13–17.7)
INR PPP: 2.16 (ref 0.91–1.09)
INR PPP: 2.47 (ref 0.91–1.09)
LEFT ATRIUM VOLUME INDEX: 65.1 ML/M2
LEFT ATRIUM VOLUME: 149 CM3
LYMPHOCYTES # BLD AUTO: 1.08 10*3/MM3 (ref 0.7–3.1)
LYMPHOCYTES NFR BLD AUTO: 14.4 % (ref 19.6–45.3)
MAGNESIUM SERPL-MCNC: 1.8 MG/DL (ref 1.6–2.4)
MAXIMAL PREDICTED HEART RATE: 155 BPM
MCH RBC QN AUTO: 30.8 PG (ref 26.6–33)
MCHC RBC AUTO-ENTMCNC: 32.8 G/DL (ref 31.5–35.7)
MCV RBC AUTO: 94 FL (ref 79–97)
MONOCYTES # BLD AUTO: 0.53 10*3/MM3 (ref 0.1–0.9)
MONOCYTES NFR BLD AUTO: 7.1 % (ref 5–12)
NEUTROPHILS NFR BLD AUTO: 5.68 10*3/MM3 (ref 1.7–7)
NEUTROPHILS NFR BLD AUTO: 75.7 % (ref 42.7–76)
NT-PROBNP SERPL-MCNC: 3100 PG/ML (ref 0–900)
PHOSPHATE SERPL-MCNC: 3.1 MG/DL (ref 2.5–4.5)
PLATELET # BLD AUTO: 130 10*3/MM3 (ref 140–450)
PMV BLD AUTO: 10.5 FL (ref 6–12)
POTASSIUM SERPL-SCNC: 4.1 MMOL/L (ref 3.5–5.2)
PROTHROMBIN TIME: 22.5 SECONDS (ref 11.5–13.4)
PROTHROMBIN TIME: 25 SECONDS (ref 11.5–13.4)
RBC # BLD AUTO: 5.52 10*6/MM3 (ref 4.14–5.8)
RH BLD: POSITIVE
SARS-COV-2 RNA PNL SPEC NAA+PROBE: NOT DETECTED
SODIUM SERPL-SCNC: 139 MMOL/L (ref 136–145)
STRESS TARGET HR: 132 BPM
T&S EXPIRATION DATE: NORMAL
T4 FREE SERPL-MCNC: 1.36 NG/DL (ref 0.93–1.7)
TROPONIN T SERPL-MCNC: 0.02 NG/ML (ref 0–0.03)
TROPONIN T SERPL-MCNC: 0.03 NG/ML (ref 0–0.03)
TSH SERPL DL<=0.05 MIU/L-ACNC: 3.36 UIU/ML (ref 0.27–4.2)
WBC # BLD AUTO: 7.5 10*3/MM3 (ref 3.4–10.8)

## 2021-07-11 PROCEDURE — 85379 FIBRIN DEGRADATION QUANT: CPT | Performed by: EMERGENCY MEDICINE

## 2021-07-11 PROCEDURE — 93356 MYOCRD STRAIN IMG SPCKL TRCK: CPT | Performed by: INTERNAL MEDICINE

## 2021-07-11 PROCEDURE — 25010000002 FUROSEMIDE PER 20 MG: Performed by: EMERGENCY MEDICINE

## 2021-07-11 PROCEDURE — 36430 TRANSFUSION BLD/BLD COMPNT: CPT

## 2021-07-11 PROCEDURE — 25010000002 PERFLUTREN 6.52 MG/ML SUSPENSION: Performed by: FAMILY MEDICINE

## 2021-07-11 PROCEDURE — 80048 BASIC METABOLIC PNL TOTAL CA: CPT | Performed by: EMERGENCY MEDICINE

## 2021-07-11 PROCEDURE — 93306 TTE W/DOPPLER COMPLETE: CPT

## 2021-07-11 PROCEDURE — 93306 TTE W/DOPPLER COMPLETE: CPT | Performed by: INTERNAL MEDICINE

## 2021-07-11 PROCEDURE — 85610 PROTHROMBIN TIME: CPT | Performed by: EMERGENCY MEDICINE

## 2021-07-11 PROCEDURE — 84439 ASSAY OF FREE THYROXINE: CPT | Performed by: EMERGENCY MEDICINE

## 2021-07-11 PROCEDURE — 83735 ASSAY OF MAGNESIUM: CPT | Performed by: EMERGENCY MEDICINE

## 2021-07-11 PROCEDURE — 71275 CT ANGIOGRAPHY CHEST: CPT

## 2021-07-11 PROCEDURE — 86901 BLOOD TYPING SEROLOGIC RH(D): CPT | Performed by: NURSE PRACTITIONER

## 2021-07-11 PROCEDURE — 25010000002 LORAZEPAM PER 2 MG: Performed by: INTERNAL MEDICINE

## 2021-07-11 PROCEDURE — 71045 X-RAY EXAM CHEST 1 VIEW: CPT

## 2021-07-11 PROCEDURE — 83036 HEMOGLOBIN GLYCOSYLATED A1C: CPT | Performed by: INTERNAL MEDICINE

## 2021-07-11 PROCEDURE — 93005 ELECTROCARDIOGRAM TRACING: CPT | Performed by: EMERGENCY MEDICINE

## 2021-07-11 PROCEDURE — 93005 ELECTROCARDIOGRAM TRACING: CPT

## 2021-07-11 PROCEDURE — 25010000002 MAGNESIUM SULFATE 2 GM/50ML SOLUTION: Performed by: EMERGENCY MEDICINE

## 2021-07-11 PROCEDURE — 85025 COMPLETE CBC W/AUTO DIFF WBC: CPT | Performed by: EMERGENCY MEDICINE

## 2021-07-11 PROCEDURE — 25010000002 AMIODARONE PER 30 MG: Performed by: EMERGENCY MEDICINE

## 2021-07-11 PROCEDURE — 99285 EMERGENCY DEPT VISIT HI MDM: CPT

## 2021-07-11 PROCEDURE — 85610 PROTHROMBIN TIME: CPT | Performed by: INTERNAL MEDICINE

## 2021-07-11 PROCEDURE — 93356 MYOCRD STRAIN IMG SPCKL TRCK: CPT

## 2021-07-11 PROCEDURE — 93010 ELECTROCARDIOGRAM REPORT: CPT | Performed by: INTERNAL MEDICINE

## 2021-07-11 PROCEDURE — 82962 GLUCOSE BLOOD TEST: CPT

## 2021-07-11 PROCEDURE — 85730 THROMBOPLASTIN TIME PARTIAL: CPT | Performed by: EMERGENCY MEDICINE

## 2021-07-11 PROCEDURE — 99223 1ST HOSP IP/OBS HIGH 75: CPT | Performed by: INTERNAL MEDICINE

## 2021-07-11 PROCEDURE — 86927 PLASMA FRESH FROZEN: CPT

## 2021-07-11 PROCEDURE — 25010000002 AMIODARONE IN DEXTROSE 5% 360-4.14 MG/200ML-% SOLUTION: Performed by: INTERNAL MEDICINE

## 2021-07-11 PROCEDURE — 87635 SARS-COV-2 COVID-19 AMP PRB: CPT | Performed by: EMERGENCY MEDICINE

## 2021-07-11 PROCEDURE — 84100 ASSAY OF PHOSPHORUS: CPT | Performed by: EMERGENCY MEDICINE

## 2021-07-11 PROCEDURE — 25010000002 AMIODARONE IN DEXTROSE 5% 360-4.14 MG/200ML-% SOLUTION: Performed by: EMERGENCY MEDICINE

## 2021-07-11 PROCEDURE — 84484 ASSAY OF TROPONIN QUANT: CPT | Performed by: EMERGENCY MEDICINE

## 2021-07-11 PROCEDURE — 0 IOPAMIDOL PER 1 ML: Performed by: EMERGENCY MEDICINE

## 2021-07-11 PROCEDURE — P9017 PLASMA 1 DONOR FRZ W/IN 8 HR: HCPCS

## 2021-07-11 PROCEDURE — 84443 ASSAY THYROID STIM HORMONE: CPT | Performed by: EMERGENCY MEDICINE

## 2021-07-11 PROCEDURE — 86850 RBC ANTIBODY SCREEN: CPT | Performed by: NURSE PRACTITIONER

## 2021-07-11 PROCEDURE — 83880 ASSAY OF NATRIURETIC PEPTIDE: CPT | Performed by: EMERGENCY MEDICINE

## 2021-07-11 PROCEDURE — 86900 BLOOD TYPING SEROLOGIC ABO: CPT | Performed by: NURSE PRACTITIONER

## 2021-07-11 RX ORDER — WARFARIN SODIUM 5 MG/1
5 TABLET ORAL
Status: DISCONTINUED | OUTPATIENT
Start: 2021-07-11 | End: 2021-07-11

## 2021-07-11 RX ORDER — ACETAMINOPHEN 650 MG/1
650 SUPPOSITORY RECTAL EVERY 4 HOURS PRN
Status: DISCONTINUED | OUTPATIENT
Start: 2021-07-11 | End: 2021-07-21

## 2021-07-11 RX ORDER — LORAZEPAM 2 MG/ML
0.5 INJECTION INTRAMUSCULAR ONCE
Status: COMPLETED | OUTPATIENT
Start: 2021-07-11 | End: 2021-07-11

## 2021-07-11 RX ORDER — LORAZEPAM 2 MG/ML
0.5 INJECTION INTRAMUSCULAR EVERY 4 HOURS PRN
Status: DISCONTINUED | OUTPATIENT
Start: 2021-07-11 | End: 2021-07-28 | Stop reason: HOSPADM

## 2021-07-11 RX ORDER — ALBUTEROL SULFATE 2.5 MG/3ML
2.5 SOLUTION RESPIRATORY (INHALATION) EVERY 4 HOURS PRN
Status: DISCONTINUED | OUTPATIENT
Start: 2021-07-11 | End: 2021-07-28 | Stop reason: HOSPADM

## 2021-07-11 RX ORDER — ONDANSETRON 2 MG/ML
4 INJECTION INTRAMUSCULAR; INTRAVENOUS EVERY 6 HOURS PRN
Status: DISCONTINUED | OUTPATIENT
Start: 2021-07-11 | End: 2021-07-13

## 2021-07-11 RX ORDER — NICOTINE POLACRILEX 4 MG
15 LOZENGE BUCCAL
Status: DISCONTINUED | OUTPATIENT
Start: 2021-07-11 | End: 2021-07-28 | Stop reason: HOSPADM

## 2021-07-11 RX ORDER — SODIUM CHLORIDE 0.9 % (FLUSH) 0.9 %
10 SYRINGE (ML) INJECTION AS NEEDED
Status: DISCONTINUED | OUTPATIENT
Start: 2021-07-11 | End: 2021-07-28 | Stop reason: HOSPADM

## 2021-07-11 RX ORDER — ACETAMINOPHEN 325 MG/1
650 TABLET ORAL EVERY 4 HOURS PRN
Status: DISCONTINUED | OUTPATIENT
Start: 2021-07-11 | End: 2021-07-23 | Stop reason: SDUPTHER

## 2021-07-11 RX ORDER — ONDANSETRON 4 MG/1
4 TABLET, FILM COATED ORAL EVERY 6 HOURS PRN
Status: DISCONTINUED | OUTPATIENT
Start: 2021-07-11 | End: 2021-07-13

## 2021-07-11 RX ORDER — AMIODARONE HYDROCHLORIDE 50 MG/ML
150 INJECTION, SOLUTION INTRAVENOUS ONCE
Status: COMPLETED | OUTPATIENT
Start: 2021-07-11 | End: 2021-07-11

## 2021-07-11 RX ORDER — DEXTROSE MONOHYDRATE 25 G/50ML
25 INJECTION, SOLUTION INTRAVENOUS
Status: DISCONTINUED | OUTPATIENT
Start: 2021-07-11 | End: 2021-07-28 | Stop reason: HOSPADM

## 2021-07-11 RX ORDER — ASPIRIN 81 MG/1
324 TABLET, CHEWABLE ORAL ONCE
Status: COMPLETED | OUTPATIENT
Start: 2021-07-11 | End: 2021-07-11

## 2021-07-11 RX ORDER — MAGNESIUM SULFATE HEPTAHYDRATE 40 MG/ML
2 INJECTION, SOLUTION INTRAVENOUS ONCE
Status: COMPLETED | OUTPATIENT
Start: 2021-07-11 | End: 2021-07-11

## 2021-07-11 RX ORDER — DILTIAZEM HYDROCHLORIDE 5 MG/ML
20 INJECTION INTRAVENOUS ONCE
Status: DISCONTINUED | OUTPATIENT
Start: 2021-07-11 | End: 2021-07-11

## 2021-07-11 RX ORDER — FUROSEMIDE 10 MG/ML
40 INJECTION INTRAMUSCULAR; INTRAVENOUS EVERY 12 HOURS
Status: DISCONTINUED | OUTPATIENT
Start: 2021-07-11 | End: 2021-07-12

## 2021-07-11 RX ORDER — FUROSEMIDE 10 MG/ML
40 INJECTION INTRAMUSCULAR; INTRAVENOUS ONCE
Status: COMPLETED | OUTPATIENT
Start: 2021-07-11 | End: 2021-07-11

## 2021-07-11 RX ORDER — SODIUM CHLORIDE 0.9 % (FLUSH) 0.9 %
10 SYRINGE (ML) INJECTION EVERY 12 HOURS SCHEDULED
Status: DISCONTINUED | OUTPATIENT
Start: 2021-07-11 | End: 2021-07-28 | Stop reason: HOSPADM

## 2021-07-11 RX ORDER — LISINOPRIL 5 MG/1
5 TABLET ORAL DAILY
Status: DISCONTINUED | OUTPATIENT
Start: 2021-07-11 | End: 2021-07-12

## 2021-07-11 RX ORDER — NITROGLYCERIN 0.4 MG/1
0.4 TABLET SUBLINGUAL
Status: DISCONTINUED | OUTPATIENT
Start: 2021-07-11 | End: 2021-07-28 | Stop reason: HOSPADM

## 2021-07-11 RX ADMIN — LORAZEPAM 0.5 MG: 2 INJECTION INTRAMUSCULAR; INTRAVENOUS at 05:29

## 2021-07-11 RX ADMIN — SODIUM CHLORIDE 500 ML: 9 INJECTION, SOLUTION INTRAVENOUS at 02:02

## 2021-07-11 RX ADMIN — AMIODARONE HYDROCHLORIDE 1 MG/MIN: 1.8 INJECTION, SOLUTION INTRAVENOUS at 16:28

## 2021-07-11 RX ADMIN — METOPROLOL TARTRATE 75 MG: 50 TABLET, FILM COATED ORAL at 21:18

## 2021-07-11 RX ADMIN — PERFLUTREN 1.17 MG: 6.52 INJECTION, SUSPENSION INTRAVENOUS at 11:56

## 2021-07-11 RX ADMIN — ASPIRIN 324 MG: 81 TABLET, CHEWABLE ORAL at 01:15

## 2021-07-11 RX ADMIN — MAGNESIUM SULFATE HEPTAHYDRATE 2 G: 40 INJECTION, SOLUTION INTRAVENOUS at 05:30

## 2021-07-11 RX ADMIN — FUROSEMIDE 40 MG: 10 INJECTION, SOLUTION INTRAMUSCULAR; INTRAVENOUS at 04:20

## 2021-07-11 RX ADMIN — AMIODARONE HYDROCHLORIDE 1 MG/MIN: 1.8 INJECTION, SOLUTION INTRAVENOUS at 10:17

## 2021-07-11 RX ADMIN — AMIODARONE HYDROCHLORIDE 1 MG/MIN: 1.8 INJECTION, SOLUTION INTRAVENOUS at 21:41

## 2021-07-11 RX ADMIN — SODIUM CHLORIDE, PRESERVATIVE FREE 10 ML: 5 INJECTION INTRAVENOUS at 10:18

## 2021-07-11 RX ADMIN — SODIUM CHLORIDE, PRESERVATIVE FREE 10 ML: 5 INJECTION INTRAVENOUS at 21:17

## 2021-07-11 RX ADMIN — AMIODARONE HYDROCHLORIDE 1 MG/MIN: 1.8 INJECTION, SOLUTION INTRAVENOUS at 03:27

## 2021-07-11 RX ADMIN — METOPROLOL TARTRATE 75 MG: 50 TABLET, FILM COATED ORAL at 08:56

## 2021-07-11 RX ADMIN — IOPAMIDOL 100 ML: 755 INJECTION, SOLUTION INTRAVENOUS at 02:15

## 2021-07-11 RX ADMIN — AMIODARONE HYDROCHLORIDE 150 MG: 50 INJECTION, SOLUTION INTRAVENOUS at 01:03

## 2021-07-11 RX ADMIN — LISINOPRIL 5 MG: 10 TABLET ORAL at 08:57

## 2021-07-12 PROBLEM — I25.5 ISCHEMIC CARDIOMYOPATHY: Status: ACTIVE | Noted: 2021-07-12

## 2021-07-12 PROBLEM — Z79.01 CHRONIC ANTICOAGULATION: Status: ACTIVE | Noted: 2021-07-12

## 2021-07-12 PROBLEM — I50.23 ACUTE ON CHRONIC SYSTOLIC CONGESTIVE HEART FAILURE (HCC): Status: ACTIVE | Noted: 2021-07-11

## 2021-07-12 PROBLEM — N18.31 STAGE 3A CHRONIC KIDNEY DISEASE (HCC): Status: ACTIVE | Noted: 2021-07-12

## 2021-07-12 LAB
ANION GAP SERPL CALCULATED.3IONS-SCNC: 19 MMOL/L (ref 5–15)
BACTERIA UR QL AUTO: ABNORMAL /HPF
BH BB BLOOD EXPIRATION DATE: NORMAL
BH BB BLOOD EXPIRATION DATE: NORMAL
BH BB BLOOD TYPE BARCODE: 6200
BH BB BLOOD TYPE BARCODE: 6200
BH BB DISPENSE STATUS: NORMAL
BH BB DISPENSE STATUS: NORMAL
BH BB PRODUCT CODE: NORMAL
BH BB PRODUCT CODE: NORMAL
BH BB UNIT NUMBER: NORMAL
BH BB UNIT NUMBER: NORMAL
BILIRUB UR QL STRIP: ABNORMAL
BUN SERPL-MCNC: 42 MG/DL (ref 8–23)
BUN/CREAT SERPL: 22.7 (ref 7–25)
CALCIUM SPEC-SCNC: 9.1 MG/DL (ref 8.6–10.5)
CHLORIDE SERPL-SCNC: 96 MMOL/L (ref 98–107)
CLARITY UR: ABNORMAL
CO2 SERPL-SCNC: 17 MMOL/L (ref 22–29)
COLOR UR: ABNORMAL
CREAT SERPL-MCNC: 1.85 MG/DL (ref 0.76–1.27)
CREAT UR-MCNC: 212 MG/DL
DEPRECATED RDW RBC AUTO: 43.5 FL (ref 37–54)
ERYTHROCYTE [DISTWIDTH] IN BLOOD BY AUTOMATED COUNT: 13 % (ref 12.3–15.4)
GFR SERPL CREATININE-BSD FRML MDRD: 37 ML/MIN/1.73
GLUCOSE BLDC GLUCOMTR-MCNC: 156 MG/DL (ref 70–130)
GLUCOSE BLDC GLUCOMTR-MCNC: 251 MG/DL (ref 70–130)
GLUCOSE BLDC GLUCOMTR-MCNC: 253 MG/DL (ref 70–130)
GLUCOSE SERPL-MCNC: 412 MG/DL (ref 65–99)
GLUCOSE UR STRIP-MCNC: ABNORMAL MG/DL
HCT VFR BLD AUTO: 52.2 % (ref 37.5–51)
HGB BLD-MCNC: 18.1 G/DL (ref 13–17.7)
HGB UR QL STRIP.AUTO: ABNORMAL
HYALINE CASTS UR QL AUTO: ABNORMAL /LPF
INR PPP: 1.56 (ref 0.91–1.09)
INR PPP: 1.82 (ref 0.91–1.09)
KETONES UR QL STRIP: NEGATIVE
LEUKOCYTE ESTERASE UR QL STRIP.AUTO: ABNORMAL
MAGNESIUM SERPL-MCNC: 2 MG/DL (ref 1.6–2.4)
MCH RBC QN AUTO: 31.8 PG (ref 26.6–33)
MCHC RBC AUTO-ENTMCNC: 34.7 G/DL (ref 31.5–35.7)
MCV RBC AUTO: 91.6 FL (ref 79–97)
NITRITE UR QL STRIP: NEGATIVE
PH UR STRIP.AUTO: <=5 [PH] (ref 5–8)
PLATELET # BLD AUTO: 153 10*3/MM3 (ref 140–450)
PMV BLD AUTO: 10.9 FL (ref 6–12)
POTASSIUM SERPL-SCNC: 5.1 MMOL/L (ref 3.5–5.2)
PROT UR QL STRIP: ABNORMAL
PROT UR-MCNC: 144.1 MG/DL
PROTHROMBIN TIME: 17.5 SECONDS (ref 11.5–13.4)
PROTHROMBIN TIME: 19.7 SECONDS (ref 11.5–13.4)
QT INTERVAL: 334 MS
QT INTERVAL: 402 MS
QT INTERVAL: 422 MS
QTC INTERVAL: 464 MS
QTC INTERVAL: 504 MS
QTC INTERVAL: 533 MS
RBC # BLD AUTO: 5.7 10*6/MM3 (ref 4.14–5.8)
RBC # UR: ABNORMAL /HPF
REF LAB TEST METHOD: ABNORMAL
SODIUM SERPL-SCNC: 132 MMOL/L (ref 136–145)
SODIUM UR-SCNC: 22 MMOL/L
SP GR UR STRIP: 1.03 (ref 1–1.03)
SQUAMOUS #/AREA URNS HPF: ABNORMAL /HPF
UNIT  ABO: NORMAL
UNIT  ABO: NORMAL
UNIT  RH: NORMAL
UNIT  RH: NORMAL
UROBILINOGEN UR QL STRIP: ABNORMAL
WBC # BLD AUTO: 12.26 10*3/MM3 (ref 3.4–10.8)
WBC UR QL AUTO: ABNORMAL /HPF
YEAST URNS QL MICRO: ABNORMAL /HPF

## 2021-07-12 PROCEDURE — B2131ZZ FLUOROSCOPY OF MULTIPLE CORONARY ARTERY BYPASS GRAFTS USING LOW OSMOLAR CONTRAST: ICD-10-PCS | Performed by: INTERNAL MEDICINE

## 2021-07-12 PROCEDURE — 84156 ASSAY OF PROTEIN URINE: CPT | Performed by: NURSE PRACTITIONER

## 2021-07-12 PROCEDURE — 99152 MOD SED SAME PHYS/QHP 5/>YRS: CPT | Performed by: INTERNAL MEDICINE

## 2021-07-12 PROCEDURE — 83735 ASSAY OF MAGNESIUM: CPT | Performed by: INTERNAL MEDICINE

## 2021-07-12 PROCEDURE — 85610 PROTHROMBIN TIME: CPT | Performed by: INTERNAL MEDICINE

## 2021-07-12 PROCEDURE — 25010000002 FENTANYL CITRATE (PF) 100 MCG/2ML SOLUTION: Performed by: INTERNAL MEDICINE

## 2021-07-12 PROCEDURE — 63710000001 INSULIN LISPRO (HUMAN) PER 5 UNITS: Performed by: INTERNAL MEDICINE

## 2021-07-12 PROCEDURE — 25010000002 DIPHENHYDRAMINE PER 50 MG: Performed by: INTERNAL MEDICINE

## 2021-07-12 PROCEDURE — 81001 URINALYSIS AUTO W/SCOPE: CPT | Performed by: NURSE PRACTITIONER

## 2021-07-12 PROCEDURE — 25010000002 LORAZEPAM PER 2 MG: Performed by: INTERNAL MEDICINE

## 2021-07-12 PROCEDURE — C1894 INTRO/SHEATH, NON-LASER: HCPCS | Performed by: INTERNAL MEDICINE

## 2021-07-12 PROCEDURE — 82570 ASSAY OF URINE CREATININE: CPT | Performed by: NURSE PRACTITIONER

## 2021-07-12 PROCEDURE — 85027 COMPLETE CBC AUTOMATED: CPT | Performed by: NURSE PRACTITIONER

## 2021-07-12 PROCEDURE — 25010000002 HEPARIN (PORCINE) 2000-0.9 UNIT/L-% SOLUTION: Performed by: INTERNAL MEDICINE

## 2021-07-12 PROCEDURE — P9017 PLASMA 1 DONOR FRZ W/IN 8 HR: HCPCS

## 2021-07-12 PROCEDURE — 25010000002 FUROSEMIDE PER 20 MG: Performed by: INTERNAL MEDICINE

## 2021-07-12 PROCEDURE — 86927 PLASMA FRESH FROZEN: CPT

## 2021-07-12 PROCEDURE — 25010000002 MIDAZOLAM HCL (PF) 5 MG/5ML SOLUTION: Performed by: INTERNAL MEDICINE

## 2021-07-12 PROCEDURE — 80048 BASIC METABOLIC PNL TOTAL CA: CPT | Performed by: INTERNAL MEDICINE

## 2021-07-12 PROCEDURE — 82962 GLUCOSE BLOOD TEST: CPT

## 2021-07-12 PROCEDURE — C1760 CLOSURE DEV, VASC: HCPCS | Performed by: INTERNAL MEDICINE

## 2021-07-12 PROCEDURE — 99153 MOD SED SAME PHYS/QHP EA: CPT | Performed by: INTERNAL MEDICINE

## 2021-07-12 PROCEDURE — 36415 COLL VENOUS BLD VENIPUNCTURE: CPT | Performed by: INTERNAL MEDICINE

## 2021-07-12 PROCEDURE — 36430 TRANSFUSION BLD/BLD COMPNT: CPT

## 2021-07-12 PROCEDURE — 0 IOPAMIDOL PER 1 ML: Performed by: INTERNAL MEDICINE

## 2021-07-12 PROCEDURE — B2111ZZ FLUOROSCOPY OF MULTIPLE CORONARY ARTERIES USING LOW OSMOLAR CONTRAST: ICD-10-PCS | Performed by: INTERNAL MEDICINE

## 2021-07-12 PROCEDURE — 25010000002 HEPARIN (PORCINE) 1000-0.9 UT/500ML-% SOLUTION: Performed by: INTERNAL MEDICINE

## 2021-07-12 PROCEDURE — 93455 CORONARY ART/GRFT ANGIO S&I: CPT | Performed by: INTERNAL MEDICINE

## 2021-07-12 PROCEDURE — 84300 ASSAY OF URINE SODIUM: CPT | Performed by: NURSE PRACTITIONER

## 2021-07-12 PROCEDURE — 25010000002 AMIODARONE IN DEXTROSE 5% 360-4.14 MG/200ML-% SOLUTION: Performed by: INTERNAL MEDICINE

## 2021-07-12 RX ORDER — MIDAZOLAM HYDROCHLORIDE 1 MG/ML
INJECTION, SOLUTION INTRAMUSCULAR; INTRAVENOUS AS NEEDED
Status: DISCONTINUED | OUTPATIENT
Start: 2021-07-12 | End: 2021-07-12 | Stop reason: HOSPADM

## 2021-07-12 RX ORDER — DIPHENHYDRAMINE HYDROCHLORIDE 50 MG/ML
INJECTION INTRAMUSCULAR; INTRAVENOUS AS NEEDED
Status: DISCONTINUED | OUTPATIENT
Start: 2021-07-12 | End: 2021-07-12 | Stop reason: HOSPADM

## 2021-07-12 RX ORDER — LIDOCAINE HYDROCHLORIDE 20 MG/ML
INJECTION, SOLUTION INFILTRATION; PERINEURAL AS NEEDED
Status: DISCONTINUED | OUTPATIENT
Start: 2021-07-12 | End: 2021-07-12 | Stop reason: HOSPADM

## 2021-07-12 RX ORDER — HEPARIN SODIUM 200 [USP'U]/100ML
INJECTION, SOLUTION INTRAVENOUS AS NEEDED
Status: DISCONTINUED | OUTPATIENT
Start: 2021-07-12 | End: 2021-07-12 | Stop reason: HOSPADM

## 2021-07-12 RX ORDER — SODIUM CHLORIDE 9 MG/ML
100 INJECTION, SOLUTION INTRAVENOUS CONTINUOUS
Status: DISPENSED | OUTPATIENT
Start: 2021-07-12 | End: 2021-07-12

## 2021-07-12 RX ORDER — ACETAMINOPHEN 325 MG/1
650 TABLET ORAL EVERY 4 HOURS PRN
Status: DISCONTINUED | OUTPATIENT
Start: 2021-07-12 | End: 2021-07-12 | Stop reason: SDUPTHER

## 2021-07-12 RX ORDER — FENTANYL CITRATE 50 UG/ML
INJECTION, SOLUTION INTRAMUSCULAR; INTRAVENOUS AS NEEDED
Status: DISCONTINUED | OUTPATIENT
Start: 2021-07-12 | End: 2021-07-12 | Stop reason: HOSPADM

## 2021-07-12 RX ORDER — SODIUM CHLORIDE 9 MG/ML
100 INJECTION, SOLUTION INTRAVENOUS CONTINUOUS
Status: DISCONTINUED | OUTPATIENT
Start: 2021-07-12 | End: 2021-07-12

## 2021-07-12 RX ADMIN — INSULIN LISPRO 6 UNITS: 100 INJECTION, SOLUTION INTRAVENOUS; SUBCUTANEOUS at 10:44

## 2021-07-12 RX ADMIN — SODIUM CHLORIDE, PRESERVATIVE FREE 10 ML: 5 INJECTION INTRAVENOUS at 08:03

## 2021-07-12 RX ADMIN — LORAZEPAM 0.5 MG: 2 INJECTION INTRAMUSCULAR; INTRAVENOUS at 04:20

## 2021-07-12 RX ADMIN — FUROSEMIDE 40 MG: 10 INJECTION, SOLUTION INTRAMUSCULAR; INTRAVENOUS at 03:12

## 2021-07-12 RX ADMIN — AMIODARONE HYDROCHLORIDE 0.5 MG/MIN: 1.8 INJECTION, SOLUTION INTRAVENOUS at 14:18

## 2021-07-12 RX ADMIN — INSULIN LISPRO 9 UNITS: 100 INJECTION, SOLUTION INTRAVENOUS; SUBCUTANEOUS at 05:02

## 2021-07-12 RX ADMIN — SODIUM CHLORIDE 75 ML/HR: 9 INJECTION, SOLUTION INTRAVENOUS at 10:51

## 2021-07-12 RX ADMIN — SODIUM BICARBONATE: 84 INJECTION INTRAVENOUS at 17:24

## 2021-07-12 RX ADMIN — METOPROLOL TARTRATE 75 MG: 50 TABLET, FILM COATED ORAL at 21:27

## 2021-07-12 RX ADMIN — AMIODARONE HYDROCHLORIDE 1 MG/MIN: 1.8 INJECTION, SOLUTION INTRAVENOUS at 03:12

## 2021-07-12 RX ADMIN — METOPROLOL TARTRATE 75 MG: 50 TABLET, FILM COATED ORAL at 08:03

## 2021-07-12 RX ADMIN — INSULIN LISPRO 2 UNITS: 100 INJECTION, SOLUTION INTRAVENOUS; SUBCUTANEOUS at 16:52

## 2021-07-12 RX ADMIN — SODIUM CHLORIDE, PRESERVATIVE FREE 10 ML: 5 INJECTION INTRAVENOUS at 21:28

## 2021-07-13 ENCOUNTER — APPOINTMENT (OUTPATIENT)
Dept: ULTRASOUND IMAGING | Facility: HOSPITAL | Age: 66
End: 2021-07-13

## 2021-07-13 LAB
ALBUMIN SERPL-MCNC: 4.3 G/DL (ref 3.5–5.2)
ALBUMIN/GLOB SERPL: 1.9 G/DL
ALP SERPL-CCNC: 67 U/L (ref 39–117)
ALT SERPL W P-5'-P-CCNC: 23 U/L (ref 1–41)
ANION GAP SERPL CALCULATED.3IONS-SCNC: 14 MMOL/L (ref 5–15)
AST SERPL-CCNC: 32 U/L (ref 1–40)
BASOPHILS # BLD AUTO: 0.04 10*3/MM3 (ref 0–0.2)
BASOPHILS NFR BLD AUTO: 0.4 % (ref 0–1.5)
BH BB BLOOD EXPIRATION DATE: NORMAL
BH BB BLOOD EXPIRATION DATE: NORMAL
BH BB BLOOD TYPE BARCODE: 600
BH BB BLOOD TYPE BARCODE: 6200
BH BB DISPENSE STATUS: NORMAL
BH BB DISPENSE STATUS: NORMAL
BH BB PRODUCT CODE: NORMAL
BH BB PRODUCT CODE: NORMAL
BH BB UNIT NUMBER: NORMAL
BH BB UNIT NUMBER: NORMAL
BILIRUB SERPL-MCNC: 1.4 MG/DL (ref 0–1.2)
BUN SERPL-MCNC: 48 MG/DL (ref 8–23)
BUN/CREAT SERPL: 24.4 (ref 7–25)
CALCIUM SPEC-SCNC: 8.9 MG/DL (ref 8.6–10.5)
CHLORIDE SERPL-SCNC: 99 MMOL/L (ref 98–107)
CO2 SERPL-SCNC: 21 MMOL/L (ref 22–29)
CREAT SERPL-MCNC: 1.97 MG/DL (ref 0.76–1.27)
DEPRECATED RDW RBC AUTO: 44.1 FL (ref 37–54)
EOSINOPHIL # BLD AUTO: 0.01 10*3/MM3 (ref 0–0.4)
EOSINOPHIL NFR BLD AUTO: 0.1 % (ref 0.3–6.2)
ERYTHROCYTE [DISTWIDTH] IN BLOOD BY AUTOMATED COUNT: 13.1 % (ref 12.3–15.4)
GFR SERPL CREATININE-BSD FRML MDRD: 34 ML/MIN/1.73
GLOBULIN UR ELPH-MCNC: 2.3 GM/DL
GLUCOSE BLDC GLUCOMTR-MCNC: 176 MG/DL (ref 70–130)
GLUCOSE BLDC GLUCOMTR-MCNC: 256 MG/DL (ref 70–130)
GLUCOSE BLDC GLUCOMTR-MCNC: 296 MG/DL (ref 70–130)
GLUCOSE BLDC GLUCOMTR-MCNC: 308 MG/DL (ref 70–130)
GLUCOSE BLDC GLUCOMTR-MCNC: 323 MG/DL (ref 70–130)
GLUCOSE SERPL-MCNC: 305 MG/DL (ref 65–99)
HCT VFR BLD AUTO: 48.1 % (ref 37.5–51)
HGB BLD-MCNC: 16.2 G/DL (ref 13–17.7)
IMM GRANULOCYTES # BLD AUTO: 0.04 10*3/MM3 (ref 0–0.05)
IMM GRANULOCYTES NFR BLD AUTO: 0.4 % (ref 0–0.5)
INR PPP: 1.72 (ref 0.91–1.09)
LYMPHOCYTES # BLD AUTO: 0.49 10*3/MM3 (ref 0.7–3.1)
LYMPHOCYTES NFR BLD AUTO: 4.5 % (ref 19.6–45.3)
MCH RBC QN AUTO: 31.3 PG (ref 26.6–33)
MCHC RBC AUTO-ENTMCNC: 33.7 G/DL (ref 31.5–35.7)
MCV RBC AUTO: 93 FL (ref 79–97)
MONOCYTES # BLD AUTO: 0.7 10*3/MM3 (ref 0.1–0.9)
MONOCYTES NFR BLD AUTO: 6.4 % (ref 5–12)
NEUTROPHILS NFR BLD AUTO: 88.2 % (ref 42.7–76)
NEUTROPHILS NFR BLD AUTO: 9.7 10*3/MM3 (ref 1.7–7)
NRBC BLD AUTO-RTO: 0 /100 WBC (ref 0–0.2)
PLATELET # BLD AUTO: 144 10*3/MM3 (ref 140–450)
PMV BLD AUTO: 11 FL (ref 6–12)
POTASSIUM SERPL-SCNC: 4.5 MMOL/L (ref 3.5–5.2)
PROT SERPL-MCNC: 6.6 G/DL (ref 6–8.5)
PROTHROMBIN TIME: 18.9 SECONDS (ref 11.5–13.4)
RBC # BLD AUTO: 5.17 10*6/MM3 (ref 4.14–5.8)
SODIUM SERPL-SCNC: 134 MMOL/L (ref 136–145)
UNIT  ABO: NORMAL
UNIT  ABO: NORMAL
UNIT  RH: NORMAL
UNIT  RH: NORMAL
WBC # BLD AUTO: 10.98 10*3/MM3 (ref 3.4–10.8)

## 2021-07-13 PROCEDURE — 82962 GLUCOSE BLOOD TEST: CPT

## 2021-07-13 PROCEDURE — 99232 SBSQ HOSP IP/OBS MODERATE 35: CPT | Performed by: INTERNAL MEDICINE

## 2021-07-13 PROCEDURE — 63710000001 INSULIN LISPRO (HUMAN) PER 5 UNITS: Performed by: INTERNAL MEDICINE

## 2021-07-13 PROCEDURE — 80053 COMPREHEN METABOLIC PANEL: CPT | Performed by: INTERNAL MEDICINE

## 2021-07-13 PROCEDURE — 25010000002 DEXAMETHASONE PER 1 MG: Performed by: NURSE PRACTITIONER

## 2021-07-13 PROCEDURE — 63710000001 INSULIN LISPRO (HUMAN) PER 5 UNITS: Performed by: NURSE PRACTITIONER

## 2021-07-13 PROCEDURE — 85025 COMPLETE CBC W/AUTO DIFF WBC: CPT | Performed by: INTERNAL MEDICINE

## 2021-07-13 PROCEDURE — 25010000002 AMIODARONE IN DEXTROSE 5% 360-4.14 MG/200ML-% SOLUTION: Performed by: INTERNAL MEDICINE

## 2021-07-13 PROCEDURE — 25010000002 LORAZEPAM PER 2 MG: Performed by: INTERNAL MEDICINE

## 2021-07-13 PROCEDURE — 25010000002 LORAZEPAM PER 2 MG: Performed by: NURSE PRACTITIONER

## 2021-07-13 PROCEDURE — 76775 US EXAM ABDO BACK WALL LIM: CPT

## 2021-07-13 PROCEDURE — 85610 PROTHROMBIN TIME: CPT | Performed by: INTERNAL MEDICINE

## 2021-07-13 PROCEDURE — 25010000002 ONDANSETRON PER 1 MG: Performed by: INTERNAL MEDICINE

## 2021-07-13 RX ORDER — LANOLIN ALCOHOL/MO/W.PET/CERES
3 CREAM (GRAM) TOPICAL NIGHTLY PRN
Status: DISCONTINUED | OUTPATIENT
Start: 2021-07-13 | End: 2021-07-28 | Stop reason: HOSPADM

## 2021-07-13 RX ORDER — DEXAMETHASONE SODIUM PHOSPHATE 4 MG/ML
8 INJECTION, SOLUTION INTRA-ARTICULAR; INTRALESIONAL; INTRAMUSCULAR; INTRAVENOUS; SOFT TISSUE ONCE
Status: COMPLETED | OUTPATIENT
Start: 2021-07-13 | End: 2021-07-13

## 2021-07-13 RX ORDER — AMIODARONE HYDROCHLORIDE 200 MG/1
400 TABLET ORAL EVERY 12 HOURS SCHEDULED
Status: DISCONTINUED | OUTPATIENT
Start: 2021-07-13 | End: 2021-07-15

## 2021-07-13 RX ORDER — FAMOTIDINE 10 MG/ML
20 INJECTION, SOLUTION INTRAVENOUS DAILY
Status: DISCONTINUED | OUTPATIENT
Start: 2021-07-13 | End: 2021-07-16 | Stop reason: CLARIF

## 2021-07-13 RX ORDER — WARFARIN SODIUM 5 MG/1
5 TABLET ORAL
Status: DISCONTINUED | OUTPATIENT
Start: 2021-07-13 | End: 2021-07-17

## 2021-07-13 RX ADMIN — AMIODARONE HYDROCHLORIDE 1 MG/MIN: 1.8 INJECTION, SOLUTION INTRAVENOUS at 00:51

## 2021-07-13 RX ADMIN — METOPROLOL TARTRATE 75 MG: 50 TABLET, FILM COATED ORAL at 20:48

## 2021-07-13 RX ADMIN — SODIUM CHLORIDE, PRESERVATIVE FREE 10 ML: 5 INJECTION INTRAVENOUS at 08:15

## 2021-07-13 RX ADMIN — METOPROLOL TARTRATE 75 MG: 50 TABLET, FILM COATED ORAL at 08:09

## 2021-07-13 RX ADMIN — AMIODARONE HYDROCHLORIDE 400 MG: 200 TABLET ORAL at 09:45

## 2021-07-13 RX ADMIN — INSULIN LISPRO 3 UNITS: 100 INJECTION, SOLUTION INTRAVENOUS; SUBCUTANEOUS at 18:09

## 2021-07-13 RX ADMIN — AMIODARONE HYDROCHLORIDE 400 MG: 200 TABLET ORAL at 20:48

## 2021-07-13 RX ADMIN — LORAZEPAM 0.5 MG: 2 INJECTION INTRAMUSCULAR; INTRAVENOUS at 20:56

## 2021-07-13 RX ADMIN — FAMOTIDINE 20 MG: 10 INJECTION, SOLUTION INTRAVENOUS at 10:56

## 2021-07-13 RX ADMIN — INSULIN LISPRO 8 UNITS: 100 INJECTION, SOLUTION INTRAVENOUS; SUBCUTANEOUS at 11:09

## 2021-07-13 RX ADMIN — DEXAMETHASONE SODIUM PHOSPHATE 8 MG: 4 INJECTION, SOLUTION INTRA-ARTICULAR; INTRALESIONAL; INTRAMUSCULAR; INTRAVENOUS; SOFT TISSUE at 10:56

## 2021-07-13 RX ADMIN — WARFARIN SODIUM 5 MG: 5 TABLET ORAL at 18:09

## 2021-07-13 RX ADMIN — INSULIN LISPRO 7 UNITS: 100 INJECTION, SOLUTION INTRAVENOUS; SUBCUTANEOUS at 07:10

## 2021-07-13 RX ADMIN — ONDANSETRON HYDROCHLORIDE 4 MG: 2 SOLUTION INTRAMUSCULAR; INTRAVENOUS at 07:06

## 2021-07-13 RX ADMIN — SODIUM BICARBONATE: 84 INJECTION INTRAVENOUS at 18:32

## 2021-07-13 RX ADMIN — LORAZEPAM 0.5 MG: 2 INJECTION INTRAMUSCULAR; INTRAVENOUS at 11:32

## 2021-07-14 ENCOUNTER — APPOINTMENT (OUTPATIENT)
Dept: CT IMAGING | Facility: HOSPITAL | Age: 66
End: 2021-07-14

## 2021-07-14 ENCOUNTER — APPOINTMENT (OUTPATIENT)
Dept: GENERAL RADIOLOGY | Facility: HOSPITAL | Age: 66
End: 2021-07-14

## 2021-07-14 LAB
ALBUMIN SERPL-MCNC: 4.1 G/DL (ref 3.5–5.2)
ALBUMIN/GLOB SERPL: 1.9 G/DL
ALP SERPL-CCNC: 63 U/L (ref 39–117)
ALT SERPL W P-5'-P-CCNC: 132 U/L (ref 1–41)
ANION GAP SERPL CALCULATED.3IONS-SCNC: 18 MMOL/L (ref 5–15)
AST SERPL-CCNC: 188 U/L (ref 1–40)
BASOPHILS # BLD AUTO: 0.02 10*3/MM3 (ref 0–0.2)
BASOPHILS NFR BLD AUTO: 0.2 % (ref 0–1.5)
BILIRUB SERPL-MCNC: 2.4 MG/DL (ref 0–1.2)
BUN SERPL-MCNC: 67 MG/DL (ref 8–23)
BUN/CREAT SERPL: 24.9 (ref 7–25)
CALCIUM SPEC-SCNC: 8.6 MG/DL (ref 8.6–10.5)
CHLORIDE SERPL-SCNC: 91 MMOL/L (ref 98–107)
CO2 SERPL-SCNC: 19 MMOL/L (ref 22–29)
CREAT SERPL-MCNC: 2.69 MG/DL (ref 0.76–1.27)
CRP SERPL-MCNC: 3.7 MG/DL (ref 0–0.5)
DEPRECATED RDW RBC AUTO: 43.5 FL (ref 37–54)
EOSINOPHIL # BLD AUTO: 0 10*3/MM3 (ref 0–0.4)
EOSINOPHIL NFR BLD AUTO: 0 % (ref 0.3–6.2)
ERYTHROCYTE [DISTWIDTH] IN BLOOD BY AUTOMATED COUNT: 13.2 % (ref 12.3–15.4)
GFR SERPL CREATININE-BSD FRML MDRD: 24 ML/MIN/1.73
GLOBULIN UR ELPH-MCNC: 2.2 GM/DL
GLUCOSE BLDC GLUCOMTR-MCNC: 282 MG/DL (ref 70–130)
GLUCOSE BLDC GLUCOMTR-MCNC: 314 MG/DL (ref 70–130)
GLUCOSE BLDC GLUCOMTR-MCNC: 329 MG/DL (ref 70–130)
GLUCOSE SERPL-MCNC: 311 MG/DL (ref 65–99)
HCT VFR BLD AUTO: 47.9 % (ref 37.5–51)
HGB BLD-MCNC: 16.7 G/DL (ref 13–17.7)
IMM GRANULOCYTES # BLD AUTO: 0.05 10*3/MM3 (ref 0–0.05)
IMM GRANULOCYTES NFR BLD AUTO: 0.6 % (ref 0–0.5)
INR PPP: 1.92 (ref 0.91–1.09)
LIPASE SERPL-CCNC: 39 U/L (ref 13–60)
LYMPHOCYTES # BLD AUTO: 0.54 10*3/MM3 (ref 0.7–3.1)
LYMPHOCYTES NFR BLD AUTO: 6.4 % (ref 19.6–45.3)
MCH RBC QN AUTO: 31.8 PG (ref 26.6–33)
MCHC RBC AUTO-ENTMCNC: 34.9 G/DL (ref 31.5–35.7)
MCV RBC AUTO: 91.2 FL (ref 79–97)
MONOCYTES # BLD AUTO: 0.49 10*3/MM3 (ref 0.1–0.9)
MONOCYTES NFR BLD AUTO: 5.8 % (ref 5–12)
NEUTROPHILS NFR BLD AUTO: 7.39 10*3/MM3 (ref 1.7–7)
NEUTROPHILS NFR BLD AUTO: 87 % (ref 42.7–76)
NRBC BLD AUTO-RTO: 0 /100 WBC (ref 0–0.2)
PLATELET # BLD AUTO: 156 10*3/MM3 (ref 140–450)
PMV BLD AUTO: 11.2 FL (ref 6–12)
POTASSIUM SERPL-SCNC: 4.6 MMOL/L (ref 3.5–5.2)
PROT SERPL-MCNC: 6.3 G/DL (ref 6–8.5)
PROTHROMBIN TIME: 20.6 SECONDS (ref 11.5–13.4)
RBC # BLD AUTO: 5.25 10*6/MM3 (ref 4.14–5.8)
SODIUM SERPL-SCNC: 128 MMOL/L (ref 136–145)
WBC # BLD AUTO: 8.49 10*3/MM3 (ref 3.4–10.8)

## 2021-07-14 PROCEDURE — 74018 RADEX ABDOMEN 1 VIEW: CPT

## 2021-07-14 PROCEDURE — 36415 COLL VENOUS BLD VENIPUNCTURE: CPT | Performed by: NURSE PRACTITIONER

## 2021-07-14 PROCEDURE — 63710000001 INSULIN LISPRO (HUMAN) PER 5 UNITS: Performed by: NURSE PRACTITIONER

## 2021-07-14 PROCEDURE — 82962 GLUCOSE BLOOD TEST: CPT

## 2021-07-14 PROCEDURE — 85610 PROTHROMBIN TIME: CPT | Performed by: NURSE PRACTITIONER

## 2021-07-14 PROCEDURE — 74176 CT ABD & PELVIS W/O CONTRAST: CPT

## 2021-07-14 PROCEDURE — 80053 COMPREHEN METABOLIC PANEL: CPT | Performed by: NURSE PRACTITIONER

## 2021-07-14 PROCEDURE — 99232 SBSQ HOSP IP/OBS MODERATE 35: CPT | Performed by: NURSE PRACTITIONER

## 2021-07-14 PROCEDURE — 25010000002 LORAZEPAM PER 2 MG: Performed by: NURSE PRACTITIONER

## 2021-07-14 PROCEDURE — 83690 ASSAY OF LIPASE: CPT | Performed by: INTERNAL MEDICINE

## 2021-07-14 PROCEDURE — 85025 COMPLETE CBC W/AUTO DIFF WBC: CPT | Performed by: NURSE PRACTITIONER

## 2021-07-14 PROCEDURE — 86140 C-REACTIVE PROTEIN: CPT | Performed by: NURSE PRACTITIONER

## 2021-07-14 RX ORDER — SODIUM CHLORIDE 9 MG/ML
60 INJECTION, SOLUTION INTRAVENOUS CONTINUOUS
Status: DISCONTINUED | OUTPATIENT
Start: 2021-07-14 | End: 2021-07-15

## 2021-07-14 RX ADMIN — SODIUM CHLORIDE 75 ML/HR: 9 INJECTION, SOLUTION INTRAVENOUS at 16:08

## 2021-07-14 RX ADMIN — INSULIN LISPRO 10 UNITS: 100 INJECTION, SOLUTION INTRAVENOUS; SUBCUTANEOUS at 08:37

## 2021-07-14 RX ADMIN — AMIODARONE HYDROCHLORIDE 400 MG: 200 TABLET ORAL at 21:05

## 2021-07-14 RX ADMIN — LORAZEPAM 0.5 MG: 2 INJECTION INTRAMUSCULAR; INTRAVENOUS at 21:19

## 2021-07-14 RX ADMIN — FAMOTIDINE 20 MG: 10 INJECTION, SOLUTION INTRAVENOUS at 08:37

## 2021-07-14 RX ADMIN — INSULIN LISPRO 10 UNITS: 100 INJECTION, SOLUTION INTRAVENOUS; SUBCUTANEOUS at 12:33

## 2021-07-14 RX ADMIN — WARFARIN SODIUM 5 MG: 5 TABLET ORAL at 17:45

## 2021-07-14 RX ADMIN — AMIODARONE HYDROCHLORIDE 400 MG: 200 TABLET ORAL at 08:37

## 2021-07-14 RX ADMIN — SODIUM BICARBONATE: 84 INJECTION INTRAVENOUS at 14:59

## 2021-07-14 RX ADMIN — INSULIN LISPRO 8 UNITS: 100 INJECTION, SOLUTION INTRAVENOUS; SUBCUTANEOUS at 17:45

## 2021-07-14 RX ADMIN — SODIUM CHLORIDE, PRESERVATIVE FREE 10 ML: 5 INJECTION INTRAVENOUS at 08:37

## 2021-07-14 RX ADMIN — METOPROLOL TARTRATE 75 MG: 50 TABLET, FILM COATED ORAL at 08:37

## 2021-07-14 RX ADMIN — METOPROLOL TARTRATE 75 MG: 50 TABLET, FILM COATED ORAL at 21:05

## 2021-07-15 ENCOUNTER — APPOINTMENT (OUTPATIENT)
Dept: GENERAL RADIOLOGY | Facility: HOSPITAL | Age: 66
End: 2021-07-15

## 2021-07-15 LAB
ALBUMIN SERPL-MCNC: 4 G/DL (ref 3.5–5.2)
ALBUMIN/GLOB SERPL: 1.8 G/DL
ALP SERPL-CCNC: 61 U/L (ref 39–117)
ALT SERPL W P-5'-P-CCNC: 169 U/L (ref 1–41)
ANION GAP SERPL CALCULATED.3IONS-SCNC: 14 MMOL/L (ref 5–15)
AST SERPL-CCNC: 156 U/L (ref 1–40)
BASOPHILS # BLD AUTO: 0.03 10*3/MM3 (ref 0–0.2)
BASOPHILS NFR BLD AUTO: 0.3 % (ref 0–1.5)
BILIRUB SERPL-MCNC: 2.3 MG/DL (ref 0–1.2)
BUN SERPL-MCNC: 79 MG/DL (ref 8–23)
BUN/CREAT SERPL: 31.6 (ref 7–25)
CALCIUM SPEC-SCNC: 8.9 MG/DL (ref 8.6–10.5)
CHLORIDE SERPL-SCNC: 95 MMOL/L (ref 98–107)
CO2 SERPL-SCNC: 22 MMOL/L (ref 22–29)
CREAT SERPL-MCNC: 2.5 MG/DL (ref 0.76–1.27)
DEPRECATED RDW RBC AUTO: 43.8 FL (ref 37–54)
EOSINOPHIL # BLD AUTO: 0 10*3/MM3 (ref 0–0.4)
EOSINOPHIL NFR BLD AUTO: 0 % (ref 0.3–6.2)
ERYTHROCYTE [DISTWIDTH] IN BLOOD BY AUTOMATED COUNT: 13 % (ref 12.3–15.4)
GFR SERPL CREATININE-BSD FRML MDRD: 26 ML/MIN/1.73
GLOBULIN UR ELPH-MCNC: 2.2 GM/DL
GLUCOSE BLDC GLUCOMTR-MCNC: 122 MG/DL (ref 70–130)
GLUCOSE BLDC GLUCOMTR-MCNC: 126 MG/DL (ref 70–130)
GLUCOSE BLDC GLUCOMTR-MCNC: 174 MG/DL (ref 70–130)
GLUCOSE BLDC GLUCOMTR-MCNC: 197 MG/DL (ref 70–130)
GLUCOSE BLDC GLUCOMTR-MCNC: 209 MG/DL (ref 70–130)
GLUCOSE SERPL-MCNC: 138 MG/DL (ref 65–99)
HCT VFR BLD AUTO: 48.2 % (ref 37.5–51)
HGB BLD-MCNC: 16.4 G/DL (ref 13–17.7)
IMM GRANULOCYTES # BLD AUTO: 0.07 10*3/MM3 (ref 0–0.05)
IMM GRANULOCYTES NFR BLD AUTO: 0.6 % (ref 0–0.5)
INR PPP: 2.46 (ref 0.91–1.09)
LYMPHOCYTES # BLD AUTO: 0.87 10*3/MM3 (ref 0.7–3.1)
LYMPHOCYTES NFR BLD AUTO: 7.3 % (ref 19.6–45.3)
MCH RBC QN AUTO: 31.4 PG (ref 26.6–33)
MCHC RBC AUTO-ENTMCNC: 34 G/DL (ref 31.5–35.7)
MCV RBC AUTO: 92.3 FL (ref 79–97)
MONOCYTES # BLD AUTO: 0.88 10*3/MM3 (ref 0.1–0.9)
MONOCYTES NFR BLD AUTO: 7.4 % (ref 5–12)
NEUTROPHILS NFR BLD AUTO: 10.06 10*3/MM3 (ref 1.7–7)
NEUTROPHILS NFR BLD AUTO: 84.4 % (ref 42.7–76)
NRBC BLD AUTO-RTO: 0.2 /100 WBC (ref 0–0.2)
PLATELET # BLD AUTO: 178 10*3/MM3 (ref 140–450)
PMV BLD AUTO: 11.6 FL (ref 6–12)
POTASSIUM SERPL-SCNC: 4.1 MMOL/L (ref 3.5–5.2)
PROT SERPL-MCNC: 6.2 G/DL (ref 6–8.5)
PROTHROMBIN TIME: 24.9 SECONDS (ref 11.5–13.4)
RBC # BLD AUTO: 5.22 10*6/MM3 (ref 4.14–5.8)
SODIUM SERPL-SCNC: 131 MMOL/L (ref 136–145)
WBC # BLD AUTO: 11.91 10*3/MM3 (ref 3.4–10.8)

## 2021-07-15 PROCEDURE — 80053 COMPREHEN METABOLIC PANEL: CPT | Performed by: NURSE PRACTITIONER

## 2021-07-15 PROCEDURE — 85610 PROTHROMBIN TIME: CPT | Performed by: NURSE PRACTITIONER

## 2021-07-15 PROCEDURE — 63710000001 INSULIN LISPRO (HUMAN) PER 5 UNITS: Performed by: NURSE PRACTITIONER

## 2021-07-15 PROCEDURE — 85025 COMPLETE CBC W/AUTO DIFF WBC: CPT | Performed by: INTERNAL MEDICINE

## 2021-07-15 PROCEDURE — 99232 SBSQ HOSP IP/OBS MODERATE 35: CPT | Performed by: NURSE PRACTITIONER

## 2021-07-15 PROCEDURE — 71046 X-RAY EXAM CHEST 2 VIEWS: CPT

## 2021-07-15 PROCEDURE — 82962 GLUCOSE BLOOD TEST: CPT

## 2021-07-15 RX ORDER — AMIODARONE HYDROCHLORIDE 200 MG/1
200 TABLET ORAL
Status: DISCONTINUED | OUTPATIENT
Start: 2021-07-16 | End: 2021-07-20

## 2021-07-15 RX ORDER — BUMETANIDE 0.25 MG/ML
2 INJECTION INTRAMUSCULAR; INTRAVENOUS ONCE
Status: DISCONTINUED | OUTPATIENT
Start: 2021-07-15 | End: 2021-07-15

## 2021-07-15 RX ORDER — BUMETANIDE 1 MG/1
1 TABLET ORAL 2 TIMES DAILY
Status: DISCONTINUED | OUTPATIENT
Start: 2021-07-15 | End: 2021-07-16

## 2021-07-15 RX ADMIN — WARFARIN SODIUM 5 MG: 5 TABLET ORAL at 17:18

## 2021-07-15 RX ADMIN — BUMETANIDE 1 MG: 1 TABLET ORAL at 17:18

## 2021-07-15 RX ADMIN — INSULIN LISPRO 5 UNITS: 100 INJECTION, SOLUTION INTRAVENOUS; SUBCUTANEOUS at 17:16

## 2021-07-15 RX ADMIN — INSULIN LISPRO 3 UNITS: 100 INJECTION, SOLUTION INTRAVENOUS; SUBCUTANEOUS at 11:38

## 2021-07-15 RX ADMIN — SODIUM CHLORIDE 60 ML/HR: 9 INJECTION, SOLUTION INTRAVENOUS at 08:37

## 2021-07-15 RX ADMIN — METOPROLOL TARTRATE 75 MG: 50 TABLET, FILM COATED ORAL at 20:24

## 2021-07-15 RX ADMIN — SODIUM CHLORIDE, PRESERVATIVE FREE 10 ML: 5 INJECTION INTRAVENOUS at 20:24

## 2021-07-15 RX ADMIN — SODIUM CHLORIDE, PRESERVATIVE FREE 10 ML: 5 INJECTION INTRAVENOUS at 08:28

## 2021-07-15 RX ADMIN — AMIODARONE HYDROCHLORIDE 400 MG: 200 TABLET ORAL at 08:28

## 2021-07-15 RX ADMIN — METOPROLOL TARTRATE 75 MG: 50 TABLET, FILM COATED ORAL at 08:28

## 2021-07-15 RX ADMIN — FAMOTIDINE 20 MG: 10 INJECTION, SOLUTION INTRAVENOUS at 08:32

## 2021-07-16 ENCOUNTER — ANTICOAGULATION VISIT (OUTPATIENT)
Dept: CARDIOLOGY | Facility: CLINIC | Age: 66
End: 2021-07-16

## 2021-07-16 PROBLEM — N17.0 ACUTE KIDNEY INJURY (AKI) WITH ACUTE TUBULAR NECROSIS (ATN) (HCC): Status: ACTIVE | Noted: 2021-07-16

## 2021-07-16 LAB
ALBUMIN SERPL-MCNC: 4.1 G/DL (ref 3.5–5.2)
ALBUMIN/GLOB SERPL: 1.9 G/DL
ALP SERPL-CCNC: 72 U/L (ref 39–117)
ALT SERPL W P-5'-P-CCNC: 207 U/L (ref 1–41)
ANION GAP SERPL CALCULATED.3IONS-SCNC: 16 MMOL/L (ref 5–15)
AST SERPL-CCNC: 135 U/L (ref 1–40)
BASOPHILS # BLD AUTO: 0.02 10*3/MM3 (ref 0–0.2)
BASOPHILS NFR BLD AUTO: 0.2 % (ref 0–1.5)
BILIRUB SERPL-MCNC: 2.8 MG/DL (ref 0–1.2)
BUN SERPL-MCNC: 76 MG/DL (ref 8–23)
BUN/CREAT SERPL: 33.5 (ref 7–25)
CALCIUM SPEC-SCNC: 9.2 MG/DL (ref 8.6–10.5)
CHLORIDE SERPL-SCNC: 94 MMOL/L (ref 98–107)
CO2 SERPL-SCNC: 22 MMOL/L (ref 22–29)
CREAT SERPL-MCNC: 2.27 MG/DL (ref 0.76–1.27)
DEPRECATED RDW RBC AUTO: 43.1 FL (ref 37–54)
EOSINOPHIL # BLD AUTO: 0 10*3/MM3 (ref 0–0.4)
EOSINOPHIL NFR BLD AUTO: 0 % (ref 0.3–6.2)
ERYTHROCYTE [DISTWIDTH] IN BLOOD BY AUTOMATED COUNT: 13.1 % (ref 12.3–15.4)
GFR SERPL CREATININE-BSD FRML MDRD: 29 ML/MIN/1.73
GLOBULIN UR ELPH-MCNC: 2.2 GM/DL
GLUCOSE BLDC GLUCOMTR-MCNC: 209 MG/DL (ref 70–130)
GLUCOSE BLDC GLUCOMTR-MCNC: 232 MG/DL (ref 70–130)
GLUCOSE BLDC GLUCOMTR-MCNC: 262 MG/DL (ref 70–130)
GLUCOSE BLDC GLUCOMTR-MCNC: 288 MG/DL (ref 70–130)
GLUCOSE SERPL-MCNC: 176 MG/DL (ref 65–99)
HCT VFR BLD AUTO: 49.8 % (ref 37.5–51)
HGB BLD-MCNC: 17.2 G/DL (ref 13–17.7)
IMM GRANULOCYTES # BLD AUTO: 0.05 10*3/MM3 (ref 0–0.05)
IMM GRANULOCYTES NFR BLD AUTO: 0.4 % (ref 0–0.5)
INR PPP: 2.86 (ref 0.91–1.09)
LYMPHOCYTES # BLD AUTO: 0.85 10*3/MM3 (ref 0.7–3.1)
LYMPHOCYTES NFR BLD AUTO: 7.6 % (ref 19.6–45.3)
MCH RBC QN AUTO: 31.4 PG (ref 26.6–33)
MCHC RBC AUTO-ENTMCNC: 34.5 G/DL (ref 31.5–35.7)
MCV RBC AUTO: 90.9 FL (ref 79–97)
MONOCYTES # BLD AUTO: 0.82 10*3/MM3 (ref 0.1–0.9)
MONOCYTES NFR BLD AUTO: 7.3 % (ref 5–12)
NEUTROPHILS NFR BLD AUTO: 84.5 % (ref 42.7–76)
NEUTROPHILS NFR BLD AUTO: 9.42 10*3/MM3 (ref 1.7–7)
NRBC BLD AUTO-RTO: 0.2 /100 WBC (ref 0–0.2)
PLATELET # BLD AUTO: 177 10*3/MM3 (ref 140–450)
PMV BLD AUTO: 10.8 FL (ref 6–12)
POTASSIUM SERPL-SCNC: 4.4 MMOL/L (ref 3.5–5.2)
PROT SERPL-MCNC: 6.3 G/DL (ref 6–8.5)
PROTHROMBIN TIME: 28 SECONDS (ref 11.5–13.4)
RBC # BLD AUTO: 5.48 10*6/MM3 (ref 4.14–5.8)
SODIUM SERPL-SCNC: 132 MMOL/L (ref 136–145)
WBC # BLD AUTO: 11.16 10*3/MM3 (ref 3.4–10.8)

## 2021-07-16 PROCEDURE — 85025 COMPLETE CBC W/AUTO DIFF WBC: CPT | Performed by: INTERNAL MEDICINE

## 2021-07-16 PROCEDURE — 82962 GLUCOSE BLOOD TEST: CPT

## 2021-07-16 PROCEDURE — 99232 SBSQ HOSP IP/OBS MODERATE 35: CPT | Performed by: PHYSICIAN ASSISTANT

## 2021-07-16 PROCEDURE — 85610 PROTHROMBIN TIME: CPT | Performed by: NURSE PRACTITIONER

## 2021-07-16 PROCEDURE — 63710000001 INSULIN LISPRO (HUMAN) PER 5 UNITS: Performed by: NURSE PRACTITIONER

## 2021-07-16 PROCEDURE — 80053 COMPREHEN METABOLIC PANEL: CPT | Performed by: NURSE PRACTITIONER

## 2021-07-16 PROCEDURE — 36415 COLL VENOUS BLD VENIPUNCTURE: CPT | Performed by: NURSE PRACTITIONER

## 2021-07-16 RX ORDER — FAMOTIDINE 20 MG/1
20 TABLET, FILM COATED ORAL DAILY
Status: DISCONTINUED | OUTPATIENT
Start: 2021-07-17 | End: 2021-07-28 | Stop reason: HOSPADM

## 2021-07-16 RX ORDER — BUMETANIDE 0.25 MG/ML
2 INJECTION INTRAMUSCULAR; INTRAVENOUS ONCE
Status: COMPLETED | OUTPATIENT
Start: 2021-07-16 | End: 2021-07-16

## 2021-07-16 RX ADMIN — AMIODARONE HYDROCHLORIDE 200 MG: 200 TABLET ORAL at 08:27

## 2021-07-16 RX ADMIN — INSULIN LISPRO 8 UNITS: 100 INJECTION, SOLUTION INTRAVENOUS; SUBCUTANEOUS at 18:12

## 2021-07-16 RX ADMIN — FAMOTIDINE 20 MG: 10 INJECTION, SOLUTION INTRAVENOUS at 08:27

## 2021-07-16 RX ADMIN — METOPROLOL TARTRATE 75 MG: 50 TABLET, FILM COATED ORAL at 21:30

## 2021-07-16 RX ADMIN — BUMETANIDE 1 MG: 1 TABLET ORAL at 05:08

## 2021-07-16 RX ADMIN — BUMETANIDE 2 MG: 0.25 INJECTION INTRAMUSCULAR; INTRAVENOUS at 18:08

## 2021-07-16 RX ADMIN — SODIUM CHLORIDE, PRESERVATIVE FREE 10 ML: 5 INJECTION INTRAVENOUS at 08:39

## 2021-07-16 RX ADMIN — WARFARIN SODIUM 5 MG: 5 TABLET ORAL at 18:08

## 2021-07-16 RX ADMIN — METOPROLOL TARTRATE 75 MG: 50 TABLET, FILM COATED ORAL at 08:27

## 2021-07-16 RX ADMIN — INSULIN LISPRO 5 UNITS: 100 INJECTION, SOLUTION INTRAVENOUS; SUBCUTANEOUS at 12:03

## 2021-07-16 RX ADMIN — SODIUM CHLORIDE, PRESERVATIVE FREE 10 ML: 5 INJECTION INTRAVENOUS at 21:30

## 2021-07-16 RX ADMIN — INSULIN LISPRO 5 UNITS: 100 INJECTION, SOLUTION INTRAVENOUS; SUBCUTANEOUS at 08:34

## 2021-07-17 PROBLEM — T50.8X5A CONTRAST DYE INDUCED NEPHROPATHY: Status: ACTIVE | Noted: 2021-07-17

## 2021-07-17 PROBLEM — N14.11 CONTRAST DYE INDUCED NEPHROPATHY: Status: ACTIVE | Noted: 2021-07-17

## 2021-07-17 LAB
ALBUMIN SERPL-MCNC: 4 G/DL (ref 3.5–5.2)
ALBUMIN/GLOB SERPL: 2 G/DL
ALP SERPL-CCNC: 69 U/L (ref 39–117)
ALT SERPL W P-5'-P-CCNC: 258 U/L (ref 1–41)
ANION GAP SERPL CALCULATED.3IONS-SCNC: 12 MMOL/L (ref 5–15)
AST SERPL-CCNC: 157 U/L (ref 1–40)
BASOPHILS # BLD AUTO: 0.01 10*3/MM3 (ref 0–0.2)
BASOPHILS NFR BLD AUTO: 0.1 % (ref 0–1.5)
BILIRUB SERPL-MCNC: 2.1 MG/DL (ref 0–1.2)
BUN SERPL-MCNC: 61 MG/DL (ref 8–23)
BUN/CREAT SERPL: 28.9 (ref 7–25)
CALCIUM SPEC-SCNC: 8.8 MG/DL (ref 8.6–10.5)
CHLORIDE SERPL-SCNC: 93 MMOL/L (ref 98–107)
CO2 SERPL-SCNC: 30 MMOL/L (ref 22–29)
CREAT SERPL-MCNC: 2.11 MG/DL (ref 0.76–1.27)
DEPRECATED RDW RBC AUTO: 44 FL (ref 37–54)
EOSINOPHIL # BLD AUTO: 0.03 10*3/MM3 (ref 0–0.4)
EOSINOPHIL NFR BLD AUTO: 0.3 % (ref 0.3–6.2)
ERYTHROCYTE [DISTWIDTH] IN BLOOD BY AUTOMATED COUNT: 13.2 % (ref 12.3–15.4)
GFR SERPL CREATININE-BSD FRML MDRD: 32 ML/MIN/1.73
GLOBULIN UR ELPH-MCNC: 2 GM/DL
GLUCOSE BLDC GLUCOMTR-MCNC: 176 MG/DL (ref 70–130)
GLUCOSE BLDC GLUCOMTR-MCNC: 186 MG/DL (ref 70–130)
GLUCOSE BLDC GLUCOMTR-MCNC: 186 MG/DL (ref 70–130)
GLUCOSE BLDC GLUCOMTR-MCNC: 391 MG/DL (ref 70–130)
GLUCOSE BLDC GLUCOMTR-MCNC: 416 MG/DL (ref 70–130)
GLUCOSE SERPL-MCNC: 198 MG/DL (ref 65–99)
HCT VFR BLD AUTO: 47.5 % (ref 37.5–51)
HGB BLD-MCNC: 16.1 G/DL (ref 13–17.7)
IMM GRANULOCYTES # BLD AUTO: 0.06 10*3/MM3 (ref 0–0.05)
IMM GRANULOCYTES NFR BLD AUTO: 0.7 % (ref 0–0.5)
INR PPP: 3.56 (ref 0.91–1.09)
LYMPHOCYTES # BLD AUTO: 0.61 10*3/MM3 (ref 0.7–3.1)
LYMPHOCYTES NFR BLD AUTO: 6.8 % (ref 19.6–45.3)
MCH RBC QN AUTO: 31.4 PG (ref 26.6–33)
MCHC RBC AUTO-ENTMCNC: 33.9 G/DL (ref 31.5–35.7)
MCV RBC AUTO: 92.8 FL (ref 79–97)
MONOCYTES # BLD AUTO: 0.78 10*3/MM3 (ref 0.1–0.9)
MONOCYTES NFR BLD AUTO: 8.7 % (ref 5–12)
NEUTROPHILS NFR BLD AUTO: 7.47 10*3/MM3 (ref 1.7–7)
NEUTROPHILS NFR BLD AUTO: 83.4 % (ref 42.7–76)
NRBC BLD AUTO-RTO: 0.3 /100 WBC (ref 0–0.2)
PLATELET # BLD AUTO: 160 10*3/MM3 (ref 140–450)
PMV BLD AUTO: 10.5 FL (ref 6–12)
POTASSIUM SERPL-SCNC: 3.5 MMOL/L (ref 3.5–5.2)
PROT SERPL-MCNC: 6 G/DL (ref 6–8.5)
PROTHROMBIN TIME: 33.2 SECONDS (ref 11.5–13.4)
RBC # BLD AUTO: 5.12 10*6/MM3 (ref 4.14–5.8)
SODIUM SERPL-SCNC: 135 MMOL/L (ref 136–145)
WBC # BLD AUTO: 8.96 10*3/MM3 (ref 3.4–10.8)

## 2021-07-17 PROCEDURE — 80053 COMPREHEN METABOLIC PANEL: CPT | Performed by: NURSE PRACTITIONER

## 2021-07-17 PROCEDURE — 85025 COMPLETE CBC W/AUTO DIFF WBC: CPT | Performed by: INTERNAL MEDICINE

## 2021-07-17 PROCEDURE — 36415 COLL VENOUS BLD VENIPUNCTURE: CPT | Performed by: NURSE PRACTITIONER

## 2021-07-17 PROCEDURE — 85610 PROTHROMBIN TIME: CPT | Performed by: NURSE PRACTITIONER

## 2021-07-17 PROCEDURE — 82962 GLUCOSE BLOOD TEST: CPT

## 2021-07-17 PROCEDURE — 99232 SBSQ HOSP IP/OBS MODERATE 35: CPT | Performed by: PHYSICIAN ASSISTANT

## 2021-07-17 PROCEDURE — 63710000001 INSULIN LISPRO (HUMAN) PER 5 UNITS: Performed by: NURSE PRACTITIONER

## 2021-07-17 RX ORDER — BUMETANIDE 0.25 MG/ML
2 INJECTION INTRAMUSCULAR; INTRAVENOUS EVERY 12 HOURS
Status: DISCONTINUED | OUTPATIENT
Start: 2021-07-17 | End: 2021-07-18

## 2021-07-17 RX ORDER — POTASSIUM CHLORIDE 750 MG/1
40 CAPSULE, EXTENDED RELEASE ORAL 2 TIMES DAILY WITH MEALS
Status: DISCONTINUED | OUTPATIENT
Start: 2021-07-17 | End: 2021-07-19

## 2021-07-17 RX ADMIN — BUMETANIDE 2 MG: 0.25 INJECTION INTRAMUSCULAR; INTRAVENOUS at 09:00

## 2021-07-17 RX ADMIN — POTASSIUM CHLORIDE 40 MEQ: 750 CAPSULE, EXTENDED RELEASE ORAL at 17:31

## 2021-07-17 RX ADMIN — INSULIN LISPRO 14 UNITS: 100 INJECTION, SOLUTION INTRAVENOUS; SUBCUTANEOUS at 12:42

## 2021-07-17 RX ADMIN — AMIODARONE HYDROCHLORIDE 200 MG: 200 TABLET ORAL at 09:01

## 2021-07-17 RX ADMIN — METOPROLOL TARTRATE 75 MG: 50 TABLET, FILM COATED ORAL at 20:22

## 2021-07-17 RX ADMIN — SODIUM CHLORIDE, PRESERVATIVE FREE 10 ML: 5 INJECTION INTRAVENOUS at 20:23

## 2021-07-17 RX ADMIN — BUMETANIDE 2 MG: 0.25 INJECTION INTRAMUSCULAR; INTRAVENOUS at 20:22

## 2021-07-17 RX ADMIN — INSULIN LISPRO 3 UNITS: 100 INJECTION, SOLUTION INTRAVENOUS; SUBCUTANEOUS at 09:14

## 2021-07-17 RX ADMIN — FAMOTIDINE 20 MG: 20 TABLET, FILM COATED ORAL at 09:01

## 2021-07-17 RX ADMIN — INSULIN LISPRO 3 UNITS: 100 INJECTION, SOLUTION INTRAVENOUS; SUBCUTANEOUS at 17:31

## 2021-07-17 RX ADMIN — SODIUM CHLORIDE, PRESERVATIVE FREE 10 ML: 5 INJECTION INTRAVENOUS at 09:01

## 2021-07-17 RX ADMIN — METOPROLOL TARTRATE 75 MG: 50 TABLET, FILM COATED ORAL at 09:01

## 2021-07-17 RX ADMIN — POTASSIUM CHLORIDE 40 MEQ: 750 CAPSULE, EXTENDED RELEASE ORAL at 09:00

## 2021-07-18 LAB
ALBUMIN SERPL-MCNC: 4 G/DL (ref 3.5–5.2)
ALBUMIN/GLOB SERPL: 2 G/DL
ALP SERPL-CCNC: 76 U/L (ref 39–117)
ALT SERPL W P-5'-P-CCNC: 313 U/L (ref 1–41)
ANION GAP SERPL CALCULATED.3IONS-SCNC: 15 MMOL/L (ref 5–15)
AST SERPL-CCNC: 186 U/L (ref 1–40)
BASOPHILS # BLD AUTO: 0.02 10*3/MM3 (ref 0–0.2)
BASOPHILS NFR BLD AUTO: 0.2 % (ref 0–1.5)
BILIRUB SERPL-MCNC: 2.2 MG/DL (ref 0–1.2)
BUN SERPL-MCNC: 56 MG/DL (ref 8–23)
BUN/CREAT SERPL: 31.3 (ref 7–25)
CALCIUM SPEC-SCNC: 9.2 MG/DL (ref 8.6–10.5)
CHLORIDE SERPL-SCNC: 94 MMOL/L (ref 98–107)
CO2 SERPL-SCNC: 29 MMOL/L (ref 22–29)
CREAT SERPL-MCNC: 1.79 MG/DL (ref 0.76–1.27)
DEPRECATED RDW RBC AUTO: 44.5 FL (ref 37–54)
EOSINOPHIL # BLD AUTO: 0.01 10*3/MM3 (ref 0–0.4)
EOSINOPHIL NFR BLD AUTO: 0.1 % (ref 0.3–6.2)
ERYTHROCYTE [DISTWIDTH] IN BLOOD BY AUTOMATED COUNT: 13.2 % (ref 12.3–15.4)
GFR SERPL CREATININE-BSD FRML MDRD: 38 ML/MIN/1.73
GLOBULIN UR ELPH-MCNC: 2 GM/DL
GLUCOSE BLDC GLUCOMTR-MCNC: 169 MG/DL (ref 70–130)
GLUCOSE BLDC GLUCOMTR-MCNC: 187 MG/DL (ref 70–130)
GLUCOSE BLDC GLUCOMTR-MCNC: 212 MG/DL (ref 70–130)
GLUCOSE BLDC GLUCOMTR-MCNC: 213 MG/DL (ref 70–130)
GLUCOSE BLDC GLUCOMTR-MCNC: 301 MG/DL (ref 70–130)
GLUCOSE SERPL-MCNC: 191 MG/DL (ref 65–99)
HCT VFR BLD AUTO: 49.7 % (ref 37.5–51)
HGB BLD-MCNC: 16.4 G/DL (ref 13–17.7)
IMM GRANULOCYTES # BLD AUTO: 0.04 10*3/MM3 (ref 0–0.05)
IMM GRANULOCYTES NFR BLD AUTO: 0.3 % (ref 0–0.5)
INR PPP: 3.38 (ref 0.91–1.09)
LYMPHOCYTES # BLD AUTO: 0.53 10*3/MM3 (ref 0.7–3.1)
LYMPHOCYTES NFR BLD AUTO: 4.5 % (ref 19.6–45.3)
MCH RBC QN AUTO: 31.1 PG (ref 26.6–33)
MCHC RBC AUTO-ENTMCNC: 33 G/DL (ref 31.5–35.7)
MCV RBC AUTO: 94.3 FL (ref 79–97)
MONOCYTES # BLD AUTO: 0.81 10*3/MM3 (ref 0.1–0.9)
MONOCYTES NFR BLD AUTO: 6.9 % (ref 5–12)
NEUTROPHILS NFR BLD AUTO: 10.26 10*3/MM3 (ref 1.7–7)
NEUTROPHILS NFR BLD AUTO: 88 % (ref 42.7–76)
NRBC BLD AUTO-RTO: 0 /100 WBC (ref 0–0.2)
PLATELET # BLD AUTO: 168 10*3/MM3 (ref 140–450)
PMV BLD AUTO: 10.6 FL (ref 6–12)
POTASSIUM SERPL-SCNC: 4.3 MMOL/L (ref 3.5–5.2)
PROT SERPL-MCNC: 6 G/DL (ref 6–8.5)
PROTHROMBIN TIME: 31.9 SECONDS (ref 11.5–13.4)
RBC # BLD AUTO: 5.27 10*6/MM3 (ref 4.14–5.8)
SODIUM SERPL-SCNC: 138 MMOL/L (ref 136–145)
WBC # BLD AUTO: 11.67 10*3/MM3 (ref 3.4–10.8)

## 2021-07-18 PROCEDURE — 80053 COMPREHEN METABOLIC PANEL: CPT | Performed by: NURSE PRACTITIONER

## 2021-07-18 PROCEDURE — 85025 COMPLETE CBC W/AUTO DIFF WBC: CPT | Performed by: INTERNAL MEDICINE

## 2021-07-18 PROCEDURE — 63710000001 INSULIN LISPRO (HUMAN) PER 5 UNITS: Performed by: NURSE PRACTITIONER

## 2021-07-18 PROCEDURE — 99232 SBSQ HOSP IP/OBS MODERATE 35: CPT | Performed by: PHYSICIAN ASSISTANT

## 2021-07-18 PROCEDURE — 82962 GLUCOSE BLOOD TEST: CPT

## 2021-07-18 PROCEDURE — 85610 PROTHROMBIN TIME: CPT | Performed by: NURSE PRACTITIONER

## 2021-07-18 RX ORDER — METOPROLOL TARTRATE 100 MG/1
100 TABLET ORAL 2 TIMES DAILY
Status: DISCONTINUED | OUTPATIENT
Start: 2021-07-18 | End: 2021-07-28 | Stop reason: HOSPADM

## 2021-07-18 RX ADMIN — FAMOTIDINE 20 MG: 20 TABLET, FILM COATED ORAL at 08:17

## 2021-07-18 RX ADMIN — AMIODARONE HYDROCHLORIDE 200 MG: 200 TABLET ORAL at 08:17

## 2021-07-18 RX ADMIN — SODIUM CHLORIDE, PRESERVATIVE FREE 10 ML: 5 INJECTION INTRAVENOUS at 20:32

## 2021-07-18 RX ADMIN — BUMETANIDE 2 MG: 0.25 INJECTION INTRAMUSCULAR; INTRAVENOUS at 08:17

## 2021-07-18 RX ADMIN — METOPROLOL TARTRATE 75 MG: 50 TABLET, FILM COATED ORAL at 08:18

## 2021-07-18 RX ADMIN — POTASSIUM CHLORIDE 40 MEQ: 750 CAPSULE, EXTENDED RELEASE ORAL at 17:58

## 2021-07-18 RX ADMIN — METOPROLOL TARTRATE 100 MG: 100 TABLET ORAL at 20:32

## 2021-07-18 RX ADMIN — SODIUM CHLORIDE, PRESERVATIVE FREE 10 ML: 5 INJECTION INTRAVENOUS at 08:18

## 2021-07-18 RX ADMIN — POTASSIUM CHLORIDE 40 MEQ: 750 CAPSULE, EXTENDED RELEASE ORAL at 08:17

## 2021-07-18 RX ADMIN — INSULIN LISPRO 10 UNITS: 100 INJECTION, SOLUTION INTRAVENOUS; SUBCUTANEOUS at 11:43

## 2021-07-18 RX ADMIN — INSULIN LISPRO 3 UNITS: 100 INJECTION, SOLUTION INTRAVENOUS; SUBCUTANEOUS at 08:17

## 2021-07-19 ENCOUNTER — APPOINTMENT (OUTPATIENT)
Dept: CT IMAGING | Facility: HOSPITAL | Age: 66
End: 2021-07-19

## 2021-07-19 ENCOUNTER — APPOINTMENT (OUTPATIENT)
Dept: GENERAL RADIOLOGY | Facility: HOSPITAL | Age: 66
End: 2021-07-19

## 2021-07-19 ENCOUNTER — APPOINTMENT (OUTPATIENT)
Dept: ULTRASOUND IMAGING | Facility: HOSPITAL | Age: 66
End: 2021-07-19

## 2021-07-19 ENCOUNTER — ANTICOAGULATION VISIT (OUTPATIENT)
Dept: CARDIOLOGY | Facility: CLINIC | Age: 66
End: 2021-07-19

## 2021-07-19 PROBLEM — E87.5 HYPERKALEMIA: Status: ACTIVE | Noted: 2021-07-19

## 2021-07-19 PROBLEM — R79.89 ABNORMAL LFTS: Status: ACTIVE | Noted: 2021-07-19

## 2021-07-19 LAB
ALBUMIN SERPL-MCNC: 4 G/DL (ref 3.5–5.2)
ALBUMIN SERPL-MCNC: 4.1 G/DL (ref 3.5–5.2)
ALBUMIN SERPL-MCNC: 4.2 G/DL (ref 3.5–5.2)
ALBUMIN/GLOB SERPL: 1.6 G/DL
ALBUMIN/GLOB SERPL: 1.9 G/DL
ALBUMIN/GLOB SERPL: 2.2 G/DL
ALP SERPL-CCNC: 86 U/L (ref 39–117)
ALP SERPL-CCNC: 86 U/L (ref 39–117)
ALP SERPL-CCNC: 88 U/L (ref 39–117)
ALT SERPL W P-5'-P-CCNC: 1197 U/L (ref 1–41)
ALT SERPL W P-5'-P-CCNC: 294 U/L (ref 1–41)
ALT SERPL W P-5'-P-CCNC: 785 U/L (ref 1–41)
AMMONIA BLD-SCNC: 51 UMOL/L (ref 16–60)
ANION GAP SERPL CALCULATED.3IONS-SCNC: 18 MMOL/L (ref 5–15)
ANION GAP SERPL CALCULATED.3IONS-SCNC: 19 MMOL/L (ref 5–15)
ANION GAP SERPL CALCULATED.3IONS-SCNC: 23 MMOL/L (ref 5–15)
ARTERIAL PATENCY WRIST A: POSITIVE
AST SERPL-CCNC: 1204 U/L (ref 1–40)
AST SERPL-CCNC: 183 U/L (ref 1–40)
AST SERPL-CCNC: 2231 U/L (ref 1–40)
ATMOSPHERIC PRESS: 752 MMHG
BASE EXCESS BLDA CALC-SCNC: -2.3 MMOL/L (ref 0–2)
BASOPHILS # BLD AUTO: 0.05 10*3/MM3 (ref 0–0.2)
BASOPHILS # BLD AUTO: 0.11 10*3/MM3 (ref 0–0.2)
BASOPHILS NFR BLD AUTO: 0.3 % (ref 0–1.5)
BASOPHILS NFR BLD AUTO: 0.6 % (ref 0–1.5)
BDY SITE: ABNORMAL
BILIRUB CONJ SERPL-MCNC: 2.6 MG/DL (ref 0–0.3)
BILIRUB INDIRECT SERPL-MCNC: 3.3 MG/DL
BILIRUB SERPL-MCNC: 4.1 MG/DL (ref 0–1.2)
BILIRUB SERPL-MCNC: 5.6 MG/DL (ref 0–1.2)
BILIRUB SERPL-MCNC: 5.9 MG/DL (ref 0–1.2)
BILIRUB SERPL-MCNC: 5.9 MG/DL (ref 0–1.2)
BODY TEMPERATURE: 37 C
BUN SERPL-MCNC: 59 MG/DL (ref 8–23)
BUN SERPL-MCNC: 66 MG/DL (ref 8–23)
BUN SERPL-MCNC: 71 MG/DL (ref 8–23)
BUN/CREAT SERPL: 23.7 (ref 7–25)
BUN/CREAT SERPL: 25.5 (ref 7–25)
BUN/CREAT SERPL: 26.1 (ref 7–25)
CALCIUM SPEC-SCNC: 9.6 MG/DL (ref 8.6–10.5)
CALCIUM SPEC-SCNC: 9.6 MG/DL (ref 8.6–10.5)
CALCIUM SPEC-SCNC: 9.9 MG/DL (ref 8.6–10.5)
CHLORIDE SERPL-SCNC: 89 MMOL/L (ref 98–107)
CHLORIDE SERPL-SCNC: 90 MMOL/L (ref 98–107)
CHLORIDE SERPL-SCNC: 93 MMOL/L (ref 98–107)
CO2 SERPL-SCNC: 22 MMOL/L (ref 22–29)
CO2 SERPL-SCNC: 25 MMOL/L (ref 22–29)
CO2 SERPL-SCNC: 26 MMOL/L (ref 22–29)
CREAT SERPL-MCNC: 2.49 MG/DL (ref 0.76–1.27)
CREAT SERPL-MCNC: 2.59 MG/DL (ref 0.76–1.27)
CREAT SERPL-MCNC: 2.72 MG/DL (ref 0.76–1.27)
CREAT UR-MCNC: 141 MG/DL
DEPRECATED RDW RBC AUTO: 46.2 FL (ref 37–54)
DEPRECATED RDW RBC AUTO: 47 FL (ref 37–54)
EOSINOPHIL # BLD AUTO: 0 10*3/MM3 (ref 0–0.4)
EOSINOPHIL # BLD AUTO: 0.01 10*3/MM3 (ref 0–0.4)
EOSINOPHIL NFR BLD AUTO: 0 % (ref 0.3–6.2)
EOSINOPHIL NFR BLD AUTO: 0.1 % (ref 0.3–6.2)
ERYTHROCYTE [DISTWIDTH] IN BLOOD BY AUTOMATED COUNT: 14.1 % (ref 12.3–15.4)
ERYTHROCYTE [DISTWIDTH] IN BLOOD BY AUTOMATED COUNT: 14.2 % (ref 12.3–15.4)
GAS FLOW AIRWAY: 2 LPM
GFR SERPL CREATININE-BSD FRML MDRD: 24 ML/MIN/1.73
GFR SERPL CREATININE-BSD FRML MDRD: 25 ML/MIN/1.73
GFR SERPL CREATININE-BSD FRML MDRD: 26 ML/MIN/1.73
GGT SERPL-CCNC: 35 U/L (ref 8–61)
GLOBULIN UR ELPH-MCNC: 1.9 GM/DL
GLOBULIN UR ELPH-MCNC: 2.2 GM/DL
GLOBULIN UR ELPH-MCNC: 2.5 GM/DL
GLUCOSE BLDC GLUCOMTR-MCNC: 139 MG/DL (ref 70–130)
GLUCOSE BLDC GLUCOMTR-MCNC: 242 MG/DL (ref 70–130)
GLUCOSE SERPL-MCNC: 170 MG/DL (ref 65–99)
GLUCOSE SERPL-MCNC: 191 MG/DL (ref 65–99)
GLUCOSE SERPL-MCNC: 209 MG/DL (ref 65–99)
HCO3 BLDA-SCNC: 22.7 MMOL/L (ref 20–26)
HCT VFR BLD AUTO: 56.5 % (ref 37.5–51)
HCT VFR BLD AUTO: 57.7 % (ref 37.5–51)
HGB BLD-MCNC: 18.9 G/DL (ref 13–17.7)
HGB BLD-MCNC: 19.5 G/DL (ref 13–17.7)
IMM GRANULOCYTES # BLD AUTO: 0.09 10*3/MM3 (ref 0–0.05)
IMM GRANULOCYTES # BLD AUTO: 0.15 10*3/MM3 (ref 0–0.05)
IMM GRANULOCYTES NFR BLD AUTO: 0.6 % (ref 0–0.5)
IMM GRANULOCYTES NFR BLD AUTO: 0.8 % (ref 0–0.5)
INR PPP: 4.43 (ref 0.91–1.09)
LIPASE SERPL-CCNC: 70 U/L (ref 13–60)
LYMPHOCYTES # BLD AUTO: 0.43 10*3/MM3 (ref 0.7–3.1)
LYMPHOCYTES # BLD AUTO: 0.7 10*3/MM3 (ref 0.7–3.1)
LYMPHOCYTES NFR BLD AUTO: 2.2 % (ref 19.6–45.3)
LYMPHOCYTES NFR BLD AUTO: 4.9 % (ref 19.6–45.3)
Lab: ABNORMAL
MCH RBC QN AUTO: 31.6 PG (ref 26.6–33)
MCH RBC QN AUTO: 32.1 PG (ref 26.6–33)
MCHC RBC AUTO-ENTMCNC: 33.5 G/DL (ref 31.5–35.7)
MCHC RBC AUTO-ENTMCNC: 33.8 G/DL (ref 31.5–35.7)
MCV RBC AUTO: 94.3 FL (ref 79–97)
MCV RBC AUTO: 94.9 FL (ref 79–97)
MODALITY: ABNORMAL
MONOCYTES # BLD AUTO: 0.85 10*3/MM3 (ref 0.1–0.9)
MONOCYTES # BLD AUTO: 1.17 10*3/MM3 (ref 0.1–0.9)
MONOCYTES NFR BLD AUTO: 5.9 % (ref 5–12)
MONOCYTES NFR BLD AUTO: 5.9 % (ref 5–12)
NEUTROPHILS NFR BLD AUTO: 12.7 10*3/MM3 (ref 1.7–7)
NEUTROPHILS NFR BLD AUTO: 17.95 10*3/MM3 (ref 1.7–7)
NEUTROPHILS NFR BLD AUTO: 88.2 % (ref 42.7–76)
NEUTROPHILS NFR BLD AUTO: 90.5 % (ref 42.7–76)
NRBC BLD AUTO-RTO: 0.5 /100 WBC (ref 0–0.2)
NRBC BLD AUTO-RTO: 0.6 /100 WBC (ref 0–0.2)
PCO2 BLDA: 39.2 MM HG (ref 35–45)
PCO2 TEMP ADJ BLD: 39.2 MM HG (ref 35–45)
PH BLDA: 7.37 PH UNITS (ref 7.35–7.45)
PH, TEMP CORRECTED: 7.37 PH UNITS (ref 7.35–7.45)
PLATELET # BLD AUTO: 169 10*3/MM3 (ref 140–450)
PLATELET # BLD AUTO: 215 10*3/MM3 (ref 140–450)
PMV BLD AUTO: 10.2 FL (ref 6–12)
PMV BLD AUTO: 11 FL (ref 6–12)
PO2 BLDA: 73.8 MM HG (ref 83–108)
PO2 TEMP ADJ BLD: 73.8 MM HG (ref 83–108)
POTASSIUM SERPL-SCNC: 4.6 MMOL/L (ref 3.5–5.2)
POTASSIUM SERPL-SCNC: 5.9 MMOL/L (ref 3.5–5.2)
POTASSIUM SERPL-SCNC: 6 MMOL/L (ref 3.5–5.2)
POTASSIUM SERPL-SCNC: 6.1 MMOL/L (ref 3.5–5.2)
PROCALCITONIN SERPL-MCNC: 0.45 NG/ML (ref 0–0.25)
PROT SERPL-MCNC: 6 G/DL (ref 6–8.5)
PROT SERPL-MCNC: 6.4 G/DL (ref 6–8.5)
PROT SERPL-MCNC: 6.5 G/DL (ref 6–8.5)
PROTHROMBIN TIME: 39.3 SECONDS (ref 11.5–13.4)
RBC # BLD AUTO: 5.99 10*6/MM3 (ref 4.14–5.8)
RBC # BLD AUTO: 6.08 10*6/MM3 (ref 4.14–5.8)
SAO2 % BLDCOA: 93.5 % (ref 94–99)
SODIUM SERPL-SCNC: 134 MMOL/L (ref 136–145)
SODIUM SERPL-SCNC: 135 MMOL/L (ref 136–145)
SODIUM SERPL-SCNC: 136 MMOL/L (ref 136–145)
TROPONIN T SERPL-MCNC: 0.02 NG/ML (ref 0–0.03)
UUN 24H UR-MCNC: 316 MG/DL
VENTILATOR MODE: ABNORMAL
WBC # BLD AUTO: 14.4 10*3/MM3 (ref 3.4–10.8)
WBC # BLD AUTO: 19.81 10*3/MM3 (ref 3.4–10.8)

## 2021-07-19 PROCEDURE — 82803 BLOOD GASES ANY COMBINATION: CPT

## 2021-07-19 PROCEDURE — 85610 PROTHROMBIN TIME: CPT | Performed by: NURSE PRACTITIONER

## 2021-07-19 PROCEDURE — 80053 COMPREHEN METABOLIC PANEL: CPT | Performed by: INTERNAL MEDICINE

## 2021-07-19 PROCEDURE — 84132 ASSAY OF SERUM POTASSIUM: CPT | Performed by: INTERNAL MEDICINE

## 2021-07-19 PROCEDURE — 76705 ECHO EXAM OF ABDOMEN: CPT

## 2021-07-19 PROCEDURE — 85025 COMPLETE CBC W/AUTO DIFF WBC: CPT | Performed by: INTERNAL MEDICINE

## 2021-07-19 PROCEDURE — 82570 ASSAY OF URINE CREATININE: CPT | Performed by: INTERNAL MEDICINE

## 2021-07-19 PROCEDURE — 83690 ASSAY OF LIPASE: CPT | Performed by: INTERNAL MEDICINE

## 2021-07-19 PROCEDURE — 84145 PROCALCITONIN (PCT): CPT | Performed by: INTERNAL MEDICINE

## 2021-07-19 PROCEDURE — 93005 ELECTROCARDIOGRAM TRACING: CPT | Performed by: INTERNAL MEDICINE

## 2021-07-19 PROCEDURE — 25010000002 CALCIUM GLUCONATE PER 10 ML: Performed by: INTERNAL MEDICINE

## 2021-07-19 PROCEDURE — 70450 CT HEAD/BRAIN W/O DYE: CPT

## 2021-07-19 PROCEDURE — 63710000001 INSULIN REGULAR HUMAN PER 5 UNITS: Performed by: INTERNAL MEDICINE

## 2021-07-19 PROCEDURE — 63710000001 INSULIN LISPRO (HUMAN) PER 5 UNITS: Performed by: NURSE PRACTITIONER

## 2021-07-19 PROCEDURE — 36600 WITHDRAWAL OF ARTERIAL BLOOD: CPT

## 2021-07-19 PROCEDURE — 71045 X-RAY EXAM CHEST 1 VIEW: CPT

## 2021-07-19 PROCEDURE — 82248 BILIRUBIN DIRECT: CPT | Performed by: INTERNAL MEDICINE

## 2021-07-19 PROCEDURE — 36415 COLL VENOUS BLD VENIPUNCTURE: CPT | Performed by: NURSE PRACTITIONER

## 2021-07-19 PROCEDURE — 80053 COMPREHEN METABOLIC PANEL: CPT | Performed by: NURSE PRACTITIONER

## 2021-07-19 PROCEDURE — 82140 ASSAY OF AMMONIA: CPT | Performed by: INTERNAL MEDICINE

## 2021-07-19 PROCEDURE — 99223 1ST HOSP IP/OBS HIGH 75: CPT | Performed by: INTERNAL MEDICINE

## 2021-07-19 PROCEDURE — 84540 ASSAY OF URINE/UREA-N: CPT | Performed by: INTERNAL MEDICINE

## 2021-07-19 PROCEDURE — 82977 ASSAY OF GGT: CPT | Performed by: INTERNAL MEDICINE

## 2021-07-19 PROCEDURE — 82247 BILIRUBIN TOTAL: CPT | Performed by: INTERNAL MEDICINE

## 2021-07-19 PROCEDURE — 82962 GLUCOSE BLOOD TEST: CPT

## 2021-07-19 PROCEDURE — 99232 SBSQ HOSP IP/OBS MODERATE 35: CPT | Performed by: EMERGENCY MEDICINE

## 2021-07-19 PROCEDURE — 84484 ASSAY OF TROPONIN QUANT: CPT | Performed by: INTERNAL MEDICINE

## 2021-07-19 PROCEDURE — 93010 ELECTROCARDIOGRAM REPORT: CPT | Performed by: INTERNAL MEDICINE

## 2021-07-19 RX ORDER — SODIUM POLYSTYRENE SULFONATE 15 G/60ML
15 SUSPENSION ORAL; RECTAL ONCE
Status: COMPLETED | OUTPATIENT
Start: 2021-07-19 | End: 2021-07-19

## 2021-07-19 RX ORDER — DEXTROSE MONOHYDRATE 25 G/50ML
50 INJECTION, SOLUTION INTRAVENOUS ONCE
Status: COMPLETED | OUTPATIENT
Start: 2021-07-19 | End: 2021-07-19

## 2021-07-19 RX ADMIN — SODIUM CHLORIDE, PRESERVATIVE FREE 10 ML: 5 INJECTION INTRAVENOUS at 08:33

## 2021-07-19 RX ADMIN — SODIUM CHLORIDE, PRESERVATIVE FREE 10 ML: 5 INJECTION INTRAVENOUS at 20:02

## 2021-07-19 RX ADMIN — FAMOTIDINE 20 MG: 20 TABLET, FILM COATED ORAL at 09:09

## 2021-07-19 RX ADMIN — METOPROLOL TARTRATE 100 MG: 100 TABLET ORAL at 09:09

## 2021-07-19 RX ADMIN — SODIUM POLYSTYRENE SULFONATE 15 G: 15 SUSPENSION ORAL; RECTAL at 03:01

## 2021-07-19 RX ADMIN — CALCIUM GLUCONATE 1 G: 98 INJECTION, SOLUTION INTRAVENOUS at 09:10

## 2021-07-19 RX ADMIN — INSULIN LISPRO 5 UNITS: 100 INJECTION, SOLUTION INTRAVENOUS; SUBCUTANEOUS at 11:45

## 2021-07-19 RX ADMIN — INSULIN LISPRO 3 UNITS: 100 INJECTION, SOLUTION INTRAVENOUS; SUBCUTANEOUS at 08:32

## 2021-07-19 RX ADMIN — INSULIN LISPRO 5 UNITS: 100 INJECTION, SOLUTION INTRAVENOUS; SUBCUTANEOUS at 18:02

## 2021-07-19 RX ADMIN — AMIODARONE HYDROCHLORIDE 200 MG: 200 TABLET ORAL at 09:09

## 2021-07-19 RX ADMIN — METOPROLOL TARTRATE 100 MG: 100 TABLET ORAL at 20:02

## 2021-07-19 RX ADMIN — INSULIN HUMAN 5 UNITS: 100 INJECTION, SOLUTION PARENTERAL at 09:10

## 2021-07-19 RX ADMIN — DEXTROSE MONOHYDRATE 50 ML: 500 INJECTION PARENTERAL at 09:10

## 2021-07-20 ENCOUNTER — ANTICOAGULATION VISIT (OUTPATIENT)
Dept: CARDIOLOGY | Facility: CLINIC | Age: 66
End: 2021-07-20

## 2021-07-20 PROBLEM — D68.9 COAGULOPATHY (HCC): Status: ACTIVE | Noted: 2021-07-20

## 2021-07-20 PROBLEM — E87.1 HYPONATREMIA: Status: ACTIVE | Noted: 2021-07-20

## 2021-07-20 LAB
ALBUMIN SERPL-MCNC: 3.8 G/DL (ref 3.5–5.2)
ALBUMIN SERPL-MCNC: 4 G/DL (ref 3.5–5.2)
ALBUMIN/GLOB SERPL: 1.5 G/DL
ALBUMIN/GLOB SERPL: 1.8 G/DL
ALP SERPL-CCNC: 103 U/L (ref 39–117)
ALP SERPL-CCNC: 97 U/L (ref 39–117)
ALT SERPL W P-5'-P-CCNC: 1258 U/L (ref 1–41)
ALT SERPL W P-5'-P-CCNC: 1357 U/L (ref 1–41)
ANION GAP SERPL CALCULATED.3IONS-SCNC: 12 MMOL/L (ref 5–15)
ANION GAP SERPL CALCULATED.3IONS-SCNC: 16 MMOL/L (ref 5–15)
AST SERPL-CCNC: 1144 U/L (ref 1–40)
AST SERPL-CCNC: 1964 U/L (ref 1–40)
BILIRUB SERPL-MCNC: 4.5 MG/DL (ref 0–1.2)
BILIRUB SERPL-MCNC: 5.2 MG/DL (ref 0–1.2)
BUN SERPL-MCNC: 79 MG/DL (ref 8–23)
BUN SERPL-MCNC: 81 MG/DL (ref 8–23)
BUN/CREAT SERPL: 31.2 (ref 7–25)
BUN/CREAT SERPL: 38.2 (ref 7–25)
CALCIUM SPEC-SCNC: 9.5 MG/DL (ref 8.6–10.5)
CALCIUM SPEC-SCNC: 9.5 MG/DL (ref 8.6–10.5)
CHLORIDE SERPL-SCNC: 86 MMOL/L (ref 98–107)
CHLORIDE SERPL-SCNC: 90 MMOL/L (ref 98–107)
CO2 SERPL-SCNC: 25 MMOL/L (ref 22–29)
CO2 SERPL-SCNC: 28 MMOL/L (ref 22–29)
CREAT SERPL-MCNC: 2.12 MG/DL (ref 0.76–1.27)
CREAT SERPL-MCNC: 2.53 MG/DL (ref 0.76–1.27)
DEPRECATED RDW RBC AUTO: 46.1 FL (ref 37–54)
ERYTHROCYTE [DISTWIDTH] IN BLOOD BY AUTOMATED COUNT: 14.3 % (ref 12.3–15.4)
GFR SERPL CREATININE-BSD FRML MDRD: 26 ML/MIN/1.73
GFR SERPL CREATININE-BSD FRML MDRD: 32 ML/MIN/1.73
GLOBULIN UR ELPH-MCNC: 2.1 GM/DL
GLOBULIN UR ELPH-MCNC: 2.6 GM/DL
GLUCOSE BLDC GLUCOMTR-MCNC: 206 MG/DL (ref 70–130)
GLUCOSE BLDC GLUCOMTR-MCNC: 294 MG/DL (ref 70–130)
GLUCOSE BLDC GLUCOMTR-MCNC: 332 MG/DL (ref 70–130)
GLUCOSE SERPL-MCNC: 128 MG/DL (ref 65–99)
GLUCOSE SERPL-MCNC: 312 MG/DL (ref 65–99)
HCT VFR BLD AUTO: 55.5 % (ref 37.5–51)
HGB BLD-MCNC: 18.9 G/DL (ref 13–17.7)
INR PPP: 5.55 (ref 0.91–1.09)
MCH RBC QN AUTO: 31.8 PG (ref 26.6–33)
MCHC RBC AUTO-ENTMCNC: 34.1 G/DL (ref 31.5–35.7)
MCV RBC AUTO: 93.3 FL (ref 79–97)
OSMOLALITY SERPL: 319 MOSM/KG (ref 280–301)
OSMOLALITY UR: 532 MOSM/KG (ref 50–1400)
PLATELET # BLD AUTO: 205 10*3/MM3 (ref 140–450)
PMV BLD AUTO: 10.9 FL (ref 6–12)
POTASSIUM SERPL-SCNC: 4.2 MMOL/L (ref 3.5–5.2)
POTASSIUM SERPL-SCNC: 4.4 MMOL/L (ref 3.5–5.2)
PROT SERPL-MCNC: 5.9 G/DL (ref 6–8.5)
PROT SERPL-MCNC: 6.6 G/DL (ref 6–8.5)
PROTHROMBIN TIME: 46.8 SECONDS (ref 11.5–13.4)
QT INTERVAL: 466 MS
QTC INTERVAL: 582 MS
RBC # BLD AUTO: 5.95 10*6/MM3 (ref 4.14–5.8)
SODIUM SERPL-SCNC: 126 MMOL/L (ref 136–145)
SODIUM SERPL-SCNC: 131 MMOL/L (ref 136–145)
WBC # BLD AUTO: 14.32 10*3/MM3 (ref 3.4–10.8)

## 2021-07-20 PROCEDURE — 25010000002 VITAMIN K1 PER 1 MG: Performed by: INTERNAL MEDICINE

## 2021-07-20 PROCEDURE — 85610 PROTHROMBIN TIME: CPT | Performed by: NURSE PRACTITIONER

## 2021-07-20 PROCEDURE — 63710000001 INSULIN LISPRO (HUMAN) PER 5 UNITS: Performed by: NURSE PRACTITIONER

## 2021-07-20 PROCEDURE — 83930 ASSAY OF BLOOD OSMOLALITY: CPT | Performed by: INTERNAL MEDICINE

## 2021-07-20 PROCEDURE — 85027 COMPLETE CBC AUTOMATED: CPT | Performed by: INTERNAL MEDICINE

## 2021-07-20 PROCEDURE — 36415 COLL VENOUS BLD VENIPUNCTURE: CPT | Performed by: NURSE PRACTITIONER

## 2021-07-20 PROCEDURE — 83935 ASSAY OF URINE OSMOLALITY: CPT | Performed by: INTERNAL MEDICINE

## 2021-07-20 PROCEDURE — 99232 SBSQ HOSP IP/OBS MODERATE 35: CPT | Performed by: EMERGENCY MEDICINE

## 2021-07-20 PROCEDURE — 80053 COMPREHEN METABOLIC PANEL: CPT | Performed by: INTERNAL MEDICINE

## 2021-07-20 PROCEDURE — 80053 COMPREHEN METABOLIC PANEL: CPT | Performed by: NURSE PRACTITIONER

## 2021-07-20 PROCEDURE — 82962 GLUCOSE BLOOD TEST: CPT

## 2021-07-20 RX ORDER — PHYTONADIONE 2 MG/ML
5 INJECTION, EMULSION INTRAMUSCULAR; INTRAVENOUS; SUBCUTANEOUS ONCE
Status: COMPLETED | OUTPATIENT
Start: 2021-07-20 | End: 2021-07-20

## 2021-07-20 RX ORDER — SODIUM CHLORIDE 9 MG/ML
50 INJECTION, SOLUTION INTRAVENOUS ONCE
Status: COMPLETED | OUTPATIENT
Start: 2021-07-20 | End: 2021-07-20

## 2021-07-20 RX ORDER — MEXILETINE HYDROCHLORIDE 200 MG/1
200 CAPSULE ORAL EVERY 8 HOURS SCHEDULED
Status: DISCONTINUED | OUTPATIENT
Start: 2021-07-20 | End: 2021-07-24

## 2021-07-20 RX ADMIN — INSULIN LISPRO 8 UNITS: 100 INJECTION, SOLUTION INTRAVENOUS; SUBCUTANEOUS at 08:34

## 2021-07-20 RX ADMIN — METOPROLOL TARTRATE 100 MG: 100 TABLET ORAL at 08:34

## 2021-07-20 RX ADMIN — INSULIN LISPRO 5 UNITS: 100 INJECTION, SOLUTION INTRAVENOUS; SUBCUTANEOUS at 17:18

## 2021-07-20 RX ADMIN — METOPROLOL TARTRATE 100 MG: 100 TABLET ORAL at 21:12

## 2021-07-20 RX ADMIN — PHYTONADIONE 5 MG: 10 INJECTION, EMULSION INTRAMUSCULAR; INTRAVENOUS; SUBCUTANEOUS at 09:42

## 2021-07-20 RX ADMIN — MEXILETINE HYDROCHLORIDE 200 MG: 200 CAPSULE ORAL at 12:02

## 2021-07-20 RX ADMIN — SODIUM CHLORIDE 50 ML/HR: 9 INJECTION, SOLUTION INTRAVENOUS at 08:35

## 2021-07-20 RX ADMIN — INSULIN LISPRO 10 UNITS: 100 INJECTION, SOLUTION INTRAVENOUS; SUBCUTANEOUS at 12:02

## 2021-07-20 RX ADMIN — SODIUM CHLORIDE, PRESERVATIVE FREE 10 ML: 5 INJECTION INTRAVENOUS at 21:13

## 2021-07-20 RX ADMIN — FAMOTIDINE 20 MG: 20 TABLET, FILM COATED ORAL at 08:34

## 2021-07-20 RX ADMIN — SODIUM CHLORIDE, PRESERVATIVE FREE 10 ML: 5 INJECTION INTRAVENOUS at 08:35

## 2021-07-20 RX ADMIN — MEXILETINE HYDROCHLORIDE 200 MG: 200 CAPSULE ORAL at 21:12

## 2021-07-21 ENCOUNTER — ANTICOAGULATION VISIT (OUTPATIENT)
Dept: CARDIOLOGY | Facility: CLINIC | Age: 66
End: 2021-07-21

## 2021-07-21 LAB
ALBUMIN SERPL-MCNC: 4 G/DL (ref 3.5–5.2)
ALBUMIN/GLOB SERPL: 1.6 G/DL
ALP SERPL-CCNC: 99 U/L (ref 39–117)
ALT SERPL W P-5'-P-CCNC: 1167 U/L (ref 1–41)
ANION GAP SERPL CALCULATED.3IONS-SCNC: 14 MMOL/L (ref 5–15)
AST SERPL-CCNC: 783 U/L (ref 1–40)
BASOPHILS # BLD AUTO: 0.05 10*3/MM3 (ref 0–0.2)
BASOPHILS NFR BLD AUTO: 0.4 % (ref 0–1.5)
BILIRUB SERPL-MCNC: 4.9 MG/DL (ref 0–1.2)
BUN SERPL-MCNC: 82 MG/DL (ref 8–23)
BUN/CREAT SERPL: 37.8 (ref 7–25)
CALCIUM SPEC-SCNC: 9.7 MG/DL (ref 8.6–10.5)
CHLORIDE SERPL-SCNC: 92 MMOL/L (ref 98–107)
CK SERPL-CCNC: 87 U/L (ref 20–200)
CO2 SERPL-SCNC: 29 MMOL/L (ref 22–29)
CREAT SERPL-MCNC: 2.17 MG/DL (ref 0.76–1.27)
DEPRECATED RDW RBC AUTO: 46.4 FL (ref 37–54)
EOSINOPHIL # BLD AUTO: 0.03 10*3/MM3 (ref 0–0.4)
EOSINOPHIL NFR BLD AUTO: 0.2 % (ref 0.3–6.2)
ERYTHROCYTE [DISTWIDTH] IN BLOOD BY AUTOMATED COUNT: 14.2 % (ref 12.3–15.4)
GFR SERPL CREATININE-BSD FRML MDRD: 31 ML/MIN/1.73
GLOBULIN UR ELPH-MCNC: 2.5 GM/DL
GLUCOSE BLDC GLUCOMTR-MCNC: 135 MG/DL (ref 70–130)
GLUCOSE BLDC GLUCOMTR-MCNC: 161 MG/DL (ref 70–130)
GLUCOSE BLDC GLUCOMTR-MCNC: 217 MG/DL (ref 70–130)
GLUCOSE BLDC GLUCOMTR-MCNC: 338 MG/DL (ref 70–130)
GLUCOSE BLDC GLUCOMTR-MCNC: 342 MG/DL (ref 70–130)
GLUCOSE SERPL-MCNC: 169 MG/DL (ref 65–99)
HCT VFR BLD AUTO: 54.3 % (ref 37.5–51)
HGB BLD-MCNC: 18.5 G/DL (ref 13–17.7)
IMM GRANULOCYTES # BLD AUTO: 0.14 10*3/MM3 (ref 0–0.05)
IMM GRANULOCYTES NFR BLD AUTO: 1.1 % (ref 0–0.5)
INR PPP: 4.57 (ref 0.91–1.09)
LYMPHOCYTES # BLD AUTO: 0.72 10*3/MM3 (ref 0.7–3.1)
LYMPHOCYTES NFR BLD AUTO: 5.7 % (ref 19.6–45.3)
MCH RBC QN AUTO: 31.9 PG (ref 26.6–33)
MCHC RBC AUTO-ENTMCNC: 34.1 G/DL (ref 31.5–35.7)
MCV RBC AUTO: 93.6 FL (ref 79–97)
MONOCYTES # BLD AUTO: 0.95 10*3/MM3 (ref 0.1–0.9)
MONOCYTES NFR BLD AUTO: 7.6 % (ref 5–12)
NEUTROPHILS NFR BLD AUTO: 10.68 10*3/MM3 (ref 1.7–7)
NEUTROPHILS NFR BLD AUTO: 85 % (ref 42.7–76)
NRBC BLD AUTO-RTO: 0.9 /100 WBC (ref 0–0.2)
PLATELET # BLD AUTO: 186 10*3/MM3 (ref 140–450)
PMV BLD AUTO: 10.4 FL (ref 6–12)
POTASSIUM SERPL-SCNC: 4.3 MMOL/L (ref 3.5–5.2)
PROT SERPL-MCNC: 6.5 G/DL (ref 6–8.5)
PROTHROMBIN TIME: 40.3 SECONDS (ref 11.5–13.4)
RBC # BLD AUTO: 5.8 10*6/MM3 (ref 4.14–5.8)
SODIUM SERPL-SCNC: 135 MMOL/L (ref 136–145)
WBC # BLD AUTO: 12.57 10*3/MM3 (ref 3.4–10.8)

## 2021-07-21 PROCEDURE — 97165 OT EVAL LOW COMPLEX 30 MIN: CPT | Performed by: OCCUPATIONAL THERAPIST

## 2021-07-21 PROCEDURE — 25010000002 LORAZEPAM PER 2 MG: Performed by: NURSE PRACTITIONER

## 2021-07-21 PROCEDURE — 82550 ASSAY OF CK (CPK): CPT | Performed by: INTERNAL MEDICINE

## 2021-07-21 PROCEDURE — 85025 COMPLETE CBC W/AUTO DIFF WBC: CPT | Performed by: INTERNAL MEDICINE

## 2021-07-21 PROCEDURE — 97162 PT EVAL MOD COMPLEX 30 MIN: CPT

## 2021-07-21 PROCEDURE — 25010000002 LORAZEPAM PER 2 MG: Performed by: INTERNAL MEDICINE

## 2021-07-21 PROCEDURE — 85610 PROTHROMBIN TIME: CPT | Performed by: NURSE PRACTITIONER

## 2021-07-21 PROCEDURE — 63710000001 INSULIN LISPRO (HUMAN) PER 5 UNITS: Performed by: INTERNAL MEDICINE

## 2021-07-21 PROCEDURE — 63710000001 INSULIN LISPRO (HUMAN) PER 5 UNITS: Performed by: NURSE PRACTITIONER

## 2021-07-21 PROCEDURE — 80053 COMPREHEN METABOLIC PANEL: CPT | Performed by: NURSE PRACTITIONER

## 2021-07-21 PROCEDURE — 82962 GLUCOSE BLOOD TEST: CPT

## 2021-07-21 RX ORDER — BUMETANIDE 1 MG/1
1 TABLET ORAL DAILY
Status: DISCONTINUED | OUTPATIENT
Start: 2021-07-22 | End: 2021-07-22

## 2021-07-21 RX ADMIN — INSULIN LISPRO 10 UNITS: 100 INJECTION, SOLUTION INTRAVENOUS; SUBCUTANEOUS at 17:32

## 2021-07-21 RX ADMIN — MEXILETINE HYDROCHLORIDE 200 MG: 200 CAPSULE ORAL at 21:13

## 2021-07-21 RX ADMIN — METOPROLOL TARTRATE 100 MG: 100 TABLET ORAL at 09:19

## 2021-07-21 RX ADMIN — SODIUM CHLORIDE, PRESERVATIVE FREE 10 ML: 5 INJECTION INTRAVENOUS at 09:19

## 2021-07-21 RX ADMIN — MEXILETINE HYDROCHLORIDE 200 MG: 200 CAPSULE ORAL at 14:58

## 2021-07-21 RX ADMIN — INSULIN LISPRO 10 UNITS: 100 INJECTION, SOLUTION INTRAVENOUS; SUBCUTANEOUS at 12:11

## 2021-07-21 RX ADMIN — MEXILETINE HYDROCHLORIDE 200 MG: 200 CAPSULE ORAL at 06:06

## 2021-07-21 RX ADMIN — LORAZEPAM 0.5 MG: 2 INJECTION INTRAMUSCULAR; INTRAVENOUS at 11:07

## 2021-07-21 RX ADMIN — LORAZEPAM 0.5 MG: 2 INJECTION INTRAMUSCULAR; INTRAVENOUS at 17:35

## 2021-07-21 RX ADMIN — SODIUM CHLORIDE, PRESERVATIVE FREE 10 ML: 5 INJECTION INTRAVENOUS at 21:14

## 2021-07-22 ENCOUNTER — ANTICOAGULATION VISIT (OUTPATIENT)
Dept: CARDIOLOGY | Facility: CLINIC | Age: 66
End: 2021-07-22

## 2021-07-22 LAB
ALBUMIN SERPL-MCNC: 3.7 G/DL (ref 3.5–5.2)
ALBUMIN/GLOB SERPL: 1.6 G/DL
ALP SERPL-CCNC: 107 U/L (ref 39–117)
ALT SERPL W P-5'-P-CCNC: 1029 U/L (ref 1–41)
ANION GAP SERPL CALCULATED.3IONS-SCNC: 16 MMOL/L (ref 5–15)
ANION GAP SERPL CALCULATED.3IONS-SCNC: 18 MMOL/L (ref 5–15)
ARTERIAL PATENCY WRIST A: POSITIVE
AST SERPL-CCNC: 695 U/L (ref 1–40)
ATMOSPHERIC PRESS: 753 MMHG
BASE EXCESS BLDA CALC-SCNC: -3.4 MMOL/L (ref 0–2)
BDY SITE: ABNORMAL
BILIRUB SERPL-MCNC: 5 MG/DL (ref 0–1.2)
BODY TEMPERATURE: 37 C
BUN SERPL-MCNC: 101 MG/DL (ref 8–23)
BUN SERPL-MCNC: 99 MG/DL (ref 8–23)
BUN/CREAT SERPL: 30.6 (ref 7–25)
BUN/CREAT SERPL: 33.4 (ref 7–25)
CALCIUM SPEC-SCNC: 9.3 MG/DL (ref 8.6–10.5)
CALCIUM SPEC-SCNC: 9.6 MG/DL (ref 8.6–10.5)
CHLORIDE SERPL-SCNC: 86 MMOL/L (ref 98–107)
CHLORIDE SERPL-SCNC: 89 MMOL/L (ref 98–107)
CO2 SERPL-SCNC: 24 MMOL/L (ref 22–29)
CO2 SERPL-SCNC: 26 MMOL/L (ref 22–29)
CREAT SERPL-MCNC: 3.02 MG/DL (ref 0.76–1.27)
CREAT SERPL-MCNC: 3.24 MG/DL (ref 0.76–1.27)
GAS FLOW AIRWAY: 7 LPM
GFR SERPL CREATININE-BSD FRML MDRD: 19 ML/MIN/1.73
GFR SERPL CREATININE-BSD FRML MDRD: 21 ML/MIN/1.73
GLOBULIN UR ELPH-MCNC: 2.3 GM/DL
GLUCOSE BLDC GLUCOMTR-MCNC: 121 MG/DL (ref 70–130)
GLUCOSE BLDC GLUCOMTR-MCNC: 168 MG/DL (ref 70–130)
GLUCOSE BLDC GLUCOMTR-MCNC: 225 MG/DL (ref 70–130)
GLUCOSE BLDC GLUCOMTR-MCNC: 230 MG/DL (ref 70–130)
GLUCOSE SERPL-MCNC: 136 MG/DL (ref 65–99)
GLUCOSE SERPL-MCNC: 256 MG/DL (ref 65–99)
HCO3 BLDA-SCNC: 18.4 MMOL/L (ref 20–26)
INR PPP: 4.36 (ref 0.91–1.09)
Lab: ABNORMAL
MODALITY: ABNORMAL
PCO2 BLDA: 26.4 MM HG (ref 35–45)
PCO2 TEMP ADJ BLD: 26.4 MM HG (ref 35–45)
PH BLDA: 7.45 PH UNITS (ref 7.35–7.45)
PH, TEMP CORRECTED: 7.45 PH UNITS (ref 7.35–7.45)
PO2 BLDA: 159 MM HG (ref 83–108)
PO2 TEMP ADJ BLD: 159 MM HG (ref 83–108)
POTASSIUM SERPL-SCNC: 4.5 MMOL/L (ref 3.5–5.2)
POTASSIUM SERPL-SCNC: 5 MMOL/L (ref 3.5–5.2)
PROT SERPL-MCNC: 6 G/DL (ref 6–8.5)
PROTHROMBIN TIME: 38.8 SECONDS (ref 11.5–13.4)
SAO2 % BLDCOA: 99.6 % (ref 94–99)
SODIUM SERPL-SCNC: 128 MMOL/L (ref 136–145)
SODIUM SERPL-SCNC: 131 MMOL/L (ref 136–145)
VENTILATOR MODE: ABNORMAL

## 2021-07-22 PROCEDURE — 36600 WITHDRAWAL OF ARTERIAL BLOOD: CPT

## 2021-07-22 PROCEDURE — 82803 BLOOD GASES ANY COMBINATION: CPT

## 2021-07-22 PROCEDURE — 99232 SBSQ HOSP IP/OBS MODERATE 35: CPT | Performed by: NURSE PRACTITIONER

## 2021-07-22 PROCEDURE — 63710000001 INSULIN LISPRO (HUMAN) PER 5 UNITS: Performed by: INTERNAL MEDICINE

## 2021-07-22 PROCEDURE — 94799 UNLISTED PULMONARY SVC/PX: CPT

## 2021-07-22 PROCEDURE — 80053 COMPREHEN METABOLIC PANEL: CPT | Performed by: INTERNAL MEDICINE

## 2021-07-22 PROCEDURE — 25010000002 LORAZEPAM PER 2 MG: Performed by: INTERNAL MEDICINE

## 2021-07-22 PROCEDURE — 85610 PROTHROMBIN TIME: CPT | Performed by: INTERNAL MEDICINE

## 2021-07-22 PROCEDURE — 97116 GAIT TRAINING THERAPY: CPT

## 2021-07-22 PROCEDURE — 36415 COLL VENOUS BLD VENIPUNCTURE: CPT | Performed by: INTERNAL MEDICINE

## 2021-07-22 PROCEDURE — 82962 GLUCOSE BLOOD TEST: CPT

## 2021-07-22 PROCEDURE — 97535 SELF CARE MNGMENT TRAINING: CPT

## 2021-07-22 RX ADMIN — MEXILETINE HYDROCHLORIDE 200 MG: 200 CAPSULE ORAL at 20:54

## 2021-07-22 RX ADMIN — SODIUM CHLORIDE, PRESERVATIVE FREE 10 ML: 5 INJECTION INTRAVENOUS at 20:54

## 2021-07-22 RX ADMIN — METOPROLOL TARTRATE 100 MG: 100 TABLET ORAL at 08:50

## 2021-07-22 RX ADMIN — BUMETANIDE 1 MG: 1 TABLET ORAL at 08:49

## 2021-07-22 RX ADMIN — MEXILETINE HYDROCHLORIDE 200 MG: 200 CAPSULE ORAL at 05:34

## 2021-07-22 RX ADMIN — LORAZEPAM 0.5 MG: 2 INJECTION INTRAMUSCULAR; INTRAVENOUS at 05:51

## 2021-07-22 RX ADMIN — SODIUM CHLORIDE, PRESERVATIVE FREE 10 ML: 5 INJECTION INTRAVENOUS at 08:50

## 2021-07-22 RX ADMIN — INSULIN LISPRO 5 UNITS: 100 INJECTION, SOLUTION INTRAVENOUS; SUBCUTANEOUS at 17:24

## 2021-07-22 RX ADMIN — MEXILETINE HYDROCHLORIDE 200 MG: 200 CAPSULE ORAL at 13:09

## 2021-07-22 RX ADMIN — INSULIN LISPRO 5 UNITS: 100 INJECTION, SOLUTION INTRAVENOUS; SUBCUTANEOUS at 11:07

## 2021-07-23 ENCOUNTER — ANESTHESIA (OUTPATIENT)
Dept: PERIOP | Facility: HOSPITAL | Age: 66
End: 2021-07-23

## 2021-07-23 ENCOUNTER — APPOINTMENT (OUTPATIENT)
Dept: INTERVENTIONAL RADIOLOGY/VASCULAR | Facility: HOSPITAL | Age: 66
End: 2021-07-23

## 2021-07-23 ENCOUNTER — ANTICOAGULATION VISIT (OUTPATIENT)
Dept: CARDIOLOGY | Facility: CLINIC | Age: 66
End: 2021-07-23

## 2021-07-23 ENCOUNTER — ANESTHESIA EVENT (OUTPATIENT)
Dept: PERIOP | Facility: HOSPITAL | Age: 66
End: 2021-07-23

## 2021-07-23 PROBLEM — I50.84 END STAGE HEART FAILURE (HCC): Status: ACTIVE | Noted: 2021-07-23

## 2021-07-23 LAB
ABO GROUP BLD: NORMAL
ALBUMIN SERPL-MCNC: 3.8 G/DL (ref 3.5–5.2)
ALBUMIN/GLOB SERPL: 1.7 G/DL
ALP SERPL-CCNC: 101 U/L (ref 39–117)
ALT SERPL W P-5'-P-CCNC: 1180 U/L (ref 1–41)
ANION GAP SERPL CALCULATED.3IONS-SCNC: 20 MMOL/L (ref 5–15)
AST SERPL-CCNC: 898 U/L (ref 1–40)
BILIRUB SERPL-MCNC: 5.9 MG/DL (ref 0–1.2)
BLD GP AB SCN SERPL QL: NEGATIVE
BUN SERPL-MCNC: 114 MG/DL (ref 8–23)
BUN/CREAT SERPL: 28.1 (ref 7–25)
CALCIUM SPEC-SCNC: 9.5 MG/DL (ref 8.6–10.5)
CHLORIDE SERPL-SCNC: 87 MMOL/L (ref 98–107)
CO2 SERPL-SCNC: 23 MMOL/L (ref 22–29)
CREAT SERPL-MCNC: 4.06 MG/DL (ref 0.76–1.27)
DEPRECATED RDW RBC AUTO: 46.8 FL (ref 37–54)
ERYTHROCYTE [DISTWIDTH] IN BLOOD BY AUTOMATED COUNT: 15.9 % (ref 12.3–15.4)
GFR SERPL CREATININE-BSD FRML MDRD: 15 ML/MIN/1.73
GLOBULIN UR ELPH-MCNC: 2.2 GM/DL
GLUCOSE BLDC GLUCOMTR-MCNC: 104 MG/DL (ref 70–130)
GLUCOSE BLDC GLUCOMTR-MCNC: 123 MG/DL (ref 70–130)
GLUCOSE BLDC GLUCOMTR-MCNC: 88 MG/DL (ref 70–130)
GLUCOSE SERPL-MCNC: 120 MG/DL (ref 65–99)
HAV IGM SERPL QL IA: NORMAL
HBV CORE IGM SERPL QL IA: NORMAL
HBV SURFACE AB SER RIA-ACNC: NORMAL
HBV SURFACE AG SERPL QL IA: NORMAL
HCT VFR BLD AUTO: 55.5 % (ref 37.5–51)
HCV AB SER DONR QL: NORMAL
HGB BLD-MCNC: 18.5 G/DL (ref 13–17.7)
INR PPP: 3.86 (ref 0.91–1.09)
MCH RBC QN AUTO: 31.4 PG (ref 26.6–33)
MCHC RBC AUTO-ENTMCNC: 33.3 G/DL (ref 31.5–35.7)
MCV RBC AUTO: 94.2 FL (ref 79–97)
PLATELET # BLD AUTO: 178 10*3/MM3 (ref 140–450)
PMV BLD AUTO: 11.3 FL (ref 6–12)
POTASSIUM SERPL-SCNC: 5.2 MMOL/L (ref 3.5–5.2)
PROT SERPL-MCNC: 6 G/DL (ref 6–8.5)
PROTHROMBIN TIME: 35.3 SECONDS (ref 11.5–13.4)
RBC # BLD AUTO: 5.89 10*6/MM3 (ref 4.14–5.8)
RH BLD: POSITIVE
SARS-COV-2 RNA PNL SPEC NAA+PROBE: NOT DETECTED
SODIUM SERPL-SCNC: 130 MMOL/L (ref 136–145)
T&S EXPIRATION DATE: NORMAL
WBC # BLD AUTO: 13.18 10*3/MM3 (ref 3.4–10.8)

## 2021-07-23 PROCEDURE — 86900 BLOOD TYPING SEROLOGIC ABO: CPT | Performed by: SURGERY

## 2021-07-23 PROCEDURE — 80074 ACUTE HEPATITIS PANEL: CPT | Performed by: INTERNAL MEDICINE

## 2021-07-23 PROCEDURE — 36558 INSERT TUNNELED CV CATH: CPT

## 2021-07-23 PROCEDURE — 99232 SBSQ HOSP IP/OBS MODERATE 35: CPT | Performed by: INTERNAL MEDICINE

## 2021-07-23 PROCEDURE — C1750 CATH, HEMODIALYSIS,LONG-TERM: HCPCS | Performed by: SURGERY

## 2021-07-23 PROCEDURE — 76000 FLUOROSCOPY <1 HR PHYS/QHP: CPT

## 2021-07-23 PROCEDURE — 87635 SARS-COV-2 COVID-19 AMP PRB: CPT | Performed by: SURGERY

## 2021-07-23 PROCEDURE — 97110 THERAPEUTIC EXERCISES: CPT

## 2021-07-23 PROCEDURE — 25010000002 DOBUTAMINE PER 250 MG: Performed by: SURGERY

## 2021-07-23 PROCEDURE — 25010000002 HEPARIN (PORCINE) PER 1000 UNITS: Performed by: INTERNAL MEDICINE

## 2021-07-23 PROCEDURE — 77001 FLUOROGUIDE FOR VEIN DEVICE: CPT | Performed by: SURGERY

## 2021-07-23 PROCEDURE — 0JH60XZ INSERTION OF TUNNELED VASCULAR ACCESS DEVICE INTO CHEST SUBCUTANEOUS TISSUE AND FASCIA, OPEN APPROACH: ICD-10-PCS | Performed by: SURGERY

## 2021-07-23 PROCEDURE — 25010000002 VITAMIN K1 PER 1 MG: Performed by: SURGERY

## 2021-07-23 PROCEDURE — 82962 GLUCOSE BLOOD TEST: CPT

## 2021-07-23 PROCEDURE — 97116 GAIT TRAINING THERAPY: CPT

## 2021-07-23 PROCEDURE — 5A1D70Z PERFORMANCE OF URINARY FILTRATION, INTERMITTENT, LESS THAN 6 HOURS PER DAY: ICD-10-PCS | Performed by: INTERNAL MEDICINE

## 2021-07-23 PROCEDURE — 86901 BLOOD TYPING SEROLOGIC RH(D): CPT | Performed by: SURGERY

## 2021-07-23 PROCEDURE — 85027 COMPLETE CBC AUTOMATED: CPT | Performed by: INTERNAL MEDICINE

## 2021-07-23 PROCEDURE — 99222 1ST HOSP IP/OBS MODERATE 55: CPT | Performed by: SURGERY

## 2021-07-23 PROCEDURE — 25010000002 DOBUTAMINE PER 250 MG: Performed by: INTERNAL MEDICINE

## 2021-07-23 PROCEDURE — 86706 HEP B SURFACE ANTIBODY: CPT | Performed by: INTERNAL MEDICINE

## 2021-07-23 PROCEDURE — 86850 RBC ANTIBODY SCREEN: CPT | Performed by: SURGERY

## 2021-07-23 PROCEDURE — 25010000002 HEPARIN (PORCINE) PER 1000 UNITS: Performed by: SURGERY

## 2021-07-23 PROCEDURE — 02HV33Z INSERTION OF INFUSION DEVICE INTO SUPERIOR VENA CAVA, PERCUTANEOUS APPROACH: ICD-10-PCS | Performed by: SURGERY

## 2021-07-23 PROCEDURE — 85610 PROTHROMBIN TIME: CPT | Performed by: INTERNAL MEDICINE

## 2021-07-23 PROCEDURE — 77001 FLUOROGUIDE FOR VEIN DEVICE: CPT

## 2021-07-23 PROCEDURE — 99232 SBSQ HOSP IP/OBS MODERATE 35: CPT | Performed by: CLINICAL NURSE SPECIALIST

## 2021-07-23 PROCEDURE — 80053 COMPREHEN METABOLIC PANEL: CPT | Performed by: INTERNAL MEDICINE

## 2021-07-23 PROCEDURE — 36415 COLL VENOUS BLD VENIPUNCTURE: CPT | Performed by: INTERNAL MEDICINE

## 2021-07-23 PROCEDURE — 25010000002 VANCOMYCIN 1 G RECONSTITUTED SOLUTION: Performed by: SURGERY

## 2021-07-23 PROCEDURE — B548ZZA ULTRASONOGRAPHY OF SUPERIOR VENA CAVA, GUIDANCE: ICD-10-PCS | Performed by: SURGERY

## 2021-07-23 PROCEDURE — 36558 INSERT TUNNELED CV CATH: CPT | Performed by: SURGERY

## 2021-07-23 PROCEDURE — C1894 INTRO/SHEATH, NON-LASER: HCPCS | Performed by: SURGERY

## 2021-07-23 RX ORDER — FENTANYL CITRATE 50 UG/ML
25 INJECTION, SOLUTION INTRAMUSCULAR; INTRAVENOUS
Status: DISCONTINUED | OUTPATIENT
Start: 2021-07-23 | End: 2021-07-23 | Stop reason: HOSPADM

## 2021-07-23 RX ORDER — ALBUMIN (HUMAN) 12.5 G/50ML
12.5 SOLUTION INTRAVENOUS AS NEEDED
Status: ACTIVE | OUTPATIENT
Start: 2021-07-23 | End: 2021-07-24

## 2021-07-23 RX ORDER — SODIUM CHLORIDE 9 MG/ML
100 INJECTION, SOLUTION INTRAVENOUS CONTINUOUS
Status: DISCONTINUED | OUTPATIENT
Start: 2021-07-23 | End: 2021-07-23

## 2021-07-23 RX ORDER — ONDANSETRON 2 MG/ML
4 INJECTION INTRAMUSCULAR; INTRAVENOUS EVERY 6 HOURS PRN
Status: DISCONTINUED | OUTPATIENT
Start: 2021-07-23 | End: 2021-07-28 | Stop reason: HOSPADM

## 2021-07-23 RX ORDER — LIDOCAINE HYDROCHLORIDE 10 MG/ML
0.5 INJECTION, SOLUTION EPIDURAL; INFILTRATION; INTRACAUDAL; PERINEURAL ONCE AS NEEDED
Status: DISCONTINUED | OUTPATIENT
Start: 2021-07-23 | End: 2021-07-23

## 2021-07-23 RX ORDER — HEPARIN SODIUM 1000 [USP'U]/ML
3200 INJECTION, SOLUTION INTRAVENOUS; SUBCUTANEOUS AS NEEDED
Status: DISCONTINUED | OUTPATIENT
Start: 2021-07-23 | End: 2021-07-25

## 2021-07-23 RX ORDER — HYDROMORPHONE HYDROCHLORIDE 1 MG/ML
0.25 INJECTION, SOLUTION INTRAMUSCULAR; INTRAVENOUS; SUBCUTANEOUS
Status: DISCONTINUED | OUTPATIENT
Start: 2021-07-23 | End: 2021-07-23 | Stop reason: HOSPADM

## 2021-07-23 RX ORDER — SODIUM CHLORIDE 0.9 % (FLUSH) 0.9 %
3-10 SYRINGE (ML) INJECTION AS NEEDED
Status: DISCONTINUED | OUTPATIENT
Start: 2021-07-23 | End: 2021-07-23

## 2021-07-23 RX ORDER — BUMETANIDE 0.25 MG/ML
2 INJECTION INTRAMUSCULAR; INTRAVENOUS ONCE
Status: COMPLETED | OUTPATIENT
Start: 2021-07-23 | End: 2021-07-23

## 2021-07-23 RX ORDER — ONDANSETRON 4 MG/1
4 TABLET, FILM COATED ORAL EVERY 6 HOURS PRN
Status: DISCONTINUED | OUTPATIENT
Start: 2021-07-23 | End: 2021-07-28 | Stop reason: HOSPADM

## 2021-07-23 RX ORDER — FLUMAZENIL 0.1 MG/ML
0.2 INJECTION INTRAVENOUS AS NEEDED
Status: DISCONTINUED | OUTPATIENT
Start: 2021-07-23 | End: 2021-07-23 | Stop reason: HOSPADM

## 2021-07-23 RX ORDER — DOBUTAMINE HYDROCHLORIDE 100 MG/100ML
10 INJECTION INTRAVENOUS
Status: DISCONTINUED | OUTPATIENT
Start: 2021-07-23 | End: 2021-07-27

## 2021-07-23 RX ORDER — HYDROCODONE BITARTRATE AND ACETAMINOPHEN 5; 325 MG/1; MG/1
1 TABLET ORAL EVERY 4 HOURS PRN
Status: DISCONTINUED | OUTPATIENT
Start: 2021-07-23 | End: 2021-07-28 | Stop reason: HOSPADM

## 2021-07-23 RX ORDER — ONDANSETRON 2 MG/ML
4 INJECTION INTRAMUSCULAR; INTRAVENOUS AS NEEDED
Status: DISCONTINUED | OUTPATIENT
Start: 2021-07-23 | End: 2021-07-23 | Stop reason: HOSPADM

## 2021-07-23 RX ORDER — SODIUM CHLORIDE, SODIUM LACTATE, POTASSIUM CHLORIDE, CALCIUM CHLORIDE 600; 310; 30; 20 MG/100ML; MG/100ML; MG/100ML; MG/100ML
100 INJECTION, SOLUTION INTRAVENOUS CONTINUOUS
Status: DISCONTINUED | OUTPATIENT
Start: 2021-07-23 | End: 2021-07-23

## 2021-07-23 RX ORDER — LABETALOL HYDROCHLORIDE 5 MG/ML
5 INJECTION, SOLUTION INTRAVENOUS
Status: DISCONTINUED | OUTPATIENT
Start: 2021-07-23 | End: 2021-07-23 | Stop reason: HOSPADM

## 2021-07-23 RX ORDER — BUPIVACAINE HYDROCHLORIDE AND EPINEPHRINE 5; 5 MG/ML; UG/ML
INJECTION, SOLUTION EPIDURAL; INTRACAUDAL; PERINEURAL AS NEEDED
Status: DISCONTINUED | OUTPATIENT
Start: 2021-07-23 | End: 2021-07-23 | Stop reason: HOSPADM

## 2021-07-23 RX ORDER — NALOXONE HCL 0.4 MG/ML
0.04 VIAL (ML) INJECTION AS NEEDED
Status: DISCONTINUED | OUTPATIENT
Start: 2021-07-23 | End: 2021-07-23 | Stop reason: HOSPADM

## 2021-07-23 RX ORDER — HEPARIN SODIUM 1000 [USP'U]/ML
INJECTION, SOLUTION INTRAVENOUS; SUBCUTANEOUS AS NEEDED
Status: DISCONTINUED | OUTPATIENT
Start: 2021-07-23 | End: 2021-07-23 | Stop reason: HOSPADM

## 2021-07-23 RX ORDER — SODIUM CHLORIDE 0.9 % (FLUSH) 0.9 %
3 SYRINGE (ML) INJECTION EVERY 12 HOURS SCHEDULED
Status: DISCONTINUED | OUTPATIENT
Start: 2021-07-23 | End: 2021-07-23

## 2021-07-23 RX ORDER — ACETAMINOPHEN 325 MG/1
650 TABLET ORAL EVERY 4 HOURS PRN
Status: DISCONTINUED | OUTPATIENT
Start: 2021-07-23 | End: 2021-07-28 | Stop reason: HOSPADM

## 2021-07-23 RX ADMIN — HEPARIN SODIUM 3200 UNITS: 1000 INJECTION, SOLUTION INTRAVENOUS; SUBCUTANEOUS at 19:23

## 2021-07-23 RX ADMIN — BUMETANIDE 2 MG: 0.25 INJECTION INTRAMUSCULAR; INTRAVENOUS at 12:29

## 2021-07-23 RX ADMIN — SODIUM CHLORIDE, PRESERVATIVE FREE 10 ML: 5 INJECTION INTRAVENOUS at 09:47

## 2021-07-23 RX ADMIN — METOPROLOL TARTRATE 100 MG: 100 TABLET ORAL at 21:40

## 2021-07-23 RX ADMIN — SODIUM CHLORIDE 100 ML/HR: 9 INJECTION, SOLUTION INTRAVENOUS at 15:07

## 2021-07-23 RX ADMIN — PHYTONADIONE 1.5 MG: 10 INJECTION, EMULSION INTRAMUSCULAR; INTRAVENOUS; SUBCUTANEOUS at 10:43

## 2021-07-23 RX ADMIN — DOBUTAMINE HYDROCHLORIDE 2 MCG/KG/MIN: 100 INJECTION INTRAVENOUS at 13:03

## 2021-07-23 RX ADMIN — DOBUTAMINE HYDROCHLORIDE 6 MCG/KG/MIN: 100 INJECTION INTRAVENOUS at 21:17

## 2021-07-23 RX ADMIN — SODIUM CHLORIDE, PRESERVATIVE FREE 10 ML: 5 INJECTION INTRAVENOUS at 21:17

## 2021-07-23 RX ADMIN — METOPROLOL TARTRATE 100 MG: 100 TABLET ORAL at 09:47

## 2021-07-23 RX ADMIN — MEXILETINE HYDROCHLORIDE 200 MG: 200 CAPSULE ORAL at 21:41

## 2021-07-23 RX ADMIN — VANCOMYCIN HYDROCHLORIDE 1000 MG: 1 INJECTION, POWDER, LYOPHILIZED, FOR SOLUTION INTRAVENOUS at 14:52

## 2021-07-23 NOTE — ANESTHESIA POSTPROCEDURE EVALUATION
"Patient: Joao Fonseca    Procedure Summary     Date: 07/23/21 Room / Location: [facility] OR  /  PAD HYBRID OR 12    Anesthesia Start: 1514 Anesthesia Stop: 1552    Procedure: HEMODIALYSIS CATHETER INSERTION (Right Neck) Diagnosis:       Acute kidney injury (ANNIE) with acute tubular necrosis (ATN) (CMS/HCC)      (Acute kidney injury (ANNIE) with acute tubular necrosis (ATN) (CMS/HCC) [N17.0])    Surgeons: Froylan De La Cruz DO Provider: Kaylan Noonan CRNA    Anesthesia Type: MAC ASA Status: 5 - Emergent          Anesthesia Type: MAC    Vitals  Vitals Value Taken Time   /76 07/23/21 1645   Temp     Pulse 92 07/23/21 1656   Resp     SpO2 98 % 07/23/21 1640   Vitals shown include unvalidated device data.        Post Anesthesia Care and Evaluation    Patient location during evaluation: PACU  Patient participation: complete - patient participated  Level of consciousness: awake and alert  Pain management: adequate  Airway patency: patent  Anesthetic complications: No anesthetic complications    Cardiovascular status: acceptable  Respiratory status: acceptable  Hydration status: acceptable    Comments: Blood pressure 93/77, pulse 89, temperature 97.5 °F (36.4 °C), temperature source Rectal, resp. rate 16, height 185.4 cm (72.99\"), weight 112 kg (246 lb 14.4 oz), SpO2 98 %.    Pt discharged from PACU based on missy score >8      "

## 2021-07-23 NOTE — ANESTHESIA PREPROCEDURE EVALUATION
Anesthesia Evaluation     Patient summary reviewed and Nursing notes reviewed   no history of anesthetic complications:  NPO Solid Status: > 8 hours  NPO Liquid Status: > 2 hours           Airway   Mallampati: II  TM distance: >3 FB  Neck ROM: full  No difficulty expected  Dental - normal exam     Pulmonary    (+) sleep apnea (reported by work for neck circumference),   (-) COPD, asthma    ROS comment: S/P thymoma resection  Cardiovascular     ECG reviewed  PT is on anticoagulation therapy  Patient on routine beta blocker and Beta blocker given within 24 hours of surgery    (+) pacemaker (medtronic) ICD, hypertension, valvular problems/murmurs AI and MR, CAD, CABG, dysrhythmias Atrial Fib, Tachycardia, CHF (EF 21-25%) , PVD (5.2 cm ascending aortic aneurysm), hyperlipidemia,     ROS comment: Cath July 2021: Severe native multivessel coronary artery disease with patent left internal mammary to left anterior descending artery as well as patent saphenous vein graft to the obtuse marginal and diagonal.  No new severe lesions which might explain an ischemic origin for the patient's recent ventricular tachycardia.  More likely, with the patient's severe left ventricular systolic dysfunction and severe native multivessel coronary artery disease, that the ventricular tachycardia is scar mediated.    · Left ventricular systolic function is severely decreased. Left ventricular ejection fraction appears to be less than 20%.  · The left ventricular cavity is severely dilated.  · Left ventricular wall thickness is consistent with mild concentric hypertrophy.  · The right ventricular cavity is mild to moderately dilated. Moderately reduced right ventricular systolic function noted.  · Biatrial enlargement is noted.  · Moderate mitral valve regurgitation is present.  · Mild aortic valve regurgitation is present.  · Mild tricuspid valve regurgitation is present. Estimated right ventricular systolic pressure from tricuspid  regurgitation is normal (<35 mmHg).  · Mild dilation of the sinuses of Valsalva is present.    Neuro/Psych  (-) seizures, TIA, CVA  GI/Hepatic/Renal/Endo    (+) obesity,  GERD,  liver disease, renal disease CRI and ESRD, diabetes mellitus,     Musculoskeletal     Abdominal    Substance History      OB/GYN          Other   arthritis,    history of cancer (thymoma s/p resection)           Poonam 10, 2020     Primary Cardiologist: Elaine  : [employer] Model: Evera MRI  Implant date: 10/6/16     Reason for evaluation: routine  Indication for AICD: chronic systolic heart failure and cardiomyopathy     Measurements  Ventricular sensing - R wave: >20 mV  Ventricular threshold: 0.625 V @ 0.4 ms  Ventricular lead impedance: 418 ohms  Shock Impedance: RV 44 ohms  SVC 53 ohms        Diagnostic Data  Paced: 1.6 %  Other: Several 2-sec runs of NSVT.  Battery status: satisfactory      Final Parameters  Mode: VVI  Lower rate: 40 bpm   Ventricular - Amplitude: 2.0 V   Pulse width: 0.4 ms  Sensitivity: 0.3 mV   Changes made: n/a  Conclusions: normal AICD function     Follow up: 3 month  Anesthesia Plan    ASA 5 - emergent     MAC   (Magnet intraop if cautery utilized, place pads; plan for interrogation post op. Cath showed no targets for intervention; LVEF<20% with multiple recent AICD discharges; received Vitamin K )  intravenous induction     Anesthetic plan, all risks, benefits, and alternatives have been provided, discussed and informed consent has been obtained with: patient.

## 2021-07-24 LAB
ALBUMIN SERPL-MCNC: 3.4 G/DL (ref 3.5–5.2)
ALBUMIN/GLOB SERPL: 2 G/DL
ALP SERPL-CCNC: 89 U/L (ref 39–117)
ALT SERPL W P-5'-P-CCNC: 947 U/L (ref 1–41)
ANION GAP SERPL CALCULATED.3IONS-SCNC: 15 MMOL/L (ref 5–15)
ANISOCYTOSIS BLD QL: NORMAL
AST SERPL-CCNC: 552 U/L (ref 1–40)
BASOPHILS # BLD AUTO: 0.05 10*3/MM3 (ref 0–0.2)
BASOPHILS NFR BLD AUTO: 0.4 % (ref 0–1.5)
BILIRUB CONJ SERPL-MCNC: 4.8 MG/DL (ref 0–0.3)
BILIRUB SERPL-MCNC: 7.2 MG/DL (ref 0–1.2)
BUN SERPL-MCNC: 96 MG/DL (ref 8–23)
BUN/CREAT SERPL: 26.2 (ref 7–25)
BURR CELLS BLD QL SMEAR: NORMAL
CALCIUM SPEC-SCNC: 8.6 MG/DL (ref 8.6–10.5)
CHLORIDE SERPL-SCNC: 91 MMOL/L (ref 98–107)
CO2 SERPL-SCNC: 26 MMOL/L (ref 22–29)
CREAT SERPL-MCNC: 3.67 MG/DL (ref 0.76–1.27)
DEPRECATED RDW RBC AUTO: 46.7 FL (ref 37–54)
EOSINOPHIL # BLD AUTO: 0.05 10*3/MM3 (ref 0–0.4)
EOSINOPHIL NFR BLD AUTO: 0.4 % (ref 0.3–6.2)
ERYTHROCYTE [DISTWIDTH] IN BLOOD BY AUTOMATED COUNT: 15.9 % (ref 12.3–15.4)
GFR SERPL CREATININE-BSD FRML MDRD: 17 ML/MIN/1.73
GLOBULIN UR ELPH-MCNC: 1.7 GM/DL
GLUCOSE BLDC GLUCOMTR-MCNC: 152 MG/DL (ref 70–130)
GLUCOSE BLDC GLUCOMTR-MCNC: 163 MG/DL (ref 70–130)
GLUCOSE BLDC GLUCOMTR-MCNC: 235 MG/DL (ref 70–130)
GLUCOSE BLDC GLUCOMTR-MCNC: 244 MG/DL (ref 70–130)
GLUCOSE SERPL-MCNC: 172 MG/DL (ref 65–99)
HCT VFR BLD AUTO: 49.7 % (ref 37.5–51)
HGB BLD-MCNC: 16.7 G/DL (ref 13–17.7)
INR PPP: 3.1 (ref 0.91–1.09)
LYMPHOCYTES # BLD AUTO: 0.41 10*3/MM3 (ref 0.7–3.1)
LYMPHOCYTES NFR BLD AUTO: 3.4 % (ref 19.6–45.3)
MACROCYTES BLD QL SMEAR: NORMAL
MCH RBC QN AUTO: 31.5 PG (ref 26.6–33)
MCHC RBC AUTO-ENTMCNC: 33.6 G/DL (ref 31.5–35.7)
MCV RBC AUTO: 93.6 FL (ref 79–97)
MONOCYTES # BLD AUTO: 0.85 10*3/MM3 (ref 0.1–0.9)
MONOCYTES NFR BLD AUTO: 7 % (ref 5–12)
NEUTROPHILS NFR BLD AUTO: 10.65 10*3/MM3 (ref 1.7–7)
NEUTROPHILS NFR BLD AUTO: 88.1 % (ref 42.7–76)
PLATELET # BLD AUTO: 68 10*3/MM3 (ref 140–450)
PMV BLD AUTO: 10.3 FL (ref 6–12)
POIKILOCYTOSIS BLD QL SMEAR: NORMAL
POTASSIUM SERPL-SCNC: 5.3 MMOL/L (ref 3.5–5.2)
PROT SERPL-MCNC: 5.1 G/DL (ref 6–8.5)
PROTHROMBIN TIME: 29.8 SECONDS (ref 11.5–13.4)
RBC # BLD AUTO: 5.31 10*6/MM3 (ref 4.14–5.8)
SMALL PLATELETS BLD QL SMEAR: NORMAL
SODIUM SERPL-SCNC: 132 MMOL/L (ref 136–145)
WBC # BLD AUTO: 12.1 10*3/MM3 (ref 3.4–10.8)
WBC MORPH BLD: NORMAL

## 2021-07-24 PROCEDURE — 85025 COMPLETE CBC W/AUTO DIFF WBC: CPT | Performed by: SURGERY

## 2021-07-24 PROCEDURE — 85610 PROTHROMBIN TIME: CPT | Performed by: SURGERY

## 2021-07-24 PROCEDURE — 82248 BILIRUBIN DIRECT: CPT | Performed by: SURGERY

## 2021-07-24 PROCEDURE — 25010000002 DOBUTAMINE PER 250 MG: Performed by: SURGERY

## 2021-07-24 PROCEDURE — 99232 SBSQ HOSP IP/OBS MODERATE 35: CPT | Performed by: INTERNAL MEDICINE

## 2021-07-24 PROCEDURE — 25010000002 HEPARIN (PORCINE) PER 1000 UNITS: Performed by: INTERNAL MEDICINE

## 2021-07-24 PROCEDURE — 94799 UNLISTED PULMONARY SVC/PX: CPT

## 2021-07-24 PROCEDURE — 25010000002 DOBUTAMINE PER 250 MG: Performed by: INTERNAL MEDICINE

## 2021-07-24 PROCEDURE — 82962 GLUCOSE BLOOD TEST: CPT

## 2021-07-24 PROCEDURE — 80053 COMPREHEN METABOLIC PANEL: CPT | Performed by: SURGERY

## 2021-07-24 PROCEDURE — 85007 BL SMEAR W/DIFF WBC COUNT: CPT | Performed by: SURGERY

## 2021-07-24 PROCEDURE — 63710000001 INSULIN LISPRO (HUMAN) PER 5 UNITS: Performed by: SURGERY

## 2021-07-24 RX ORDER — MIDODRINE HYDROCHLORIDE 2.5 MG/1
5 TABLET ORAL
Status: DISCONTINUED | OUTPATIENT
Start: 2021-07-24 | End: 2021-07-25

## 2021-07-24 RX ORDER — MEXILETINE HYDROCHLORIDE 150 MG/1
150 CAPSULE ORAL EVERY 8 HOURS SCHEDULED
Status: DISCONTINUED | OUTPATIENT
Start: 2021-07-24 | End: 2021-07-28 | Stop reason: HOSPADM

## 2021-07-24 RX ADMIN — DOBUTAMINE HYDROCHLORIDE 10 MCG/KG/MIN: 100 INJECTION INTRAVENOUS at 23:10

## 2021-07-24 RX ADMIN — DOBUTAMINE HYDROCHLORIDE 10 MCG/KG/MIN: 100 INJECTION INTRAVENOUS at 19:24

## 2021-07-24 RX ADMIN — MEXILETINE HYDROCHLORIDE 200 MG: 200 CAPSULE ORAL at 06:49

## 2021-07-24 RX ADMIN — DOBUTAMINE HYDROCHLORIDE 6 MCG/KG/MIN: 100 INJECTION INTRAVENOUS at 03:43

## 2021-07-24 RX ADMIN — DOBUTAMINE HYDROCHLORIDE 6 MCG/KG/MIN: 100 INJECTION INTRAVENOUS at 10:19

## 2021-07-24 RX ADMIN — INSULIN LISPRO 3 UNITS: 100 INJECTION, SOLUTION INTRAVENOUS; SUBCUTANEOUS at 08:33

## 2021-07-24 RX ADMIN — SODIUM CHLORIDE, PRESERVATIVE FREE 10 ML: 5 INJECTION INTRAVENOUS at 21:48

## 2021-07-24 RX ADMIN — METOPROLOL TARTRATE 100 MG: 100 TABLET ORAL at 21:48

## 2021-07-24 RX ADMIN — HEPARIN SODIUM 3200 UNITS: 1000 INJECTION, SOLUTION INTRAVENOUS; SUBCUTANEOUS at 19:36

## 2021-07-24 RX ADMIN — INSULIN LISPRO 5 UNITS: 100 INJECTION, SOLUTION INTRAVENOUS; SUBCUTANEOUS at 12:02

## 2021-07-24 RX ADMIN — MIDODRINE HYDROCHLORIDE 5 MG: 2.5 TABLET ORAL at 16:47

## 2021-07-24 RX ADMIN — SODIUM CHLORIDE, PRESERVATIVE FREE 10 ML: 5 INJECTION INTRAVENOUS at 08:34

## 2021-07-24 RX ADMIN — MEXILETINE HYDROCHLORIDE 150 MG: 150 CAPSULE ORAL at 21:48

## 2021-07-24 RX ADMIN — METOPROLOL TARTRATE 100 MG: 100 TABLET ORAL at 09:55

## 2021-07-24 RX ADMIN — DOBUTAMINE HYDROCHLORIDE 10 MCG/KG/MIN: 100 INJECTION INTRAVENOUS at 15:23

## 2021-07-24 RX ADMIN — MEXILETINE HYDROCHLORIDE 150 MG: 150 CAPSULE ORAL at 14:32

## 2021-07-25 LAB
ALBUMIN SERPL-MCNC: 3.2 G/DL (ref 3.5–5.2)
ALBUMIN/GLOB SERPL: 2 G/DL
ALP SERPL-CCNC: 90 U/L (ref 39–117)
ALT SERPL W P-5'-P-CCNC: 646 U/L (ref 1–41)
ANION GAP SERPL CALCULATED.3IONS-SCNC: 8 MMOL/L (ref 5–15)
APTT PPP: 33.3 SECONDS (ref 24.1–35)
AST SERPL-CCNC: 268 U/L (ref 1–40)
BILIRUB SERPL-MCNC: 6.8 MG/DL (ref 0–1.2)
BUN SERPL-MCNC: 61 MG/DL (ref 8–23)
BUN/CREAT SERPL: 25.5 (ref 7–25)
CALCIUM SPEC-SCNC: 8.1 MG/DL (ref 8.6–10.5)
CHLORIDE SERPL-SCNC: 95 MMOL/L (ref 98–107)
CO2 SERPL-SCNC: 28 MMOL/L (ref 22–29)
CREAT SERPL-MCNC: 2.39 MG/DL (ref 0.76–1.27)
DEPRECATED RDW RBC AUTO: 49.3 FL (ref 37–54)
ERYTHROCYTE [DISTWIDTH] IN BLOOD BY AUTOMATED COUNT: 16 % (ref 12.3–15.4)
FIBRINOGEN PPP-MCNC: 201 MG/DL (ref 240–460)
GFR SERPL CREATININE-BSD FRML MDRD: 27 ML/MIN/1.73
GLOBULIN UR ELPH-MCNC: 1.6 GM/DL
GLUCOSE BLDC GLUCOMTR-MCNC: 138 MG/DL (ref 70–130)
GLUCOSE BLDC GLUCOMTR-MCNC: 201 MG/DL (ref 70–130)
GLUCOSE BLDC GLUCOMTR-MCNC: 202 MG/DL (ref 70–130)
GLUCOSE BLDC GLUCOMTR-MCNC: 209 MG/DL (ref 70–130)
GLUCOSE BLDC GLUCOMTR-MCNC: 213 MG/DL (ref 70–130)
GLUCOSE SERPL-MCNC: 238 MG/DL (ref 65–99)
HCT VFR BLD AUTO: 47.3 % (ref 37.5–51)
HGB BLD-MCNC: 15.8 G/DL (ref 13–17.7)
INR PPP: 2.18 (ref 0.91–1.09)
LYMPHOCYTES # BLD MANUAL: 0.55 10*3/MM3 (ref 0.7–3.1)
LYMPHOCYTES NFR BLD MANUAL: 4 % (ref 5–12)
LYMPHOCYTES NFR BLD MANUAL: 6 % (ref 19.6–45.3)
MCH RBC QN AUTO: 31.6 PG (ref 26.6–33)
MCHC RBC AUTO-ENTMCNC: 33.4 G/DL (ref 31.5–35.7)
MCV RBC AUTO: 94.6 FL (ref 79–97)
MONOCYTES # BLD AUTO: 0.37 10*3/MM3 (ref 0.1–0.9)
NEUTROPHILS # BLD AUTO: 8.31 10*3/MM3 (ref 1.7–7)
NEUTROPHILS NFR BLD MANUAL: 90 % (ref 42.7–76)
PLATELET # BLD AUTO: 36 10*3/MM3 (ref 140–450)
PMV BLD AUTO: 11.4 FL (ref 6–12)
POTASSIUM SERPL-SCNC: 4.1 MMOL/L (ref 3.5–5.2)
PROT SERPL-MCNC: 4.8 G/DL (ref 6–8.5)
PROTHROMBIN TIME: 22.7 SECONDS (ref 11.5–13.4)
RBC # BLD AUTO: 5 10*6/MM3 (ref 4.14–5.8)
RBC MORPH BLD: NORMAL
SMALL PLATELETS BLD QL SMEAR: ABNORMAL
SODIUM SERPL-SCNC: 131 MMOL/L (ref 136–145)
WBC # BLD AUTO: 9.23 10*3/MM3 (ref 3.4–10.8)
WBC MORPH BLD: NORMAL

## 2021-07-25 PROCEDURE — 82962 GLUCOSE BLOOD TEST: CPT

## 2021-07-25 PROCEDURE — 85610 PROTHROMBIN TIME: CPT | Performed by: SURGERY

## 2021-07-25 PROCEDURE — 99222 1ST HOSP IP/OBS MODERATE 55: CPT | Performed by: INTERNAL MEDICINE

## 2021-07-25 PROCEDURE — 80053 COMPREHEN METABOLIC PANEL: CPT | Performed by: INTERNAL MEDICINE

## 2021-07-25 PROCEDURE — 85025 COMPLETE CBC W/AUTO DIFF WBC: CPT | Performed by: INTERNAL MEDICINE

## 2021-07-25 PROCEDURE — 63710000001 INSULIN LISPRO (HUMAN) PER 5 UNITS: Performed by: SURGERY

## 2021-07-25 PROCEDURE — 25010000002 DOBUTAMINE PER 250 MG: Performed by: INTERNAL MEDICINE

## 2021-07-25 PROCEDURE — 85730 THROMBOPLASTIN TIME PARTIAL: CPT | Performed by: INTERNAL MEDICINE

## 2021-07-25 PROCEDURE — 97116 GAIT TRAINING THERAPY: CPT

## 2021-07-25 PROCEDURE — 85384 FIBRINOGEN ACTIVITY: CPT | Performed by: INTERNAL MEDICINE

## 2021-07-25 PROCEDURE — 85007 BL SMEAR W/DIFF WBC COUNT: CPT | Performed by: INTERNAL MEDICINE

## 2021-07-25 PROCEDURE — 36415 COLL VENOUS BLD VENIPUNCTURE: CPT | Performed by: SURGERY

## 2021-07-25 PROCEDURE — 97110 THERAPEUTIC EXERCISES: CPT

## 2021-07-25 RX ORDER — MIDODRINE HYDROCHLORIDE 2.5 MG/1
7.5 TABLET ORAL
Status: DISCONTINUED | OUTPATIENT
Start: 2021-07-25 | End: 2021-07-28 | Stop reason: HOSPADM

## 2021-07-25 RX ORDER — ALBUMIN (HUMAN) 12.5 G/50ML
12.5 SOLUTION INTRAVENOUS AS NEEDED
Status: DISCONTINUED | OUTPATIENT
Start: 2021-07-25 | End: 2021-07-26

## 2021-07-25 RX ADMIN — MIDODRINE HYDROCHLORIDE 5 MG: 2.5 TABLET ORAL at 12:10

## 2021-07-25 RX ADMIN — INSULIN LISPRO 5 UNITS: 100 INJECTION, SOLUTION INTRAVENOUS; SUBCUTANEOUS at 16:58

## 2021-07-25 RX ADMIN — MIDODRINE HYDROCHLORIDE 5 MG: 2.5 TABLET ORAL at 07:34

## 2021-07-25 RX ADMIN — METOPROLOL TARTRATE 100 MG: 100 TABLET ORAL at 22:24

## 2021-07-25 RX ADMIN — DOBUTAMINE HYDROCHLORIDE 10 MCG/KG/MIN: 100 INJECTION INTRAVENOUS at 23:33

## 2021-07-25 RX ADMIN — INSULIN LISPRO 5 UNITS: 100 INJECTION, SOLUTION INTRAVENOUS; SUBCUTANEOUS at 12:10

## 2021-07-25 RX ADMIN — DOBUTAMINE HYDROCHLORIDE 10 MCG/KG/MIN: 100 INJECTION INTRAVENOUS at 11:26

## 2021-07-25 RX ADMIN — MEXILETINE HYDROCHLORIDE 150 MG: 150 CAPSULE ORAL at 06:44

## 2021-07-25 RX ADMIN — METOPROLOL TARTRATE 100 MG: 100 TABLET ORAL at 10:28

## 2021-07-25 RX ADMIN — DOBUTAMINE HYDROCHLORIDE 10 MCG/KG/MIN: 100 INJECTION INTRAVENOUS at 15:30

## 2021-07-25 RX ADMIN — DOBUTAMINE HYDROCHLORIDE 10 MCG/KG/MIN: 100 INJECTION INTRAVENOUS at 07:27

## 2021-07-25 RX ADMIN — DOBUTAMINE HYDROCHLORIDE 10 MCG/KG/MIN: 100 INJECTION INTRAVENOUS at 03:26

## 2021-07-25 RX ADMIN — SODIUM CHLORIDE, PRESERVATIVE FREE 10 ML: 5 INJECTION INTRAVENOUS at 10:28

## 2021-07-25 RX ADMIN — MEXILETINE HYDROCHLORIDE 150 MG: 150 CAPSULE ORAL at 22:24

## 2021-07-25 RX ADMIN — DOBUTAMINE HYDROCHLORIDE 10 MCG/KG/MIN: 100 INJECTION INTRAVENOUS at 19:46

## 2021-07-25 RX ADMIN — MIDODRINE HYDROCHLORIDE 7.5 MG: 2.5 TABLET ORAL at 18:32

## 2021-07-25 RX ADMIN — INSULIN LISPRO 5 UNITS: 100 INJECTION, SOLUTION INTRAVENOUS; SUBCUTANEOUS at 07:34

## 2021-07-25 RX ADMIN — MEXILETINE HYDROCHLORIDE 150 MG: 150 CAPSULE ORAL at 15:11

## 2021-07-25 RX ADMIN — SODIUM CHLORIDE, PRESERVATIVE FREE 10 ML: 5 INJECTION INTRAVENOUS at 22:24

## 2021-07-26 ENCOUNTER — ANTICOAGULATION VISIT (OUTPATIENT)
Dept: CARDIOLOGY | Facility: CLINIC | Age: 66
End: 2021-07-26

## 2021-07-26 LAB
ALBUMIN SERPL-MCNC: 3.3 G/DL (ref 3.5–5.2)
ALBUMIN/GLOB SERPL: 1.6 G/DL
ALP SERPL-CCNC: 91 U/L (ref 39–117)
ALT SERPL W P-5'-P-CCNC: 561 U/L (ref 1–41)
ANION GAP SERPL CALCULATED.3IONS-SCNC: 11 MMOL/L (ref 5–15)
AST SERPL-CCNC: 178 U/L (ref 1–40)
BASOPHILS # BLD AUTO: 0.02 10*3/MM3 (ref 0–0.2)
BASOPHILS NFR BLD AUTO: 0.2 % (ref 0–1.5)
BILIRUB SERPL-MCNC: 5.7 MG/DL (ref 0–1.2)
BUN SERPL-MCNC: 59 MG/DL (ref 8–23)
BUN/CREAT SERPL: 24.6 (ref 7–25)
CALCIUM SPEC-SCNC: 8.1 MG/DL (ref 8.6–10.5)
CHLORIDE SERPL-SCNC: 93 MMOL/L (ref 98–107)
CO2 SERPL-SCNC: 25 MMOL/L (ref 22–29)
CREAT SERPL-MCNC: 2.4 MG/DL (ref 0.76–1.27)
DEPRECATED RDW RBC AUTO: 49.9 FL (ref 37–54)
EOSINOPHIL # BLD AUTO: 0.08 10*3/MM3 (ref 0–0.4)
EOSINOPHIL NFR BLD AUTO: 0.8 % (ref 0.3–6.2)
ERYTHROCYTE [DISTWIDTH] IN BLOOD BY AUTOMATED COUNT: 15.5 % (ref 12.3–15.4)
GFR SERPL CREATININE-BSD FRML MDRD: 27 ML/MIN/1.73
GLOBULIN UR ELPH-MCNC: 2.1 GM/DL
GLUCOSE BLDC GLUCOMTR-MCNC: 127 MG/DL (ref 70–130)
GLUCOSE BLDC GLUCOMTR-MCNC: 177 MG/DL (ref 70–130)
GLUCOSE BLDC GLUCOMTR-MCNC: 214 MG/DL (ref 70–130)
GLUCOSE BLDC GLUCOMTR-MCNC: 225 MG/DL (ref 70–130)
GLUCOSE SERPL-MCNC: 161 MG/DL (ref 65–99)
HCT VFR BLD AUTO: 48.8 % (ref 37.5–51)
HGB BLD-MCNC: 16 G/DL (ref 13–17.7)
IMM GRANULOCYTES # BLD AUTO: 0.07 10*3/MM3 (ref 0–0.05)
IMM GRANULOCYTES NFR BLD AUTO: 0.7 % (ref 0–0.5)
INR PPP: 1.61 (ref 0.91–1.09)
LYMPHOCYTES # BLD AUTO: 0.49 10*3/MM3 (ref 0.7–3.1)
LYMPHOCYTES NFR BLD AUTO: 5 % (ref 19.6–45.3)
MAGNESIUM SERPL-MCNC: 2.1 MG/DL (ref 1.6–2.4)
MCH RBC QN AUTO: 31 PG (ref 26.6–33)
MCHC RBC AUTO-ENTMCNC: 32.8 G/DL (ref 31.5–35.7)
MCV RBC AUTO: 94.6 FL (ref 79–97)
MONOCYTES # BLD AUTO: 0.72 10*3/MM3 (ref 0.1–0.9)
MONOCYTES NFR BLD AUTO: 7.3 % (ref 5–12)
NEUTROPHILS NFR BLD AUTO: 8.48 10*3/MM3 (ref 1.7–7)
NEUTROPHILS NFR BLD AUTO: 86 % (ref 42.7–76)
NRBC BLD AUTO-RTO: 0.2 /100 WBC (ref 0–0.2)
PHOSPHATE SERPL-MCNC: 3.8 MG/DL (ref 2.5–4.5)
PLATELET # BLD AUTO: 38 10*3/MM3 (ref 140–450)
PMV BLD AUTO: 11.8 FL (ref 6–12)
POTASSIUM SERPL-SCNC: 4.4 MMOL/L (ref 3.5–5.2)
PROT SERPL-MCNC: 5.4 G/DL (ref 6–8.5)
PROTHROMBIN TIME: 17.9 SECONDS (ref 11.5–13.4)
RBC # BLD AUTO: 5.16 10*6/MM3 (ref 4.14–5.8)
SODIUM SERPL-SCNC: 129 MMOL/L (ref 136–145)
WBC # BLD AUTO: 9.86 10*3/MM3 (ref 3.4–10.8)

## 2021-07-26 PROCEDURE — 99233 SBSQ HOSP IP/OBS HIGH 50: CPT | Performed by: INTERNAL MEDICINE

## 2021-07-26 PROCEDURE — 83735 ASSAY OF MAGNESIUM: CPT | Performed by: INTERNAL MEDICINE

## 2021-07-26 PROCEDURE — 80053 COMPREHEN METABOLIC PANEL: CPT | Performed by: INTERNAL MEDICINE

## 2021-07-26 PROCEDURE — 99232 SBSQ HOSP IP/OBS MODERATE 35: CPT | Performed by: CLINICAL NURSE SPECIALIST

## 2021-07-26 PROCEDURE — 85610 PROTHROMBIN TIME: CPT | Performed by: SURGERY

## 2021-07-26 PROCEDURE — 63710000001 INSULIN LISPRO (HUMAN) PER 5 UNITS: Performed by: SURGERY

## 2021-07-26 PROCEDURE — 99232 SBSQ HOSP IP/OBS MODERATE 35: CPT | Performed by: INTERNAL MEDICINE

## 2021-07-26 PROCEDURE — 05HB33Z INSERTION OF INFUSION DEVICE INTO RIGHT BASILIC VEIN, PERCUTANEOUS APPROACH: ICD-10-PCS | Performed by: INTERNAL MEDICINE

## 2021-07-26 PROCEDURE — 82962 GLUCOSE BLOOD TEST: CPT

## 2021-07-26 PROCEDURE — C1751 CATH, INF, PER/CENT/MIDLINE: HCPCS

## 2021-07-26 PROCEDURE — 36410 VNPNXR 3YR/> PHY/QHP DX/THER: CPT

## 2021-07-26 PROCEDURE — 84100 ASSAY OF PHOSPHORUS: CPT | Performed by: INTERNAL MEDICINE

## 2021-07-26 PROCEDURE — 36415 COLL VENOUS BLD VENIPUNCTURE: CPT | Performed by: SURGERY

## 2021-07-26 PROCEDURE — 25010000002 DOBUTAMINE PER 250 MG: Performed by: INTERNAL MEDICINE

## 2021-07-26 PROCEDURE — 25010000002 HEPARIN (PORCINE) PER 1000 UNITS: Performed by: INTERNAL MEDICINE

## 2021-07-26 PROCEDURE — 85025 COMPLETE CBC W/AUTO DIFF WBC: CPT | Performed by: INTERNAL MEDICINE

## 2021-07-26 RX ORDER — HEPARIN SODIUM 1000 [USP'U]/ML
3200 INJECTION, SOLUTION INTRAVENOUS; SUBCUTANEOUS AS NEEDED
Status: DISCONTINUED | OUTPATIENT
Start: 2021-07-26 | End: 2021-07-28

## 2021-07-26 RX ADMIN — MEXILETINE HYDROCHLORIDE 150 MG: 150 CAPSULE ORAL at 21:26

## 2021-07-26 RX ADMIN — MEXILETINE HYDROCHLORIDE 150 MG: 150 CAPSULE ORAL at 14:46

## 2021-07-26 RX ADMIN — INSULIN LISPRO 5 UNITS: 100 INJECTION, SOLUTION INTRAVENOUS; SUBCUTANEOUS at 07:34

## 2021-07-26 RX ADMIN — HEPARIN SODIUM 3200 UNITS: 1000 INJECTION, SOLUTION INTRAVENOUS; SUBCUTANEOUS at 15:29

## 2021-07-26 RX ADMIN — METOPROLOL TARTRATE 100 MG: 100 TABLET ORAL at 21:26

## 2021-07-26 RX ADMIN — MEXILETINE HYDROCHLORIDE 150 MG: 150 CAPSULE ORAL at 06:31

## 2021-07-26 RX ADMIN — DOBUTAMINE HYDROCHLORIDE 10 MCG/KG/MIN: 100 INJECTION INTRAVENOUS at 19:48

## 2021-07-26 RX ADMIN — DOBUTAMINE HYDROCHLORIDE 10 MCG/KG/MIN: 100 INJECTION INTRAVENOUS at 07:26

## 2021-07-26 RX ADMIN — SODIUM CHLORIDE, PRESERVATIVE FREE 10 ML: 5 INJECTION INTRAVENOUS at 21:26

## 2021-07-26 RX ADMIN — DOBUTAMINE HYDROCHLORIDE 10 MCG/KG/MIN: 100 INJECTION INTRAVENOUS at 11:32

## 2021-07-26 RX ADMIN — MIDODRINE HYDROCHLORIDE 7.5 MG: 2.5 TABLET ORAL at 07:28

## 2021-07-26 RX ADMIN — INSULIN LISPRO 3 UNITS: 100 INJECTION, SOLUTION INTRAVENOUS; SUBCUTANEOUS at 16:41

## 2021-07-26 RX ADMIN — MIDODRINE HYDROCHLORIDE 7.5 MG: 2.5 TABLET ORAL at 18:03

## 2021-07-26 RX ADMIN — DOBUTAMINE HYDROCHLORIDE 10 MCG/KG/MIN: 100 INJECTION INTRAVENOUS at 15:28

## 2021-07-26 RX ADMIN — MIDODRINE HYDROCHLORIDE 7.5 MG: 2.5 TABLET ORAL at 12:39

## 2021-07-26 RX ADMIN — DOBUTAMINE HYDROCHLORIDE 10 MCG/KG/MIN: 100 INJECTION INTRAVENOUS at 03:17

## 2021-07-26 RX ADMIN — DOBUTAMINE HYDROCHLORIDE 10 MCG/KG/MIN: 100 INJECTION INTRAVENOUS at 23:34

## 2021-07-27 LAB
ALBUMIN SERPL-MCNC: 3.6 G/DL (ref 3.5–5.2)
ALBUMIN/GLOB SERPL: 1.7 G/DL
ALP SERPL-CCNC: 100 U/L (ref 39–117)
ALT SERPL W P-5'-P-CCNC: 443 U/L (ref 1–41)
ANION GAP SERPL CALCULATED.3IONS-SCNC: 11 MMOL/L (ref 5–15)
AST SERPL-CCNC: 87 U/L (ref 1–40)
BILIRUB SERPL-MCNC: 5.1 MG/DL (ref 0–1.2)
BUN SERPL-MCNC: 41 MG/DL (ref 8–23)
BUN/CREAT SERPL: 17.4 (ref 7–25)
CALCIUM SPEC-SCNC: 8.4 MG/DL (ref 8.6–10.5)
CHLORIDE SERPL-SCNC: 94 MMOL/L (ref 98–107)
CO2 SERPL-SCNC: 26 MMOL/L (ref 22–29)
CREAT SERPL-MCNC: 2.36 MG/DL (ref 0.76–1.27)
DEPRECATED RDW RBC AUTO: 52.6 FL (ref 37–54)
ERYTHROCYTE [DISTWIDTH] IN BLOOD BY AUTOMATED COUNT: 16 % (ref 12.3–15.4)
GFR SERPL CREATININE-BSD FRML MDRD: 28 ML/MIN/1.73
GLOBULIN UR ELPH-MCNC: 2.1 GM/DL
GLUCOSE BLDC GLUCOMTR-MCNC: 185 MG/DL (ref 70–130)
GLUCOSE BLDC GLUCOMTR-MCNC: 190 MG/DL (ref 70–130)
GLUCOSE BLDC GLUCOMTR-MCNC: 245 MG/DL (ref 70–130)
GLUCOSE BLDC GLUCOMTR-MCNC: 322 MG/DL (ref 70–130)
GLUCOSE SERPL-MCNC: 220 MG/DL (ref 65–99)
HCT VFR BLD AUTO: 49.1 % (ref 37.5–51)
HGB BLD-MCNC: 16.2 G/DL (ref 13–17.7)
INR PPP: 1.41 (ref 0.91–1.09)
MCH RBC QN AUTO: 32.1 PG (ref 26.6–33)
MCHC RBC AUTO-ENTMCNC: 33 G/DL (ref 31.5–35.7)
MCV RBC AUTO: 97.2 FL (ref 79–97)
PLATELET # BLD AUTO: 19 10*3/MM3 (ref 140–450)
PMV BLD AUTO: 11.4 FL (ref 6–12)
POTASSIUM SERPL-SCNC: 4.4 MMOL/L (ref 3.5–5.2)
PROT SERPL-MCNC: 5.7 G/DL (ref 6–8.5)
PROTHROMBIN TIME: 16.2 SECONDS (ref 11.5–13.4)
RBC # BLD AUTO: 5.05 10*6/MM3 (ref 4.14–5.8)
SODIUM SERPL-SCNC: 131 MMOL/L (ref 136–145)
WBC # BLD AUTO: 8.48 10*3/MM3 (ref 3.4–10.8)

## 2021-07-27 PROCEDURE — 63710000001 INSULIN LISPRO (HUMAN) PER 5 UNITS: Performed by: SURGERY

## 2021-07-27 PROCEDURE — 97530 THERAPEUTIC ACTIVITIES: CPT | Performed by: OCCUPATIONAL THERAPIST

## 2021-07-27 PROCEDURE — 85610 PROTHROMBIN TIME: CPT | Performed by: SURGERY

## 2021-07-27 PROCEDURE — 82962 GLUCOSE BLOOD TEST: CPT

## 2021-07-27 PROCEDURE — 97535 SELF CARE MNGMENT TRAINING: CPT | Performed by: OCCUPATIONAL THERAPIST

## 2021-07-27 PROCEDURE — 25010000002 IMMUNE GLOBULIN (HUMAN) 5 GM/50ML SOLUTION 50 ML VIAL: Performed by: INTERNAL MEDICINE

## 2021-07-27 PROCEDURE — 63710000001 INSULIN DETEMIR PER 5 UNITS: Performed by: INTERNAL MEDICINE

## 2021-07-27 PROCEDURE — 99232 SBSQ HOSP IP/OBS MODERATE 35: CPT | Performed by: CLINICAL NURSE SPECIALIST

## 2021-07-27 PROCEDURE — 80053 COMPREHEN METABOLIC PANEL: CPT | Performed by: INTERNAL MEDICINE

## 2021-07-27 PROCEDURE — 25010000002 DEXAMETHASONE SODIUM PHOSPHATE 100 MG/10ML SOLUTION 10 ML VIAL: Performed by: INTERNAL MEDICINE

## 2021-07-27 PROCEDURE — 99233 SBSQ HOSP IP/OBS HIGH 50: CPT | Performed by: INTERNAL MEDICINE

## 2021-07-27 PROCEDURE — 85027 COMPLETE CBC AUTOMATED: CPT | Performed by: INTERNAL MEDICINE

## 2021-07-27 PROCEDURE — 25010000002 DOBUTAMINE PER 250 MG: Performed by: INTERNAL MEDICINE

## 2021-07-27 PROCEDURE — 97116 GAIT TRAINING THERAPY: CPT

## 2021-07-27 PROCEDURE — 36415 COLL VENOUS BLD VENIPUNCTURE: CPT | Performed by: SURGERY

## 2021-07-27 PROCEDURE — 25010000002 IMMUNE GLOBULIN (HUMAN) 10 GM/100ML SOLUTION 100 ML VIAL: Performed by: INTERNAL MEDICINE

## 2021-07-27 PROCEDURE — 99232 SBSQ HOSP IP/OBS MODERATE 35: CPT | Performed by: INTERNAL MEDICINE

## 2021-07-27 RX ORDER — DOBUTAMINE HYDROCHLORIDE 100 MG/100ML
10 INJECTION INTRAVENOUS CONTINUOUS
Status: DISCONTINUED | OUTPATIENT
Start: 2021-07-27 | End: 2021-07-28 | Stop reason: HOSPADM

## 2021-07-27 RX ADMIN — SODIUM CHLORIDE, PRESERVATIVE FREE 10 ML: 5 INJECTION INTRAVENOUS at 09:06

## 2021-07-27 RX ADMIN — MEXILETINE HYDROCHLORIDE 150 MG: 150 CAPSULE ORAL at 13:29

## 2021-07-27 RX ADMIN — METOPROLOL TARTRATE 100 MG: 100 TABLET ORAL at 09:03

## 2021-07-27 RX ADMIN — IMMUNE GLOBULIN (HUMAN) 35 G: 10 INJECTION INTRAVENOUS; SUBCUTANEOUS at 10:34

## 2021-07-27 RX ADMIN — DOBUTAMINE HYDROCHLORIDE 10 MCG/KG/MIN: 100 INJECTION INTRAVENOUS at 10:39

## 2021-07-27 RX ADMIN — MIDODRINE HYDROCHLORIDE 7.5 MG: 2.5 TABLET ORAL at 11:59

## 2021-07-27 RX ADMIN — DOBUTAMINE HYDROCHLORIDE 10 MCG/KG/MIN: 100 INJECTION INTRAVENOUS at 16:07

## 2021-07-27 RX ADMIN — INSULIN DETEMIR 10 UNITS: 100 INJECTION, SOLUTION SUBCUTANEOUS at 21:15

## 2021-07-27 RX ADMIN — METOPROLOL TARTRATE 100 MG: 100 TABLET ORAL at 21:14

## 2021-07-27 RX ADMIN — MIDODRINE HYDROCHLORIDE 7.5 MG: 2.5 TABLET ORAL at 17:04

## 2021-07-27 RX ADMIN — MEXILETINE HYDROCHLORIDE 150 MG: 150 CAPSULE ORAL at 06:11

## 2021-07-27 RX ADMIN — DOBUTAMINE HYDROCHLORIDE 10 MCG/KG/MIN: 100 INJECTION INTRAVENOUS at 03:21

## 2021-07-27 RX ADMIN — INSULIN LISPRO 3 UNITS: 100 INJECTION, SOLUTION INTRAVENOUS; SUBCUTANEOUS at 07:37

## 2021-07-27 RX ADMIN — MEXILETINE HYDROCHLORIDE 150 MG: 150 CAPSULE ORAL at 21:14

## 2021-07-27 RX ADMIN — MIDODRINE HYDROCHLORIDE 7.5 MG: 2.5 TABLET ORAL at 07:26

## 2021-07-27 RX ADMIN — FAMOTIDINE 20 MG: 20 TABLET, FILM COATED ORAL at 09:03

## 2021-07-27 RX ADMIN — DEXAMETHASONE SODIUM PHOSPHATE 40 MG: 10 INJECTION, SOLUTION INTRAMUSCULAR; INTRAVENOUS at 10:22

## 2021-07-27 RX ADMIN — DOBUTAMINE HYDROCHLORIDE 10 MCG/KG/MIN: 100 INJECTION INTRAVENOUS at 20:18

## 2021-07-27 RX ADMIN — SODIUM CHLORIDE, PRESERVATIVE FREE 10 ML: 5 INJECTION INTRAVENOUS at 21:17

## 2021-07-27 RX ADMIN — INSULIN LISPRO 10 UNITS: 100 INJECTION, SOLUTION INTRAVENOUS; SUBCUTANEOUS at 11:59

## 2021-07-27 RX ADMIN — INSULIN LISPRO 3 UNITS: 100 INJECTION, SOLUTION INTRAVENOUS; SUBCUTANEOUS at 17:04

## 2021-07-27 RX ADMIN — DOBUTAMINE HYDROCHLORIDE 10 MCG/KG/MIN: 100 INJECTION INTRAVENOUS at 06:58

## 2021-07-28 ENCOUNTER — ANTICOAGULATION VISIT (OUTPATIENT)
Dept: CARDIOLOGY | Facility: CLINIC | Age: 66
End: 2021-07-28

## 2021-07-28 VITALS
HEIGHT: 72 IN | BODY MASS INDEX: 32.79 KG/M2 | OXYGEN SATURATION: 97 % | WEIGHT: 242.06 LBS | HEART RATE: 102 BPM | SYSTOLIC BLOOD PRESSURE: 97 MMHG | DIASTOLIC BLOOD PRESSURE: 73 MMHG | RESPIRATION RATE: 18 BRPM | TEMPERATURE: 97.6 F

## 2021-07-28 PROBLEM — D69.6 THROMBOCYTOPENIA (HCC): Status: ACTIVE | Noted: 2021-07-28

## 2021-07-28 LAB
GLUCOSE BLDC GLUCOMTR-MCNC: 222 MG/DL (ref 70–130)
GLUCOSE BLDC GLUCOMTR-MCNC: 290 MG/DL (ref 70–130)
GLUCOSE BLDC GLUCOMTR-MCNC: 313 MG/DL (ref 70–130)
INR PPP: 1.47 (ref 0.91–1.09)
PROTHROMBIN TIME: 16.7 SECONDS (ref 11.5–13.4)

## 2021-07-28 PROCEDURE — 25010000002 DEXAMETHASONE SODIUM PHOSPHATE 100 MG/10ML SOLUTION 10 ML VIAL: Performed by: INTERNAL MEDICINE

## 2021-07-28 PROCEDURE — 25010000002 DOBUTAMINE PER 250 MG: Performed by: INTERNAL MEDICINE

## 2021-07-28 PROCEDURE — 25010000002 IMMUNE GLOBULIN (HUMAN) 5 GM/50ML SOLUTION 50 ML VIAL: Performed by: INTERNAL MEDICINE

## 2021-07-28 PROCEDURE — 63710000001 INSULIN LISPRO (HUMAN) PER 5 UNITS: Performed by: SURGERY

## 2021-07-28 PROCEDURE — 85610 PROTHROMBIN TIME: CPT | Performed by: SURGERY

## 2021-07-28 PROCEDURE — 97116 GAIT TRAINING THERAPY: CPT

## 2021-07-28 PROCEDURE — 25010000002 IMMUNE GLOBULIN (HUMAN) 10 GM/100ML SOLUTION 100 ML VIAL: Performed by: INTERNAL MEDICINE

## 2021-07-28 PROCEDURE — 82962 GLUCOSE BLOOD TEST: CPT

## 2021-07-28 PROCEDURE — 97110 THERAPEUTIC EXERCISES: CPT

## 2021-07-28 PROCEDURE — 36415 COLL VENOUS BLD VENIPUNCTURE: CPT | Performed by: SURGERY

## 2021-07-28 PROCEDURE — 97530 THERAPEUTIC ACTIVITIES: CPT

## 2021-07-28 PROCEDURE — 99232 SBSQ HOSP IP/OBS MODERATE 35: CPT | Performed by: CLINICAL NURSE SPECIALIST

## 2021-07-28 RX ORDER — ACETAMINOPHEN 500 MG
1000 TABLET ORAL EVERY 4 HOURS PRN
Status: CANCELLED | OUTPATIENT
Start: 2021-07-28

## 2021-07-28 RX ORDER — ACETAMINOPHEN 325 MG/1
650 TABLET ORAL EVERY 4 HOURS PRN
Start: 2021-07-28

## 2021-07-28 RX ORDER — DIPHENHYDRAMINE HYDROCHLORIDE 50 MG/ML
50 INJECTION INTRAMUSCULAR; INTRAVENOUS ONCE AS NEEDED
Status: CANCELLED | OUTPATIENT
Start: 2021-07-28

## 2021-07-28 RX ORDER — LANOLIN ALCOHOL/MO/W.PET/CERES
3 CREAM (GRAM) TOPICAL NIGHTLY PRN
Start: 2021-07-28

## 2021-07-28 RX ORDER — HYDROCODONE BITARTRATE AND ACETAMINOPHEN 5; 325 MG/1; MG/1
1 TABLET ORAL EVERY 4 HOURS PRN
Start: 2021-07-28 | End: 2021-07-30

## 2021-07-28 RX ORDER — DIPHENHYDRAMINE HCL 25 MG
25 CAPSULE ORAL EVERY 4 HOURS PRN
Status: CANCELLED | OUTPATIENT
Start: 2021-07-28

## 2021-07-28 RX ORDER — ALBUTEROL SULFATE 2.5 MG/3ML
2.5 SOLUTION RESPIRATORY (INHALATION) EVERY 4 HOURS PRN
Refills: 12
Start: 2021-07-28

## 2021-07-28 RX ORDER — MIDODRINE HYDROCHLORIDE 2.5 MG/1
7.5 TABLET ORAL
Start: 2021-07-28

## 2021-07-28 RX ORDER — ALBUMIN (HUMAN) 12.5 G/50ML
12.5 SOLUTION INTRAVENOUS AS NEEDED
Status: CANCELLED | OUTPATIENT
Start: 2021-07-28 | End: 2021-07-29

## 2021-07-28 RX ORDER — LORAZEPAM 2 MG/ML
0.5 INJECTION INTRAMUSCULAR EVERY 4 HOURS PRN
Start: 2021-07-28

## 2021-07-28 RX ORDER — ONDANSETRON 2 MG/ML
4 INJECTION INTRAMUSCULAR; INTRAVENOUS
Status: CANCELLED | OUTPATIENT
Start: 2021-07-28

## 2021-07-28 RX ORDER — NITROGLYCERIN 0.4 MG/1
0.4 TABLET SUBLINGUAL
Refills: 12
Start: 2021-07-28

## 2021-07-28 RX ORDER — FAMOTIDINE 20 MG/1
20 TABLET, FILM COATED ORAL DAILY
Start: 2021-07-29

## 2021-07-28 RX ORDER — DIPHENHYDRAMINE HYDROCHLORIDE 50 MG/ML
25 INJECTION INTRAMUSCULAR; INTRAVENOUS EVERY 4 HOURS PRN
Status: CANCELLED | OUTPATIENT
Start: 2021-07-28

## 2021-07-28 RX ORDER — DOBUTAMINE HYDROCHLORIDE 100 MG/100ML
10 INJECTION INTRAVENOUS CONTINUOUS
Start: 2021-07-28

## 2021-07-28 RX ORDER — MEXILETINE HYDROCHLORIDE 150 MG/1
150 CAPSULE ORAL EVERY 8 HOURS SCHEDULED
Start: 2021-07-28

## 2021-07-28 RX ORDER — SODIUM CITRATE 4 % (3 ML)
5 SYRINGE (ML) MISCELLANEOUS AS NEEDED
Status: DISCONTINUED | OUTPATIENT
Start: 2021-07-28 | End: 2021-07-28 | Stop reason: HOSPADM

## 2021-07-28 RX ORDER — ONDANSETRON 4 MG/1
4 TABLET, FILM COATED ORAL EVERY 6 HOURS PRN
Start: 2021-07-28

## 2021-07-28 RX ORDER — METOPROLOL TARTRATE 100 MG/1
100 TABLET ORAL 2 TIMES DAILY
Start: 2021-07-28

## 2021-07-28 RX ORDER — CLONIDINE HYDROCHLORIDE 0.1 MG/1
0.1 TABLET ORAL
Status: CANCELLED | OUTPATIENT
Start: 2021-07-28

## 2021-07-28 RX ADMIN — MIDODRINE HYDROCHLORIDE 7.5 MG: 2.5 TABLET ORAL at 18:16

## 2021-07-28 RX ADMIN — DOBUTAMINE HYDROCHLORIDE 10 MCG/KG/MIN: 100 INJECTION INTRAVENOUS at 12:33

## 2021-07-28 RX ADMIN — MIDODRINE HYDROCHLORIDE 7.5 MG: 2.5 TABLET ORAL at 12:33

## 2021-07-28 RX ADMIN — DOBUTAMINE HYDROCHLORIDE 10 MCG/KG/MIN: 100 INJECTION INTRAVENOUS at 00:26

## 2021-07-28 RX ADMIN — DOBUTAMINE HYDROCHLORIDE 10 MCG/KG/MIN: 100 INJECTION INTRAVENOUS at 08:24

## 2021-07-28 RX ADMIN — INSULIN LISPRO 10 UNITS: 100 INJECTION, SOLUTION INTRAVENOUS; SUBCUTANEOUS at 08:24

## 2021-07-28 RX ADMIN — IMMUNE GLOBULIN (HUMAN) 35 G: 10 INJECTION INTRAVENOUS; SUBCUTANEOUS at 09:50

## 2021-07-28 RX ADMIN — DEXAMETHASONE SODIUM PHOSPHATE 40 MG: 10 INJECTION, SOLUTION INTRAMUSCULAR; INTRAVENOUS at 08:25

## 2021-07-28 RX ADMIN — INSULIN LISPRO 8 UNITS: 100 INJECTION, SOLUTION INTRAVENOUS; SUBCUTANEOUS at 12:33

## 2021-07-28 RX ADMIN — NITROGLYCERIN 0.4 MG: 0.4 TABLET SUBLINGUAL at 01:48

## 2021-07-28 RX ADMIN — FAMOTIDINE 20 MG: 20 TABLET, FILM COATED ORAL at 08:24

## 2021-07-28 RX ADMIN — MIDODRINE HYDROCHLORIDE 7.5 MG: 2.5 TABLET ORAL at 08:24

## 2021-07-28 RX ADMIN — Medication 5 ML: at 17:25

## 2021-07-28 RX ADMIN — MEXILETINE HYDROCHLORIDE 150 MG: 150 CAPSULE ORAL at 06:29

## 2021-07-28 RX ADMIN — DOBUTAMINE HYDROCHLORIDE 10 MCG/KG/MIN: 100 INJECTION INTRAVENOUS at 03:54

## 2021-07-28 RX ADMIN — INSULIN LISPRO 5 UNITS: 100 INJECTION, SOLUTION INTRAVENOUS; SUBCUTANEOUS at 18:16

## 2021-08-02 ENCOUNTER — ANTICOAGULATION VISIT (OUTPATIENT)
Dept: CARDIOLOGY | Facility: CLINIC | Age: 66
End: 2021-08-02

## 2021-08-04 ENCOUNTER — TELEPHONE (OUTPATIENT)
Dept: GASTROENTEROLOGY | Facility: CLINIC | Age: 66
End: 2021-08-04

## 2021-08-04 ENCOUNTER — ANTICOAGULATION VISIT (OUTPATIENT)
Dept: CARDIOLOGY | Facility: CLINIC | Age: 66
End: 2021-08-04

## 2021-08-04 ENCOUNTER — TELEPHONE (OUTPATIENT)
Dept: INTERNAL MEDICINE | Facility: CLINIC | Age: 66
End: 2021-08-04

## 2021-08-04 NOTE — TELEPHONE ENCOUNTER
SPOKE WITH LAILA FROM [facility].  SHE IS ASKING IF DR TIWARI WOULD FOLLOW PATIENT WITH ORDERS FOR HOME HEALTH. PER DR TIWARI'S  VERBAL ORDERS HE WILL FOLLOW PATIENT WITH HOME HEALTH.    [facility]--[phone]

## 2021-08-04 NOTE — TELEPHONE ENCOUNTER
HOME HEALTH NURSE CALLED AND STATED THAT P/T IS CURRENTLY IN [facility]. STATES HE IS IN THERE REGARDING CONGESTIVE HEART FAILURE. he IS GOING TO HAVE A HEART ABLATION [place]. SHE WOULD LIKE TO KNOW IF DR. TIWARI WILL FOLLOW HIM FOR HOME HEALTH ORDERS    HOME HEALTH NURSE WOULD LIKE A CALLBACK

## 2021-08-05 ENCOUNTER — ANTICOAGULATION VISIT (OUTPATIENT)
Dept: CARDIOLOGY | Facility: CLINIC | Age: 66
End: 2021-08-05

## 2021-08-10 ENCOUNTER — TELEPHONE (OUTPATIENT)
Dept: INTERNAL MEDICINE | Facility: CLINIC | Age: 66
End: 2021-08-10

## 2021-08-10 NOTE — TELEPHONE ENCOUNTER
Caller: Jayna Fonseca    Relationship: Emergency Contact    Best call back number: [phone]     What medication are you requesting: BLOOD SUGAR MONITOR/KIT, TEST STRIPS, ETC    What are your current symptoms: JUST GOT OUT OF HOSPITAL AND IS SUPPOSED TO CHECK HIS BLOOD SUGAR REGULARLY.    How long have you been experiencing symptoms: ONGOING    Have you had these symptoms before:    [x] Yes  [] No    Have you been treated for these symptoms before:   [x] Yes  [] No    If a prescription is needed, what is your preferred pharmacy and phone number: [facility]  [place], [place] - [address] - [phone] [facility] [phone]      Additional notes:

## 2021-08-10 NOTE — TELEPHONE ENCOUNTER
TANNER AVALOS WITH [facility] HAS RECEIVED HOME HEALTH ORDERS FOR PATIENT AND HAS CALLED ASKING IF DR TIWARI WILL FOLLOW.  PER DR TIWARI'S VERBAL ORDERS HE WILL FOLLOW.     TANNER AVALOS-- [facility]  [phone]

## 2021-08-11 RX ORDER — LANCETS
1 EACH MISCELLANEOUS DAILY
Qty: 100 EACH | Refills: 12 | Status: SHIPPED | OUTPATIENT
Start: 2021-08-11

## 2021-08-11 RX ORDER — DIPHENHYDRAMINE HYDROCHLORIDE 25 MG/1
1 CAPSULE, LIQUID FILLED ORAL TAKE AS DIRECTED
Qty: 1 EACH | Refills: 0 | Status: SHIPPED | OUTPATIENT
Start: 2021-08-11 | End: 2021-08-19

## 2021-08-11 NOTE — TELEPHONE ENCOUNTER
PATIENT HAS CALLED, HE STATED THAT HE IS NEEDING A GLUCOSE MONITOR, STRIPS AND LANCETS SENT TO [facility] IN [place].  PATIENT IS HOME FROM THE HOSPITAL AND IS NEEDING THIS ASAP.  HE STATED THAT THEY ARE TRYING TO GET HIS MEDICATION ADJUSTED.    PHONE) [phone]  FAX)       [phone]

## 2021-08-12 ENCOUNTER — TELEPHONE (OUTPATIENT)
Dept: INTERNAL MEDICINE | Facility: CLINIC | Age: 66
End: 2021-08-12

## 2021-08-13 ENCOUNTER — APPOINTMENT (OUTPATIENT)
Dept: LAB | Facility: HOSPITAL | Age: 66
End: 2021-08-13

## 2021-08-13 ENCOUNTER — LAB (OUTPATIENT)
Dept: LAB | Facility: HOSPITAL | Age: 66
End: 2021-08-13

## 2021-08-13 DIAGNOSIS — I48.0 PAF (PAROXYSMAL ATRIAL FIBRILLATION) (HCC): ICD-10-CM

## 2021-08-13 LAB
INR PPP: 1 (ref 0.91–1.09)
PROTHROMBIN TIME: 12.6 SECONDS (ref 10–13.8)

## 2021-08-13 PROCEDURE — 85610 PROTHROMBIN TIME: CPT | Performed by: NURSE PRACTITIONER

## 2021-08-14 NOTE — PROGRESS NOTES
"Subjective   Patient ID: Joao Fonseca is a 66 y.o. male who is here for follow-up of a known aneurysm.   Chief Complaint   Patient presents with   • Thoracic Aneurysm     Pt is here for follow up      Overall has been doing well.  Medically has been stable.  In the interim he has been seen by Dr. Dupree and Dr. Henry.  He denies any chest pain nor shortness of breath.  He has had no new diagnoses.  He does have some fatigue here and there but reports he just keeps going with what he needs to do.    The following portions of the patient's history were reviewed and updated as appropriate: allergies, current medications, past family history, past medical history, past social history, past surgical history and problem list.       Objective      Visit Vitals  /80 (BP Location: Left arm, Patient Position: Sitting, Cuff Size: Adult)   Pulse (!) 123   Ht 182.9 cm (72\")   Wt 107 kg (235 lb 9.6 oz)   SpO2 97%   BMI 31.95 kg/m²       Physical Exam   Constitutional: He is oriented to person, place, and time. He appears well-developed.   HENT:   Head: Normocephalic and atraumatic.   Eyes: Pupils are equal, round, and reactive to light.   Neck: No JVD present. No tracheal deviation present. No thyromegaly present.   Cardiovascular: Normal rate, regular rhythm and normal heart sounds. Exam reveals no gallop and no friction rub.   No murmur heard.  Pulmonary/Chest: Effort normal and breath sounds normal. No respiratory distress. He has no wheezes. He has no rales. He exhibits no tenderness.   Abdominal: Soft. He exhibits no distension. There is no abdominal tenderness.   Musculoskeletal: Normal range of motion.   Lymphadenopathy:     He has no cervical adenopathy.   Neurological: He is alert and oriented to person, place, and time. No cranial nerve deficit.   Skin: Skin is warm and dry.   Sternum is stable. Incision is well healed.      EXAMINATION: CT ANGIOGRAM CHEST W WO CONTRAST-  5/4/2018 3:31 PM CDT     HISTORY: " Dyspnea     COMPARISON: 12/9/2015 chest CT     TECHNIQUE:     CT evaluation of the chest was performed with intravenous contrast. 2 mm  transaxial images were obtained.. Coronal reconstruction maximum  intensity projection images of the pulmonary arteries and aorta were  also generated.     Radiation dose equals  mGy-cm.  Automated exposure control dose  reduction technique was implemented.        FINDINGS:     [The pulmonary arteries are opacified with iodinated contrast without  intraluminal filling defects or CT angiographic evidence of pulmonary  embolus.     The aorta is minimally opacified with iodinated contrast. There are  changes from median sternotomy. There is aneurysmal dilatation of the  ascending thoracic aorta measuring approximate 5.2 cm, just superior to  the valve. The] arch tapers appropriately in the arch and descending  thoracic aorta with atherosclerotic calcifications. No secondary signs  of dissection with again poor enhancement.     There are coronary artery calcifications.     There is cardiomegaly with panchamber enlargement.     There are multiple middle mediastinal lymph nodes. Calcified right  paratracheal and right hilar nodes observed. No mediastinal or hilar  lymphadenopathy.     There are small bilateral pleural effusions layering posteriorly. There  are groundglass opacities appreciated in the lower lobes bilaterally  which may be related to the level of inspiration and patient position.  Mild interstitial infiltration from an acute infectious or inflammatory  process or mild interstitial pulmonary edema also considered. The  pulmonary arteries aren't prominent suggesting pulmonary hypertension.  There are no consolidating parenchymal infiltrates.     Limited imaging of the upper abdomen is unremarkable.     Degenerative changes noted in the thoracic spine with osteophyte  formation. No acute osseous abnormality observed.     IMPRESSION:  1. No CT angiographic evidence of  pulmonary embolus or acute aortic  pathology.   2. Aneurysm dilatation of the ascending thoracic aorta measuring 5.2 cm  in greatest AP projection.  3. Small bilateral pleural effusions. Cardiomegaly with pulmonary  vasculature prominence. Mild pulmonary venous hypertension considered.  No parenchymal infiltrates.  This report was finalized on 05/04/2018 15:38 by Dr. Leo Haddad MD.  CT scan of the chest obtained May of 2018 is reviewed by me.  It measures 5.1 cm in the short axis in greatest dimension.    No evidence of acute aortic pathology.      EXAMINATION:  CT ANGIOGRAM CHEST W CONTRAST-  11/19/2018 8:01 AM CST     HISTORY: I71.2-Thoracic aortic aneurysm, without rupture.     COMPARISON : 5/4/2018.     DLP: 434 mGy-cm. Automated dosage control was utilized.     TECHNIQUE: CT angio was performed of the chest with contrast. Coronal,  sagittal and 3-D reconstruction were performed.     MEDIASTINUM, HEART AND VASCULAR STRUCTURES: The ascending thoracic aorta  measures 5.2 cm in transverse diameter. The aortic arch and descending  thoracic aorta are normal in caliber. There is mild atheromatous disease  of the thoracic aorta. There is fairly dense atheromatous disease of the  coronary arteries. There has been prior heart bypass surgery. There is  cardiomegaly. The pulmonary arteries are dilated. The main pulmonary  artery segment measures 3.5 cm. The left main pulmonary artery measures  3.2 cm on the right main pulmonary artery measures 3.2 cm. There are  calcified and noncalcified mediastinal lymph nodes. Mediastinal lymph  nodes are stable compared to the prior study.     LUNGS: There is no focal infiltrate or effusion. Patchy groundglass  opacity in both lungs is likely related to relatively poor inspiration.  There is no pleural effusion. The airways are patent.      UPPER ABDOMEN: There is diverticulosis of the visualized colon. The  visualized upper abdomen is otherwise within normal limits.     BONES:  There are degenerative changes of the spine.     IMPRESSION:  1. Aneurysmal dilatation of the ascending thoracic aorta measuring 5.2  cm, stable.  2. Cardiomegaly. Atheromatous disease of the thoracic aorta and coronary  arteries. Prior heart bypass surgery.  3. No significant lung infiltrate.  4. Diverticulosis of the colon.    This report was finalized on 11/19/2018 08:32 by Dr. Jerry Reza MD.    Study Result     EXAMINATION: CT ANGIOGRAM CHEST W CONTRAST- 5/22/2019 1:13 PM CDT     HISTORY: 3 month checkup for thoracic aortic aneurysm, nontraumatic  aortic disease     COMPARISON: CTA chest 11/19/2018, 05/04/2018, and 12/09/2015     DOSE: 1376 mGy-cm     TECHNIQUE: Sequential imaging was performed from the thoracic inlet  through the upper abdomen following the administration of IV contrast.   Sagittal and coronal reformations were made from the original source  data and reviewed. Additionally, 3-D MIPS reconstructions of the vessels  were made per CTA protocol. Automated exposure control was also utilized  to decrease patient radiation dose.     FINDINGS:   A left subclavian approach cardiac pacing device is in place with the  tip adjacent to the right ventricle.     The visualized thyroid gland is unremarkable. Trachea and main bronchi  appear widely patent and in normal anatomic position. The esophagus is  grossly normal in appearance.     The heart is markedly enlarged with dilation of the left ventricle.  Coronary artery calcifications are present. No pericardial effusion is  appreciated. The ascending thoracic aorta measures up to 5.5 cm in  diameter near the aortic root measuring up to 5.2 cm on the most recent  comparison exam. The descending thoracic aorta measures up to 3.6 cm in  diameter. Contrast bolus timing is suboptimal for evaluation of the  aorta for dissection. No hyperdense material is seen to suggest acute  dissection. The main pulmonary artery is dilated, suggesting pulmonary  arterial  hypertension. No filling defects are seen to suggest PE.     There is no appreciable axillary lymphadenopathy. A prevascular lymph  node is seen which measures 11 mm in short axis, stable compared to the  previous exam. Multiple calcified mediastinal lymph nodes indicate prior  granulomatous exposure. A subcarinal lymph node measures 14 mm in short  axis, previously measuring 17 mm in short axis. There are numerous  collateral vessels through the soft tissues of the left chest.     There is mild interlobular septal thickening at the lung bases. The  lungs otherwise appear clear.     Review of the visualized portion of the upper abdomen demonstrates mild  reflux of contrast into the IVC and hepatic veins.     Review of the visualized osseous structures demonstrates no acute or  aggressive lesions. Degenerative changes are noted in the spine. There  has been prior median sternotomy.     IMPRESSION:  1. Interval increase in the size of an ascending thoracic aortic  aneurysm, now measuring 5.5 cm, most recently measuring 5.2 cm.  2. Marked cardiomegaly. Atherosclerosis of the aorta and coronary  arteries.  3. Findings suggestive of pulmonary arterial hypertension.  4. Nonspecific mildly enlarged mediastinal lymph nodes. Some appear  partially calcified, and enlargement may be all related to granulomatous  disease.           This report was finalized on 05/22/2019 13:21 by Dr. Jaime Carreno MD.     Study Result     EXAMINATION:  CT ANGIOGRAM CHEST-  11/20/2019 8:06 AM CST     HISTORY: Aortic dz, non-traumatic, known or suspect; I71.2-Thoracic  aortic aneurysm, without rupture     COMPARISON : 05/22/2019.     DLP: 320 mGy-cm. Automated dosage control was utilized.     TECHNIQUE: CT angio was performed of the chest with contrast. Coronal,  sagittal and 3-D reconstruction were performed.     MEDIASTINUM, HEART AND VASCULAR STRUCTURES: The ascending thoracic aorta  measures 5.4 cm transversely and appears stable compared to  the prior  study. The aortic arch and descending thoracic aorta are normal in  caliber. The main pulmonary artery segment is dilated. It measures 3.4  cm. There is moderate to severe cardiomegaly. There is dense coronary  artery calcification. There has been prior bypass surgery. There are  some borderline size mediastinal lymph nodes that appear stable. Some of  the lymph nodes are calcified in the right paratracheal region.     LUNGS: There is minimal pleural effusion on the right. There is no focal  airspace consolidation. There is very minimal intralobular septal  thickening in the lung bases.     UPPER ABDOMEN: No significant abnormality is seen in the upper abdomen.     BONES: There are degenerative changes of the spine. No acute appearing  bony abnormality is appreciated.     IMPRESSION:  1. The ascending thoracic aorta appears stable and measures up to 5.4  cm. The aortic arch and descending thoracic aorta are normal caliber.  2. Cardiomegaly. Dense coronary artery calcification. Prior heart bypass  surgery.  3. Minimal right pleural effusion. Minimal intralobular septal  thickening in the lung bases suggesting very mild edema.  4. Borderline size mediastinal lymph nodes appear stable. Some of the  lymph nodes are calcified.    This report was finalized on 11/20/2019 08:39 by Dr. Jerry Reza MD.     Study Result     Exam: CT angiogram of the of the chest with intravenous contrast.     CLINICAL HISTORY:  Aortic disease. Follow-up aneurysm.     DOSE:  45 mGycm. Automated exposure control was utilized to diminish  patient radiation dose.     TECHNIQUE: Contiguous axial images were obtained through the thorax  following intravenous contrast administration with reformatted images  obtained in the sagittal and coronal projections from the original data  set. Three-dimensional reconstructions are also obtained.     COMPARISON:  11/20/2019     FINDINGS:     Pulmonary arteries:  There is normal enhancement of the  pulmonary  arteries with no evidence of pulmonary thromboembolic disease.     Aorta:  There is aneurysmal dilatation of the ascending thoracic aorta  with an anterior to posterior dimension of 5.3 cm just above the aortic  root. The aortic arch is ectatic. The descending thoracic aorta is  normal in caliber. There is no evidence of dissection. The proximal  great vessels including the innominate are significant for atheromatous  calcification and tortuosity of the innominate artery. This is stable  from the previous study.     LUNGS:  The lungs are clear. No evidence of lobar consolidation.     Pleural spaces: There is mild pleural thickening within the right  posterior costophrenic angle stable from the previous study. No evidence  of pleural effusion.     HEART: There is moderate cardiomegaly. No evidence of right ventricular  dysfunction. No evidence of pericardial effusion. Extensive coronary  artery calcifications are present.     Bones: No acute osseous abnormalities are demonstrated.     CHEST WALL: No chest wall abnormalities are demonstrated.     Lymph nodes: Multiple prevascular nodes are present some of which are  borderline enlarged by CT criteria are again present and stable from the  previous study. Calcified right paratracheal nodes are present related  to remote granulomatous disease. No new enlarged mediastinal or axillary  nodes are present.     Upper abdomen: There is dependent increased density within the  gallbladder likely representing gallstones. No biliary dilatation is  present. The adrenals are unremarkable.     IMPRESSION:  1. Stable aneurysmal dilatation of the ascending thoracic aorta with an  anterior to posterior dimension of 5.3 cm again demonstrated. The  proximal great vessels are tortuous. No evidence of dissection. The  aortic arch is also mildly ectatic. The descending thoracic aorta is  normal in caliber.  2. No evidence of pulmonary thromboembolic disease.  3. Stable  mediastinal adenopathy from previous exams. No new adenopathy  is present. There is evidence of remote granulomatous disease.  4. Extensive coronary calcifications.  5. Suspected cholelithiasis. This could be confirmed with gallbladder  ultrasound.  This report was finalized on 05/20/2020 08:25 by Dr. Calvin Fregoso MD.       EXAM: CT ANGIOGRAM CHEST- - 11/18/2020 7:41 AM CST     HISTORY: Aortic disease, nontraumatic; I50.22-Chronic systolic  (congestive) heart failure; I25.10-Atherosclerotic heart disease of  native coronary artery without angina pectoris; I71.2-Thoracic aortic  aneurysm, without rupture       COMPARISON: 5/20/2020.      DOSE LENGTH PRODUCT: 344 mGy cm. Automatic exposure control was utilized  to make radiation dose as low as reasonably achievable.     TECHNIQUE: Enhanced axial CT images obtained with multiplanar reformats.  3D postprocessing, including MIPs, performed and images saved to PACS.     FINDINGS:   AIRWAYS/PULMONARY PARENCHYMA: The central airways are midline and  patent. No suspicious pulmonary mass or nodule. No pleural effusion or  pneumothorax.     VESSELS: Contrast bolus is adequate. Ascending thoracic aorta measures  up to 5.3 cm, similar compared to 5/20/2020. Aortic arch measures 3.3  cm. Descending thoracic aorta measures 2.9 cm. Exam is not optimized for  evaluation of the pulmonary arteries, but there is no evidence of a  large central pulmonary artery filling defect. Normal pulmonary artery  caliber. Similar tortuous configuration of the right brachiocephalic  artery.     CARDIAC:  Cardiomegaly. Changes of prior CABG. Coronary artery  calcifications present. No pericardial effusion. Cardiac pulse generator  in the left chest.     MEDIASTINUM: Calcified mediastinal lymph nodes, likely representing  sequelae of prior granulomatous disease. Esophagus has normal coarse,  caliber and wall thickness.     EXTRATHORACIC: The visualized portions of the thyroid gland  are  unremarkable. No thoracic inlet or axillary adenopathy. The  extrathoracic soft tissues are within normal limits.      INCLUDED UPPER ABDOMEN: Visualized portion of the liver, pancreas,  spleen, adrenal glands appear within normal limits, not optimally  assessed on this exam.     OSSEOUS: There are mild degenerative changes of the visualized spine. No  acute or suspicious bony finding.         IMPRESSION:  1. Similar size of ascending thoracic aortic aneurysm measuring up to  5.3 cm. No evidence of dissection.  2. Stable calcified mediastinal lymph nodes.  3. Changes of prior CABG with coronary artery calcifications.  This report was finalized on 11/18/2020 08:09 by Dr Keshia Harrell MD.    Study Result    Narrative & Impression   CT ANGIOGRAM CHEST- 5/26/2021 7:30 AM CDT     HISTORY: Aortic disease, nontraumatic; I71.2-Thoracic aortic aneurysm,  without rupture      COMPARISON: 11/18/2020     DOSE LENGTH PRODUCT: 530 mGy cm. Automated exposure control was also  utilized to decrease patient radiation dose.     TECHNIQUE: Axial images the chest are obtained following IV contrast.  2-D and maximal intensity projection images are reconstructed and  reviewed.     FINDINGS:  Stable ascending thoracic aortic aneurysm measuring 5.4 cm in  AP diameter on the sagittal reconstruction. The arch of the aorta  measures 3.2 cm with the descending thoracic aorta measuring 3.2 cm. No  dissection. Cardiomegaly. No paracardial effusion. Left subclavian  cardiac pacer device. Median sternotomy changes.     Calcified mediastinal and right hilar lymph nodes. Similar nonspecific  prevascular lymph nodes measuring 10 mm or less. No new or increasing  intrathoracic or axillary lymphadenopathy. No pericardial or pleural  effusions.     Images the upper abdomen demonstrate no adrenal nodules. Calcified  granuloma the spleen.     No parenchymal consolidation or suspicious nodularity. No pneumothorax.  No endobronchial lesions.      Moderate degenerative change of the thoracic spine.     IMPRESSION:  1. Stable 5.4 cm ascending thoracic aortic aneurysm. No dissection.  2. Prior mediastinal surgery. Left subclavian cardiac pacer device.  Similar cardiomegaly.  This report was finalized on 05/26/2021 09:00 by Dr. Rosario Lee MD.         Diagnoses and all orders for this visit:    1. Thoracic aortic aneurysm without rupture (CMS/HCC) (Primary)  -     CT Angiogram Chest; Future    2. Type 2 diabetes mellitus with stage 3a chronic kidney disease, without long-term current use of insulin (CMS/HCC)    3. Malignant thymoma (CMS/HCC)    4. Coronary artery disease involving native coronary artery of native heart without angina pectoris    5. CHF (congestive heart failure), NYHA class II, chronic, systolic (CMS/HCC)          Assessment/Plan       Independent review of current CT scan shows an ascending aortic aneurysm measuring 5.3 cm in size again.  It is fusiform in morphology.    We discussed his results of his CT scan of the chest demonstrating a 5.3 cm aortic aneurysm.  The previous CT scan of his chest demonstrated in the short axis in greatest dimension a measurement of 5.3, 5.3, 5.4, 5.3, 5.2, and 5.1  cm in size in reverse chronological order.  His trend demonstrates stability.  We discussed signs and symptoms of an acute aortic event.  He verbalizes understanding.  Given current data and the fact that he would require redo median sternotomy, I have previously recommended we use 5.5 cm as the point to that the benefit of ascending aortic resection would outweigh the risk of surgery but with EF measuring 21-25%, ckd, and previous entry x 2 risk analysis may suggest 6 cm is more likely the inflection point of considering resection.  Will assess his aorta factoring his BSA once we approach 5.5 cm as well as obtain an echocardiogram to ascertain his risk-benefit ratio. All questions have been answered.  He is agreeable to the plan of care.  BP  is well controlled. He has been educated on a weight lifting restriction: 35 pounds.        RTC 9 months with CTA chest.      He is a non smoker.    Patient's Body mass index is 31.95 kg/m². BMI is above normal parameters. Recommendations include: referral to primary care.

## 2021-08-18 ENCOUNTER — TRANSCRIBE ORDERS (OUTPATIENT)
Dept: ADMINISTRATIVE | Facility: HOSPITAL | Age: 66
End: 2021-08-18

## 2021-08-18 DIAGNOSIS — I48.91 ATRIAL FIBRILLATION, UNSPECIFIED TYPE (HCC): Primary | ICD-10-CM

## 2021-08-19 ENCOUNTER — OFFICE VISIT (OUTPATIENT)
Dept: INTERNAL MEDICINE | Facility: CLINIC | Age: 66
End: 2021-08-19

## 2021-08-19 VITALS
OXYGEN SATURATION: 96 % | HEIGHT: 72 IN | HEART RATE: 101 BPM | WEIGHT: 206.5 LBS | BODY MASS INDEX: 27.97 KG/M2 | TEMPERATURE: 98.1 F | SYSTOLIC BLOOD PRESSURE: 98 MMHG | DIASTOLIC BLOOD PRESSURE: 60 MMHG | RESPIRATION RATE: 16 BRPM

## 2021-08-19 DIAGNOSIS — N18.4 TYPE 2 DIABETES MELLITUS WITH STAGE 4 CHRONIC KIDNEY DISEASE, WITH LONG-TERM CURRENT USE OF INSULIN (HCC): Primary | ICD-10-CM

## 2021-08-19 DIAGNOSIS — Z79.4 TYPE 2 DIABETES MELLITUS WITH STAGE 4 CHRONIC KIDNEY DISEASE, WITH LONG-TERM CURRENT USE OF INSULIN (HCC): Primary | ICD-10-CM

## 2021-08-19 DIAGNOSIS — E11.22 TYPE 2 DIABETES MELLITUS WITH STAGE 4 CHRONIC KIDNEY DISEASE, WITH LONG-TERM CURRENT USE OF INSULIN (HCC): Primary | ICD-10-CM

## 2021-08-19 DIAGNOSIS — I25.10 CORONARY ARTERY DISEASE INVOLVING NATIVE CORONARY ARTERY OF NATIVE HEART WITHOUT ANGINA PECTORIS: ICD-10-CM

## 2021-08-19 DIAGNOSIS — N17.0 ACUTE KIDNEY INJURY (AKI) WITH ACUTE TUBULAR NECROSIS (ATN) (HCC): ICD-10-CM

## 2021-08-19 DIAGNOSIS — D69.6 THROMBOCYTOPENIA (HCC): ICD-10-CM

## 2021-08-19 DIAGNOSIS — I50.22 CHRONIC SYSTOLIC CHF (CONGESTIVE HEART FAILURE), NYHA CLASS 3 (HCC): ICD-10-CM

## 2021-08-19 DIAGNOSIS — I50.84 END STAGE HEART FAILURE (HCC): ICD-10-CM

## 2021-08-19 PROBLEM — R79.89 ABNORMAL LFTS: Status: RESOLVED | Noted: 2021-07-19 | Resolved: 2021-08-19

## 2021-08-19 PROBLEM — E66.3 OVERWEIGHT (BMI 25.0-29.9): Status: ACTIVE | Noted: 2018-01-05

## 2021-08-19 PROBLEM — N18.30 TYPE 2 DIABETES MELLITUS WITH STAGE 3 CHRONIC KIDNEY DISEASE, WITHOUT LONG-TERM CURRENT USE OF INSULIN (HCC): Status: RESOLVED | Noted: 2017-01-09 | Resolved: 2021-08-19

## 2021-08-19 PROCEDURE — 99214 OFFICE O/P EST MOD 30 MIN: CPT | Performed by: INTERNAL MEDICINE

## 2021-08-19 RX ORDER — TORSEMIDE 20 MG/1
1 TABLET ORAL DAILY
COMMUNITY

## 2021-08-19 RX ORDER — FOLIC ACID 1 MG/1
1 TABLET ORAL DAILY
COMMUNITY

## 2021-08-19 RX ORDER — ASPIRIN 81 MG/1
81 TABLET ORAL DAILY
COMMUNITY

## 2021-08-19 RX ORDER — WARFARIN SODIUM 5 MG/1
1 TABLET ORAL DAILY
COMMUNITY

## 2021-08-19 RX ORDER — PEN NEEDLE, DIABETIC 30 GX3/16"
1 NEEDLE, DISPOSABLE MISCELLANEOUS DAILY
Qty: 100 EACH | Refills: 11 | Status: SHIPPED | OUTPATIENT
Start: 2021-08-19

## 2021-08-19 RX ORDER — INSULIN DETEMIR 100 [IU]/ML
15 INJECTION, SOLUTION SUBCUTANEOUS DAILY
Qty: 6 ML | Refills: 3 | Status: SHIPPED | OUTPATIENT
Start: 2021-08-19

## 2021-08-19 RX ORDER — LOSARTAN POTASSIUM 25 MG/1
1 TABLET ORAL DAILY
COMMUNITY

## 2021-08-19 RX ORDER — SPIRONOLACTONE 25 MG/1
1 TABLET ORAL DAILY
COMMUNITY

## 2021-08-19 RX ORDER — AMIODARONE HYDROCHLORIDE 400 MG/1
1 TABLET ORAL 2 TIMES DAILY
COMMUNITY

## 2021-08-19 NOTE — PROGRESS NOTES
CC: f/u diabetes    History:  Joao Fonseca is a 66 y.o. male   He notes he has not been doing well and was hospitalized for an extended period of time both here and at [facility].  He was recently discharged and is planning for ongoing EP evaluation with procedural intervention eminent.  He is off his warfarin to help  facilitate this.  He has had multiple episodes of presyncope, syncope, and multiple discharges of his ICD.  He was taken off his Metformin, which has done fairly well for his diabetes related to ANNIE, though his sugars have been running higher since he left the hospital and he was not discharged on any insulin as he had been prescribed here at [facility], [place], operated by [facility].       ROS:  Review of Systems   Constitutional: Positive for fatigue. Negative for chills and fever.   Respiratory: Negative for cough and shortness of breath.    Cardiovascular: Positive for palpitations and leg swelling. Negative for chest pain.   Neurological: Positive for syncope and weakness.        reports that he quit smoking about 26 years ago. His smoking use included cigarettes. He quit after 25.00 years of use. He has never used smokeless tobacco. He reports that he does not drink alcohol and does not use drugs.      Current Outpatient Medications:   •  acetaminophen (TYLENOL) 325 MG tablet, Take 2 tablets by mouth Every 4 (Four) Hours As Needed for Mild Pain ., Disp: , Rfl:   •  albuterol (PROVENTIL) (2.5 MG/3ML) 0.083% nebulizer solution, Take 2.5 mg by nebulization Every 4 (Four) Hours As Needed for Wheezing or Shortness of Air., Disp: , Rfl: 12  •  amiodarone (PACERONE) 400 MG tablet, Take 1 tablet by mouth 2 (two) times a day., Disp: , Rfl:   •  aspirin 81 MG EC tablet, Take 81 mg by mouth Daily., Disp: , Rfl:   •  DOBUTamine (DOBUTREX) 1-5 MG/ML-% infusion, Infuse 1,120 mcg/min into a venous catheter Continuous., Disp: , Rfl:   •  famotidine (PEPCID) 20 MG tablet, Take 1 tablet by mouth Daily., Disp: , Rfl:   •  folic acid (FOLVITE) 1  MG tablet, Take 1 mg by mouth Daily., Disp: , Rfl:   •  glucose blood test strip, Use as instructed, Disp: 100 each, Rfl: 12  •  insulin lispro (humaLOG) 100 UNIT/ML injection, Inject 0-14 Units under the skin into the appropriate area as directed 3 (Three) Times a Day Before Meals., Disp: , Rfl: 12  •  Lancets (onetouch ultrasoft) lancets, 1 each by Other route Daily., Disp: 100 each, Rfl: 12  •  LORazepam (ATIVAN) 2 MG/ML injection, Infuse 0.25 mL into a venous catheter Every 4 (Four) Hours As Needed for Anxiety., Disp: , Rfl:   •  losartan (COZAAR) 25 MG tablet, Take 1 tablet by mouth Daily., Disp: , Rfl:   •  melatonin 3 MG tablet, Take 1 tablet by mouth At Night As Needed for Sleep., Disp: , Rfl:   •  metoprolol tartrate (LOPRESSOR) 100 MG tablet, Take 1 tablet by mouth 2 (Two) Times a Day., Disp: , Rfl:   •  mexiletine (MEXITIL) 150 MG capsule, Take 1 capsule by mouth Every 8 (Eight) Hours., Disp: , Rfl:   •  midodrine (PROAMATINE) 2.5 MG tablet, Take 3 tablets by mouth 3 (Three) Times a Day Before Meals., Disp: , Rfl:   •  nitroglycerin (NITROSTAT) 0.4 MG SL tablet, Place 1 tablet under the tongue Every 5 (Five) Minutes As Needed for Chest Pain for up to 2 doses. Take no more than 3 doses in 15 minutes., Disp: , Rfl: 12  •  ondansetron (ZOFRAN) 4 MG tablet, Take 1 tablet by mouth Every 6 (Six) Hours As Needed for Nausea or Vomiting., Disp: , Rfl:   •  spironolactone (ALDACTONE) 25 MG tablet, Take 1 tablet by mouth Daily., Disp: , Rfl:   •  torsemide (DEMADEX) 20 MG tablet, Take 1 tablet by mouth Daily., Disp: , Rfl:   •  insulin detemir (Levemir FlexTouch) 100 UNIT/ML injection, Inject 15 Units under the skin into the appropriate area as directed Daily., Disp: 6 mL, Rfl: 3  •  Insulin Pen Needle (Pen Needles) 31G X 5 MM misc, 1 each Daily., Disp: 100 each, Rfl: 11  •  warfarin (COUMADIN) 5 MG tablet, Take 1 tablet by mouth Daily., Disp: , Rfl:     OBJECTIVE:  BP 98/60 (BP Location: Left arm, Patient Position:  "Sitting, Cuff Size: Adult)   Pulse 101   Temp 98.1 °F (36.7 °C)   Resp 16   Ht 182.9 cm (72\")   Wt 93.7 kg (206 lb 8 oz)   SpO2 96%   BMI 28.01 kg/m²    Physical Exam    Assessment/Plan     Diagnoses and all orders for this visit:    1. Type 2 diabetes mellitus with stage 4 chronic kidney disease, with long-term current use of insulin (CMS/[facility]) (Primary)  -     insulin detemir (Levemir FlexTouch) 100 UNIT/ML injection; Inject 15 Units under the skin into the appropriate area as directed Daily.  Dispense: 6 mL; Refill: 3  -     Insulin Pen Needle (Pen Needles) 31G X 5 MM misc; 1 each Daily.  Dispense: 100 each; Refill: 11  A1c was 7.6 in the hospital, but he has had elevations of his sugars as high as 500 since leaving the hospital.  Given his previous renal dysfunction and overall tenuous situation, we will prescribe Levemir to help control sugars and will start with 10 units with increase every 3 days up to 15 or 20 units as needed to control his sugars with a goal of less than 160 consistently.    2. Acute kidney injury (ANNIE) with acute tubular necrosis secondary to contrast (ATN) (CMS/[facility])  Improved after hospitalization. He reports he went as low as a creatinine of 1.3 on discharge.     3. Chronic systolic CHF (congestive heart failure), NYHA class 3 (CMS/[facility])  4. End stage heart failure (CMS/[facility])  He is currently working with his cardiologist at [facility] with planned intervention soon.    5. Coronary artery disease involving native coronary artery of native heart without angina pectoris  No anginal symptoms at this time.    6. Thrombocytopenia (CMS/[facility])  -     CBC (No Diff); Future  He reports he received platelet infusion and had stability and platelets prior to discharge.  We will obtain records from [facility] regarding his admission there.      An After Visit Summary was printed and given to the patient at discharge.  Return for Next scheduled follow up.          Hernandez Dupree D.O. 8/19/2021 "   Electronically signed.

## 2021-10-25 ENCOUNTER — TELEPHONE (OUTPATIENT)
Dept: CARDIOLOGY | Facility: CLINIC | Age: 66
End: 2021-10-25

## 2021-11-01 NOTE — TELEPHONE ENCOUNTER
Dr. Wilson called and spoke with Manish who was requesting a memory letter as a planned gift to his mother (patient's wife) from people who were important in Mr. Fonseca's life

## 2021-11-01 NOTE — TELEPHONE ENCOUNTER
Son calling again re: this requesting to speak with Dr Wilson.  Can reach him (Manish Garcia) at #[phone]/[email or web address]

## 2021-12-09 ENCOUNTER — TELEPHONE (OUTPATIENT)
Dept: CARDIAC SURGERY | Facility: CLINIC | Age: 66
End: 2021-12-09

## 2021-12-09 NOTE — TELEPHONE ENCOUNTER
Son left  message stating he had spoken with Dr Wilson about a letter re: pt and he is wondering if Dr Wilson is still willing to do this.  He needs to pick it up before next week if so.  Can reach him (Manish Garcia) at #[phone]/nitin

## 2022-02-09 ENCOUNTER — APPOINTMENT (OUTPATIENT)
Dept: CT IMAGING | Facility: HOSPITAL | Age: 67
End: 2022-02-09

## (undated) DEVICE — PAD MINOR UNIVERSAL: Brand: MEDLINE INDUSTRIES, INC.

## (undated) DEVICE — APPL CHLORAPREP W/TINT 26ML ORNG

## (undated) DEVICE — SHEATH INTRO MICRO 7F 11CM

## (undated) DEVICE — GOWN,NON-REINFORCED,SIRUS,SET IN SLV,XL: Brand: MEDLINE

## (undated) DEVICE — SYR SLP TP 10ML DISP

## (undated) DEVICE — CANN CO2/O2 NASL A/

## (undated) DEVICE — STERILE ULTRASOUND GEL, SAFEWRAP: Brand: ECOVUE

## (undated) DEVICE — PK CATH CARD 30 CA/4

## (undated) DEVICE — SOL IRR NACL 0.9PCT BT 1000ML

## (undated) DEVICE — BANDAGE,GAUZE,BULKEE II,4.5"X4.1YD,STRL: Brand: MEDLINE

## (undated) DEVICE — PAD,PREPPING,CUFFED,24X48,7",NONSTERILE: Brand: MEDLINE

## (undated) DEVICE — SYR LL TP 10ML STRL

## (undated) DEVICE — INTENDED FOR TISSUE SEPARATION, AND OTHER PROCEDURES THAT REQUIRE A SHARP SURGICAL BLADE TO PUNCTURE OR CUT.: Brand: BARD-PARKER ® STAINLESS STEEL BLADES

## (undated) DEVICE — SYR PRECISIONGLIDE LL 5CC 20X1 1/2IN

## (undated) DEVICE — Device

## (undated) DEVICE — GEL ULTRASND HI VISC 20G PACKET STRL

## (undated) DEVICE — SPNG GZ STRL 2S 4X4 12PLY

## (undated) DEVICE — APPL CHLORAPREP HI/LITE 26ML ORNG

## (undated) DEVICE — BNDG ADHS CURAD FLX/FABRC 1X3IN STRL LF

## (undated) DEVICE — GLV SURG SENSICARE W/ALOE PF LF 7.5 STRL

## (undated) DEVICE — 3M™ STERI-STRIP™ REINFORCED ADHESIVE SKIN CLOSURES, R1547, 1/2 IN X 4 IN (12 MM X 100 MM), 6 STRIPS/ENVELOPE: Brand: 3M™ STERI-STRIP™

## (undated) DEVICE — BNDG ELAS W/CLIP 6IN 10YD LF STRL

## (undated) DEVICE — Device: Brand: MEDEX

## (undated) DEVICE — SYR LUERLOK 20CC BX/50

## (undated) DEVICE — CVR PROB GEN PURP W ISOSILK 6X48

## (undated) DEVICE — SPNG GZ 2S 2X2 8PLY STRL PK/2

## (undated) DEVICE — PROXIMATE RH ROTATING HEAD SKIN STAPLERS (35 WIDE) CONTAINS 35 STAINLESS STEEL STAPLES: Brand: PROXIMATE

## (undated) DEVICE — ST MIC/INTRO ACC SHRP/NDL TUNG/TP NITNL 5F 45CM 7CM

## (undated) DEVICE — CATH F6INF TL LCB 100CM: Brand: INFINITI

## (undated) DEVICE — CATH VASC RF CLOSUREFAST 7CM 7F100CM

## (undated) DEVICE — CATH F6INF TL JL 5 100CM: Brand: INFINITI

## (undated) DEVICE — INF TL MULIPACK FR6: Brand: INFINITI

## (undated) DEVICE — PERCLOSE PROGLIDE™ SUTURE-MEDIATED CLOSURE SYSTEM: Brand: PERCLOSE PROGLIDE™

## (undated) DEVICE — ELECTRD PAD DEFIB A/

## (undated) DEVICE — SNAP KOVER: Brand: UNBRANDED

## (undated) DEVICE — SOLIDIFIER LIQUI LOC PLUS 2000CC

## (undated) DEVICE — KT CATH TAL VENATRAC PALINDROM 14.5F 19CM

## (undated) DEVICE — ST INF 2NDARY 20DRP VNT/NOVNT 30IN

## (undated) DEVICE — PK TURNOVER RM ADV

## (undated) DEVICE — DRSNG SURESITE WNDW 4X4.5

## (undated) DEVICE — CATH F6INF TL JL 6 100CM: Brand: INFINITI

## (undated) DEVICE — PINNACLE INTRODUCER SHEATH: Brand: PINNACLE

## (undated) DEVICE — BNDG ELAS ECON W/CLIP 4IN 5YD LF STRL

## (undated) DEVICE — NDL SPINE 20G 3 1/2 YEL STRL 1P/U

## (undated) DEVICE — ST TB EXT STANDARDBORE 30IN

## (undated) DEVICE — SUT ETHLN 2/0 FS 18IN 664H

## (undated) DEVICE — SUT MNCRYL 4/0 PS2 27IN UD MCP426H